# Patient Record
Sex: FEMALE | Race: WHITE | Employment: OTHER | URBAN - METROPOLITAN AREA
[De-identification: names, ages, dates, MRNs, and addresses within clinical notes are randomized per-mention and may not be internally consistent; named-entity substitution may affect disease eponyms.]

---

## 2017-01-05 ENCOUNTER — OFFICE VISIT (OUTPATIENT)
Dept: INTERNAL MEDICINE CLINIC | Age: 82
End: 2017-01-05

## 2017-01-05 VITALS
HEART RATE: 80 BPM | SYSTOLIC BLOOD PRESSURE: 168 MMHG | OXYGEN SATURATION: 98 % | WEIGHT: 178 LBS | RESPIRATION RATE: 16 BRPM | BODY MASS INDEX: 31.54 KG/M2 | HEIGHT: 63 IN | TEMPERATURE: 98.4 F | DIASTOLIC BLOOD PRESSURE: 84 MMHG

## 2017-01-05 DIAGNOSIS — J01.00 ACUTE MAXILLARY SINUSITIS, RECURRENCE NOT SPECIFIED: ICD-10-CM

## 2017-01-05 DIAGNOSIS — H26.9 CATARACT: Primary | ICD-10-CM

## 2017-01-05 DIAGNOSIS — R14.3 EXCESSIVE FLATUS: ICD-10-CM

## 2017-01-05 DIAGNOSIS — H61.21 EXCESSIVE CERUMEN IN EAR CANAL, RIGHT: ICD-10-CM

## 2017-01-05 DIAGNOSIS — Z01.818 PREOP GENERAL PHYSICAL EXAM: ICD-10-CM

## 2017-01-05 DIAGNOSIS — I10 ESSENTIAL HYPERTENSION: ICD-10-CM

## 2017-01-05 RX ORDER — AZITHROMYCIN 250 MG/1
250 TABLET, FILM COATED ORAL SEE ADMIN INSTRUCTIONS
Qty: 6 TAB | Refills: 0 | Status: SHIPPED | OUTPATIENT
Start: 2017-01-05 | End: 2017-01-10

## 2017-01-05 RX ORDER — SIMETHICONE 80 MG
80 TABLET,CHEWABLE ORAL
Qty: 30 TAB | Refills: 0
Start: 2017-01-05 | End: 2017-02-22 | Stop reason: ALTCHOICE

## 2017-01-05 NOTE — PROGRESS NOTES
Have you been to the ER or urgent care clinic since your last visit? No     Have you been hospitalized since your last visit? No     Have you been seen or consulted any other health care provider outside of [facility] since your last visit (including pap smears, colonoscopy screening)?    No

## 2017-01-05 NOTE — PATIENT INSTRUCTIONS
Cataract Surgery: Before Your Surgery  What is cataract surgery? Cataracts are cloudy areas in the lens of your eye. Your lens is behind the colored part of your eye (iris). Its job is to focus light onto the back of your eye. In some people, cataracts prevent light from reaching the back of the eye. This can cause vision problems. Cataract surgery helps you see better. It replaces your natural lens, which has become cloudy, with a clear artificial one. There are two types of cataract surgery. Phacoemulsification (say \"dbfs-yr-gd-wmu-anr-pnk-RUTH-shun\") is the most common type. The doctor makes a small cut in your eye. This cut is called an incision. The doctor uses a special ultrasound tool to break your cloudy lens apart. Sometimes a laser is used too. Then he or she removes the small pieces of the lens through the incision. In most cases, the doctor then inserts an artificial lens through the incision. Most people do not need stitches, because the incision is so small. If the doctor is not able to put in an artificial lens, you can wear a contact lens or thick glasses in place of your natural lens. Extracapsular extraction is a less common type of cataract surgery. The doctor makes a larger incision to remove the whole lens at once. After the doctor removes the lens, he or she stitches up the incision. Recovery from this type of surgery takes longer. Before either surgery, the doctor puts numbing drops in your eye. Some doctors use a shot instead. You may also get medicine to make you feel relaxed. You probably will not feel much pain. The surgery takes about 20 to 40 minutes. After surgery, you may have a bandage or shield on your eye. You will probably go home from surgery after 1 hour in the recovery room. Most people see better in 1 to 3 days. You may be able to go back to work or your normal routine in a few days.  It could take 3 to 10 weeks for your eye to completely heal. After your eye heals, you may still need to wear glasses, especially for reading. Follow-up care is a key part of your treatment and safety. Be sure to make and go to all appointments, and call your doctor if you are having problems. It's also a good idea to know your test results and keep a list of the medicines you take. What happens before surgery? Surgery can be stressful. This information will help you understand what you can expect. And it will help you safely prepare for surgery. Preparing for surgery  · Understand exactly what surgery is planned, along with the risks, benefits, and other options. · Tell your doctors ALL the medicines, vitamins, supplements, and herbal remedies you take. Some of these can increase the risk of bleeding or interact with anesthesia. · If you take blood thinners, such as warfarin (Coumadin), clopidogrel (Plavix), or aspirin, be sure to talk to your doctor. He or she will tell you if you should stop taking these medicines before your surgery. Make sure that you understand exactly what your doctor wants you to do. · Your doctor will tell you which medicines to take or stop before your surgery. You may need to stop taking certain medicines a week or more before surgery. So talk to your doctor as soon as you can. · If you have an advance directive, let your doctor know. It may include a living will and a durable power of  for health care. Bring a copy to the hospital. If you don't have one, you may want to prepare one. It lets your doctor and loved ones know your health care wishes. Doctors advise that everyone prepare these papers before any type of surgery or procedure. What happens on the day of surgery? · Follow the instructions exactly about when to stop eating and drinking. If you don't, your surgery may be canceled. If your doctor told you to take your medicines on the day of surgery, take them with only a sip of water. · Take a bath or shower before you come in for your surgery. Do not apply lotions, perfumes, deodorants, or nail polish. · Take off all jewelry and piercings. And take out contact lenses, if you wear them. At the hospital or surgery center  · Bring a picture ID. · The area for surgery is often marked to make sure there are no errors. · You will be kept comfortable and safe by your anesthesia provider. The anesthesia may make you sleep. Or it may just numb the area being worked on. · The surgery will take about 20 to 40 minutes. Going home  · Be sure you have someone to drive you home. Anesthesia and pain medicine make it unsafe for you to drive. · You will be given more specific instructions about recovering from your surgery. They will cover things like diet, wound care, follow-up care, driving, and getting back to your normal routine. · You may have a bandage or patch over your eye. You may also have a clear shield over your eye. This prevents you from rubbing it. When should you call your doctor? · You have questions or concerns. · You don't understand how to prepare for your surgery. · You become ill before the surgery (such as fever, flu, or a cold). · You need to reschedule or have changed your mind about having the surgery. Where can you learn more? Go to http://jonatan-elvis.info/. Enter K474 in the search box to learn more about \"Cataract Surgery: Before Your Surgery. \"  Current as of: May 23, 2016  Content Version: 11.1  © 8710-8401 [employer]. Care instructions adapted under license by [employer] (which disclaims liability or warranty for this information). If you have questions about a medical condition or this instruction, always ask your healthcare professional. [place] [address] any warranty or liability for your use of this information. Gas and Bloating: Care Instructions  Your Care Instructions  Gas and bloating can be uncomfortable and embarrassing problems.  All people pass gas, but some people produce more gas than others, sometimes enough to cause distress. It is normal to pass gas from 6 to 20 times per day. Excess gas usually is not caused by a serious health problem. Gas and bloating usually are caused by something you eat or drink, including some food supplements and medicines. Gas and bloating are usually harmless and go away without treatment. However, changing your diet can help end the problem. Some over-the-counter medicines can help prevent gas and relieve bloating. Follow-up care is a key part of your treatment and safety. Be sure to make and go to all appointments, and call your doctor if you are having problems. Its also a good idea to know your test results and keep a list of the medicines you take. How can you care for yourself at home? · Keep a food diary if you think a food gives you gas. Write down what you eat or drink. Also record when you get gas. If you notice that a food seems to cause your gas each time, avoid it and see if the gas goes away. Examples of foods that cause gas include:  ¨ Fried and fatty foods. ¨ Beans. ¨ Vegetables such as artichokes, asparagus, broccoli, brussels sprouts, cabbage, cauliflower, cucumbers, green peppers, onions, peas, radishes, and raw potatoes. ¨ Fruits such as apricots, bananas, melons, peaches, pears, prunes, and raw apples. ¨ Wheat and wheat bran. · Soak dry beans in water overnight, then dump the water and cook the soaked beans in new water. This can help prevent gas and bloating. · If you have problems with lactose, avoid dairy products such as milk and cheese. · Try not to swallow air. Do not drink through a straw, gulp your food, or chew gum. · Take an over-the-counter medicine. Read and follow all instructions on the label. ¨ Food enzymes, such as Beano, can be added to gas-producing foods to prevent gas.   ¨ Antacids, such as Maalox Anti-Gas and Mylanta Gas, can relieve bloating by making you burp.  ¨ Activated charcoal tablets, such as CharcoCaps, may decrease odor from gas you pass. ¨ If you have problems with lactose, you can take medicines such as Dairy Ease and Lactaid with dairy products to prevent gas and bloating. · Get some exercise regularly. When should you call for help? Call 911 anytime you think you may need emergency care. For example, call if:  · You have gas and signs of a heart attack, such as:  ¨ Chest pain or pressure. ¨ Sweating. ¨ Shortness of breath. ¨ Nausea or vomiting. ¨ Pain that spreads from the chest to the neck, jaw, or one or both shoulders or arms. ¨ Dizziness or lightheadedness. ¨ A fast or uneven pulse. After calling 911, chew 1 adult-strength aspirin. Wait for an ambulance. Do not try to drive yourself. Call your doctor now or seek immediate medical care if:  · You have severe belly pain. · You have blood in your stool. Watch closely for changes in your health, and be sure to contact your doctor if:  · You have blood or pus in your urine. · Your urine is cloudy or smells bad. · You are burping and have trouble swallowing. · You feel bloated and have swelling in your belly. · You do not get better as expected. Where can you learn more? Go to http://jonatan-elvis.info/. Enter O474 in the search box to learn more about \"Gas and Bloating: Care Instructions. \"  Current as of: May 27, 2016  Content Version: 11.1  © 6716-2970 [employer]. Care instructions adapted under license by [employer] (which disclaims liability or warranty for this information). If you have questions about a medical condition or this instruction, always ask your healthcare professional. [place] [address] any warranty or liability for your use of this information.

## 2017-01-05 NOTE — MR AVS SNAPSHOT
Visit Information Date & Time Provider Department Dept. Phone Encounter #  
 1/5/2017  2:00 PM Jayro Mccullough NP [facility] [id number] Your Appointments 6/13/2017 11:40 AM  
ESTABLISHED PATIENT with Rafael Segundo MD  
[facility] ([facility]) Appt Note: annual  
 [address] [facility] [place] [address] [place] [address] Road  
54 [facility] [address] [address] Upcoming Health Maintenance Date Due DTaP/Tdap/Td series (1 - Tdap) 12/30/1956 ZOSTER VACCINE AGE 60> 12/30/1996 Pneumococcal 65+ Low/Medium Risk (2 of 2 - PPSV23) 5/20/2014 COLONOSCOPY 1/1/2016 GLAUCOMA SCREENING Q2Y 4/1/2016 INFLUENZA AGE 9 TO ADULT 8/1/2016 MEDICARE YEARLY EXAM 11/12/2016 Allergies as of 1/5/2017  Review Complete On: 1/5/2017 By: Jayro Mccullough NP Severity Noted Reaction Type Reactions Ceclor [Cefaclor]  03/31/2009    Other (comments) Pain flank, abd  
 Cleocin [Clindamycin Hcl]  03/31/2009    Other (comments) Flank, abd pain Codeine  03/31/2009    Nausea and Vomiting Compazine [Prochlorperazine]  03/31/2009    Other (comments) Hyper and anxious Fosamax [Alendronate]  03/31/2009    Other (comments) Indigestion Macrobid [Nitrofurantoin Monohyd/m-cryst]  12/06/2012   Systemic Swelling, Other (comments)  
 wheezing Pcn [Penicillins]  03/31/2009    Rash Phenobarbital  03/31/2009    Other (comments)  
 anxious Sulfa (Sulfonamide Antibiotics)  03/31/2009    Rash Headache Current Immunizations  Reviewed on 4/11/2011 Name Date Influenza Vaccine (Whole Virus) 1/1/2013 Influenza Vaccine Split 11/17/2010  8:33 AM  
 Influenza Vaccine Whole 4/11/2010 PPD 3/23/2011 12:57 PM  
 Pneumococcal Vaccine (Unspecified Type) 5/20/2009 Not reviewed this visit You Were Diagnosed With   
  
 Codes Comments Cataract    -  Primary ICD-10-CM: H26.9 ICD-9-CM: 366.9 Preop general physical exam     ICD-10-CM: T67.681 ICD-9-CM: V72.83 Acute maxillary sinusitis, recurrence not specified     ICD-10-CM: J01.00 ICD-9-CM: 461.0 Essential hypertension     ICD-10-CM: I10 
ICD-9-CM: 401.9 Excessive flatus     ICD-10-CM: R14.3 ICD-9-CM: 787.3 Excessive cerumen in ear canal, right     ICD-10-CM: H61.21 ICD-9-CM: 380.4 Vitals BP Pulse Temp Resp Height(growth percentile) Weight(growth percentile) 168/84 (BP 1 Location: Left arm, BP Patient Position: Sitting) 80 98.4 °F (36.9 °C) (Oral) 16 5' 3\" (1.6 m) 178 lb (80.7 kg) SpO2 BMI OB Status Smoking Status 98% 31.53 kg/m2 Postmenopausal Former Smoker BMI and BSA Data Body Mass Index Body Surface Area  
 31.53 kg/m 2 1.89 m 2 Preferred Pharmacy Pharmacy Name Phone [id number] [address], [address] [phone] Your Updated Medication List  
  
   
This list is accurate as of: 1/5/17  3:04 PM.  Always use your most recent med list.  
  
  
  
  
 aspirin delayed-release 81 mg tablet  
take 162 mg by mouth daily. atenolol 25 mg tablet Commonly known as:  TENORMIN Take 1 Tab by mouth daily. azithromycin 250 mg tablet Commonly known as:  Ocasio Congress Take 1 Tab by mouth See Admin Instructions for 5 days. COPPER GLUCONATE PO Take  by mouth. diazePAM 5 mg tablet Commonly known as:  VALIUM Take 0.5 Tabs by mouth every six (6) hours as needed for Anxiety. hydroCHLOROthiazide 25 mg tablet Commonly known as:  HYDRODIURIL Take 1 Tab by mouth daily. OTHER  
  
 PRESERVISION LUTEIN 226 mg-200 unit -5 mg-0.8 mg Cap Generic drug:  Vit C-Vit E-Copper-ZnOx-Lutein Take  by mouth. simethicone 80 mg chewable tablet Commonly known as:  Marrifrancisco Smooth Take 1 Tab by mouth every six (6) hours as needed for Flatulence. TYLENOL EXTRA STRENGTH 500 mg tablet Generic drug:  acetaminophen Take  by mouth every six (6) hours as needed for Pain. VITAMIN D3 1,000 unit Cap Generic drug:  cholecalciferol Take 2,000 Int'l Units by mouth daily. Prescriptions Sent to Pharmacy Refills  
 azithromycin (ZITHROMAX) 250 mg tablet 0 Sig: Take 1 Tab by mouth See Admin Instructions for 5 days. Class: Normal  
 Pharmacy: [address] [place] [address] #: [phone] Route: Oral  
  
We Performed the Following [place] REMOVAL IMPACTED CERUMEN IRRIGATION/LVG UNILAT [19514 CPT(R)] Patient Instructions Cataract Surgery: Before Your Surgery What is cataract surgery? Cataracts are cloudy areas in the lens of your eye. Your lens is behind the colored part of your eye (iris). Its job is to focus light onto the back of your eye. In some people, cataracts prevent light from reaching the back of the eye. This can cause vision problems. Cataract surgery helps you see better. It replaces your natural lens, which has become cloudy, with a clear artificial one. There are two types of cataract surgery. Phacoemulsification (say \"iipv-yf-je-ong-ybc-cwp-RUTH-shun\") is the most common type. The doctor makes a small cut in your eye. This cut is called an incision. The doctor uses a special ultrasound tool to break your cloudy lens apart. Sometimes a laser is used too. Then he or she removes the small pieces of the lens through the incision. In most cases, the doctor then inserts an artificial lens through the incision. Most people do not need stitches, because the incision is so small. If the doctor is not able to put in an artificial lens, you can wear a contact lens or thick glasses in place of your natural lens. Extracapsular extraction is a less common type of cataract surgery. The doctor makes a larger incision to remove the whole lens at once.  After the doctor removes the lens, he or she stitches up the incision. Recovery from this type of surgery takes longer. Before either surgery, the doctor puts numbing drops in your eye. Some doctors use a shot instead. You may also get medicine to make you feel relaxed. You probably will not feel much pain. The surgery takes about 20 to 40 minutes. After surgery, you may have a bandage or shield on your eye. You will probably go home from surgery after 1 hour in the recovery room. Most people see better in 1 to 3 days. You may be able to go back to work or your normal routine in a few days. It could take 3 to 10 weeks for your eye to completely heal. After your eye heals, you may still need to wear glasses, especially for reading. Follow-up care is a key part of your treatment and safety. Be sure to make and go to all appointments, and call your doctor if you are having problems. It's also a good idea to know your test results and keep a list of the medicines you take. What happens before surgery? Surgery can be stressful. This information will help you understand what you can expect. And it will help you safely prepare for surgery. Preparing for surgery · Understand exactly what surgery is planned, along with the risks, benefits, and other options. · Tell your doctors ALL the medicines, vitamins, supplements, and herbal remedies you take. Some of these can increase the risk of bleeding or interact with anesthesia. · If you take blood thinners, such as warfarin (Coumadin), clopidogrel (Plavix), or aspirin, be sure to talk to your doctor. He or she will tell you if you should stop taking these medicines before your surgery. Make sure that you understand exactly what your doctor wants you to do. · Your doctor will tell you which medicines to take or stop before your surgery. You may need to stop taking certain medicines a week or more before surgery. So talk to your doctor as soon as you can. · If you have an advance directive, let your doctor know. It may include a living will and a durable power of  for health care. Bring a copy to the hospital. If you don't have one, you may want to prepare one. It lets your doctor and loved ones know your health care wishes. Doctors advise that everyone prepare these papers before any type of surgery or procedure. What happens on the day of surgery? · Follow the instructions exactly about when to stop eating and drinking. If you don't, your surgery may be canceled. If your doctor told you to take your medicines on the day of surgery, take them with only a sip of water. · Take a bath or shower before you come in for your surgery. Do not apply lotions, perfumes, deodorants, or nail polish. · Take off all jewelry and piercings. And take out contact lenses, if you wear them. At the hospital or surgery center · Bring a picture ID. · The area for surgery is often marked to make sure there are no errors. · You will be kept comfortable and safe by your anesthesia provider. The anesthesia may make you sleep. Or it may just numb the area being worked on. · The surgery will take about 20 to 40 minutes. Going home · Be sure you have someone to drive you home. Anesthesia and pain medicine make it unsafe for you to drive. · You will be given more specific instructions about recovering from your surgery. They will cover things like diet, wound care, follow-up care, driving, and getting back to your normal routine. · You may have a bandage or patch over your eye. You may also have a clear shield over your eye. This prevents you from rubbing it. When should you call your doctor? · You have questions or concerns. · You don't understand how to prepare for your surgery. · You become ill before the surgery (such as fever, flu, or a cold). · You need to reschedule or have changed your mind about having the surgery. Where can you learn more? Go to http://jonatan-elvis.info/. Enter K474 in the search box to learn more about \"Cataract Surgery: Before Your Surgery. \" Current as of: May 23, 2016 Content Version: 11.1 © 8297-0140 [employer]. Care instructions adapted under license by [employer] (which disclaims liability or warranty for this information). If you have questions about a medical condition or this instruction, always ask your healthcare professional. [address] any warranty or liability for your use of this information. Gas and Bloating: Care Instructions Your Care Instructions Gas and bloating can be uncomfortable and embarrassing problems. All people pass gas, but some people produce more gas than others, sometimes enough to cause distress. It is normal to pass gas from 6 to 20 times per day. Excess gas usually is not caused by a serious health problem. Gas and bloating usually are caused by something you eat or drink, including some food supplements and medicines. Gas and bloating are usually harmless and go away without treatment. However, changing your diet can help end the problem. Some over-the-counter medicines can help prevent gas and relieve bloating. Follow-up care is a key part of your treatment and safety. Be sure to make and go to all appointments, and call your doctor if you are having problems. Its also a good idea to know your test results and keep a list of the medicines you take. How can you care for yourself at home? · Keep a food diary if you think a food gives you gas. Write down what you eat or drink. Also record when you get gas. If you notice that a food seems to cause your gas each time, avoid it and see if the gas goes away. Examples of foods that cause gas include: ¨ Fried and fatty foods. ¨ Beans.  
¨ Vegetables such as artichokes, asparagus, broccoli, brussels sprouts, cabbage, cauliflower, cucumbers, green peppers, onions, peas, radishes, and raw potatoes. ¨ Fruits such as apricots, bananas, melons, peaches, pears, prunes, and raw apples. ¨ Wheat and wheat bran. · Soak dry beans in water overnight, then dump the water and cook the soaked beans in new water. This can help prevent gas and bloating. · If you have problems with lactose, avoid dairy products such as milk and cheese. · Try not to swallow air. Do not drink through a straw, gulp your food, or chew gum. · Take an over-the-counter medicine. Read and follow all instructions on the label. ¨ Food enzymes, such as Beano, can be added to gas-producing foods to prevent gas. ¨ Antacids, such as Maalox Anti-Gas and Mylanta Gas, can relieve bloating by making you burp. ¨ Activated charcoal tablets, such as CharcoCaps, may decrease odor from gas you pass. ¨ If you have problems with lactose, you can take medicines such as Dairy Ease and Lactaid with dairy products to prevent gas and bloating. · Get some exercise regularly. When should you call for help? Call 911 anytime you think you may need emergency care. For example, call if: 
· You have gas and signs of a heart attack, such as: ¨ Chest pain or pressure. ¨ Sweating. ¨ Shortness of breath. ¨ Nausea or vomiting. ¨ Pain that spreads from the chest to the neck, jaw, or one or both shoulders or arms. ¨ Dizziness or lightheadedness. ¨ A fast or uneven pulse. After calling 911, chew 1 adult-strength aspirin. Wait for an ambulance. Do not try to drive yourself. Call your doctor now or seek immediate medical care if: 
· You have severe belly pain. · You have blood in your stool. Watch closely for changes in your health, and be sure to contact your doctor if: 
· You have blood or pus in your urine. · Your urine is cloudy or smells bad. · You are burping and have trouble swallowing. · You feel bloated and have swelling in your belly. · You do not get better as expected. Where can you learn more? Go to http://jonatan-elvis.info/. Enter P513 in the search box to learn more about \"Gas and Bloating: Care Instructions. \" Current as of: May 27, 2016 Content Version: 11.1 © 3095-7368 [employer]. Care instructions adapted under license by [employer] (which disclaims liability or warranty for this information). If you have questions about a medical condition or this instruction, always ask your healthcare professional. [address] any warranty or liability for your use of this information. Introducing [facility] & HE[facility]! [facility] introduces [employer] patient portal. Now you can access parts of your medical record, email your doctor's office, and request medication refills online. 1. In your internet browser, go to [email or web address] 2. Click on the First Time User? Click Here link in the Sign In box. You will see the New Member Sign Up page. 3. Enter your [employer] Access Code exactly as it appears below. You will not need to use this code after youve completed the sign-up process. If you do not sign up before the expiration date, you must request a new code. · [employer] Access Code: [email or web address] Expires: 4/5/2017  3:04 PM 
 
4. Enter the last four digits of your Social Security Number (xxxx) and Date of Birth (mm/dd/yyyy) as indicated and click Submit. You will be taken to the next sign-up page. 5. Create a DCITSt ID. This will be your [employer] login ID and cannot be changed, so think of one that is secure and easy to remember. 6. Create a [employer] password. You can change your password at any time. 7. Enter your Password Reset Question and Answer. This can be used at a later time if you forget your password. 8. Enter your e-mail address. You will receive e-mail notification when new information is available in [address] 9. Click Sign Up. You can now view and download portions of your medical record. 10. Click the Download Summary menu link to download a portable copy of your medical information. If you have questions, please visit the Frequently Asked Questions section of the August website. Remember, August is NOT to be used for urgent needs. For medical emergencies, dial 911. Now available from your iPhone and Android! Please provide this summary of care documentation to your next provider. Your primary care clinician is listed as Hina Honeycutt. If you have any questions after today's visit, please call [phone].

## 2017-01-05 NOTE — PROGRESS NOTES
HISTORY OF PRESENT ILLNESS  Tamela Bergman is a [de-identified] y.o. female. HPI  Tamela Bergman was referred for evaluation by:Dr. Don Gonzalez for Pre- Op Evaluation. Please see encounter details and orders for consultative summary. Type of surgery : Cataract extraction with lens implant  Surgery site : Left eye  Anesthesia type: Regional block  Date of procedure:  1/18/2017    Complains of sinus pain and pressure for the past week; began with sore throat, right ear fullness and has developed thick post nasal drainage with foul taste in mouth. Has had mild dry cough; denies fever, chills. Has not taken any OTC medications for current symptoms. Complains of excessive flatus for the past month; denies eating unusual foods or foods that produce a lot of gas; has been under more stress with recent move into apartment and has not been eating as regularly. Denies constipation but feels her bowel habits have not been as regular as usual.  No abdominal pain, nausea or vomiting. Allergies: Allergies   Allergen Reactions    Ceclor [Cefaclor] Other (comments)     Pain flank, abd      Cleocin [Clindamycin Hcl] Other (comments)     Flank, abd pain    Codeine Nausea and Vomiting    Compazine [Prochlorperazine] Other (comments)     Hyper and anxious    Fosamax [Alendronate] Other (comments)     Indigestion      Macrobid [Nitrofurantoin Monohyd/M-Cryst] Swelling and Other (comments)     wheezing    Pcn [Penicillins] Rash    Phenobarbital Other (comments)     anxious    Sulfa (Sulfonamide Antibiotics) Rash     Headache       Latex allergy: no  Prior reactions to anesthesia:  None    Functional status:  she is able to ambulate up 2 flights of step without shortness of breath, chest pain  Prior cardiac evaluation:  Stress echo normal in 2015        Current Outpatient Prescriptions   Medication Sig    azithromycin (ZITHROMAX) 250 mg tablet Take 1 Tab by mouth See Admin Instructions for 5 days.     simethicone (MYLICON) 80 mg chewable tablet Take 1 Tab by mouth every six (6) hours as needed for Flatulence.  atenolol (TENORMIN) 25 mg tablet Take 1 Tab by mouth daily.  hydrochlorothiazide (HYDRODIURIL) 25 mg tablet Take 1 Tab by mouth daily.  Cholecalciferol, Vitamin D3, (VITAMIN D3) 1,000 unit cap Take 2,000 Int'l Units by mouth daily.  acetaminophen (TYLENOL EXTRA STRENGTH) 500 mg tablet Take  by mouth every six (6) hours as needed for Pain.  OTHER      Vit C-Vit E-Copper-ZnOx-Lutein (PRESERVISION) 226-200-5 mg-unit-mg Cap Take  by mouth.  COPPER GLUCONATE PO Take  by mouth.  aspirin delayed-release 81 mg tablet take 162 mg by mouth daily.  diazepam (VALIUM) 5 mg tablet Take 0.5 Tabs by mouth every six (6) hours as needed for Anxiety. No current facility-administered medications for this visit. Past Medical History   Diagnosis Date    Hypertension     Kidney stones 11/17/2010    Macular degeneration     Palpitation     Seizures ([facility] [address]      since 1992    Sleep apnea 11/16/2010     uses cpap    TIA (transient ischemic attack) 2003,2009,2011     tia's x 3  (she says it was related to estrogen use)     Past Surgical History   Procedure Laterality Date    Endoscopy, colon, diagnostic       2006 neg    Hx cholecystectomy       20 years ago with adhesions found    Hx abdominal wall defect repair       biliary stones and ercp for pancreatitis    Hx gyn       anastasiia endometriosis    Hx hysterectomy  1972    Hx tonsillectomy       x 2     Social History   Substance Use Topics    Smoking status: Former Smoker     Years: 2.00     Quit date: 1/1/1965    Smokeless tobacco: Never Used      Comment: stopped in her 25s    Alcohol use 3.5 oz/week     7 Glasses of wine per week       Blood pressure 168/84, pulse 80, temperature 98.4 °F (36.9 °C), temperature source Oral, resp. rate 16, height 5' 3\" (1.6 m), weight 178 lb (80.7 kg), SpO2 98 %.     Blood pressure rechecked in office 140/84    Review of Systems   Constitutional: Positive for malaise/fatigue. Negative for chills and fever. HENT: Positive for congestion, ear pain, hearing loss and sore throat. Eyes: Positive for blurred vision. Respiratory: Positive for cough. Negative for sputum production and shortness of breath. Cardiovascular: Negative for chest pain and palpitations. Gastrointestinal: Negative for abdominal pain, blood in stool, diarrhea, nausea and vomiting. Genitourinary: Negative for dysuria and frequency. Musculoskeletal: Negative for myalgias. Skin: Negative for rash. Neurological: Positive for headaches. Negative for dizziness and tingling. Physical Exam   Constitutional: She is oriented to person, place, and time. She appears well-developed and well-nourished. HENT:   Head: Normocephalic and atraumatic. Right Ear: External ear normal.   Left Ear: External ear normal.   Nose: Mucosal edema present. Right sinus exhibits maxillary sinus tenderness. Left sinus exhibits maxillary sinus tenderness. Mouth/Throat: Posterior oropharyngeal erythema present. No oropharyngeal exudate. Right ear canal occluded with cerumen   Neck: Normal range of motion. Neck supple. No thyromegaly present. Cardiovascular: Normal rate and regular rhythm. Pulmonary/Chest: Effort normal and breath sounds normal. She has no wheezes. Abdominal: Soft. Bowel sounds are normal. There is no tenderness. There is no rebound. Musculoskeletal: Normal range of motion. She exhibits no edema. Lymphadenopathy:     She has no cervical adenopathy. Neurological: She is alert and oriented to person, place, and time. Psychiatric: She has a normal mood and affect. Her behavior is normal.   Nursing note and vitals reviewed. ASSESSMENT and PLAN  Winnie Rodriguez was seen today for pre-op exam and pressure behind the eyes.     Diagnoses and all orders for this visit:    Cataract    Preop general physical exam -- considered medically stable to undergo Left cataract extraction surgery under regional anesthesia    Acute maxillary sinusitis, recurrence not specified  -     azithromycin (ZITHROMAX) 250 mg tablet; Take 1 Tab by mouth See Admin Instructions for 5 days. Essential hypertension --stable on recheck    Excessive flatus  -     simethicone (MYLICON) 80 mg chewable tablet; Take 1 Tab by mouth every six (6) hours as needed for Flatulence. Excessive cerumen in ear canal, right -- large amount of cerumen removed via irrigation; patient tolerated well.   -     RI REMOVAL IMPACTED CERUMEN IRRIGATION/LVG UNILAT      lab results and schedule of future lab studies reviewed with patient  reviewed diet, exercise and weight control  reviewed medications and side effects in detail

## 2017-01-12 ENCOUNTER — OFFICE VISIT (OUTPATIENT)
Dept: INTERNAL MEDICINE CLINIC | Age: 82
End: 2017-01-12

## 2017-01-12 ENCOUNTER — HOSPITAL ENCOUNTER (OUTPATIENT)
Dept: GENERAL RADIOLOGY | Age: 82
Discharge: HOME | End: 2017-01-12
Attending: NURSE PRACTITIONER
Payer: MEDICARE

## 2017-01-12 VITALS
BODY MASS INDEX: 31.01 KG/M2 | TEMPERATURE: 98.6 F | SYSTOLIC BLOOD PRESSURE: 146 MMHG | OXYGEN SATURATION: 98 % | WEIGHT: 175 LBS | RESPIRATION RATE: 16 BRPM | HEART RATE: 74 BPM | HEIGHT: 63 IN | DIASTOLIC BLOOD PRESSURE: 78 MMHG

## 2017-01-12 DIAGNOSIS — J98.01 BRONCHOSPASM: ICD-10-CM

## 2017-01-12 DIAGNOSIS — J40 BRONCHITIS: ICD-10-CM

## 2017-01-12 DIAGNOSIS — R10.9 FLANK PAIN: Primary | ICD-10-CM

## 2017-01-12 LAB
BILIRUB UR QL STRIP: NEGATIVE
GLUCOSE UR-MCNC: NEGATIVE MG/DL
KETONES P FAST UR STRIP-MCNC: NEGATIVE MG/DL
PH UR STRIP: 7 [PH] (ref 4.6–8)
PROT UR QL STRIP: NEGATIVE MG/DL
SP GR UR STRIP: 1.02 (ref 1–1.03)
UA UROBILINOGEN AMB POC: NORMAL (ref 0.2–1)
URINALYSIS CLARITY POC: CLEAR
URINALYSIS COLOR POC: YELLOW
URINE BLOOD POC: NORMAL
URINE LEUKOCYTES POC: NEGATIVE
URINE NITRITES POC: NEGATIVE

## 2017-01-12 PROCEDURE — 71020 XR CHEST PA LAT: CPT

## 2017-01-12 RX ORDER — LEVALBUTEROL INHALATION SOLUTION 1.25 MG/3ML
1.25 SOLUTION RESPIRATORY (INHALATION)
Qty: 3 ML | Refills: 0
Start: 2017-01-12 | End: 2017-01-12

## 2017-01-12 RX ORDER — DOXYCYCLINE 100 MG/1
100 TABLET ORAL 2 TIMES DAILY
Qty: 14 TAB | Refills: 0 | Status: SHIPPED | OUTPATIENT
Start: 2017-01-12 | End: 2017-02-22 | Stop reason: ALTCHOICE

## 2017-01-12 RX ORDER — ALBUTEROL SULFATE 90 UG/1
2 AEROSOL, METERED RESPIRATORY (INHALATION)
Qty: 1 INHALER | Refills: 0 | Status: SHIPPED | OUTPATIENT
Start: 2017-01-12 | End: 2017-02-22 | Stop reason: ALTCHOICE

## 2017-01-12 NOTE — MR AVS SNAPSHOT
Visit Information Date & Time Provider Department Dept. Phone Encounter #  
 1/12/2017  9:40 AM Genesis Rubio NP [facility] [address] [facility] [id number] Your Appointments 6/13/2017 11:40 AM  
ESTABLISHED PATIENT with Su Morton MD  
[facility] ([address]) Appt Note: annual  
 [address] [facility] [place] [address] [facility]  
[address] [facility] [address] [address] Upcoming Health Maintenance Date Due DTaP/Tdap/Td series (1 - Tdap) 12/30/1956 ZOSTER VACCINE AGE 60> 12/30/1996 Pneumococcal 65+ Low/Medium Risk (2 of 2 - PPSV23) 5/20/2014 COLONOSCOPY 1/1/2016 GLAUCOMA SCREENING Q2Y 4/1/2016 INFLUENZA AGE 9 TO ADULT 8/1/2016 MEDICARE YEARLY EXAM 11/12/2016 Allergies as of 1/12/2017  Review Complete On: 1/12/2017 By: Genesis Rubio NP Severity Noted Reaction Type Reactions Ceclor [Cefaclor]  03/31/2009    Other (comments) Pain flank, abd  
 Cleocin [Clindamycin Hcl]  03/31/2009    Other (comments) Flank, abd pain Codeine  03/31/2009    Nausea and Vomiting Compazine [Prochlorperazine]  03/31/2009    Other (comments) Hyper and anxious Fosamax [Alendronate]  03/31/2009    Other (comments) Indigestion Macrobid [Nitrofurantoin Monohyd/m-cryst]  12/06/2012   Systemic Swelling, Other (comments)  
 wheezing Pcn [Penicillins]  03/31/2009    Rash Phenobarbital  03/31/2009    Other (comments)  
 anxious Sulfa (Sulfonamide Antibiotics)  03/31/2009    Rash Headache Current Immunizations  Reviewed on 4/11/2011 Name Date Influenza Vaccine (Whole Virus) 1/1/2013 Influenza Vaccine Split 11/17/2010  8:33 AM  
 Influenza Vaccine Whole 4/11/2010 PPD 3/23/2011 12:57 PM  
 Pneumococcal Vaccine (Unspecified Type) 5/20/2009 Not reviewed this visit You Were Diagnosed With   
  
 Codes Comments Flank pain    -  Primary ICD-10-CM: R10.9 ICD-9-CM: 789.09 Bronchospasm     ICD-10-CM: J98.01 
ICD-9-CM: 519.11 Bronchitis     ICD-10-CM: J40 ICD-9-CM: 383 Vitals BP Pulse Temp Resp Height(growth percentile) Weight(growth percentile) 146/78 (BP 1 Location: Left arm, BP Patient Position: Sitting) 74 98.6 °F (37 °C) (Oral) 16 5' 3\" (1.6 m) 175 lb (79.4 kg) SpO2 BMI OB Status Smoking Status 98% 31 kg/m2 Postmenopausal Former Smoker BMI and BSA Data Body Mass Index Body Surface Area  
 31 kg/m 2 1.88 m 2 Preferred Pharmacy Pharmacy Name Phone [facility][id number]- [address], [address] [phone] Your Updated Medication List  
  
   
This list is accurate as of: 1/12/17 11:02 AM.  Always use your most recent med list.  
  
  
  
  
 albuterol 90 mcg/actuation inhaler Commonly known as:  PROVENTIL HFA, VENTOLIN HFA, PROAIR HFA Take 2 Puffs by inhalation every six (6) hours as needed for Wheezing. aspirin delayed-release 81 mg tablet  
take 162 mg by mouth daily. atenolol 25 mg tablet Commonly known as:  TENORMIN Take 1 Tab by mouth daily. COPPER GLUCONATE PO Take  by mouth. diazePAM 5 mg tablet Commonly known as:  VALIUM Take 0.5 Tabs by mouth every six (6) hours as needed for Anxiety. hydroCHLOROthiazide 25 mg tablet Commonly known as:  HYDRODIURIL Take 1 Tab by mouth daily. levalbuterol 1.25 mg/3 mL Nebu Commonly known as:  XOPENEX  
3 mL by Nebulization route now for 1 dose. OTHER  
  
 PRESERVISION LUTEIN 226 mg-200 unit -5 mg-0.8 mg Cap Generic drug:  Vit C-Vit E-Copper-ZnOx-Lutein Take  by mouth. simethicone 80 mg chewable tablet Commonly known as:  Sarajane Bumps Take 1 Tab by mouth every six (6) hours as needed for Flatulence. TYLENOL EXTRA STRENGTH 500 mg tablet Generic drug:  acetaminophen Take  by mouth every six (6) hours as needed for Pain. VITAMIN D3 1,000 unit Cap Generic drug:  cholecalciferol Take 2,000 Int'l Units by mouth daily. Prescriptions Sent to Pharmacy Refills  
 albuterol (PROVENTIL HFA, VENTOLIN HFA, PROAIR HFA) 90 mcg/actuation inhaler 0 Sig: Take 2 Puffs by inhalation every six (6) hours as needed for Wheezing. Class: Normal  
 Pharmacy: [address] [place] [address] Ph #: [phone] Route: Inhalation We Performed the Following AMB POC URINALYSIS DIP STICK AUTO W/O MICRO [48491 CPT(R)] LEVALBUTEROL, INHAL. SOL., FDA-APPROVED FINAL, NON-COMPOUND UNIT DOSE, 0.5 MG [ [facility]] [place] INHAL RX, AIRWAY OBST/DX SPUTUM INDUCT [id number] CPT(R)] To-Do List   
 01/12/2017 Imaging:  XR CHEST PA LAT Patient Instructions Bronchitis: Care Instructions Your Care Instructions Bronchitis is inflammation of the bronchial tubes, which carry air to the lungs. The tubes swell and produce mucus, or phlegm. The mucus and inflamed bronchial tubes make you cough. You may have trouble breathing. Most cases of bronchitis are caused by viruses like those that cause colds. Antibiotics usually do not help and they may be harmful. Bronchitis usually develops rapidly and lasts about 2 to 3 weeks in otherwise healthy people. Follow-up care is a key part of your treatment and safety. Be sure to make and go to all appointments, and call your doctor if you are having problems. It's also a good idea to know your test results and keep a list of the medicines you take. How can you care for yourself at home? · Take all medicines exactly as prescribed. Call your doctor if you think you are having a problem with your medicine. · Get some extra rest. 
· Take an over-the-counter pain medicine, such as acetaminophen (Tylenol), ibuprofen (Advil, Motrin), or naproxen (Aleve) to reduce fever and relieve body aches. Read and follow all instructions on the label. · Do not take two or more pain medicines at the same time unless the doctor told you to. Many pain medicines have acetaminophen, which is Tylenol. Too much acetaminophen (Tylenol) can be harmful. · Take an over-the-counter cough medicine that contains dextromethorphan to help quiet a dry, hacking cough so that you can sleep. Avoid cough medicines that have more than one active ingredient. Read and follow all instructions on the label. · Breathe moist air from a humidifier, hot shower, or sink filled with hot water. The heat and moisture will thin mucus so you can cough it out. · Do not smoke. Smoking can make bronchitis worse. If you need help quitting, talk to your doctor about stop-smoking programs and medicines. These can increase your chances of quitting for good. When should you call for help? Call 911 anytime you think you may need emergency care. For example, call if: 
· You have severe trouble breathing. Call your doctor now or seek immediate medical care if: 
· You have new or worse trouble breathing. · You cough up dark brown or bloody mucus (sputum). · You have a new or higher fever. · You have a new rash. Watch closely for changes in your health, and be sure to contact your doctor if: 
· You cough more deeply or more often, especially if you notice more mucus or a change in the color of your mucus. · You are not getting better as expected. Where can you learn more? Go to http://jonatan-elvis.info/. Enter H333 in the search box to learn more about \"Bronchitis: Care Instructions. \" Current as of: May 23, 2016 Content Version: 11.1 © 5107-4632 [employer]. Care instructions adapted under license by [employer] (which disclaims liability or warranty for this information).  If you have questions about a medical condition or this instruction, always ask your healthcare professional. Katy Mcfarlane [facility] disclaims any warranty or liability for your use of this information. Reactive Airway Disease: Care Instructions Your Care Instructions Reactive airway disease is a breathing problem that appears as wheezing, a whistling noise in your airways. It may be caused by a viral or bacterial infection, allergies, tobacco smoke, or something else in the environment. When you are around these triggers, your body releases chemicals that make the airways get tight. Reactive airway disease is a lot like asthma. Both can cause wheezing. But asthma is ongoing, while reactive airway disease may occur only now and then. Tests can be done to tell whether you have asthma. You may take the same medicines used to treat asthma. Good home care and follow-up care with your doctor can help you recover. Follow-up care is a key part of your treatment and safety. Be sure to make and go to all appointments, and call your doctor if you are having problems. It's also a good idea to know your test results and keep a list of the medicines you take. How can you care for yourself at home? · Take your medicines exactly as prescribed. Call your doctor if you think you are having a problem with your medicine. · Do not smoke or allow others to smoke around you. If you need help quitting, talk to your doctor about stop-smoking programs and medicines. These can increase your chances of quitting for good. · If you know what caused your wheezing (such as perfume or the odor of household chemicals), try to avoid it in the future. · Wash your hands several times a day, and consider using hand gels or wipes that contain alcohol. This can prevent colds and other infections. When should you call for help? Call 911 anytime you think you may need emergency care. For example, call if: 
· You have severe trouble breathing. Watch closely for changes in your health, and be sure to contact your doctor if: · You cough up yellow, dark brown, or bloody mucus. · You have a fever. · Your wheezing gets worse. Where can you learn more? Go to http://jonatan-elvis.info/. Enter H561 in the search box to learn more about \"Reactive Airway Disease: Care Instructions. \" Current as of: May 23, 2016 Content Version: 11.1 © 5832-3700 [employer]. Care instructions adapted under license by [employer] (which disclaims liability or warranty for this information). If you have questions about a medical condition or this instruction, always ask your healthcare professional. [place] [address] any warranty or liability for your use of this information. Introducing [facility]! [employer] introduces TrovaGene patient portal. Now you can access parts of your medical record, email your doctor's office, and request medication refills online. 1. In your internet browser, go to [email or web address] 2. Click on the First Time User? Click Here link in the Sign In box. You will see the New Member Sign Up page. 3. Enter your TrovaGene Access Code exactly as it appears below. You will not need to use this code after youve completed the sign-up process. If you do not sign up before the expiration date, you must request a new code. · TrovaGene Access Code: [email or web address] Expires: 4/5/2017  3:04 PM 
 
4. Enter the last four digits of your Social Security Number (xxxx) and Date of Birth (mm/dd/yyyy) as indicated and click Submit. You will be taken to the next sign-up page. 5. Create a TrovaGene ID. This will be your TrovaGene login ID and cannot be changed, so think of one that is secure and easy to remember. 6. Create a TrovaGene password. You can change your password at any time. 7. Enter your Password Reset Question and Answer. This can be used at a later time if you forget your password. 8. Enter your e-mail address. You will receive e-mail notification when new information is available in [address] 9. Click Sign Up. You can now view and download portions of your medical record. 10. Click the Download Summary menu link to download a portable copy of your medical information. If you have questions, please visit the Frequently Asked Questions section of the Share Your Brain website. Remember, Share Your Brain is NOT to be used for urgent needs. For medical emergencies, dial 911. Now available from your iPhone and Android! Please provide this summary of care documentation to your next provider. Your primary care clinician is listed as Willa Baker. If you have any questions after today's visit, please call [phone].

## 2017-01-12 NOTE — PROGRESS NOTES
Have you been to the ER or urgent care clinic since your last visit? No     Have you been hospitalized since your last visit? No     Have you been seen or consulted any other health care provider outside of [address] [place] [address] since your last visit (including pap smears, colonoscopy screening)?    No

## 2017-01-12 NOTE — PROGRESS NOTES
HISTORY OF PRESENT ILLNESS  Tamela Bergman is a [de-identified] y.o. female. HPI  Presents for followup from 1/5/2017 office visit where she was diagnosed with sinus infection; has completed course of Zithromax and sinus pain and pressure has improved but now she has deeper loose cough and has been feeling more weak and tired. Has also had some discomfort in bilateral flank areas but has been coughing more. Has been feeling more tight in upper chest and has felt slightly shortwinded at times. Has felt slightly feverish as well at night. Review of Systems   Constitutional: Positive for fever and malaise/fatigue. Negative for chills. HENT: Positive for congestion. Negative for sore throat. Respiratory: Positive for cough, sputum production, shortness of breath and wheezing. Cardiovascular: Negative for chest pain and palpitations. Gastrointestinal: Negative for nausea and vomiting. Genitourinary: Negative for dysuria and frequency. Musculoskeletal: Positive for back pain. Skin: Negative for rash. Neurological: Positive for weakness and headaches. Negative for dizziness and tingling. Visit Vitals    /78 (BP 1 Location: Left arm, BP Patient Position: Sitting)    Pulse 74    Temp 98.6 °F (37 °C) (Oral)    Resp 16    Ht 5' 3\" (1.6 m)    Wt 175 lb (79.4 kg)    SpO2 98%    BMI 31 kg/m2     Physical Exam   Constitutional: She is oriented to person, place, and time. She appears well-developed and well-nourished. HENT:   Head: Normocephalic and atraumatic. Right Ear: External ear normal.   Left Ear: External ear normal.   Nose: Mucosal edema present. Right sinus exhibits maxillary sinus tenderness. Mouth/Throat: Posterior oropharyngeal erythema present. No posterior oropharyngeal edema. Neck: Normal range of motion. Neck supple. No thyromegaly present. Cardiovascular: Normal rate and regular rhythm. Pulmonary/Chest: Effort normal. She has wheezes. She has no rales.    Loose cough Musculoskeletal:        Thoracic back: She exhibits tenderness. Back:    Lymphadenopathy:     She has no cervical adenopathy. Neurological: She is alert and oriented to person, place, and time. Psychiatric: She has a normal mood and affect. Her behavior is normal.   Nursing note and vitals reviewed. ASSESSMENT and PLAN  Ranjana Quintanilla was seen today for flank pain and croup. Diagnoses and all orders for this visit:    Flank pain - most likely MSK  -     AMB POC URINALYSIS DIP STICK AUTO W/O MICRO -- negative    Bronchospasm -- Xopenex nebulizer treatment given in office with improvement of air exchange and decreased wheezing.  -     LEVALBUTEROL, INHAL. SOL., FDA-APPROVED FINAL, NON-COMPOUND UNIT DOSE, 0.5 MG  -     levalbuterol (XOPENEX) 1.25 mg/3 mL nebu; 3 mL by Nebulization route now for 1 dose. -     [place] INHAL RX, AIRWAY OBST/DX SPUTUM INDUCT  -     albuterol (PROVENTIL HFA, VENTOLIN HFA, PROAIR HFA) 90 mcg/actuation inhaler; Take 2 Puffs by inhalation every six (6) hours as needed for Wheezing. Bronchitis -- concern about more chest congestion even with Zithromax. Will check CXR to rule out pneumonia  -     XR CHEST PA LAT; Future  -     doxycycline (ADOXA) 100 mg tablet; Take 1 Tab by mouth two (2) times a day.       lab results and schedule of future lab studies reviewed with patient  reviewed diet, exercise and weight control  reviewed medications and side effects in detail

## 2017-01-12 NOTE — PATIENT INSTRUCTIONS
Bronchitis: Care Instructions  Your Care Instructions    Bronchitis is inflammation of the bronchial tubes, which carry air to the lungs. The tubes swell and produce mucus, or phlegm. The mucus and inflamed bronchial tubes make you cough. You may have trouble breathing. Most cases of bronchitis are caused by viruses like those that cause colds. Antibiotics usually do not help and they may be harmful. Bronchitis usually develops rapidly and lasts about 2 to 3 weeks in otherwise healthy people. Follow-up care is a key part of your treatment and safety. Be sure to make and go to all appointments, and call your doctor if you are having problems. It's also a good idea to know your test results and keep a list of the medicines you take. How can you care for yourself at home? · Take all medicines exactly as prescribed. Call your doctor if you think you are having a problem with your medicine. · Get some extra rest.  · Take an over-the-counter pain medicine, such as acetaminophen (Tylenol), ibuprofen (Advil, Motrin), or naproxen (Aleve) to reduce fever and relieve body aches. Read and follow all instructions on the label. · Do not take two or more pain medicines at the same time unless the doctor told you to. Many pain medicines have acetaminophen, which is Tylenol. Too much acetaminophen (Tylenol) can be harmful. · Take an over-the-counter cough medicine that contains dextromethorphan to help quiet a dry, hacking cough so that you can sleep. Avoid cough medicines that have more than one active ingredient. Read and follow all instructions on the label. · Breathe moist air from a humidifier, hot shower, or sink filled with hot water. The heat and moisture will thin mucus so you can cough it out. · Do not smoke. Smoking can make bronchitis worse. If you need help quitting, talk to your doctor about stop-smoking programs and medicines. These can increase your chances of quitting for good.   When should you call for help? Call 911 anytime you think you may need emergency care. For example, call if:  · You have severe trouble breathing. Call your doctor now or seek immediate medical care if:  · You have new or worse trouble breathing. · You cough up dark brown or bloody mucus (sputum). · You have a new or higher fever. · You have a new rash. Watch closely for changes in your health, and be sure to contact your doctor if:  · You cough more deeply or more often, especially if you notice more mucus or a change in the color of your mucus. · You are not getting better as expected. Where can you learn more? Go to http://jonatan-elvis.info/. Enter H333 in the search box to learn more about \"Bronchitis: Care Instructions. \"  Current as of: May 23, 2016  Content Version: 11.1  © 1940-5684 [employer]. Care instructions adapted under license by [employer] (which disclaims liability or warranty for this information). If you have questions about a medical condition or this instruction, always ask your healthcare professional. [place] [address] any warranty or liability for your use of this information. Reactive Airway Disease: Care Instructions  Your Care Instructions  Reactive airway disease is a breathing problem that appears as wheezing, a whistling noise in your airways. It may be caused by a viral or bacterial infection, allergies, tobacco smoke, or something else in the environment. When you are around these triggers, your body releases chemicals that make the airways get tight. Reactive airway disease is a lot like asthma. Both can cause wheezing. But asthma is ongoing, while reactive airway disease may occur only now and then. Tests can be done to tell whether you have asthma. You may take the same medicines used to treat asthma. Good home care and follow-up care with your doctor can help you recover.   Follow-up care is a key part of your treatment and safety. Be sure to make and go to all appointments, and call your doctor if you are having problems. It's also a good idea to know your test results and keep a list of the medicines you take. How can you care for yourself at home? · Take your medicines exactly as prescribed. Call your doctor if you think you are having a problem with your medicine. · Do not smoke or allow others to smoke around you. If you need help quitting, talk to your doctor about stop-smoking programs and medicines. These can increase your chances of quitting for good. · If you know what caused your wheezing (such as perfume or the odor of household chemicals), try to avoid it in the future. · Wash your hands several times a day, and consider using hand gels or wipes that contain alcohol. This can prevent colds and other infections. When should you call for help? Call 911 anytime you think you may need emergency care. For example, call if:  · You have severe trouble breathing. Watch closely for changes in your health, and be sure to contact your doctor if:  · You cough up yellow, dark brown, or bloody mucus. · You have a fever. · Your wheezing gets worse. Where can you learn more? Go to http://jonatan-elvis.info/. Enter R376 in the search box to learn more about \"Reactive Airway Disease: Care Instructions. \"  Current as of: May 23, 2016  Content Version: 11.1  © 7900-0842 [employer]. Care instructions adapted under license by [employer] (which disclaims liability or warranty for this information). If you have questions about a medical condition or this instruction, always ask your healthcare professional. [place] [address] any warranty or liability for your use of this information.

## 2017-01-20 ENCOUNTER — TELEPHONE (OUTPATIENT)
Dept: INTERNAL MEDICINE CLINIC | Age: 82
End: 2017-01-20

## 2017-01-20 RX ORDER — LEVOFLOXACIN 500 MG/1
500 TABLET, FILM COATED ORAL DAILY
Qty: 7 TAB | Refills: 0 | Status: SHIPPED | OUTPATIENT
Start: 2017-01-20 | End: 2017-02-22 | Stop reason: ALTCHOICE

## 2017-01-20 NOTE — TELEPHONE ENCOUNTER
Pt states she was given an antibiotic by Melina Pinto that is not working.  Requesting a return call from the nurse [phone]

## 2017-01-20 NOTE — TELEPHONE ENCOUNTER
Patient stated she is still coughing up a lot of mucous and she stop the doxycycline because she couldn't tolerate it. Pt stated she discuss at her visit that [address] [place] [address] [facility] would help, pt requesting Levaquin to her local pharmacy.

## 2017-02-22 ENCOUNTER — HOSPITAL ENCOUNTER (OUTPATIENT)
Dept: LAB | Age: 82
Discharge: HOME | End: 2017-02-22
Payer: MEDICARE

## 2017-02-22 ENCOUNTER — OFFICE VISIT (OUTPATIENT)
Dept: INTERNAL MEDICINE CLINIC | Age: 82
End: 2017-02-22

## 2017-02-22 VITALS
RESPIRATION RATE: 16 BRPM | TEMPERATURE: 98.6 F | HEART RATE: 56 BPM | OXYGEN SATURATION: 98 % | BODY MASS INDEX: 30.83 KG/M2 | DIASTOLIC BLOOD PRESSURE: 81 MMHG | WEIGHT: 174 LBS | SYSTOLIC BLOOD PRESSURE: 130 MMHG | HEIGHT: 63 IN

## 2017-02-22 DIAGNOSIS — R25.1 TREMOR: ICD-10-CM

## 2017-02-22 DIAGNOSIS — R53.83 FATIGUE, UNSPECIFIED TYPE: Primary | ICD-10-CM

## 2017-02-22 DIAGNOSIS — H83.2X9 VESTIBULAR DISEQUILIBRIUM, UNSPECIFIED LATERALITY: ICD-10-CM

## 2017-02-22 PROCEDURE — 80053 COMPREHEN METABOLIC PANEL: CPT

## 2017-02-22 PROCEDURE — 84443 ASSAY THYROID STIM HORMONE: CPT

## 2017-02-22 PROCEDURE — 36415 COLL VENOUS BLD VENIPUNCTURE: CPT

## 2017-02-22 PROCEDURE — 85025 COMPLETE CBC W/AUTO DIFF WBC: CPT

## 2017-02-22 NOTE — MR AVS SNAPSHOT
Visit Information Date & Time Provider Department Dept. Phone Encounter #  
 2/22/2017 11:00 AM Akash Hogan NP [facility] [address]  [id number] Your Appointments 6/13/2017 11:40 AM  
ESTABLISHED PATIENT with Bartolo Wray MD  
[facility] ([address]) Appt Note: annual  
 [address] [facility] [place] [address] [facility]  
[address] [facility] [place] [address] [address] Upcoming Health Maintenance Date Due DTaP/Tdap/Td series (1 - Tdap) 12/30/1956 ZOSTER VACCINE AGE 60> 12/30/1996 Pneumococcal 65+ Low/Medium Risk (2 of 2 - PPSV23) 5/20/2014 COLONOSCOPY 1/1/2016 GLAUCOMA SCREENING Q2Y 4/1/2016 INFLUENZA AGE 9 TO ADULT 8/1/2016 MEDICARE YEARLY EXAM 11/12/2016 Allergies as of 2/22/2017  Review Complete On: 2/22/2017 By: Akash Hogan NP Severity Noted Reaction Type Reactions Ceclor [Cefaclor]  03/31/2009    Other (comments) Pain flank, abd  
 Cleocin [Clindamycin Hcl]  03/31/2009    Other (comments) Flank, abd pain Codeine  03/31/2009    Nausea and Vomiting Compazine [Prochlorperazine]  03/31/2009    Other (comments) Hyper and anxious Fosamax [Alendronate]  03/31/2009    Other (comments) Indigestion Macrobid [Nitrofurantoin Monohyd/m-cryst]  12/06/2012   Systemic Swelling, Other (comments)  
 wheezing Pcn [Penicillins]  03/31/2009    Rash Phenobarbital  03/31/2009    Other (comments)  
 anxious Sulfa (Sulfonamide Antibiotics)  03/31/2009    Rash Headache Current Immunizations  Reviewed on 4/11/2011 Name Date Influenza Vaccine (Whole Virus) 1/1/2013 Influenza Vaccine Split 11/17/2010  8:33 AM  
 Influenza Vaccine Whole 4/11/2010 PPD 3/23/2011 12:57 PM  
 Pneumococcal Vaccine (Unspecified Type) 5/20/2009 Not reviewed this visit You Were Diagnosed With   
  
 Codes Comments Fatigue, unspecified type    -  Primary ICD-10-CM: R53.83 ICD-9-CM: 780.79 Tremor     ICD-10-CM: R25.1 ICD-9-CM: 956. 0 Vestibular disequilibrium, unspecified laterality     ICD-10-CM: U11.5V0 ICD-9-CM: 386.50 Vitals BP  
  
  
  
  
  
 130/81 (BP 1 Location: Left arm, BP Patient Position: Sitting) BMI and BSA Data Body Mass Index Body Surface Area  
 30.82 kg/m 2 1.87 m 2 Preferred Pharmacy Pharmacy Name Phone [id number] [address], [address] [phone] Your Updated Medication List  
  
   
This list is accurate as of: 2/22/17 11:54 AM.  Always use your most recent med list.  
  
  
  
  
 aspirin delayed-release 81 mg tablet  
take 162 mg by mouth daily. atenolol 25 mg tablet Commonly known as:  TENORMIN Take 1 Tab by mouth daily. COPPER GLUCONATE PO Take  by mouth. diazePAM 5 mg tablet Commonly known as:  VALIUM Take 0.5 Tabs by mouth every six (6) hours as needed for Anxiety. hydroCHLOROthiazide 25 mg tablet Commonly known as:  HYDRODIURIL Take 1 Tab by mouth daily. OTHER  
  
 PRESERVISION LUTEIN 226 mg-200 unit -5 mg-0.8 mg Cap Generic drug:  Vit C-Vit E-Copper-ZnOx-Lutein Take  by mouth. TYLENOL EXTRA STRENGTH 500 mg tablet Generic drug:  acetaminophen Take  by mouth every six (6) hours as needed for Pain. VITAMIN D3 1,000 unit Cap Generic drug:  cholecalciferol Take 2,000 Int'l Units by mouth daily. We Performed the Following CBC WITH AUTOMATED DIFF [82008 CPT(R)] METABOLIC PANEL, COMPREHENSIVE [50811 CPT(R)] REFERRAL TO NEUROLOGY [NCM76 Custom] Comments:  
 Please evaluate patient for worsening of tremor TSH 3RD GENERATION [72909 CPT(R)] Referral Information Referral ID Referred By Referred To  
  
 [id number] MD [address] Lidia Browning Phone: [phone] Fax: [phone] Visits Status Start Date End Date 1 New Request 2/22/17 2/22/18 If your referral has a status of pending review or denied, additional information will be sent to support the outcome of this decision. Patient Instructions Fatigue: Care Instructions Your Care Instructions Fatigue is a feeling of tiredness, exhaustion, or lack of energy. You may feel fatigue because of too much or not enough activity. It can also come from stress, lack of sleep, boredom, and poor diet. Many medical problems, such as viral infections, can cause fatigue. Emotional problems, especially depression, are often the cause of fatigue. Fatigue is most often a symptom of another problem. Treatment for fatigue depends on the cause. For example, if you have fatigue because you have a certain health problem, treating this problem also treats your fatigue. If depression or anxiety is the cause, treatment may help. Follow-up care is a key part of your treatment and safety. Be sure to make and go to all appointments, and call your doctor if you are having problems. It's also a good idea to know your test results and keep a list of the medicines you take. How can you care for yourself at home? · Get regular exercise. But don't overdo it. Go back and forth between rest and exercise. · Get plenty of rest. 
· Eat a healthy diet. Do not skip meals, especially breakfast. 
· Reduce your use of caffeine, tobacco, and alcohol. Caffeine is most often found in coffee, tea, cola drinks, and chocolate. · Limit medicines that can cause fatigue. This includes tranquilizers and cold and allergy medicines. When should you call for help? Watch closely for changes in your health, and be sure to contact your doctor if: 
· You have new symptoms such as fever or a rash. · Your fatigue gets worse. · You have been feeling down, depressed, or hopeless. Or you may have lost interest in things that you usually enjoy. · You are not getting better as expected. Where can you learn more? Go to http://jonatan-elvis.info/. Enter U557 in the search box to learn more about \"Fatigue: Care Instructions. \" Current as of: May 27, 2016 Content Version: 11.1 © 7827-9531 MobileTag. Care instructions adapted under license by Street[employer] (which disclaims liability or warranty for this information). If you have questions about a medical condition or this instruction, always ask your healthcare professional. [place] [address] any warranty or liability for your use of this information. Vertigo: Care Instructions Your Care Instructions Vertigo is the feeling that you or your surroundings are moving when there is no actual movement. It is often described as a feeling of spinning, whirling, falling, or tilting. Vertigo may make you vomit or feel nauseated. You may have trouble standing or walking and may lose your balance. Vertigo is often related to an inner ear problem, but it can have other more serious causes. If vertigo continues, you may need more tests to find its cause. Follow-up care is a key part of your treatment and safety. Be sure to make and go to all appointments, and call your doctor if you are having problems. Its also a good idea to know your test results and keep a list of the medicines you take. How can you care for yourself at home? · Do not lie flat on your back. Prop yourself up slightly. This may reduce the spinning feeling. Keep your eyes open. · Move slowly so that you do not fall. · If your doctor recommends medicine, take it exactly as directed. · Do not drive while you are having vertigo. Certain exercises, called Villarreal-Daroff exercises, can help decrease vertigo. To do Villarreal-Daroff exercises: · Sit on the edge of a bed or sofa and quickly lie down on the side that causes the worst vertigo. Lie on your side with your ear down. · Stay in this position for at least 30 seconds or until the vertigo goes away. · Sit up. If this causes vertigo, wait for it to stop. · Repeat the procedure on the other side. · Repeat this 10 times. Do these exercises 2 times a day until the vertigo is gone. When should you call for help? Call 911 anytime you think you may need emergency care. For example, call if: 
· You passed out (lost consciousness). · You have symptoms of a stroke. These may include: 
¨ Sudden numbness, tingling, weakness, or loss of movement in your face, arm, or leg, especially on only one side of your body. ¨ Sudden vision changes. ¨ Sudden trouble speaking. ¨ Sudden confusion or trouble understanding simple statements. ¨ Sudden problems with walking or balance. ¨ A sudden, severe headache that is different from past headaches. Call your doctor now or seek immediate medical care if: · Vertigo occurs with a fever, a headache, or ringing in your ears. · You have new or increased nausea and vomiting. Watch closely for changes in your health, and be sure to contact your doctor if: · Vertigo gets worse or happens more often. · Vertigo has not gotten better after 2 weeks. Where can you learn more? Go to http://jonatan-elvis.info/. Enter P658 in the search box to learn more about \"Vertigo: Care Instructions. \" Current as of: July 29, 2016 Content Version: 11.1 © 6610-8925 7billionideas. Care instructions adapted under license by Vartopia (which disclaims liability or warranty for this information). If you have questions about a medical condition or this instruction, always ask your healthcare professional. [place] [address] any warranty or liability for your use of this information. Vertigo: Exercises Your Care Instructions Here are some examples of typical rehabilitation exercises for your condition. Start each exercise slowly. Ease off the exercise if you start to have pain. Your doctor or physical therapist will tell you when you can start these exercises and which ones will work best for you. How to do the exercises Note: Do these exercises twice a day. Try to progress to doing each head movement 15 to 20 times. Then try to do them with your eyes closed. Exercise 1 1. Stand with a chair in front of you and a wall behind you. If you begin to fall, you may use them for support. 2. Stand with your feet together and your arms at your sides. 3. Move your head up and down 10 times. Exercise 2 Move your head side to side 10 times. Exercise 3 Move your head diagonally up and down 10 times. Exercise 4 Move your head diagonally up and down 10 times on the other side. Follow-up care is a key part of your treatment and safety. Be sure to make and go to all appointments, and call your doctor if you are having problems. It's also a good idea to know your test results and keep a list of the medicines you take. Where can you learn more? Go to http://jonatan-elvis.info/. Enter F349 in the search box to learn more about \"Vertigo: Exercises. \" Current as of: July 29, 2016 Content Version: 11.1 © 3814-9900 [employer] Microsystems, Incorporated. Care instructions adapted under license by [employer] Biosc[employer] (which disclaims liability or warranty for this information). If you have questions about a medical condition or this instruction, always ask your healthcare professional. [place] [address] any warranty or liability for your use of this information. Introducing \[facility] [place] [facility]\"" & HEALTH SERVICES! [facility] introduces [employer] patient portal. Now you can access parts of your medical record, email your doctor's office, and request medication refills online.    
 
1. In your internet browser, go to [email or web address] 2. Click on the First Time User? Click Here link in the Sign In box. You will see the New Member Sign Up page. 3. Enter your Akumina Access Code exactly as it appears below. You will not need to use this code after youve completed the sign-up process. If you do not sign up before the expiration date, you must request a new code. · Akumina Access Code: [email or web address] Expires: 4/5/2017  3:04 PM 
 
4. Enter the last four digits of your Social Security Number (xxxx) and Date of Birth (mm/dd/yyyy) as indicated and click Submit. You will be taken to the next sign-up page. 5. Create a Datumatet ID. This will be your Akumina login ID and cannot be changed, so think of one that is secure and easy to remember. 6. Create a Akumina password. You can change your password at any time. 7. Enter your Password Reset Question and Answer. This can be used at a later time if you forget your password. 8. Enter your e-mail address. You will receive e-mail notification when new information is available in [address] 9. Click Sign Up. You can now view and download portions of your medical record. 10. Click the Download Summary menu link to download a portable copy of your medical information. If you have questions, please visit the Frequently Asked Questions section of the Akumina website. Remember, Akumina is NOT to be used for urgent needs. For medical emergencies, dial 911. Now available from your iPhone and Android! Please provide this summary of care documentation to your next provider. Your primary care clinician is listed as Airam Ortiz. If you have any questions after today's visit, please call [phone].

## 2017-02-22 NOTE — PROGRESS NOTES
Have you been to the ER or urgent care clinic since your last visit? No     Have you been hospitalized since your last visit? No     Have you been seen or consulted any other health care provider outside of [address] [place] [address] since your last visit (including pap smears, colonoscopy screening)?    No

## 2017-02-22 NOTE — PATIENT INSTRUCTIONS
Fatigue: Care Instructions  Your Care Instructions  Fatigue is a feeling of tiredness, exhaustion, or lack of energy. You may feel fatigue because of too much or not enough activity. It can also come from stress, lack of sleep, boredom, and poor diet. Many medical problems, such as viral infections, can cause fatigue. Emotional problems, especially depression, are often the cause of fatigue. Fatigue is most often a symptom of another problem. Treatment for fatigue depends on the cause. For example, if you have fatigue because you have a certain health problem, treating this problem also treats your fatigue. If depression or anxiety is the cause, treatment may help. Follow-up care is a key part of your treatment and safety. Be sure to make and go to all appointments, and call your doctor if you are having problems. It's also a good idea to know your test results and keep a list of the medicines you take. How can you care for yourself at home? · Get regular exercise. But don't overdo it. Go back and forth between rest and exercise. · Get plenty of rest.  · Eat a healthy diet. Do not skip meals, especially breakfast.  · Reduce your use of caffeine, tobacco, and alcohol. Caffeine is most often found in coffee, tea, cola drinks, and chocolate. · Limit medicines that can cause fatigue. This includes tranquilizers and cold and allergy medicines. When should you call for help? Watch closely for changes in your health, and be sure to contact your doctor if:  · You have new symptoms such as fever or a rash. · Your fatigue gets worse. · You have been feeling down, depressed, or hopeless. Or you may have lost interest in things that you usually enjoy. · You are not getting better as expected. Where can you learn more? Go to http://jonatan-elvis.info/. Enter R605 in the search box to learn more about \"Fatigue: Care Instructions. \"  Current as of: May 27, 2016  Content Version: 11.1  © 7787-3061 Healthwise, [employer]ive. Care instructions adapted under license by Ablative Solutions (which disclaims liability or warranty for this information). If you have questions about a medical condition or this instruction, always ask your healthcare professional. [address] any warranty or liability for your use of this information. Vertigo: Care Instructions  Your Care Instructions  Vertigo is the feeling that you or your surroundings are moving when there is no actual movement. It is often described as a feeling of spinning, whirling, falling, or tilting. Vertigo may make you vomit or feel nauseated. You may have trouble standing or walking and may lose your balance. Vertigo is often related to an inner ear problem, but it can have other more serious causes. If vertigo continues, you may need more tests to find its cause. Follow-up care is a key part of your treatment and safety. Be sure to make and go to all appointments, and call your doctor if you are having problems. Its also a good idea to know your test results and keep a list of the medicines you take. How can you care for yourself at home? · Do not lie flat on your back. Prop yourself up slightly. This may reduce the spinning feeling. Keep your eyes open. · Move slowly so that you do not fall. · If your doctor recommends medicine, take it exactly as directed. · Do not drive while you are having vertigo. Certain exercises, called Villarreal-Daroff exercises, can help decrease vertigo. To do Villarreal-Daroff exercises:  · Sit on the edge of a bed or sofa and quickly lie down on the side that causes the worst vertigo. Lie on your side with your ear down. · Stay in this position for at least 30 seconds or until the vertigo goes away. · Sit up. If this causes vertigo, wait for it to stop. · Repeat the procedure on the other side. · Repeat this 10 times.  Do these exercises 2 times a day until the vertigo is gone.  When should you call for help? Call 911 anytime you think you may need emergency care. For example, call if:  · You passed out (lost consciousness). · You have symptoms of a stroke. These may include:  ¨ Sudden numbness, tingling, weakness, or loss of movement in your face, arm, or leg, especially on only one side of your body. ¨ Sudden vision changes. ¨ Sudden trouble speaking. ¨ Sudden confusion or trouble understanding simple statements. ¨ Sudden problems with walking or balance. ¨ A sudden, severe headache that is different from past headaches. Call your doctor now or seek immediate medical care if:  · Vertigo occurs with a fever, a headache, or ringing in your ears. · You have new or increased nausea and vomiting. Watch closely for changes in your health, and be sure to contact your doctor if:  · Vertigo gets worse or happens more often. · Vertigo has not gotten better after 2 weeks. Where can you learn more? Go to http://jonatan-elvis.info/. Enter E792 in the search box to learn more about \"Vertigo: Care Instructions. \"  Current as of: July 29, 2016  Content Version: 11.1  © 1217-8687 Visicon Technologies. Care instructions adapted under license by [employer] (which disclaims liability or warranty for this information). If you have questions about a medical condition or this instruction, always ask your healthcare professional. [place] [address] any warranty or liability for your use of this information. Vertigo: Exercises  Your Care Instructions  Here are some examples of typical rehabilitation exercises for your condition. Start each exercise slowly. Ease off the exercise if you start to have pain. Your doctor or physical therapist will tell you when you can start these exercises and which ones will work best for you. How to do the exercises  Note: Do these exercises twice a day.  Try to progress to doing each head movement 15 to 20 times. Then try to do them with your eyes closed. Exercise 1    1. Stand with a chair in front of you and a wall behind you. If you begin to fall, you may use them for support. 2. Stand with your feet together and your arms at your sides. 3. Move your head up and down 10 times. Exercise 2    Move your head side to side 10 times. Exercise 3    Move your head diagonally up and down 10 times. Exercise 4    Move your head diagonally up and down 10 times on the other side. Follow-up care is a key part of your treatment and safety. Be sure to make and go to all appointments, and call your doctor if you are having problems. It's also a good idea to know your test results and keep a list of the medicines you take. Where can you learn more? Go to http://jonatan-elvis.info/. Enter F349 in the search box to learn more about \"Vertigo: Exercises. \"  Current as of: July 29, 2016  Content Version: 11.1  © 20068578-1955 Anonymous You, Incorporated. Care instructions adapted under license by Synthonics (which disclaims liability or warranty for this information). If you have questions about a medical condition or this instruction, always ask your healthcare professional. [address] any warranty or liability for your use of this information.

## 2017-02-22 NOTE — PROGRESS NOTES
HISTORY OF PRESENT ILLNESS  Yudi Newman is a [de-identified] y.o. female. HPI  Presents with various complaints of fatigue, intermittent head tremor, vision changes, sensation of feeling off-balance that has been occurring over the past 2 weeks. Recently completed 7 day course of Levaquin 500 mg for sinusitis and bronchitis on 1/27 and she is wondering whether her current symptoms are related to medication. Overall has been feeling \"terrible\" but is somewhat vague about specific symptoms. Had developed some pressure sensation in her eyes while she was taking Levaquin and she called her retinal specialist who saw her on 1/30 and she had a series of 2 injections to both eyes for increased pressure. Since then her vision has improved but then other symptoms began with extreme fatigue and feeling \"shaky\". Has been sleeping well without insomnia but continues to feel tired. Has been having to walk with her cane due to sensation of disequilibrium; denies spinning sensation. Denies fever, chills, nausea, vomiting. Has been voiding normally. Sees her neurologist on an annual basis due to past history of TIAs; she denies any slurring of speech or weakness of extremities. Review of Systems   Constitutional: Positive for malaise/fatigue. Negative for chills and fever. HENT: Negative for congestion and sore throat. Eyes: Positive for blurred vision. Respiratory: Negative for cough and shortness of breath. Cardiovascular: Negative for chest pain and palpitations. Gastrointestinal: Negative for abdominal pain, nausea and vomiting. Genitourinary: Negative for dysuria and frequency. Musculoskeletal: Negative for myalgias. Skin: Negative for rash. Neurological: Positive for dizziness, tremors, weakness and headaches. Negative for tingling. Psychiatric/Behavioral: The patient is nervous/anxious.         /81 (BP 1 Location: Left arm, BP Patient Position: Sitting)  Pulse (!) 56  Temp 98.6 °F (37 °C) (Oral)   Resp 16  Ht 5' 3\" (1.6 m)  Wt 174 lb (78.9 kg)  SpO2 98%  BMI 30.82 kg/m2  Physical Exam   Constitutional: She is oriented to person, place, and time. She appears well-developed and well-nourished. No distress. HENT:   Head: Normocephalic and atraumatic. Right Ear: External ear normal.   Left Ear: External ear normal.   Nose: Nose normal.   Mouth/Throat: Oropharynx is clear and moist.   Neck: Normal range of motion. Neck supple. No thyromegaly present. Cardiovascular: Normal rate, regular rhythm and normal heart sounds. Pulmonary/Chest: Effort normal and breath sounds normal. She has no wheezes. She has no rales. Abdominal: Soft. Bowel sounds are normal. There is no tenderness. There is no rebound. Musculoskeletal: Normal range of motion. Lymphadenopathy:     She has no cervical adenopathy. Neurological: She is alert and oriented to person, place, and time. Coordination (swaying with Romberg maneuver) abnormal.   Skin: Skin is warm and dry. Psychiatric: She has a normal mood and affect. Her behavior is normal.   Nursing note and vitals reviewed. ASSESSMENT and PLAN  Sukumar Ramos was seen today for fatigue.     Diagnoses and all orders for this visit:    Fatigue, unspecified type -- may be related to Levaquin; would not give this to her again  -     CBC WITH AUTOMATED DIFF  -     METABOLIC PANEL, COMPREHENSIVE  -     TSH 3RD GENERATION    Tremor  -     REFERRAL TO NEUROLOGY    Vestibular disequilibrium, unspecified laterality -- given head exercises to help with vertigo; did not give any antihistamines due to higher risk of falling; if symptoms persist, will have her see ENT or follow up with her Neurologist.  -     REFERRAL TO NEUROLOGY      lab results and schedule of future lab studies reviewed with patient  reviewed diet, exercise and weight control  reviewed medications and side effects in detail

## 2017-02-23 LAB
ALBUMIN SERPL-MCNC: 4.3 G/DL (ref 3.5–4.7)
ALBUMIN/GLOB SERPL: 1.5 {RATIO} (ref 1.1–2.5)
ALP SERPL-CCNC: 88 IU/L (ref 39–117)
ALT SERPL-CCNC: 49 IU/L (ref 0–32)
AST SERPL-CCNC: 40 IU/L (ref 0–40)
BASOPHILS # BLD AUTO: 0 X10E3/UL (ref 0–0.2)
BASOPHILS NFR BLD AUTO: 1 %
BILIRUB SERPL-MCNC: 0.4 MG/DL (ref 0–1.2)
BUN SERPL-MCNC: 23 MG/DL (ref 8–27)
BUN/CREAT SERPL: 32 (ref 11–26)
CALCIUM SERPL-MCNC: 10.5 MG/DL (ref 8.7–10.3)
CHLORIDE SERPL-SCNC: 102 MMOL/L (ref 96–106)
CO2 SERPL-SCNC: 25 MMOL/L (ref 18–29)
CREAT SERPL-MCNC: 0.73 MG/DL (ref 0.57–1)
EOSINOPHIL # BLD AUTO: 0.1 X10E3/UL (ref 0–0.4)
EOSINOPHIL NFR BLD AUTO: 1 %
ERYTHROCYTE [DISTWIDTH] IN BLOOD BY AUTOMATED COUNT: 12.7 % (ref 12.3–15.4)
GLOBULIN SER CALC-MCNC: 2.8 G/DL (ref 1.5–4.5)
GLUCOSE SERPL-MCNC: 76 MG/DL (ref 65–99)
HCT VFR BLD AUTO: 44.1 % (ref 34–46.6)
HGB BLD-MCNC: 14.8 G/DL (ref 11.1–15.9)
IMM GRANULOCYTES # BLD: 0 X10E3/UL (ref 0–0.1)
IMM GRANULOCYTES NFR BLD: 0 %
LYMPHOCYTES # BLD AUTO: 2.7 X10E3/UL (ref 0.7–3.1)
LYMPHOCYTES NFR BLD AUTO: 42 %
MCH RBC QN AUTO: 31.1 PG (ref 26.6–33)
MCHC RBC AUTO-ENTMCNC: 33.6 G/DL (ref 31.5–35.7)
MCV RBC AUTO: 93 FL (ref 79–97)
MONOCYTES # BLD AUTO: 0.9 X10E3/UL (ref 0.1–0.9)
MONOCYTES NFR BLD AUTO: 14 %
NEUTROPHILS # BLD AUTO: 2.7 X10E3/UL (ref 1.4–7)
NEUTROPHILS NFR BLD AUTO: 42 %
PLATELET # BLD AUTO: 389 X10E3/UL (ref 150–379)
POTASSIUM SERPL-SCNC: 5.2 MMOL/L (ref 3.5–5.2)
PROT SERPL-MCNC: 7.1 G/DL (ref 6–8.5)
RBC # BLD AUTO: 4.76 X10E6/UL (ref 3.77–5.28)
SODIUM SERPL-SCNC: 143 MMOL/L (ref 134–144)
TSH SERPL DL<=0.005 MIU/L-ACNC: 2.25 UIU/ML (ref 0.45–4.5)
WBC # BLD AUTO: 6.4 X10E3/UL (ref 3.4–10.8)

## 2017-03-01 ENCOUNTER — TELEPHONE (OUTPATIENT)
Dept: INTERNAL MEDICINE CLINIC | Age: 82
End: 2017-03-01

## 2017-03-16 ENCOUNTER — HOSPITAL ENCOUNTER (OUTPATIENT)
Dept: MRI IMAGING | Age: 82
Discharge: HOME | End: 2017-03-16
Attending: PSYCHIATRY & NEUROLOGY
Payer: MEDICARE

## 2017-03-16 DIAGNOSIS — R26.81 UNSTEADY GAIT: ICD-10-CM

## 2017-03-16 PROCEDURE — 70551 MRI BRAIN STEM W/O DYE: CPT

## 2017-04-07 ENCOUNTER — OFFICE VISIT (OUTPATIENT)
Dept: SLEEP MEDICINE | Age: 82
End: 2017-04-07

## 2017-04-07 VITALS
HEIGHT: 63 IN | BODY MASS INDEX: 30.65 KG/M2 | HEART RATE: 63 BPM | DIASTOLIC BLOOD PRESSURE: 82 MMHG | WEIGHT: 173 LBS | OXYGEN SATURATION: 94 % | TEMPERATURE: 98 F | SYSTOLIC BLOOD PRESSURE: 157 MMHG

## 2017-04-07 DIAGNOSIS — G47.33 OSA (OBSTRUCTIVE SLEEP APNEA): Primary | ICD-10-CM

## 2017-04-07 DIAGNOSIS — E66.9 OBESITY (BMI 30-39.9): ICD-10-CM

## 2017-04-07 NOTE — MR AVS SNAPSHOT
Visit Information Date & Time Provider Department Dept. Phone Encounter #  
 4/7/2017 11:00 AM Laura Kent, [address] [id number] Follow-up Instructions Return for call with results. Follow-up and Disposition History Your Appointments 5/16/2017 10:30 AM  
Medicare Physical with Norberto Menezes MD  
[facility] [address]) Appt Note: medicare annual  
 [address]  
  
   
 Lionel Elkins 94 [id number]  
  
    
 6/13/2017 11:40 AM  
ESTABLISHED PATIENT with Gilda Urias MD  
[facility] ([address]) Appt Note: annual  
 [address] [place] [address] 1007 [facility]  
[address] [facility] [place] [address] [facility] 91[facility] Upcoming Health Maintenance Date Due DTaP/Tdap/Td series (1 - Tdap) 12/30/1956 ZOSTER VACCINE AGE 60> 12/30/1996 Pneumococcal 65+ Low/Medium Risk (2 of 2 - PPSV23) 5/20/2014 COLONOSCOPY 1/1/2016 GLAUCOMA SCREENING Q2Y 4/1/2016 INFLUENZA AGE 9 TO ADULT 8/1/2016 MEDICARE YEARLY EXAM 11/12/2016 Allergies as of 4/7/2017  Review Complete On: 4/7/2017 By: Laura Kent MD  
  
 Severity Noted Reaction Type Reactions Ceclor [Cefaclor]  03/31/2009    Other (comments) Pain flank, abd  
 Cleocin [Clindamycin Hcl]  03/31/2009    Other (comments) Flank, abd pain Codeine  03/31/2009    Nausea and Vomiting Compazine [Prochlorperazine]  03/31/2009    Other (comments) Hyper and anxious Fosamax [Alendronate]  03/31/2009    Other (comments) Indigestion Macrobid [Nitrofurantoin Monohyd/m-cryst]  12/06/2012   Systemic Swelling, Other (comments)  
 wheezing Pcn [Penicillins]  03/31/2009    Rash Phenobarbital  03/31/2009    Other (comments)  
 anxious Sulfa (Sulfonamide Antibiotics)  03/31/2009    Rash Headache Current Immunizations  Reviewed on 4/11/2011 Name Date Influenza Vaccine (Whole Virus) 1/1/2013 Influenza Vaccine Split 11/17/2010  8:33 AM  
 Influenza Vaccine Whole 4/11/2010 PPD 3/23/2011 12:57 PM  
 Pneumococcal Vaccine (Unspecified Type) 5/20/2009 Not reviewed this visit You Were Diagnosed With   
  
 Codes Comments GALLO (obstructive sleep apnea)    -  Primary ICD-10-CM: G47.33 
ICD-9-CM: 327.23 Obesity (BMI 30-39. 9)     ICD-10-CM: E66.9 ICD-9-CM: 278.00 Vitals BP Pulse Temp Height(growth percentile) Weight(growth percentile) SpO2  
 157/82 63 98 °F (36.7 °C) 5' 3\" (1.6 m) 173 lb (78.5 kg) 94% BMI OB Status Smoking Status 30.65 kg/m2 Postmenopausal Former Smoker Vitals History BMI and BSA Data Body Mass Index Body Surface Area  
 30.65 kg/m 2 1.87 m 2 Preferred Pharmacy Pharmacy Name Phone [id number] [address], [address] [phone] Your Updated Medication List  
  
   
This list is accurate as of: 4/7/17 11:40 AM.  Always use your most recent med list.  
  
  
  
  
 aspirin delayed-release 81 mg tablet  
take 162 mg by mouth daily. atenolol 25 mg tablet Commonly known as:  TENORMIN Take 1 Tab by mouth daily. COPPER GLUCONATE PO Take  by mouth. diazePAM 5 mg tablet Commonly known as:  VALIUM Take 0.5 Tabs by mouth every six (6) hours as needed for Anxiety. hydroCHLOROthiazide 25 mg tablet Commonly known as:  HYDRODIURIL Take 1 Tab by mouth daily. OTHER  
  
 PRESERVISION LUTEIN 226 mg-200 unit -5 mg-0.8 mg Cap Generic drug:  Vit C-Vit E-Copper-ZnOx-Lutein Take  by mouth. TYLENOL EXTRA STRENGTH 500 mg tablet Generic drug:  acetaminophen Take  by mouth every six (6) hours as needed for Pain. VITAMIN D3 1,000 unit Cap Generic drug:  cholecalciferol Take 2,000 Int'l Units by mouth daily. We Performed the Following SLEEP STUDY UNATTENDED, RESPIRATORY EFFORT  [42085 CPT(R)] Follow-up Instructions Return for call with results. Patient Instructions [address] [place]. 1668 [facility], [address] Tel.  [phone] Fax. 100 [facility] 60 [place], [address] Tel.  [phone] Fax. [phone] [address] [facility]  Tel.  [phone] Fax. [phone] Learning About CPAP for Sleep Apnea What is CPAP? CPAP is a small machine that you use at home every night while you sleep. It increases air pressure in your throat to keep your airway open. When you have sleep apnea, this can help you sleep better so you feel much better. CPAP stands for \"continuous positive airway pressure. \" The CPAP machine will have one of the following: · A mask that covers your nose and mouth · Prongs that fit into your nose · A mask that covers your nose only, the most common type. This type is called NCPAP. The N stands for \"nasal.\" Why is it done? CPAP is usually the best treatment for obstructive sleep apnea. It is the first treatment choice and the most widely used. Your doctor may suggest CPAP if you have: · Moderate to severe sleep apnea. · Sleep apnea and coronary artery disease (CAD) or heart failure. How does it help? · CPAP can help you have more normal sleep, so you feel less sleepy and more alert during the daytime. · CPAP may help keep heart failure or other heart problems from getting worse. · NCPAP may help lower your blood pressure. · If you use CPAP, your bed partner may also sleep better because you are not snoring or restless. What are the side effects? Some people who use CPAP have: · A dry or stuffy nose and a sore throat. · Irritated skin on the face. · Sore eyes. · Bloating. If you have any of these problems, work with your doctor to fix them. Here are some things you can try: · Be sure the mask or nasal prongs fit well. · See if your doctor can adjust the pressure of your CPAP. · If your nose is dry, try a humidifier. · If your nose is runny or stuffy, try decongestant medicine or a steroid nasal spray. If these things do not help, you might try a different type of machine. Some machines have air pressure that adjusts on its own. Others have air pressures that are different when you breathe in than when you breathe out. This may reduce discomfort caused by too much pressure in your nose. Where can you learn more? Go to judge.me.be Enter A606 in the search box to learn more about \"Learning About CPAP for Sleep Apnea. \"  
© 7883-6613 Healthwise, Incorporated. Care instructions adapted under license by Alondra Gonzalez (which disclaims liability or warranty for this information). This care instruction is for use with your licensed healthcare professional. If you have questions about a medical condition or this instruction, always ask your healthcare professional. [place] [address] any warranty or liability for your use of this information. Content Version: 7.4.63381; Last Revised: January 11, 2010 PROPER SLEEP HYGIENE What to avoid · Do not have drinks with caffeine, such as coffee or black tea, for 8 hours before bed. · Do not smoke or use other types of tobacco near bedtime. Nicotine is a stimulant and can keep you awake. · Avoid drinking alcohol late in the evening, because it can cause you to wake in the middle of the night. · Do not eat a big meal close to bedtime. If you are hungry, eat a light snack. · Do not drink a lot of water close to bedtime, because the need to urinate may wake you up during the night. · Do not read or watch TV in bed. Use the bed only for sleeping and sexual activity. What to try · Go to bed at the same time every night, and wake up at the same time every morning. Do not take naps during the day. · Keep your bedroom quiet, dark, and cool. · Get regular exercise, but not within 3 to 4 hours of your bedtime. Arianna Bourgeois · Sleep on a comfortable pillow and mattress. · If watching the clock makes you anxious, turn it facing away from you so you cannot see the time. · If you worry when you lie down, start a worry book. Well before bedtime, write down your worries, and then set the book and your concerns aside. · Try meditation or other relaxation techniques before you go to bed. · If you cannot fall asleep, get up and go to another room until you feel sleepy. Do something relaxing. Repeat your bedtime routine before you go to bed again. · Make your house quiet and calm about an hour before bedtime. Turn down the lights, turn off the TV, log off the computer, and turn down the volume on music. This can help you relax after a busy day. Drowsy Driving: The [place] [address] cites drowsiness as a causing factor in more than 619,105 police reported crashes annually, resulting in 76,000 injuries and 1,500 deaths. Other surveys suggest 55% of people polled have driven while drowsy in the past year, 23% had fallen asleep but not crashed, 3% crashed, and 2% had and accident due to drowsy driving. Who is at risk? Young Drivers: One study of drowsy driving accidents states that 55% of the drivers were under 25 years. Of those, 75% were male. Shift Workers and Travelers: People who work overnight or travel across time zones frequently are at higher risk of experiencing Circadian Rhythm Disorders. They are trying to work and function when their body is programed to sleep. Sleep Deprived: Lack of sleep has a serious impact on your ability to pay attention or focus on a task.  Consistently getting less than the average of 8 hours your body needs creates partial or cumulative sleep deprivation. Untreated Sleep Disorders: Sleep Apnea, Narcolepsy, R.L.S., and other sleep disorders (untreated) prevent a person from getting enough restful sleep. This leads to excessive daytime sleepiness and increases the risk for drowsy driving accidents by up to 7 times. Medications / Alcohol: Even over the counter medications can cause drowsiness. Medications that impair a drivers attention should have a warning label. Alcohol naturally makes you sleepy and on its own can cause accidents. Combined with excessive drowsiness its effects are amplified. Signs of Drowsy Driving: * You don't remember driving the last few miles * You may drift out of your elsi * You are unable to focus and your thoughts wander * You may yawn more often than normal 
 * You have difficulty keeping your eyes open / nodding off * Missing traffic signs, speeding, or tailgating Prevention-  
Good sleep hygiene, lifestyle and behavioral choices have the most impact on drowsy driving. There is no substitute for sleep and the average person requires 8 hours nightly. If you find yourself driving drowsy, stop and sleep. Consider the sleep hygiene tips provided during your visit as well. Medication Refill Policy: Refills for all medications require 1 week advance notice. Please have your pharmacy fax a refill request. We are unable to fax, or call in \"controled substance\" medications and you will need to pick these prescriptions up from our office. [employer] Activation Thank you for requesting access to [employer]. Please follow the instructions below to securely access and download your online medical record. [employer] allows you to send messages to your doctor, view your test results, renew your prescriptions, schedule appointments, and more. How Do I Sign Up? 1. In your internet browser, go to [email or web address]. 2. Click on the First Time User? Click Here link in the Sign In box. You will see the New Member Sign Up page. 3. Enter your [employer] Access Code exactly as it appears below. You will not need to use this code after youve completed the sign-up process. If you do not sign up before the expiration date, you must request a new code. [employer] Access Code: [email or web address] Expires: 2017 11:33 AM (This is the date your [employer] access code will ) 4. Enter the last four digits of your Social Security Number (xxxx) and Date of Birth (mm/dd/yyyy) as indicated and click Submit. You will be taken to the next sign-up page. 5. Create a [employer] ID. This will be your [employer] login ID and cannot be changed, so think of one that is secure and easy to remember. 6. Create a [employer] password. You can change your password at any time. 7. Enter your Password Reset Question and Answer. This can be used at a later time if you forget your password. 8. Enter your e-mail address. You will receive e-mail notification when new information is available in [address] 9. Click Sign Up. You can now view and download portions of your medical record. 10. Click the Download Summary menu link to download a portable copy of your medical information. Additional Information If you have questions, please call [phone]. Remember, [employer] is NOT to be used for urgent needs. For medical emergencies, dial 911. Starting a Weight Loss Plan: After Your Visit Your Care Instructions If you are thinking about losing weight, it can be hard to know where to start. Your doctor can help you set up a weight loss plan that best meets your needs. You may want to take a class on nutrition or exercise, or join a weight loss support group.  If you have questions about how to make changes to your eating or exercise habits, ask your doctor about seeing a registered dietitian or an exercise specialist. 
 It can be a big challenge to lose weight. But you do not have to make huge changes at once. Make small changes, and stick with them. When those changes become habit, add a few more changes. If you do not think you are ready to make changes right now, try to pick a date in the future. Make an appointment to see your doctor to discuss whether the time is right for you to start a plan. Follow-up care is a key part of your treatment and safety. Be sure to make and go to all appointments, and call your doctor if you are having problems. It's also a good idea to know your test results and keep a list of the medicines you take. How can you care for yourself at home? · Set realistic goals. Many people expect to lose much more weight than is likely. A weight loss of 5% to 10% of your body weight may be enough to improve your health. · Get family and friends involved to provide support. Talk to them about why you are trying to lose weight, and ask them to help. They can help by participating in exercise and having meals with you, even if they may be eating something different. · Find what works best for you. If you do not have time or do not like to cook, a program that offers meal replacement bars or shakes may be better for you. Or if you like to prepare meals, finding a plan that includes daily menus and recipes may be best. 
· Ask your doctor about other health professionals who can help you achieve your weight loss goals. ¨ A dietitian can help you make healthy changes in your diet. ¨ An exercise specialist or  can help you develop a safe and effective exercise program. 
¨ A counselor or psychiatrist can help you cope with issues such as depression, anxiety, or family problems that can make it hard to focus on weight loss. · Consider joining a support group for people who are trying to lose weight. Your doctor can suggest groups in your area. Where can you learn more? Go to DealExplorer.be Enter H680 in the search box to learn more about \"Starting a Weight Loss Plan: After Your Visit. \"  
© 5563-6087 Healthwise, Incorporated. Care instructions adapted under license by [employer] (which disclaims liability or warranty for this information). This care instruction is for use with your licensed healthcare professional. If you have questions about a medical condition or this instruction, always ask your healthcare professional. [address] any warranty or liability for your use of this information. Content Version: 5.0.48887; Last Revised: September 1, 2009 Introducing \[facility]\"" & HEALTH SERVICES! [facility] introduces [employer] patient portal. Now you can access parts of your medical record, email your doctor's office, and request medication refills online. 1. In your internet browser, go to [email or web address] 2. Click on the First Time User? Click Here link in the Sign In box. You will see the New Member Sign Up page. 3. Enter your [employer] Access Code exactly as it appears below. You will not need to use this code after youve completed the sign-up process. If you do not sign up before the expiration date, you must request a new code. · [employer] Access Code: [email or web address] Expires: 7/6/2017 11:33 AM 
 
4. Enter the last four digits of your Social Security Number (xxxx) and Date of Birth (mm/dd/yyyy) as indicated and click Submit. You will be taken to the next sign-up page. 5. Create a Birdboxt ID. This will be your [employer] login ID and cannot be changed, so think of one that is secure and easy to remember. 6. Create a [employer] password. You can change your password at any time. 7. Enter your Password Reset Question and Answer. This can be used at a later time if you forget your password. 8. Enter your e-mail address.  You will receive e-mail notification when new information is available in [employer]. 9. Click Sign Up. You can now view and download portions of your medical record. 10. Click the Download Summary menu link to download a portable copy of your medical information. If you have questions, please visit the Frequently Asked Questions section of the [employer] website. Remember, [employer] is NOT to be used for urgent needs. For medical emergencies, dial 911. Now available from your iPhone and Android! Please provide this summary of care documentation to your next provider. Your primary care clinician is listed as Jeremy Brasher. If you have any questions after today's visit, please call [phone].

## 2017-04-07 NOTE — PROGRESS NOTES
217 [facility]. [place], [address]  Tel.  [phone]  Fax. 100 [facility] 60  [place], [address] [facility]  Tel.  [phone]  Fax. [phone] [address]  Tel.  [phone]  Fax. [phone]       S>Allie Mcdonald is a [de-identified] y.o. female seen today to receive a home sleep testing unit (HST). · Patient was educated on proper hookup and operation of the HST. · Instruction forms and documentation were reviewed and signed. · The patient demonstrated good understanding of the HST. O>    Visit Vitals    /82    Pulse 63    Temp 98 °F (36.7 °C)    Ht 5' 3\" (1.6 m)    Wt 173 lb (78.5 kg)    SpO2 94%    BMI 30.65 kg/m2    Neck circ. in \"inches\": 14.25    A>  1. GALLO (obstructive sleep apnea)    2. Obesity (BMI 30-39. 9)          P>  · General information regarding operations and maintenance of the device was provided. · She was provided information on sleep apnea including coresponding risk factors and the importance of proper treatment. · Follow-up appointment was made to return the HST. She will be contacted once the results have been reviewed. · She was asked to contact our office for any problems regarding her home sleep test study.

## 2017-04-07 NOTE — PROGRESS NOTES
217 [facility]., [facility]. [place], [address]  Tel.  [phone]  Fax. 100 [facility] 60  [place], [address] [facility]  Tel.  [phone]  Fax. [phone] 9250 [facility]  Tel.  [phone]  Fax. [phone]       S>Allie Mcdonald is a [de-identified] y.o. female seen today to receive a home sleep testing unit (HST). · Patient was educated on proper hookup and operation of the HST. · Instruction forms and documentation were reviewed and signed. · The patient demonstrated good understanding of the HST. O>    Visit Vitals    /82    Pulse 63    Temp 98 °F (36.7 °C)    Ht 5' 3\" (1.6 m)    Wt 173 lb (78.5 kg)    SpO2 94%    BMI 30.65 kg/m2    Neck circ. in \"inches\": 14.25    A>  1. GALLO (obstructive sleep apnea)    2. Obesity (BMI 30-39. 9)          P>  · General information regarding operations and maintenance of the device was provided. · She was provided information on sleep apnea including coresponding risk factors and the importance of proper treatment. · Follow-up appointment was made to return the HST. She will be contacted once the results have been reviewed. · She was asked to contact our office for any problems regarding her home sleep test study.

## 2017-04-07 NOTE — PATIENT INSTRUCTIONS
[address] [facility] [place]. [place], [address]  Tel.  [phone]  Fax. 100 [facility] 60  [place], [address]  Tel.  [phone]  Fax. [phone] [address] Roberto Nob[address]  Tel.  [phone]  Fax. [phone]     Learning About CPAP for Sleep Apnea  What is CPAP? CPAP is a small machine that you use at home every night while you sleep. It increases air pressure in your throat to keep your airway open. When you have sleep apnea, this can help you sleep better so you feel much better. CPAP stands for \"continuous positive airway pressure. \"  The CPAP machine will have one of the following:  · A mask that covers your nose and mouth  · Prongs that fit into your nose  · A mask that covers your nose only, the most common type. This type is called NCPAP. The N stands for \"nasal.\"  Why is it done? CPAP is usually the best treatment for obstructive sleep apnea. It is the first treatment choice and the most widely used. Your doctor may suggest CPAP if you have:  · Moderate to severe sleep apnea. · Sleep apnea and coronary artery disease (CAD) or heart failure. How does it help? · CPAP can help you have more normal sleep, so you feel less sleepy and more alert during the daytime. · CPAP may help keep heart failure or other heart problems from getting worse. · NCPAP may help lower your blood pressure. · If you use CPAP, your bed partner may also sleep better because you are not snoring or restless. What are the side effects? Some people who use CPAP have:  · A dry or stuffy nose and a sore throat. · Irritated skin on the face. · Sore eyes. · Bloating. If you have any of these problems, work with your doctor to fix them. Here are some things you can try:  · Be sure the mask or nasal prongs fit well. · See if your doctor can adjust the pressure of your CPAP. · If your nose is dry, try a humidifier.   · If your nose is runny or stuffy, try decongestant medicine or a steroid nasal spray. If these things do not help, you might try a different type of machine. Some machines have air pressure that adjusts on its own. Others have air pressures that are different when you breathe in than when you breathe out. This may reduce discomfort caused by too much pressure in your nose. Where can you learn more? Go to [employer].be  Enter Zohreh Ritu in the search box to learn more about \"Learning About CPAP for Sleep Apnea. \"   © 6948-0406 Healthwise, [employer]. Care instructions adapted under license by Kayden Winters (which disclaims liability or warranty for this information). This care instruction is for use with your licensed healthcare professional. If you have questions about a medical condition or this instruction, always ask your healthcare professional. [address] any warranty or liability for your use of this information. Content Version: 7.6.16218; Last Revised: January 11, 2010  PROPER SLEEP HYGIENE    What to avoid  · Do not have drinks with caffeine, such as coffee or black tea, for 8 hours before bed. · Do not smoke or use other types of tobacco near bedtime. Nicotine is a stimulant and can keep you awake. · Avoid drinking alcohol late in the evening, because it can cause you to wake in the middle of the night. · Do not eat a big meal close to bedtime. If you are hungry, eat a light snack. · Do not drink a lot of water close to bedtime, because the need to urinate may wake you up during the night. · Do not read or watch TV in bed. Use the bed only for sleeping and sexual activity. What to try  · Go to bed at the same time every night, and wake up at the same time every morning. Do not take naps during the day. · Keep your bedroom quiet, dark, and cool. · Get regular exercise, but not within 3 to 4 hours of your bedtime. .  · Sleep on a comfortable pillow and mattress.   · If watching the clock makes you anxious, turn it facing away from you so you cannot see the time. · If you worry when you lie down, start a worry book. Well before bedtime, write down your worries, and then set the book and your concerns aside. · Try meditation or other relaxation techniques before you go to bed. · If you cannot fall asleep, get up and go to another room until you feel sleepy. Do something relaxing. Repeat your bedtime routine before you go to bed again. · Make your house quiet and calm about an hour before bedtime. Turn down the lights, turn off the TV, log off the computer, and turn down the volume on music. This can help you relax after a busy day. Drowsy Driving: The [employer] cites drowsiness as a causing factor in more than 429,941 police reported crashes annually, resulting in 76,000 injuries and 1,500 deaths. Other surveys suggest 55% of people polled have driven while drowsy in the past year, 23% had fallen asleep but not crashed, 3% crashed, and 2% had and accident due to drowsy driving. Who is at risk? Young Drivers: One study of drowsy driving accidents states that 55% of the drivers were under 25 years. Of those, 75% were male. Shift Workers and Travelers: People who work overnight or travel across time zones frequently are at higher risk of experiencing Circadian Rhythm Disorders. They are trying to work and function when their body is programed to sleep. Sleep Deprived: Lack of sleep has a serious impact on your ability to pay attention or focus on a task. Consistently getting less than the average of 8 hours your body needs creates partial or cumulative sleep deprivation. Untreated Sleep Disorders: Sleep Apnea, Narcolepsy, R.L.S., and other sleep disorders (untreated) prevent a person from getting enough restful sleep. This leads to excessive daytime sleepiness and increases the risk for drowsy driving accidents by up to 7 times.   Medications / Alcohol: Even over the counter medications can cause drowsiness. Medications that impair a drivers attention should have a warning label. Alcohol naturally makes you sleepy and on its own can cause accidents. Combined with excessive drowsiness its effects are amplified. Signs of Drowsy Driving:   * You don't remember driving the last few miles   * You may drift out of your elsi   * You are unable to focus and your thoughts wander   * You may yawn more often than normal   * You have difficulty keeping your eyes open / nodding off   * Missing traffic signs, speeding, or tailgating  Prevention-   Good sleep hygiene, lifestyle and behavioral choices have the most impact on drowsy driving. There is no substitute for sleep and the average person requires 8 hours nightly. If you find yourself driving drowsy, stop and sleep. Consider the sleep hygiene tips provided during your visit as well. Medication Refill Policy: Refills for all medications require 1 week advance notice. Please have your pharmacy fax a refill request. We are unable to fax, or call in \"controled substance\" medications and you will need to pick these prescriptions up from our office. [employer] Activation    Thank you for requesting access to [employer]. Please follow the instructions below to securely access and download your online medical record. [employer] allows you to send messages to your doctor, view your test results, renew your prescriptions, schedule appointments, and more. How Do I Sign Up? 1. In your internet browser, go to [email or web address]. 2. Click on the First Time User? Click Here link in the Sign In box. You will see the New Member Sign Up page. 3. Enter your [employer] Access Code exactly as it appears below. You will not need to use this code after youve completed the sign-up process. If you do not sign up before the expiration date, you must request a new code.     [employer] Access Code: [email or web address]  Expires: 2017 11:33 AM (This is the date your [employer] access code will )    4. Enter the last four digits of your Social Security Number (xxxx) and Date of Birth (mm/dd/yyyy) as indicated and click Submit. You will be taken to the next sign-up page. 5. Create a [employer] ID. This will be your [employer] login ID and cannot be changed, so think of one that is secure and easy to remember. 6. Create a [employer] password. You can change your password at any time. 7. Enter your Password Reset Question and Answer. This can be used at a later time if you forget your password. 8. Enter your e-mail address. You will receive e-mail notification when new information is available in [address] 9. Click Sign Up. You can now view and download portions of your medical record. 10. Click the Download Summary menu link to download a portable copy of your medical information. Additional Information    If you have questions, please call [phone]. Remember, [employer] is NOT to be used for urgent needs. For medical emergencies, dial 911. Starting a Weight Loss Plan: After Your Visit  Your Care Instructions  If you are thinking about losing weight, it can be hard to know where to start. Your doctor can help you set up a weight loss plan that best meets your needs. You may want to take a class on nutrition or exercise, or join a weight loss support group. If you have questions about how to make changes to your eating or exercise habits, ask your doctor about seeing a registered dietitian or an exercise specialist.  It can be a big challenge to lose weight. But you do not have to make huge changes at once. Make small changes, and stick with them. When those changes become habit, add a few more changes. If you do not think you are ready to make changes right now, try to pick a date in the future.  Make an appointment to see your doctor to discuss whether the time is right for you to start a plan.  Follow-up care is a key part of your treatment and safety. Be sure to make and go to all appointments, and call your doctor if you are having problems. It's also a good idea to know your test results and keep a list of the medicines you take. How can you care for yourself at home? · Set realistic goals. Many people expect to lose much more weight than is likely. A weight loss of 5% to 10% of your body weight may be enough to improve your health. · Get family and friends involved to provide support. Talk to them about why you are trying to lose weight, and ask them to help. They can help by participating in exercise and having meals with you, even if they may be eating something different. · Find what works best for you. If you do not have time or do not like to cook, a program that offers meal replacement bars or shakes may be better for you. Or if you like to prepare meals, finding a plan that includes daily menus and recipes may be best.  · Ask your doctor about other health professionals who can help you achieve your weight loss goals. ¨ A dietitian can help you make healthy changes in your diet. ¨ An exercise specialist or  can help you develop a safe and effective exercise program.  ¨ A counselor or psychiatrist can help you cope with issues such as depression, anxiety, or family problems that can make it hard to focus on weight loss. · Consider joining a support group for people who are trying to lose weight. Your doctor can suggest groups in your area. Where can you learn more? Go to [employer].be  Enter U357 in the search box to learn more about \"Starting a Weight Loss Plan: After Your Visit. \"   © 4814-2494 Healthwise, Incorporated. Care instructions adapted under license by "[employer] (which disclaims liability or warranty for this information).  This care instruction is for use with your licensed healthcare professional. If you have questions about a medical condition or this instruction, always ask your healthcare professional. [place] [address] any warranty or liability for your use of this information.   Content Version: 3.0.21994; Last Revised: September 1, 2009

## 2017-04-09 ENCOUNTER — HOSPITAL ENCOUNTER (OUTPATIENT)
Dept: SLEEP MEDICINE | Age: 82
Discharge: HOME | End: 2017-04-09
Payer: MEDICARE

## 2017-04-09 PROCEDURE — 95806 SLEEP STUDY UNATT&RESP EFFT: CPT | Performed by: INTERNAL MEDICINE

## 2017-04-10 ENCOUNTER — TELEPHONE (OUTPATIENT)
Dept: SLEEP MEDICINE | Age: 82
End: 2017-04-10

## 2017-04-10 NOTE — TELEPHONE ENCOUNTER
HSAT Returned    Date of Study: 17    The following information was gathered from the patients study log:    · Lights off: 9:00p  · Estimated sleep onset: 10:30p    · Awakened a total of unknown times  · The patient felt they slept 8 hours  · Patient took 2.5mg Diazapam before starting the test  · Sleep quality was worse compared to a usual nights sleep. Further information provided: \"Went to a  yesterday. A lot on my mind. Body was tired and tense. \"

## 2017-04-11 ENCOUNTER — TELEPHONE (OUTPATIENT)
Dept: SLEEP MEDICINE | Age: 82
End: 2017-04-11

## 2017-04-11 NOTE — TELEPHONE ENCOUNTER
[address] [facility] [place]. [place], [address]  Tel.  [phone]  Fax. 100 [facility] 60  [place], [address] [facility]  Tel.  [phone]  Fax. [phone] [address]  Tel.  [phone]  Fax. [phone]   Polysomnogram was performed and the results of the study were explained to the patient. Please refer to interpretation report for further details. Apnea/Hypopnea index of 5 which indicates mild apnea. She continues to have snoring. * Treatment options were offered. She feels she will not be compliant with regular CPAP use. * We have recommended a dedicated weight loss and exercise regiment as significant weight reduction has been shown to reduce severity of obstructive sleep apnea. * Counseling was provided regarding safe driving and proper sleep hygiene, with particular attention to maintaining lateral positioning while sleeping with the use of bed pillows to reduce apnic events at night. Caution with hypnotics, alcohol, and sedating pain medications as these can worsen sleep apnea. F/U in 6 months  * She was asked to contact our office at any time for further questions regarding her sleep symptoms.       Guillermo Thurman M.D.  (electronically signed)  Diplomate in Sleep Medicine, [facility]

## 2017-04-12 ENCOUNTER — DOCUMENTATION ONLY (OUTPATIENT)
Dept: SLEEP MEDICINE | Age: 82
End: 2017-04-12

## 2017-05-16 ENCOUNTER — OFFICE VISIT (OUTPATIENT)
Dept: INTERNAL MEDICINE CLINIC | Age: 82
End: 2017-05-16

## 2017-05-16 ENCOUNTER — HOSPITAL ENCOUNTER (OUTPATIENT)
Dept: LAB | Age: 82
Discharge: HOME | End: 2017-05-16
Payer: MEDICARE

## 2017-05-16 VITALS
WEIGHT: 168.4 LBS | RESPIRATION RATE: 14 BRPM | OXYGEN SATURATION: 98 % | SYSTOLIC BLOOD PRESSURE: 126 MMHG | DIASTOLIC BLOOD PRESSURE: 59 MMHG | BODY MASS INDEX: 29.84 KG/M2 | HEART RATE: 58 BPM | HEIGHT: 63 IN | TEMPERATURE: 98 F

## 2017-05-16 DIAGNOSIS — Z78.0 POST-MENOPAUSAL: ICD-10-CM

## 2017-05-16 DIAGNOSIS — F33.2 SEVERE EPISODE OF RECURRENT MAJOR DEPRESSIVE DISORDER, WITHOUT PSYCHOTIC FEATURES (HCC): ICD-10-CM

## 2017-05-16 DIAGNOSIS — Z00.00 MEDICARE ANNUAL WELLNESS VISIT, SUBSEQUENT: Primary | ICD-10-CM

## 2017-05-16 DIAGNOSIS — I10 ESSENTIAL HYPERTENSION: ICD-10-CM

## 2017-05-16 DIAGNOSIS — E78.9 LIPID DISORDER: ICD-10-CM

## 2017-05-16 DIAGNOSIS — Z13.39 SCREENING FOR ALCOHOLISM: ICD-10-CM

## 2017-05-16 PROCEDURE — 80061 LIPID PANEL: CPT

## 2017-05-16 PROCEDURE — 36415 COLL VENOUS BLD VENIPUNCTURE: CPT

## 2017-05-16 PROCEDURE — 80053 COMPREHEN METABOLIC PANEL: CPT

## 2017-05-16 PROCEDURE — 85027 COMPLETE CBC AUTOMATED: CPT

## 2017-05-16 PROCEDURE — 84443 ASSAY THYROID STIM HORMONE: CPT

## 2017-05-16 NOTE — PROGRESS NOTES
HISTORY OF PRESENT ILLNESS  Kalyan Carmichael is a [de-identified] y.o. female. HPI     Hypertension ROS: taking medications as instructed, no medication side effects noted, no TIA's, no chest pain on exertion, no dyspnea on exertion, no swelling of ankles. New concerns: Stable- bp was 126/59 in the office today. Mood- Pt is not taking a medication for depression. She works on her mood with behavioral therapy. Reports she walks with her son. Pt does not take Valium often. She takes this medication if her tremors get bad. Reports she had bronchitis in 12/2016 and was on 3 antibiotics for this. The last antibiotic she took was Levaquin and she had bad side effects with this antibiotic. Pt had a rash and saw Dr. Ameya Allen who thought her rash was due to Levaquin. Reports her retina hemorrhaged and she had to have 6 injections- they thought this was due to Levaquin. She states her balance is not great. She was not driving for awhile because she was feeling unsteady and states now she is back to driving. Reports she saw her ENT who told her that her balance was not caused by her inner ear and suggested she see Dr. Anna Marr. Dr. Anna Marr thought she had a stroke and ordered and MRI. Pt states MRI showed a meningioma and Dr. Anna Marr is following this. Pt states that she went off her diuretic due to rash and her weight increased to 180 pounds when she stopped taking this. Pt states she started on a diet and taking diuretic again and her current weight is 168 pounds. Pt states she stopped drinking wine and now only occasionally drinks wine with a friend. Pt states that if she eats too fast she gets a spasm in her esophagus and she has to be careful. Pt states she had hearing checked by ENT who told her that her hearing was not any worse than the last check and it was 60%. Pt wears hearing aids. Pt states that she has been gardening for the past few weeks and has been having issues with her back.  Reports back is tight at night and if she moves her leg a certain way she has pain up her back. She took extra strength Tylenol which provided relief. Pt denies any recent falls. She continues to use her cane. She does all of her cooking and takes care of her  and they live in apartment. Review of Systems   All other systems reviewed and are negative. Physical Exam   Constitutional: She is oriented to person, place, and time. She appears well-developed and well-nourished. HENT:   Head: Normocephalic and atraumatic. Right Ear: External ear normal.   Left Ear: External ear normal.   Nose: Nose normal.   Mouth/Throat: Oropharynx is clear and moist.   Eyes: Conjunctivae and EOM are normal.   Neck: Normal range of motion. Neck supple. Carotid bruit is not present. No thyroid mass and no thyromegaly present. Cardiovascular: Normal rate, regular rhythm, S1 normal, S2 normal, normal heart sounds and intact distal pulses. Pulmonary/Chest: Effort normal and breath sounds normal.   Abdominal: Soft. Normal appearance and bowel sounds are normal. There is no hepatosplenomegaly. There is no tenderness. Musculoskeletal: Normal range of motion. Neurological: She is alert and oriented to person, place, and time. She has normal strength. No cranial nerve deficit or sensory deficit. Coordination normal.   Skin: Skin is warm, dry and intact. No abrasion and no rash noted. Psychiatric: She has a normal mood and affect. Her behavior is normal. Judgment and thought content normal.   Nursing note and vitals reviewed. ASSESSMENT and PLAN  Yu Romano was seen today for annual wellness visit. Diagnoses and all orders for this visit:    Medicare annual wellness visit, subsequent    Essential hypertension  BP is at goal. I do not recommend any change in medications.   -     CBC W/O DIFF  -     METABOLIC PANEL, COMPREHENSIVE    Severe episode of recurrent major depressive disorder, without psychotic features ([facility] [address]  She is not taking a medication for her mood. She walks and does behavioral therapy.  -     TSH 3RD GENERATION    Screening for alcoholism    Lipid disorder  -     LIPID PANEL    Post-menopausal  -     DEXA BONE DENSITY STUDY AXIAL; Future    Pt should take Tylenol prn and continue stretching for her back. Encouraged pt to get Prevnar-13 vaccine. Pt ate oatmeal- will check labs today anyway. lab results and schedule of future lab studies reviewed with patient  reviewed diet, exercise and weight control  reviewed medications and side effects in detail     Written by Brett Almeida, as dictated by Rossana Garcia MD.     Current diagnosis and concerns discussed with pt at length. Understands risks and benefits or current treatment plan and medications and accepts the treatment and medication with any possible risks. Pt asks appropriate questions which were answered. Pt instructed to call with any concerns or problems.

## 2017-05-16 NOTE — PROGRESS NOTES
This is a Subsequent Medicare Annual Wellness Visit providing Personalized Prevention Plan Services (PPPS) (Performed 12 months after initial AWV and PPPS )    I have reviewed the patient's medical history in detail and updated the computerized patient record. History     Past Medical History:   Diagnosis Date    Hypertension     Kidney stones 11/17/2010    Macular degeneration     Palpitation     Seizures ([address]     since 1992    Sleep apnea 11/16/2010    uses cpap    TIA (transient ischemic attack) 2003,2009,2011    tia's x 3  (she says it was related to estrogen use)      Past Surgical History:   Procedure Laterality Date    ENDOSCOPY, COLON, DIAGNOSTIC      2006 neg    HX ABDOMINAL WALL DEFECT REPAIR      biliary stones and ercp for pancreatitis    HX CHOLECYSTECTOMY      20 years ago with adhesions found    HX GYN      anastasiia endometriosis    HX HYSTERECTOMY  1972    HX TONSILLECTOMY      x 2     Current Outpatient Prescriptions   Medication Sig Dispense Refill    atenolol (TENORMIN) 25 mg tablet Take 1 Tab by mouth daily. 90 Tab 3    hydrochlorothiazide (HYDRODIURIL) 25 mg tablet Take 1 Tab by mouth daily. (Patient taking differently: Take 12.5 mg by mouth daily.) 30 Tab 4    diazepam (VALIUM) 5 mg tablet Take 0.5 Tabs by mouth every six (6) hours as needed for Anxiety. 20 Tab 0    Cholecalciferol, Vitamin D3, (VITAMIN D3) 1,000 unit cap Take 2,000 Int'l Units by mouth daily.  acetaminophen (TYLENOL EXTRA STRENGTH) 500 mg tablet Take  by mouth every six (6) hours as needed for Pain.  Vit C-Vit E-Copper-ZnOx-Lutein (PRESERVISION) 226-200-5 mg-unit-mg Cap Take  by mouth.  COPPER GLUCONATE PO Take  by mouth.  aspirin delayed-release 81 mg tablet take 162 mg by mouth daily.        Allergies   Allergen Reactions    Ceclor [Cefaclor] Other (comments)     Pain flank, abd      Cleocin [Clindamycin Hcl] Other (comments)     Flank, abd pain    Codeine Nausea and Vomiting    Compazine [Prochlorperazine] Other (comments)     Hyper and anxious    Fosamax [Alendronate] Other (comments)     Indigestion      Macrobid [Nitrofurantoin Monohyd/M-Cryst] Swelling and Other (comments)     wheezing    Pcn [Penicillins] Rash    Phenobarbital Other (comments)     anxious    Sulfa (Sulfonamide Antibiotics) Rash     Headache       Family History   Problem Relation Age of Onset    Hypertension Mother     Heart Disease Father      Social History   Substance Use Topics    Smoking status: Former Smoker     Years: 2.00     Quit date: 1/1/1965    Smokeless tobacco: Never Used      Comment: stopped in her 25s    Alcohol use 0.6 oz/week     1 Glasses of wine per week      Comment: weekends only     Patient Active Problem List   Diagnosis Code    TIA (transient ischemic attack) G45.9    Sleep apnea G47.30    Kidney stones N20.0    Macular degeneration H35.30    Major depressive disorder, recurrent episode, severe ([facility] [address] F33.2    Essential hypertension I10       Depression Risk Factor Screening:     PHQ over the last two weeks 7/22/2014   Little interest or pleasure in doing things Not at all   Feeling down, depressed or hopeless Not at all   Total Score PHQ 2 0     Alcohol Risk Factor Screening: On any occasion during the past 3 months, have you had more than 3 drinks containing alcohol? No    Do you average more than 7 drinks per week? No- maybe four times over the last two months      Functional Ability and Level of Safety:     Hearing Loss   moderate    Activities of Daily Living   Self-care. Requires assistance with: no ADLs    Fall Risk     Fall Risk Assessment, last 12 mths 2/22/2017   Able to walk? Yes   Fall in past 12 months? No     Abuse Screen   Patient is not abused    Review of Systems   A comprehensive review of systems was negative except for that written in the HPI.     Physical Examination     Evaluation of Cognitive Function:  Mood/affect:  neutral  Appearance: age appropriate  Family member/caregiver input: has been gardening and taking care of herself ; uses her can every time-         Patient Care Team:  Linda Bishop MD as PCP - General (Internal Medicine)  Christiano Gates MD (Cardiology)   [address] [place] [address] neurology     Advice/Referrals/Counseling   Education and counseling provided:  Are appropriate based on today's review and evaluation  End-of-Life planning (with patient's consent)  Pneumococcal Vaccine      Assessment/Plan   Kerry Grande was seen today for annual wellness visit. Diagnoses and all orders for this visit:    Medicare annual wellness visit, subsequent    Essential hypertension  -     CBC W/O DIFF  -     METABOLIC PANEL, COMPREHENSIVE    Severe episode of recurrent major depressive disorder, without psychotic features ([facility] [address]  -     TSH 3RD GENERATION    Screening for alcoholism    Lipid disorder  -     LIPID PANEL    Post-menopausal  -     DEXA BONE DENSITY STUDY AXIAL; Future      lab results and schedule of future lab studies reviewed with patient  reviewed diet, exercise and weight control  reviewed medications and side effects in detail.

## 2017-05-17 LAB
ALBUMIN SERPL-MCNC: 4.2 G/DL (ref 3.5–4.7)
ALBUMIN/GLOB SERPL: 1.6 {RATIO} (ref 1.2–2.2)
ALP SERPL-CCNC: 99 IU/L (ref 39–117)
ALT SERPL-CCNC: 27 IU/L (ref 0–32)
AST SERPL-CCNC: 28 IU/L (ref 0–40)
BILIRUB SERPL-MCNC: 0.4 MG/DL (ref 0–1.2)
BUN SERPL-MCNC: 17 MG/DL (ref 8–27)
BUN/CREAT SERPL: 24 (ref 12–28)
CALCIUM SERPL-MCNC: 10.3 MG/DL (ref 8.7–10.3)
CHLORIDE SERPL-SCNC: 100 MMOL/L (ref 96–106)
CHOLEST SERPL-MCNC: 170 MG/DL (ref 100–199)
CO2 SERPL-SCNC: 23 MMOL/L (ref 18–29)
CREAT SERPL-MCNC: 0.7 MG/DL (ref 0.57–1)
ERYTHROCYTE [DISTWIDTH] IN BLOOD BY AUTOMATED COUNT: 12.7 % (ref 12.3–15.4)
GLOBULIN SER CALC-MCNC: 2.6 G/DL (ref 1.5–4.5)
GLUCOSE SERPL-MCNC: 95 MG/DL (ref 65–99)
HCT VFR BLD AUTO: 42.2 % (ref 34–46.6)
HDLC SERPL-MCNC: 53 MG/DL
HGB BLD-MCNC: 13.8 G/DL (ref 11.1–15.9)
INTERPRETATION, 910389: NORMAL
LDLC SERPL CALC-MCNC: 97 MG/DL (ref 0–99)
MCH RBC QN AUTO: 30.8 PG (ref 26.6–33)
MCHC RBC AUTO-ENTMCNC: 32.7 G/DL (ref 31.5–35.7)
MCV RBC AUTO: 94 FL (ref 79–97)
PLATELET # BLD AUTO: 326 X10E3/UL (ref 150–379)
POTASSIUM SERPL-SCNC: 4.4 MMOL/L (ref 3.5–5.2)
PROT SERPL-MCNC: 6.8 G/DL (ref 6–8.5)
RBC # BLD AUTO: 4.48 X10E6/UL (ref 3.77–5.28)
SODIUM SERPL-SCNC: 137 MMOL/L (ref 134–144)
TRIGL SERPL-MCNC: 100 MG/DL (ref 0–149)
TSH SERPL DL<=0.005 MIU/L-ACNC: 2.05 UIU/ML (ref 0.45–4.5)
VLDLC SERPL CALC-MCNC: 20 MG/DL (ref 5–40)
WBC # BLD AUTO: 5.4 X10E3/UL (ref 3.4–10.8)

## 2017-05-25 ENCOUNTER — HOSPITAL ENCOUNTER (OUTPATIENT)
Dept: MAMMOGRAPHY | Age: 82
Discharge: HOME | End: 2017-05-25
Attending: INTERNAL MEDICINE
Payer: MEDICARE

## 2017-05-25 DIAGNOSIS — Z78.0 POST-MENOPAUSAL: ICD-10-CM

## 2017-05-25 PROCEDURE — 77080 DXA BONE DENSITY AXIAL: CPT

## 2017-08-10 ENCOUNTER — OFFICE VISIT (OUTPATIENT)
Dept: INTERNAL MEDICINE CLINIC | Age: 82
End: 2017-08-10

## 2017-08-10 ENCOUNTER — HOSPITAL ENCOUNTER (OUTPATIENT)
Dept: LAB | Age: 82
Discharge: HOME | End: 2017-08-10
Payer: MEDICARE

## 2017-08-10 VITALS
BODY MASS INDEX: 30.02 KG/M2 | WEIGHT: 169.4 LBS | DIASTOLIC BLOOD PRESSURE: 69 MMHG | OXYGEN SATURATION: 97 % | HEIGHT: 63 IN | HEART RATE: 69 BPM | RESPIRATION RATE: 14 BRPM | TEMPERATURE: 97.6 F | SYSTOLIC BLOOD PRESSURE: 144 MMHG

## 2017-08-10 DIAGNOSIS — R82.71 BACTERIURIA: ICD-10-CM

## 2017-08-10 DIAGNOSIS — R61 NIGHT SWEATS: ICD-10-CM

## 2017-08-10 DIAGNOSIS — M25.551 CHRONIC RIGHT HIP PAIN: ICD-10-CM

## 2017-08-10 DIAGNOSIS — M54.5 LEFT LOW BACK PAIN, UNSPECIFIED CHRONICITY, WITH SCIATICA PRESENCE UNSPECIFIED: Primary | ICD-10-CM

## 2017-08-10 DIAGNOSIS — G89.29 CHRONIC RIGHT HIP PAIN: ICD-10-CM

## 2017-08-10 LAB
BILIRUB UR QL STRIP: NEGATIVE
GLUCOSE UR-MCNC: NEGATIVE MG/DL
KETONES P FAST UR STRIP-MCNC: NEGATIVE MG/DL
PH UR STRIP: 6.5 [PH] (ref 4.6–8)
PROT UR QL STRIP: NEGATIVE MG/DL
SP GR UR STRIP: 1.01 (ref 1–1.03)
UA UROBILINOGEN AMB POC: ABNORMAL (ref 0.2–1)
URINALYSIS CLARITY POC: CLEAR
URINALYSIS COLOR POC: YELLOW
URINE BLOOD POC: ABNORMAL
URINE LEUKOCYTES POC: ABNORMAL
URINE NITRITES POC: NEGATIVE

## 2017-08-10 PROCEDURE — 36415 COLL VENOUS BLD VENIPUNCTURE: CPT

## 2017-08-10 PROCEDURE — 85025 COMPLETE CBC W/AUTO DIFF WBC: CPT

## 2017-08-10 PROCEDURE — 80053 COMPREHEN METABOLIC PANEL: CPT

## 2017-08-10 NOTE — PATIENT INSTRUCTIONS
Hip Pain: Care Instructions  Your Care Instructions  Hip pain may be caused by many things, including overuse, a fall, or a twisting movement. Another cause of hip pain is arthritis. Your pain may increase when you stand up, walk, or squat. The pain may come and go or may be constant. Home treatment can help relieve hip pain, swelling, and stiffness. If your pain is ongoing, you may need more tests and treatment. Follow-up care is a key part of your treatment and safety. Be sure to make and go to all appointments, and call your doctor if you are having problems. Its also a good idea to know your test results and keep a list of the medicines you take. How can you care for yourself at home? · Take pain medicines exactly as directed. ¨ If the doctor gave you a prescription medicine for pain, take it as prescribed. ¨ If you are not taking a prescription pain medicine, ask your doctor if you can take an over-the-counter medicine. · Rest and protect your hip. Take a break from any activity, including standing or walking, that may cause pain. · Put ice or a cold pack against your hip for 10 to 20 minutes at a time. Try to do this every 1 to 2 hours for the next 3 days (when you are awake) or until the swelling goes down. Put a thin cloth between the ice and your skin. · Sleep on your healthy side with a pillow between your knees, or sleep on your back with pillows under your knees. · If there is no swelling, you can put moist heat, a heating pad, or a warm cloth on your hip. Do gentle stretching exercises to help keep your hip flexible. · Learn how to prevent falls. Have your vision and hearing checked regularly. Wear slippers or shoes with a nonskid sole. · Stay at a healthy weight. · Wear comfortable shoes. When should you call for help? Call 911 anytime you think you may need emergency care. For example, call if:  · You have sudden chest pain and shortness of breath, or you cough up blood.   · You are not able to stand or walk or bear weight. · Your buttocks, legs, or feet feel numb or tingly. · Your leg or foot is cool or pale or changes color. · You have severe pain. Call your doctor now or seek immediate medical care if:  · You have signs of infection, such as:  ¨ Increased pain, swelling, warmth, or redness in the hip area. ¨ Red streaks leading from the hip area. ¨ Pus draining from the hip area. ¨ A fever. · You have signs of a blood clot, such as:  ¨ Pain in your calf, back of the knee, thigh, or groin. ¨ Redness and swelling in your leg or groin. · You are not able to bend, straighten, or move your leg normally. · You have trouble urinating or having bowel movements. Watch closely for changes in your health, and be sure to contact your doctor if:  · You do not get better as expected. Where can you learn more? Go to http://jonatan-elvis.info/. Enter B428 in the search box to learn more about \"Hip Pain: Care Instructions. \"  Current as of: March 20, 2017  Content Version: 11.3  © 5860-6876 [employer]. Care instructions adapted under license by [employer] (which disclaims liability or warranty for this information). If you have questions about a medical condition or this instruction, always ask your healthcare professional. [place] [address] any warranty or liability for your use of this information.

## 2017-08-10 NOTE — MR AVS SNAPSHOT
Visit Information Date & Time Provider Department Dept. Phone Encounter #  
 8/10/2017 11:20 AM Roberta John, NP [facility] [address] [id number] Your Appointments 10/19/2017 11:00 AM  
Any with Miko Figueroa MD  
[facility] ([address]) Appt Note: 6 month fu  
 [address] [place] [id number] [address] [facility] [phone] Upcoming Health Maintenance Date Due Pneumococcal 65+ Low/Medium Risk (2 of 2 - PPSV23) 5/20/2014 COLONOSCOPY 1/1/2016 GLAUCOMA SCREENING Q2Y 4/1/2016 INFLUENZA AGE 9 TO ADULT 8/1/2017 MEDICARE YEARLY EXAM 5/17/2018 DTaP/Tdap/Td series (2 - Td) 5/16/2027 Allergies as of 8/10/2017  Review Complete On: 8/10/2017 By: Roberta John NP Severity Noted Reaction Type Reactions Ceclor [Cefaclor]  03/31/2009    Other (comments) Pain flank, abd  
 Cleocin [Clindamycin Hcl]  03/31/2009    Other (comments) Flank, abd pain Codeine  03/31/2009    Nausea and Vomiting Compazine [Prochlorperazine]  03/31/2009    Other (comments) Hyper and anxious Fosamax [Alendronate]  03/31/2009    Other (comments) Indigestion Levaquin [Levofloxacin]  08/10/2017    Other (comments) Hemorrhage in the eyes Macrobid [Nitrofurantoin Monohyd/m-cryst]  12/06/2012   Systemic Swelling, Other (comments)  
 wheezing Pcn [Penicillins]  03/31/2009    Rash Phenobarbital  03/31/2009    Other (comments)  
 anxious Sulfa (Sulfonamide Antibiotics)  03/31/2009    Rash Headache Current Immunizations  Reviewed on 4/11/2011 Name Date Influenza Vaccine (Whole Virus) 1/1/2013 Influenza Vaccine Split 11/17/2010  8:33 AM  
 Influenza Vaccine Whole 4/11/2010 PPD 3/23/2011 12:57 PM  
 ZZZ-RETIRED (DO NOT USE) Pneumococcal Vaccine (Unspecified Type) 5/20/2009 Not reviewed this visit You Were Diagnosed With   
  
 Codes Comments Left low back pain, unspecified chronicity, with sciatica presence unspecified    -  Primary ICD-10-CM: M54.5 ICD-9-CM: 724.2 Chronic right hip pain     ICD-10-CM: M25.551, G89.29 ICD-9-CM: 719.45, 338.29 Bacteriuria     ICD-10-CM: R82.71 ICD-9-CM: 791.9 Night sweats     ICD-10-CM: R61 
ICD-9-CM: 780.8 Vitals BP Pulse Temp Resp Height(growth percentile) Weight(growth percentile) 144/69 (BP 1 Location: Left arm, BP Patient Position: Sitting) 69 97.6 °F (36.4 °C) (Oral) 14 5' 3\" (1.6 m) 169 lb 6.4 oz (76.8 kg) SpO2 BMI OB Status Smoking Status 97% 30.01 kg/m2 Postmenopausal Former Smoker BMI and BSA Data Body Mass Index Body Surface Area 30.01 kg/m 2 1.85 m 2 Preferred Pharmacy Pharmacy Name Phone [id number] [address] [facility] [address], [address] [phone] Your Updated Medication List  
  
   
This list is accurate as of: 8/10/17 12:09 PM.  Always use your most recent med list.  
  
  
  
  
 aspirin delayed-release 81 mg tablet  
take 162 mg by mouth daily. atenolol 25 mg tablet Commonly known as:  TENORMIN Take 1 Tab by mouth daily. COPPER GLUCONATE PO Take  by mouth. diazePAM 5 mg tablet Commonly known as:  VALIUM Take 0.5 Tabs by mouth every six (6) hours as needed for Anxiety. hydroCHLOROthiazide 25 mg tablet Commonly known as:  HYDRODIURIL Take 1 Tab by mouth daily. PRESERVISION LUTEIN 226 mg-200 unit -5 mg-0.8 mg Cap Generic drug:  Vit C-Vit E-Copper-ZnOx-Lutein Take  by mouth. TYLENOL EXTRA STRENGTH 500 mg tablet Generic drug:  acetaminophen Take  by mouth every six (6) hours as needed for Pain. VITAMIN D3 1,000 unit Cap Generic drug:  cholecalciferol Take 2,000 Int'l Units by mouth daily. We Performed the Following AMB POC URINALYSIS DIP STICK AUTO W/O MICRO [61312 CPT(R)] CBC WITH AUTOMATED DIFF [95519 CPT(R)] CULTURE, URINE [id number] CPT(R)] METABOLIC PANEL, COMPREHENSIVE [91864 CPT(R)] Patient Instructions Hip Pain: Care Instructions Your Care Instructions Hip pain may be caused by many things, including overuse, a fall, or a twisting movement. Another cause of hip pain is arthritis. Your pain may increase when you stand up, walk, or squat. The pain may come and go or may be constant. Home treatment can help relieve hip pain, swelling, and stiffness. If your pain is ongoing, you may need more tests and treatment. Follow-up care is a key part of your treatment and safety. Be sure to make and go to all appointments, and call your doctor if you are having problems. Its also a good idea to know your test results and keep a list of the medicines you take. How can you care for yourself at home? · Take pain medicines exactly as directed. ¨ If the doctor gave you a prescription medicine for pain, take it as prescribed. ¨ If you are not taking a prescription pain medicine, ask your doctor if you can take an over-the-counter medicine. · Rest and protect your hip. Take a break from any activity, including standing or walking, that may cause pain. · Put ice or a cold pack against your hip for 10 to 20 minutes at a time. Try to do this every 1 to 2 hours for the next 3 days (when you are awake) or until the swelling goes down. Put a thin cloth between the ice and your skin. · Sleep on your healthy side with a pillow between your knees, or sleep on your back with pillows under your knees. · If there is no swelling, you can put moist heat, a heating pad, or a warm cloth on your hip. Do gentle stretching exercises to help keep your hip flexible. · Learn how to prevent falls. Have your vision and hearing checked regularly. Wear slippers or shoes with a nonskid sole. · Stay at a healthy weight. · Wear comfortable shoes. When should you call for help? Call 911 anytime you think you may need emergency care. For example, call if: 
· You have sudden chest pain and shortness of breath, or you cough up blood. · You are not able to stand or walk or bear weight. · Your buttocks, legs, or feet feel numb or tingly. · Your leg or foot is cool or pale or changes color. · You have severe pain. Call your doctor now or seek immediate medical care if: 
· You have signs of infection, such as: 
¨ Increased pain, swelling, warmth, or redness in the hip area. ¨ Red streaks leading from the hip area. ¨ Pus draining from the hip area. ¨ A fever. · You have signs of a blood clot, such as: 
¨ Pain in your calf, back of the knee, thigh, or groin. ¨ Redness and swelling in your leg or groin. · You are not able to bend, straighten, or move your leg normally. · You have trouble urinating or having bowel movements. Watch closely for changes in your health, and be sure to contact your doctor if: 
· You do not get better as expected. Where can you learn more? Go to http://jonatan-elvis.info/. Enter C702 in the search box to learn more about \"Hip Pain: Care Instructions. \" Current as of: March 20, 2017 Content Version: 11.3 © 5567-8088 [employer]. Care instructions adapted under license by [employer] (which disclaims liability or warranty for this information). If you have questions about a medical condition or this instruction, always ask your healthcare professional. [place] [address] any warranty or liability for your use of this information. Introducing [facility]! Leticia Ugalde introduces GlobaTrek patient portal. Now you can access parts of your medical record, email your doctor's office, and request medication refills online. 1. In your internet browser, go to [email or web address] 2. Click on the First Time User? Click Here link in the Sign In box. You will see the New Member Sign Up page. 3. Enter your Ophis Vape Access Code exactly as it appears below. You will not need to use this code after youve completed the sign-up process. If you do not sign up before the expiration date, you must request a new code. · Ophis Vape Access Code: [email or web address] Expires: 11/8/2017 12:09 PM 
 
4. Enter the last four digits of your Social Security Number (xxxx) and Date of Birth (mm/dd/yyyy) as indicated and click Submit. You will be taken to the next sign-up page. 5. Create a Ophis Vape ID. This will be your Ophis Vape login ID and cannot be changed, so think of one that is secure and easy to remember. 6. Create a Ophis Vape password. You can change your password at any time. 7. Enter your Password Reset Question and Answer. This can be used at a later time if you forget your password. 8. Enter your e-mail address. You will receive e-mail notification when new information is available in [address] 9. Click Sign Up. You can now view and download portions of your medical record. 10. Click the Download Summary menu link to download a portable copy of your medical information. If you have questions, please visit the Frequently Asked Questions section of the Ophis Vape website. Remember, Ophis Vape is NOT to be used for urgent needs. For medical emergencies, dial 911. Now available from your iPhone and Android! Please provide this summary of care documentation to your next provider. Your primary care clinician is listed as Sean Aparicio. If you have any questions after today's visit, please call [phone].

## 2017-08-11 LAB
ALBUMIN SERPL-MCNC: 4.1 G/DL (ref 3.5–4.7)
ALBUMIN/GLOB SERPL: 1.6 {RATIO} (ref 1.2–2.2)
ALP SERPL-CCNC: 94 IU/L (ref 39–117)
ALT SERPL-CCNC: 25 IU/L (ref 0–32)
AST SERPL-CCNC: 23 IU/L (ref 0–40)
BASOPHILS # BLD AUTO: 0 X10E3/UL (ref 0–0.2)
BASOPHILS NFR BLD AUTO: 0 %
BILIRUB SERPL-MCNC: 0.3 MG/DL (ref 0–1.2)
BUN SERPL-MCNC: 18 MG/DL (ref 8–27)
BUN/CREAT SERPL: 28 (ref 12–28)
CALCIUM SERPL-MCNC: 10.2 MG/DL (ref 8.7–10.3)
CHLORIDE SERPL-SCNC: 101 MMOL/L (ref 96–106)
CO2 SERPL-SCNC: 26 MMOL/L (ref 18–29)
CREAT SERPL-MCNC: 0.64 MG/DL (ref 0.57–1)
EOSINOPHIL # BLD AUTO: 0.1 X10E3/UL (ref 0–0.4)
EOSINOPHIL NFR BLD AUTO: 2 %
ERYTHROCYTE [DISTWIDTH] IN BLOOD BY AUTOMATED COUNT: 13.2 % (ref 12.3–15.4)
GLOBULIN SER CALC-MCNC: 2.6 G/DL (ref 1.5–4.5)
GLUCOSE SERPL-MCNC: 94 MG/DL (ref 65–99)
HCT VFR BLD AUTO: 41.3 % (ref 34–46.6)
HGB BLD-MCNC: 13.5 G/DL (ref 11.1–15.9)
IMM GRANULOCYTES # BLD: 0 X10E3/UL (ref 0–0.1)
IMM GRANULOCYTES NFR BLD: 1 %
LYMPHOCYTES # BLD AUTO: 2.6 X10E3/UL (ref 0.7–3.1)
LYMPHOCYTES NFR BLD AUTO: 43 %
MCH RBC QN AUTO: 30.4 PG (ref 26.6–33)
MCHC RBC AUTO-ENTMCNC: 32.7 G/DL (ref 31.5–35.7)
MCV RBC AUTO: 93 FL (ref 79–97)
MONOCYTES # BLD AUTO: 0.6 X10E3/UL (ref 0.1–0.9)
MONOCYTES NFR BLD AUTO: 10 %
NEUTROPHILS # BLD AUTO: 2.7 X10E3/UL (ref 1.4–7)
NEUTROPHILS NFR BLD AUTO: 44 %
PLATELET # BLD AUTO: 356 X10E3/UL (ref 150–379)
POTASSIUM SERPL-SCNC: 4.7 MMOL/L (ref 3.5–5.2)
PROT SERPL-MCNC: 6.7 G/DL (ref 6–8.5)
RBC # BLD AUTO: 4.44 X10E6/UL (ref 3.77–5.28)
SODIUM SERPL-SCNC: 141 MMOL/L (ref 134–144)
WBC # BLD AUTO: 6.1 X10E3/UL (ref 3.4–10.8)

## 2017-08-11 NOTE — PROGRESS NOTES
Please call patient with normal labs and send letter with actual results.   She has urine culture pending

## 2017-08-12 LAB — BACTERIA UR CULT: NORMAL

## 2017-08-12 NOTE — PROGRESS NOTES
HISTORY OF PRESENT ILLNESS  Jarrell Morgan is a [de-identified] y.o. female. HPI  Presents with concerns about possible UTI; has been having some lower back pain and also urinary frequency over the past several weeks. Has been having some night sweats and chills but has not been running any fever when checked. Denies burning with voiding. She is scheduled to see Dr Sophia Benitez at the end of August for urinary incontinence. Reports she feels like her bladder has dropped. Denies vaginal discharge or pain. Reports that she saw Dr Jacoby Proctor for complaints of right hip pain for the past several months and she had cortisone injection in hip for bursitis and had good relief of pain but reports that her xray was not normal and she was advised to have repeat films in 3 months; she is concerned that this is a tumor and is wondering why orthopedic specialist wanted to wait so long to repeat the xrays. Review of Systems   Constitutional: Positive for chills, fever and malaise/fatigue. HENT: Negative for congestion and sore throat. Respiratory: Negative for cough and shortness of breath. Cardiovascular: Negative for chest pain and palpitations. Gastrointestinal: Negative for nausea and vomiting. Genitourinary: Positive for frequency and urgency. Negative for hematuria. Musculoskeletal: Positive for back pain and joint pain (right hip). Skin: Negative for rash. Neurological: Negative for dizziness, tingling and headaches. /69 (BP 1 Location: Left arm, BP Patient Position: Sitting)  Pulse 69  Temp 97.6 °F (36.4 °C) (Oral)   Resp 14  Ht 5' 3\" (1.6 m)  Wt 169 lb 6.4 oz (76.8 kg)  SpO2 97%  BMI 30.01 kg/m2  Physical Exam   Constitutional: She is oriented to person, place, and time. She appears well-developed and well-nourished. HENT:   Head: Normocephalic and atraumatic. Neck: Normal range of motion. Neck supple. No thyromegaly present. Cardiovascular: Normal rate and regular rhythm. Pulmonary/Chest: Effort normal and breath sounds normal. She has no wheezes. Abdominal: Soft. Bowel sounds are normal. There is tenderness in the suprapubic area. There is no rebound and no guarding. Musculoskeletal:        Right hip: She exhibits tenderness. Lumbar back: She exhibits tenderness. Back:    Lymphadenopathy:     She has no cervical adenopathy. Neurological: She is alert and oriented to person, place, and time. Psychiatric: She has a normal mood and affect. Her behavior is normal.   Nursing note and vitals reviewed. Urine dip in office unremarkable except for trace of blood and leukocytes. ASSESSMENT and PLAN  Diagnoses and all orders for this visit:    1. Left low back pain, unspecified chronicity, with sciatica presence unspecified  -     AMB POC URINALYSIS DIP STICK AUTO W/O MICRO    2. Chronic right hip pain -- advised to follow up with Dr Tauna Heimlich from Orthopedics. 3. Bacteriuria -- will send out urine culture and treat if positive; deferred on treatment at this time due to multiple antibiotic allergies. -     CULTURE, URINE    4.  Night sweats  -     CBC WITH AUTOMATED DIFF  -     METABOLIC PANEL, COMPREHENSIVE      lab results and schedule of future lab studies reviewed with patient  reviewed diet, exercise and weight control  reviewed medications and side effects in detail

## 2017-09-18 ENCOUNTER — OFFICE VISIT (OUTPATIENT)
Dept: OBGYN CLINIC | Age: 82
End: 2017-09-18

## 2017-09-18 VITALS
SYSTOLIC BLOOD PRESSURE: 136 MMHG | WEIGHT: 169 LBS | BODY MASS INDEX: 29.95 KG/M2 | HEIGHT: 63 IN | DIASTOLIC BLOOD PRESSURE: 80 MMHG

## 2017-09-18 DIAGNOSIS — Z01.411 ENCOUNTER FOR GYNECOLOGICAL EXAMINATION (GENERAL) (ROUTINE) WITH ABNORMAL FINDINGS: Primary | ICD-10-CM

## 2017-09-18 DIAGNOSIS — N90.5 ATROPHIC VULVA: ICD-10-CM

## 2017-09-18 DIAGNOSIS — R23.9 SKIN CHANGE: ICD-10-CM

## 2017-09-18 NOTE — PROGRESS NOTES
[address] [facility]  http://[employer]/  [phone]    Brendan Pineda MD, [address] [facility]       Annual Gynecologic Exam:  [facility] s/p hyst  Chief Complaint   Patient presents with    Well Woman    Vulvar Abnormaility     mole       Chloé Story is a [de-identified] y.o. No obstetric history on file. WHITE OR   female who is here for an annual exam.    She does report additional concerns today. Patient states that she went to the urologist a few months ago for prolapsed bladder, and that has now subsided, but they saw a mole on her right labia majora and advised her to get it looked at by her GYN. Patient states that she previously had a mole on her chest very similar to this, and that was examined by her dermatologist and she stated it was nothing to worry about. Pt wants AE. Has a dermatologist. But did not check under breast, and perineum. Saw Dr. Maury Dewitt, advised Gyn visit. H/o HRT until 78 yo.  D/C'd because of TIA. Has a pessary, in 2wk  Tried PT, until this year. Size 1, [place]. History of Present Illness: The patient is reporting having skin lesions for several years. 1 1/2, -2 years  She reports the symptoms are is slightly larger. Aggravating factors include none. And alleviating factors include none. Menstrual status:  Her periods are absent with a history of a hysterectomy. Contraception:  The current method of family planning is a hysterectomy. Sexual history:  She does not reports new sexual partner(s) in the last year. Preventive Medicine History:  Her last annual GYN exam was about three or more years ago. Her most recent Pap smear was normal many years ago per patient. Her most recent HR HPV screen was unknown. She reports she does not have a history of CHASE 2, 3 or cervical cancer. Breast health: The patient has not had a recent mammogram.  She does not have a first or second degree relative with a history of breast cancer.     Bone health: Eric Parra She does not have a history of osteopenia/osteoporosis. Osteoporosis family history updated: see FH. She is currently taking calcium and vitamin D.       Past Medical History:   Diagnosis Date    Endometriosis     YOU/BSO    Hypertension     Kidney stones 11/17/2010    Macular degeneration     Palpitation     Seizures ([address]     since 1992    Sleep apnea 11/16/2010    uses cpap    TIA (transient ischemic attack) 2003,2009,2011    tia's x 3  (she says it was related to estrogen use)     Past Surgical History:   Procedure Laterality Date    ENDOSCOPY, COLON, DIAGNOSTIC      2006 neg    HX ABDOMINAL WALL DEFECT REPAIR      biliary stones and ercp for pancreatitis    HX CHOLECYSTECTOMY      20 years ago with adhesions found    HX GYN      you endometriosis    HX HYSTERECTOMY  1972    endometriosis    HX TONSILLECTOMY      x 2     Family History   Problem Relation Age of Onset    Hypertension Mother    [facility] Other Mother      pulmonary hypotension    Heart Disease Father     Heart Disease Brother      Social History   Substance Use Topics    Smoking status: Former Smoker     Years: 2.00     Quit date: 1/1/1965    Smokeless tobacco: Never Used      Comment: stopped in her 25s    Alcohol use 0.6 oz/week     1 Glasses of wine per week      Comment: weekends only     Allergies   Allergen Reactions    Ceclor [Cefaclor] Other (comments)     Pain flank, abd      Cleocin [Clindamycin Hcl] Other (comments)     Flank, abd pain    Codeine Nausea and Vomiting    Compazine [Prochlorperazine] Other (comments)     Hyper and anxious    Fosamax [Alendronate] Other (comments)     Indigestion      Levaquin [Levofloxacin] Other (comments)     Hemorrhage in the eyes      Macrobid [Nitrofurantoin Monohyd/M-Cryst] Swelling and Other (comments)     wheezing    Pcn [Penicillins] Rash    Phenobarbital Other (comments)     anxious    Sulfa (Sulfonamide Antibiotics) Rash     Headache       Current Outpatient Prescriptions   Medication Sig    hydroCHLOROthiazide (HYDRODIURIL) 25 mg tablet TAKE 1 TABLET BY MOUTH DAILY.  atenolol (TENORMIN) 25 mg tablet Take 1 Tab by mouth daily.  diazepam (VALIUM) 5 mg tablet Take 0.5 Tabs by mouth every six (6) hours as needed for Anxiety.  Cholecalciferol, Vitamin D3, (VITAMIN D3) 1,000 unit cap Take 2,000 Int'l Units by mouth daily.  Vit C-Vit E-Copper-ZnOx-Lutein (PRESERVISION) 226-200-5 mg-unit-mg Cap Take  by mouth.  aspirin delayed-release 81 mg tablet take 162 mg by mouth daily.  acetaminophen (TYLENOL EXTRA STRENGTH) 500 mg tablet Take  by mouth every six (6) hours as needed for Pain.  COPPER GLUCONATE PO Take  by mouth. No current facility-administered medications for this visit. Tobacco History:  reports that she quit smoking about 52 years ago. She quit after 2.00 years of use. She has never used smokeless tobacco.  Alcohol Abuse:  reports that she drinks about 0.6 oz of alcohol per week   Drug Abuse:  reports that she does not use illicit drugs.     Patient Active Problem List   Diagnosis Code    TIA (transient ischemic attack) G45.9    Sleep apnea G47.30    Kidney stones N20.0    Macular degeneration H35.30    Major depressive disorder, recurrent episode, severe ([facility] [address] F33.2    Essential hypertension I10       Review of Systems - History obtained from the patient  Constitutional: negative for weight loss, fever, night sweats  HEENT: negative for hearing loss, earache, congestion, snoring, sorethroat  CV: negative for chest pain, palpitations, edema  Resp: negative for cough, shortness of breath, wheezing  GI: negative for change in bowel habits, abdominal pain, black or bloody stools  : negative for frequency, dysuria, hematuria, vaginal discharge  MSK: negative for back pain, joint pain, muscle pain  Breast: negative for breast lumps, nipple discharge, galactorrhea  Skin :negative for itching, rash, hives  Neuro: negative for dizziness, headache, confusion, weakness  Psych: negative for anxiety, depression, change in mood  Heme/lymph: negative for bleeding, bruising, pallor    Physical Exam  Visit Vitals    /80    Ht 5' 3\" (1.6 m)    Wt 169 lb (76.7 kg)    BMI 29.94 kg/m2       Constitutional  · Appearance: well-nourished, well developed, alert, in no acute distress    HENT  · Head and Face: appears normal    Neck  · Inspection/Palpation: normal appearance, no masses or tenderness  · Lymph Nodes: no lymphadenopathy present  · Thyroid: gland size normal, nontender, no nodules or masses present on palpation    Chest  · Respiratory Effort: breathing unlabored  · Auscultation: normal breath sounds    Cardiovascular  · Heart:  · Auscultation: regular rate and rhythm without murmur    Breasts  · Inspection of Breasts: breasts symmetrical, raised skin lesion see image under left breast c/w SK, no discharge present, nipple appearance normal, no skin retraction present  · Palpation of Breasts and Axillae: no masses present on palpation, no breast tenderness  · Axillary Lymph Nodes: no lymphadenopathy present      ·   ·     Gastrointestinal  · Abdominal Examination: abdomen non-tender to palpation, normal bowel sounds, no masses present  · Liver and spleen: no hepatomegaly present, spleen not palpable  · Hernias: no hernias identified    Genitourinary  Pessary removed, rinsed and replaced after exam  · External Genitalia: normal appearance for age, no discharge present, no tenderness present, no inflammatory lesions present, no masses present,  atrophy present  · See image above appears c/w SK: right labia majora  · Vagina: normal vaginal vault without central or paravaginal defects, no discharge present, no inflammatory lesions present, no masses present  · Bladder: non-tender to palpation  · Urethra: appears normal  · Cervix: not present   · Uterus: absent  · Adnexa: no adnexal tenderness present, no adnexal masses present  · Perineum: perineum within normal limits, no evidence of trauma, no rashes or skin lesions present  · Anus: anus within normal limits, no hemorrhoids present  · Inguinal Lymph Nodes: no lymphadenopathy present    Skin  · General Inspection: no rash, no lesions identified    Neurologic/Psychiatric  · Mental Status:  · Orientation: grossly oriented to person, place and time  · Mood and Affect: mood normal, affect appropriate    Assessment:  Encounter Diagnoses   Name Primary?  Encounter for gynecological examination (general) (routine) with abnormal findings Yes    Skin change     Atrophic vulva        Plan:  The patient was counseled about diet, exercise, healthy lifestyle  We recommended the patient return for yearly wellness visits  We discussed self breast exam  We discussed safer sex practices, condom use and risk factors for sexually transmitted diseases. We discussed current pap smear and HR HPV testing guidelines. Handouts were given to the patient. She is instructed to contact our office with any questions or concerns  We recommend repeating her annual exam in 1 year or following up sooner if needed  Handouts were given to the patient  We recommend follow up with a primary care physician for chronic medical problems and evaluation of non-gynecologic concerns. Rec dermatology skin check: full body: sees Dr. Odessa Craig. Disc breast cancer risks with advanced age, pt reports she wants to have one more and will have \"one more\"  Notify MD if any new skin changes, disc option of removal/biopsy for definitive diagnosis      No orders of the defined types were placed in this encounter. No results found for any visits on 09/18/17.

## 2017-09-18 NOTE — PATIENT INSTRUCTIONS

## 2017-09-18 NOTE — MR AVS SNAPSHOT
Visit Information Date & Time Provider Department Dept. Phone Encounter #  
 9/18/2017  1:30 PM Galina Campbell MD [facility] [phone] Your Appointments 10/31/2017 11:00 AM  
Any with Clauida Yepez MD  
[address] ([address]) Appt Note: 6 month fu; R/S  
 [address] [facility] [address] [place] [id number] [address] [facility] [id number] Upcoming Health Maintenance Date Due COLONOSCOPY 1/1/2016 INFLUENZA AGE 9 TO ADULT 8/1/2017 Allergies as of 9/18/2017  Review Complete On: 9/18/2017 By: Fabricio Gage LPN Severity Noted Reaction Type Reactions Ceclor [Cefaclor]  03/31/2009    Other (comments) Pain flank, abd  
 Cleocin [Clindamycin Hcl]  03/31/2009    Other (comments) Flank, abd pain Codeine  03/31/2009    Nausea and Vomiting Compazine [Prochlorperazine]  03/31/2009    Other (comments) Hyper and anxious Fosamax [Alendronate]  03/31/2009    Other (comments) Indigestion Levaquin [Levofloxacin]  08/10/2017    Other (comments) Hemorrhage in the eyes Macrobid [Nitrofurantoin Monohyd/m-cryst]  12/06/2012   Systemic Swelling, Other (comments)  
 wheezing Pcn [Penicillins]  03/31/2009    Rash Phenobarbital  03/31/2009    Other (comments)  
 anxious Sulfa (Sulfonamide Antibiotics)  03/31/2009    Rash Headache Current Immunizations  Reviewed on 4/11/2011 Name Date Influenza Vaccine (Whole Virus) 1/1/2013 Influenza Vaccine Split 11/17/2010  8:33 AM  
 Influenza Vaccine Whole 4/11/2010 PPD 3/23/2011 12:57 PM  
 ZZZ-RETIRED (DO NOT USE) Pneumococcal Vaccine (Unspecified Type) 5/20/2009 Not reviewed this visit Vitals BP Height(growth percentile) Weight(growth percentile) BMI OB Status Smoking Status  136/80 5' 3\" (1.6 m) 169 lb (76.7 kg) 29.94 kg/m2 Postmenopausal Former Smoker BMI and BSA Data Body Mass Index Body Surface Area  
 29.94 kg/m 2 1.85 m 2 Preferred Pharmacy Pharmacy Name Phone [id number] [address], [address] [phone] Your Updated Medication List  
  
   
This list is accurate as of: 9/18/17  1:47 PM.  Always use your most recent med list.  
  
  
  
  
 aspirin delayed-release 81 mg tablet  
take 162 mg by mouth daily. atenolol 25 mg tablet Commonly known as:  TENORMIN Take 1 Tab by mouth daily. COPPER GLUCONATE PO Take  by mouth. diazePAM 5 mg tablet Commonly known as:  VALIUM Take 0.5 Tabs by mouth every six (6) hours as needed for Anxiety. hydroCHLOROthiazide 25 mg tablet Commonly known as:  HYDRODIURIL  
TAKE 1 TABLET BY MOUTH DAILY. PRESERVISION LUTEIN 226 mg-200 unit -5 mg-0.8 mg Cap Generic drug:  Vit C-Vit E-Copper-ZnOx-Lutein Take  by mouth. TYLENOL EXTRA STRENGTH 500 mg tablet Generic drug:  acetaminophen Take  by mouth every six (6) hours as needed for Pain. VITAMIN D3 1,000 unit Cap Generic drug:  cholecalciferol Take 2,000 Int'l Units by mouth daily. Patient Instructions Well Visit, Over 72: Care Instructions Your Care Instructions Physical exams can help you stay healthy. Your doctor has checked your overall health and may have suggested ways to take good care of yourself. He or she also may have recommended tests. At home, you can help prevent illness with healthy eating, regular exercise, and other steps. Follow-up care is a key part of your treatment and safety. Be sure to make and go to all appointments, and call your doctor if you are having problems. It's also a good idea to know your test results and keep a list of the medicines you take. How can you care for yourself at home? · Reach and stay at a healthy weight.  This will lower your risk for many problems, such as obesity, diabetes, heart disease, and high blood pressure. · Get at least 30 minutes of exercise on most days of the week. Walking is a good choice. You also may want to do other activities, such as running, swimming, cycling, or playing tennis or team sports. · Do not smoke. Smoking can make health problems worse. If you need help quitting, talk to your doctor about stop-smoking programs and medicines. These can increase your chances of quitting for good. · Protect your skin from too much sun. When you're outdoors from 10 a.m. to 4 p.m., stay in the shade or cover up with clothing and a hat with a wide brim. Wear sunglasses that block UV rays. Even when it's cloudy, put broad-spectrum sunscreen (SPF 30 or higher) on any exposed skin. · See a dentist one or two times a year for checkups and to have your teeth cleaned. · Wear a seat belt in the car. · Limit alcohol to 2 drinks a day for men and 1 drink a day for women. Too much alcohol can cause health problems. Follow your doctor's advice about when to have certain tests. These tests can spot problems early. For men and women · Cholesterol. Your doctor will tell you how often to have this done based on your overall health and other things that can increase your risk for heart attack and stroke. · Blood pressure. Have your blood pressure checked during a routine doctor visit. Your doctor will tell you how often to check your blood pressure based on your age, your blood pressure results, and other factors. · Diabetes. Ask your doctor whether you should have tests for diabetes. · Vision. Experts recommend that you have yearly exams for glaucoma and other age-related eye problems. · Hearing. Tell your doctor if you notice any change in your hearing. You can have tests to find out how well you hear. · Colon cancer tests. Keep having colon cancer tests as your doctor recommends. You can have one of several types of tests. · Heart attack and stroke risk. At least every 4 to 6 years, you should have your risk for heart attack and stroke assessed. Your doctor uses factors such as your age, blood pressure, cholesterol, and whether you smoke or have diabetes to show what your risk for a heart attack or stroke is over the next 10 years. · Osteoporosis. Talk to your doctor about whether you should have a bone density test to find out whether you have thinning bones. Also ask your doctor about whether you should take calcium and vitamin D supplements. For women · Pap test and pelvic exam. You may no longer need a Pap test. Talk with your doctor about whether to stop or continue to have Pap tests. · Breast exam and mammogram. Ask how often you should have a mammogram, which is an X-ray of your breasts. A mammogram can spot breast cancer before it can be felt and when it is easiest to treat. · Thyroid disease. Talk to your doctor about whether to have your thyroid checked as part of a regular physical exam. Women have an increased chance of a thyroid problem. For men · Prostate exam. Talk to your doctor about whether you should have a blood test (called a PSA test) for prostate cancer. Experts disagree on whether men should have this test. Some experts recommend that you discuss the benefits and risks of the test with your doctor. · Abdominal aortic aneurysm. Ask your doctor whether you should have a test to check for an aneurysm. You may need a test if you ever smoked or if your parent, brother, sister, or child has had an aneurysm. When should you call for help? Watch closely for changes in your health, and be sure to contact your doctor if you have any problems or symptoms that concern you. Where can you learn more? Go to http://jonatan-elvis.info/. Enter J946 in the search box to learn more about \"Well Visit, Over 65: Care Instructions. \" Current as of: July 19, 2016 Content Version: 11.3 © 2432-6570 Healthwise, Incorporated. Care instructions adapted under license by [employer] (which disclaims liability or warranty for this information). If you have questions about a medical condition or this instruction, always ask your healthcare professional. [address] any warranty or liability for your use of this information. Introducing [facility]! [employer] introduces [employer]ring patient portal. Now you can access parts of your medical record, email your doctor's office, and request medication refills online. 1. In your internet browser, go to [email or web address] 2. Click on the First Time User? Click Here link in the Sign In box. You will see the New Member Sign Up page. 3. Enter your Zenring Access Code exactly as it appears below. You will not need to use this code after youve completed the sign-up process. If you do not sign up before the expiration date, you must request a new code. · Zenring Access Code: [email or web address] Expires: 11/8/2017 12:09 PM 
 
4. Enter the last four digits of your Social Security Number (xxxx) and Date of Birth (mm/dd/yyyy) as indicated and click Submit. You will be taken to the next sign-up page. 5. Create a Zenring ID. This will be your Zenring login ID and cannot be changed, so think of one that is secure and easy to remember. 6. Create a Zenring password. You can change your password at any time. 7. Enter your Password Reset Question and Answer. This can be used at a later time if you forget your password. 8. Enter your e-mail address. You will receive e-mail notification when new information is available in [address] 9. Click Sign Up. You can now view and download portions of your medical record. 10. Click the Download Summary menu link to download a portable copy of your medical information.  
 
If you have questions, please visit the Frequently Asked Questions section of the [employer]. Remember, [employer] is NOT to be used for urgent needs. For medical emergencies, dial 911. Now available from your iPhone and Android! Please provide this summary of care documentation to your next provider. Your primary care clinician is listed as Tanya Nolen. If you have any questions after today's visit, please call [phone].

## 2017-11-30 ENCOUNTER — OFFICE VISIT (OUTPATIENT)
Dept: CARDIOLOGY CLINIC | Age: 82
End: 2017-11-30

## 2017-11-30 VITALS
OXYGEN SATURATION: 98 % | DIASTOLIC BLOOD PRESSURE: 88 MMHG | WEIGHT: 164 LBS | HEART RATE: 70 BPM | BODY MASS INDEX: 29.06 KG/M2 | HEIGHT: 63 IN | SYSTOLIC BLOOD PRESSURE: 140 MMHG

## 2017-11-30 DIAGNOSIS — I10 ESSENTIAL HYPERTENSION: Primary | ICD-10-CM

## 2017-11-30 DIAGNOSIS — G45.9 TRANSIENT CEREBRAL ISCHEMIA, UNSPECIFIED TYPE: ICD-10-CM

## 2017-11-30 RX ORDER — HYDROCHLOROTHIAZIDE 25 MG/1
12.5 TABLET ORAL DAILY
Qty: 30 TAB | Refills: 5
Start: 2017-11-30 | End: 2019-02-27

## 2017-11-30 NOTE — PATIENT INSTRUCTIONS

## 2017-11-30 NOTE — PROGRESS NOTES
Domo Keller MD. [facility]              Patient: Alphonso Clancy  : 1935      Today's Date: 2017            HISTORY OF PRESENT ILLNESS:     History of Present Illness:  Ms. Shanonn Avila is here for follow-up. She is doing \"really well\" cardiac wise. She has some problems with her hip - difficult for her to walk. No cardiac complaints. No CP or SOB. She takes only half HCTZ daily. BP at 120-130/70-80's. PAST MEDICAL HISTORY:     Past Medical History:   Diagnosis Date    Endometriosis     ANASTASIIA/BSO    Hypertension     Kidney stones 2010    Macular degeneration     Palpitation     Seizures ([address]     since     Sleep apnea 2010    uses cpap    TIA (transient ischemic attack) ,,    tia's x 3  (she says it was related to estrogen use)         Past Surgical History:   Procedure Laterality Date    ENDOSCOPY, COLON, DIAGNOSTIC       neg    HX ABDOMINAL WALL DEFECT REPAIR      biliary stones and ercp for pancreatitis    HX CHOLECYSTECTOMY      20 years ago with adhesions found    HX GYN      anastasiia endometriosis    HX HYSTERECTOMY      endometriosis    HX TONSILLECTOMY      x 2           MEDICATIONS:     Current Outpatient Prescriptions   Medication Sig Dispense Refill    ASCORBIC ACID/MULTIVIT-MIN (EMERGEN-C PO) Take  by mouth.  atenolol (TENORMIN) 25 mg tablet TAKE 1 TAB BY MOUTH DAILY. 90 Tab 0    hydroCHLOROthiazide (HYDRODIURIL) 25 mg tablet TAKE 1 TABLET BY MOUTH DAILY. 30 Tab 5    diazepam (VALIUM) 5 mg tablet Take 0.5 Tabs by mouth every six (6) hours as needed for Anxiety. 20 Tab 0    Cholecalciferol, Vitamin D3, (VITAMIN D3) 1,000 unit cap Take 2,000 Int'l Units by mouth daily.  acetaminophen (TYLENOL EXTRA STRENGTH) 500 mg tablet Take  by mouth every six (6) hours as needed for Pain.  Vit C-Vit E-Copper-ZnOx-Lutein (PRESERVISION) 226-200-5 mg-unit-mg Cap Take  by mouth.  COPPER GLUCONATE PO Take  by mouth.  aspirin delayed-release 81 mg tablet Take 81 mg by mouth daily. Allergies   Allergen Reactions    Ceclor [Cefaclor] Other (comments)     Pain flank, abd      Cleocin [Clindamycin Hcl] Other (comments)     Flank, abd pain    Codeine Nausea and Vomiting    Compazine [Prochlorperazine] Other (comments)     Hyper and anxious    Fosamax [Alendronate] Other (comments)     Indigestion      Levaquin [Levofloxacin] Other (comments)     Hemorrhage in the eyes      Macrobid [Nitrofurantoin Monohyd/M-Cryst] Swelling and Other (comments)     wheezing    Pcn [Penicillins] Rash    Phenobarbital Other (comments)     anxious    Sulfa (Sulfonamide Antibiotics) Rash     Headache               SOCIAL HISTORY:     Social History   Substance Use Topics    Smoking status: Former Smoker     Years: 2.00     Quit date: 1/1/1965    Smokeless tobacco: Never Used      Comment: stopped in her 25s    Alcohol use 0.6 oz/week     1 Glasses of wine per week      Comment: weekends only         FAMILY HISTORY:     Family History   Problem Relation Age of Onset    Hypertension Mother     Other Mother      pulmonary hypotension    Heart Disease Father     Heart Disease Brother                REVIEW OF SYMPTOMS:      Review of Symptoms:  Constitutional: Occ chills. No fevers   HEENT: Negative for nosebleeds, tinnitus, and vision changes. Respiratory: Negative for cough, wheezing  Cardiovascular: Negative for orthopnea, claudication, syncope, and PND. Gastrointestinal: Negative for abdominal pain, diarrhea, melena. Genitourinary: Negative for dysuria  Musculoskeletal: Negative for myalgias. + back pain. Skin: Negative for rash  Heme: No problems bleeding.   Neurological: Negative for speech change and focal weakness.               PHYSICAL EXAM:      Physical Exam:  Visit Vitals    /88 (BP 1 Location: Right arm, BP Patient Position: Sitting)    Pulse 70    Ht 5' 3\" (1.6 m)    Wt 164 lb (74.4 kg)    SpO2 98%  BMI 29.05 kg/m2      Patient appears generally well, mood and affect are appropriate and pleasant. HEENT:  Hearing intact, non-icteric, normocephalic, atraumatic. Neck Exam: Supple, No JVD or carotid bruits. Lung Exam: Clear to auscultation, even breath sounds. Cardiac Exam: Regular rate and rhythm with no murmur  Abdomen: Soft, non-tender, normal bowel sounds. No bruits or masses. Extremities: Moves all ext well. No lower extremity edema. Vascular: 2+ dorsalis pedis pulses bilaterally. Psych: Appropriate affect  Neuro - Grossly intact              LABS / OTHER STUDIES:      Lab Results   Component Value Date/Time    Sodium 141 08/10/2017 12:27 PM    Potassium 4.7 08/10/2017 12:27 PM    Chloride 101 08/10/2017 12:27 PM    CO2 26 08/10/2017 12:27 PM    Anion gap 10 04/11/2011 05:20 PM    Glucose 94 08/10/2017 12:27 PM    BUN 18 08/10/2017 12:27 PM    Creatinine 0.64 08/10/2017 12:27 PM    BUN/Creatinine ratio 28 08/10/2017 12:27 PM    GFR est AA 97 08/10/2017 12:27 PM    GFR est non-AA 85 08/10/2017 12:27 PM    Calcium 10.2 08/10/2017 12:27 PM    Bilirubin, total 0.3 08/10/2017 12:27 PM    AST (SGOT) 23 08/10/2017 12:27 PM    Alk.  phosphatase 94 08/10/2017 12:27 PM    Protein, total 6.7 08/10/2017 12:27 PM    Albumin 4.1 08/10/2017 12:27 PM    Globulin 3.6 04/11/2011 05:20 PM    A-G Ratio 1.6 08/10/2017 12:27 PM    ALT (SGPT) 25 08/10/2017 12:27 PM       Lab Results   Component Value Date/Time    WBC 6.1 08/10/2017 12:27 PM    WBC 5.9 05/30/2012 03:04 AM    Hemoglobin (POC) 14.3 07/10/2009 01:33 PM    HGB 13.5 08/10/2017 12:27 PM    Hematocrit (POC) 42 07/10/2009 01:33 PM    HCT 41.3 08/10/2017 12:27 PM    PLATELET 922 55/69/5209 12:27 PM    MCV 93 08/10/2017 12:27 PM       Lab Results   Component Value Date/Time    Cholesterol, total 170 05/16/2017 12:07 PM    HDL Cholesterol 53 05/16/2017 12:07 PM    LDL, calculated 97 05/16/2017 12:07 PM    VLDL, calculated 20 05/16/2017 12:07 PM    Triglyceride 100 05/16/2017 12:07 PM    CHOL/HDL Ratio 3.5 07/11/2009 04:15 AM                   CARDIAC DIAGNOSTICS:      Cardiac Evaluation Includes:  Exercise Cardiolite 7/09 - walked 3:23 (5 METS). Normal study. LVEF > 75%  Echo 4/12/11 - LVEF 60%, grade 1 diastology   Carotid Doppler 4/11 - mild disease (0-49%) bilat   Stress Echo 11/12 - walked 4 min, normal   Stress Echo 7/27/15 - walked 4 min, normal study   CT Heart Scan 7/30/15 - CAC score 5. Also a 3 mm LLL nodule. Event monitor 6/10/16-7/9/16 - normal study, no afib      EKG 6/10/16 - NSR, PRWP  EKG 11/30/17 - sinus bradycardia, PRWP, cannot rule out old inferior infarct             ASSESSMENT AND PLAN:      Assessment and Plan:  1) History of TIA's (most recently in 2011)  - tia's x 3    - she says it was related to estrogen use  - Event monitor 6/10/16-7/9/16 - normal study, no afib      2) HTN  - she says she has white coat HTN  - BP at home runs < 130/90 she says  - continue atenolol and HCTZ (12.5 mg daily)  - follow BP at home      3) See me back in one year or earlier with any complaints. Patient expressed understanding of the plan - questions were answered. 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    Gwen Ya MD, [address]  [address] [facility], [address]                [address]  [address], [address]  Ph: [phone]                                                             Ph [phone]

## 2017-11-30 NOTE — PROGRESS NOTES
Visit Vitals    /88 (BP 1 Location: Right arm, BP Patient Position: Sitting)    Pulse 70    Ht 5' 3\" (1.6 m)    Wt 164 lb (74.4 kg)    SpO2 98%    BMI 29.05 kg/m2

## 2017-12-05 ENCOUNTER — OFFICE VISIT (OUTPATIENT)
Dept: INTERNAL MEDICINE CLINIC | Age: 82
End: 2017-12-05

## 2017-12-05 DIAGNOSIS — F33.1 MODERATE EPISODE OF RECURRENT MAJOR DEPRESSIVE DISORDER (HCC): Primary | ICD-10-CM

## 2017-12-07 ENCOUNTER — OFFICE VISIT (OUTPATIENT)
Dept: INTERNAL MEDICINE CLINIC | Age: 82
End: 2017-12-07

## 2017-12-07 VITALS
OXYGEN SATURATION: 97 % | WEIGHT: 164 LBS | RESPIRATION RATE: 14 BRPM | TEMPERATURE: 97.8 F | DIASTOLIC BLOOD PRESSURE: 75 MMHG | HEIGHT: 63 IN | BODY MASS INDEX: 29.06 KG/M2 | SYSTOLIC BLOOD PRESSURE: 135 MMHG | HEART RATE: 73 BPM

## 2017-12-07 DIAGNOSIS — F33.2 SEVERE EPISODE OF RECURRENT MAJOR DEPRESSIVE DISORDER, WITHOUT PSYCHOTIC FEATURES (HCC): Primary | ICD-10-CM

## 2017-12-07 RX ORDER — BUPROPION HYDROCHLORIDE 100 MG/1
100 TABLET, EXTENDED RELEASE ORAL DAILY
Qty: 30 TAB | Refills: 1 | Status: ON HOLD | OUTPATIENT
Start: 2017-12-07 | End: 2018-01-23

## 2017-12-07 NOTE — PATIENT INSTRUCTIONS
Depression Treatment: Care Instructions  Your Care Instructions    Depression is a condition that affects the way you feel, think, and act. It causes symptoms such as low energy, loss of interest in daily activities, and sadness or grouchiness that goes on for a long time. Depression is very common and affects men and women of all ages. Depression is a medical illness caused by changes in the natural chemicals in your brain. It is not a character flaw, and it does not mean that you are a bad or weak person. It does not mean that you are going crazy. It is important to know that depression can be treated. Medicines, counseling, and self-care can all help. Many people do not get help because they are embarrassed or think that they will get over the depression on their own. But some people do not get better without treatment. Follow-up care is a key part of your treatment and safety. Be sure to make and go to all appointments, and call your doctor if you are having problems. It's also a good idea to know your test results and keep a list of the medicines you take. How can you care for yourself at home? Learn about antidepressant medicines  Antidepressant medicines can improve or end the symptoms of depression. You may need to take the medicine for at least 6 months, and often longer. Keep taking your medicine even if you feel better. If you stop taking it too soon, your symptoms may come back or get worse. You may start to feel better within 1 to 3 weeks of taking antidepressant medicine. But it can take as many as 6 to 8 weeks to see more improvement. Talk to your doctor if you have problems with your medicine or if you do not notice any improvement after 3 weeks. Antidepressants can make you feel tired, dizzy, or nervous. Some people have dry mouth, constipation, headaches, sexual problems, an upset stomach, or diarrhea.  Many of these side effects are mild and go away on their own after you take the medicine for a few weeks. Some may last longer. Talk to your doctor if side effects bother you too much. You might be able to try a different medicine. If you are pregnant or breastfeeding, talk to your doctor about what medicines you can take. Learn about counseling  In many cases, counseling can work as well as medicines to treat mild to moderate depression. Counseling is done by licensed mental health providers, such as psychologists, social workers, and some types of nurses. It can be done in one-on-one sessions or in a group setting. Many people find group sessions helpful. Cognitive-behavioral therapy is a type of counseling. In this treatment therapy, you learn how to see and change unhelpful thinking styles that may be adding to your depression. Counseling and medicines often work well when used together. To manage depression  · Be physically active. Getting 30 minutes of exercise each day is good for your body and your mind. Begin slowly if it is hard for you to get started. If you already exercise, keep it up. · Plan something pleasant for yourself every day. Include activities that you have enjoyed in the past.  · Get enough sleep. Talk to your doctor if you have problems sleeping. · Eat a balanced diet. If you do not feel hungry, eat small snacks rather than large meals. · Do not drink alcohol, use illegal drugs, or take medicines that your doctor has not prescribed for you. They may interfere with your treatment. · Spend time with family and friends. It may help to speak openly about your depression with people you trust.  · Take your medicines exactly as prescribed. Call your doctor if you think you are having a problem with your medicine. · Do not make major life decisions while you are depressed. Depression may change the way you think. You will be able to make better decisions after you feel better. · Think positively.  Challenge negative thoughts with statements such as \"I am hopeful\"; \"Things will get better\"; and \"I can ask for the help I need. \" Write down these statements and read them often, even if you don't believe them yet. · Be patient with yourself. It took time for your depression to develop, and it will take time for your symptoms to improve. Do not take on too much or be too hard on yourself. · Learn all you can about depression from written and online materials. · Check out behavioral health classes to learn more about dealing with depression. · Keep the numbers for these national suicide hotlines: 3-338-701-TALK (4-572.172.5727) and 6-806-IQDXFFV (5-744.697.5476). If you or someone you know talks about suicide or feeling hopeless, get help right away. When should you call for help? Call 911 anytime you think you may need emergency care. For example, call if:  ? · You feel you cannot stop from hurting yourself or someone else. ?Call your doctor now or seek immediate medical care if:  ? · You hear voices. ? · You feel much more depressed. ? Watch closely for changes in your health, and be sure to contact your doctor if:  ? · You are having problems with your depression medicine. ? · You are not getting better as expected. Where can you learn more? Go to http://jonatan-elvis.info/. Enter W615 in the search box to learn more about \"Depression Treatment: Care Instructions. \"  Current as of: May 12, 2017  Content Version: 11.4  © 9004-7063 [employer]. Care instructions adapted under license by [employer] (which disclaims liability or warranty for this information). If you have questions about a medical condition or this instruction, always ask your healthcare professional. [address] any warranty or liability for your use of this information.

## 2017-12-07 NOTE — PROGRESS NOTES
HISTORY OF PRESENT ILLNESS  Rachel Chairez is a [de-identified] y.o. female. HPI  Presents to discuss possibly starting anti-depressant medication. Has been seeing Matilda Vogt for counseling and has had long term depression but has always been reluctant to start on any medication for depression due to her previous  committing suicide after he was started on antidepressant therapy. She reports that she has been feeling so sad and worthless and does not have any self-esteem. She is willing to try something for depression at this point. Reports that she has been isolating herself and has not been doing things that she has enjoyed in the past.  Reports she has a great deal of anger towards her  Cindi Jerome who has left them in financial ruin. Denies harmful thoughts towards self or others. Review of Systems   Constitutional: Negative for chills and fever. HENT: Negative for congestion. Respiratory: Negative for cough. Cardiovascular: Negative for chest pain and palpitations. Gastrointestinal: Negative for nausea and vomiting. Skin: Negative for rash. Neurological: Negative for dizziness and headaches. Psychiatric/Behavioral: Positive for depression. /75 (BP 1 Location: Left arm, BP Patient Position: Sitting)  Pulse 73  Temp 97.8 °F (36.6 °C) (Oral)   Resp 14  Ht 5' 3\" (1.6 m)  Wt 164 lb (74.4 kg)  SpO2 97%  BMI 29.05 kg/m2  Physical Exam   Constitutional: She appears well-developed and well-nourished. HENT:   Head: Normocephalic and atraumatic. Neck: Normal range of motion. Neck supple. Cardiovascular: Normal rate and regular rhythm. Pulmonary/Chest: Effort normal and breath sounds normal.   Psychiatric: Her speech is normal. She exhibits a depressed mood. Nursing note and vitals reviewed. ASSESSMENT and PLAN  Diagnoses and all orders for this visit:    1.  Severe episode of recurrent major depressive disorder, without psychotic features ([facility] [address]-- will start low dose Wellbutrin and bring her back in 2-3 weeks  -     buPROPion SR (WELLBUTRIN SR) 100 mg SR tablet; Take 1 Tab by mouth daily.       reviewed diet, exercise and weight control  reviewed medications and side effects in detail

## 2017-12-07 NOTE — MR AVS SNAPSHOT
Visit Information Date & Time Provider Department Dept. Phone Encounter #  
 12/7/2017  1:00 PM Ki Kelley NP [facility] [address] [facility] [id number] Your Appointments 2/20/2018 10:20 AM  
Any with Gagandeep Daily MD  
[address] ([facility]) Appt Note: 6 month fu; R/S; yearly cpap follow up  
 [address] [place] [phone] [address] [facility] [id number]  
  
    
 12/11/2018 11:40 AM  
ESTABLISHED PATIENT with Tamar Miranda MD  
[facility] ([facility]) Appt Note: annual visit [address] [facility] [address] [address]7 [facility]  
[address] [facility] [address] [address] Upcoming Health Maintenance Date Due Pneumococcal 65+ Low/Medium Risk (2 of 2 - PPSV23) 5/20/2014 COLONOSCOPY 1/1/2016 GLAUCOMA SCREENING Q2Y 4/1/2016 Influenza Age 5 to Adult 8/1/2017 MEDICARE YEARLY EXAM 5/17/2018 DTaP/Tdap/Td series (2 - Td) 5/16/2027 Allergies as of 12/7/2017  Review Complete On: 12/7/2017 By: Tony Roque LPN Severity Noted Reaction Type Reactions Ceclor [Cefaclor]  03/31/2009    Other (comments) Pain flank, abd  
 Cleocin [Clindamycin Hcl]  03/31/2009    Other (comments) Flank, abd pain Codeine  03/31/2009    Nausea and Vomiting Compazine [Prochlorperazine]  03/31/2009    Other (comments) Hyper and anxious Fosamax [Alendronate]  03/31/2009    Other (comments) Indigestion Levaquin [Levofloxacin]  08/10/2017    Other (comments) Hemorrhage in the eyes Macrobid [Nitrofurantoin Monohyd/m-cryst]  12/06/2012   Systemic Swelling, Other (comments)  
 wheezing Pcn [Penicillins]  03/31/2009    Rash Phenobarbital  03/31/2009    Other (comments)  
 anxious Sulfa (Sulfonamide Antibiotics)  03/31/2009    Rash Headache Current Immunizations  Reviewed on 4/11/2011 Name Date Influenza Vaccine (Whole Virus) 1/1/2013 Influenza Vaccine Split 11/17/2010  8:33 AM  
 Influenza Vaccine Whole 4/11/2010 PPD 3/23/2011 12:57 PM  
 ZZZ-RETIRED (DO NOT USE) Pneumococcal Vaccine (Unspecified Type) 5/20/2009 Not reviewed this visit You Were Diagnosed With   
  
 Codes Comments Severe episode of recurrent major depressive disorder, without psychotic features ([facility].)    -  Primary ICD-10-CM: F33.2 ICD-9-CM: 296.33 Vitals BP Pulse Temp Resp Height(growth percentile) Weight(growth percentile) 135/75 (BP 1 Location: Left arm, BP Patient Position: Sitting) 73 97.8 °F (36.6 °C) (Oral) 14 5' 3\" (1.6 m) 164 lb (74.4 kg) SpO2 BMI OB Status Smoking Status 97% 29.05 kg/m2 Postmenopausal Former Smoker BMI and BSA Data Body Mass Index Body Surface Area 29.05 kg/m 2 1.82 m 2 Preferred Pharmacy Pharmacy Name Phone [id number] [address], [address] [phone] Your Updated Medication List  
  
   
This list is accurate as of: 12/7/17  1:30 PM.  Always use your most recent med list.  
  
  
  
  
 aspirin delayed-release 81 mg tablet Take 81 mg by mouth daily. atenolol 25 mg tablet Commonly known as:  TENORMIN  
TAKE 1 TAB BY MOUTH DAILY. buPROPion  mg SR tablet Commonly known as:  [place] Take 1 Tab by mouth daily. COPPER GLUCONATE PO Take  by mouth. diazePAM 5 mg tablet Commonly known as:  VALIUM Take 0.5 Tabs by mouth every six (6) hours as needed for Anxiety. EMERGEN-C PO Take  by mouth. hydroCHLOROthiazide 25 mg tablet Commonly known as:  HYDRODIURIL Take 0.5 Tabs by mouth daily. PRESERVISION LUTEIN 226 mg-200 unit -5 mg-0.8 mg Cap Generic drug:  Vit C-Vit E-Copper-ZnOx-Lutein Take  by mouth. TYLENOL EXTRA STRENGTH 500 mg tablet Generic drug:  acetaminophen Take  by mouth every six (6) hours as needed for Pain. VITAMIN D3 1,000 unit Cap Generic drug:  cholecalciferol Take 2,000 Int'l Units by mouth daily. Prescriptions Sent to Pharmacy Refills buPROPion SR (WELLBUTRIN SR) 100 mg SR tablet 1 Sig: Take 1 Tab by mouth daily. Class: Normal  
 Pharmacy: [address] [place] [address] #: [phone] Route: Oral  
  
Patient Instructions Depression Treatment: Care Instructions Your Care Instructions Depression is a condition that affects the way you feel, think, and act. It causes symptoms such as low energy, loss of interest in daily activities, and sadness or grouchiness that goes on for a long time. Depression is very common and affects men and women of all ages. Depression is a medical illness caused by changes in the natural chemicals in your brain. It is not a character flaw, and it does not mean that you are a bad or weak person. It does not mean that you are going crazy. It is important to know that depression can be treated. Medicines, counseling, and self-care can all help. Many people do not get help because they are embarrassed or think that they will get over the depression on their own. But some people do not get better without treatment. Follow-up care is a key part of your treatment and safety. Be sure to make and go to all appointments, and call your doctor if you are having problems. It's also a good idea to know your test results and keep a list of the medicines you take. How can you care for yourself at home? Learn about antidepressant medicines Antidepressant medicines can improve or end the symptoms of depression. You may need to take the medicine for at least 6 months, and often longer. Keep taking your medicine even if you feel better. If you stop taking it too soon, your symptoms may come back or get worse. You may start to feel better within 1 to 3 weeks of taking antidepressant medicine. But it can take as many as 6 to 8 weeks to see more improvement. Talk to your doctor if you have problems with your medicine or if you do not notice any improvement after 3 weeks. Antidepressants can make you feel tired, dizzy, or nervous. Some people have dry mouth, constipation, headaches, sexual problems, an upset stomach, or diarrhea. Many of these side effects are mild and go away on their own after you take the medicine for a few weeks. Some may last longer. Talk to your doctor if side effects bother you too much. You might be able to try a different medicine. If you are pregnant or breastfeeding, talk to your doctor about what medicines you can take. Learn about counseling In many cases, counseling can work as well as medicines to treat mild to moderate depression. Counseling is done by licensed mental health providers, such as psychologists, social workers, and some types of nurses. It can be done in one-on-one sessions or in a group setting. Many people find group sessions helpful. Cognitive-behavioral therapy is a type of counseling. In this treatment therapy, you learn how to see and change unhelpful thinking styles that may be adding to your depression. Counseling and medicines often work well when used together. To manage depression · Be physically active. Getting 30 minutes of exercise each day is good for your body and your mind. Begin slowly if it is hard for you to get started. If you already exercise, keep it up. · Plan something pleasant for yourself every day. Include activities that you have enjoyed in the past. 
· Get enough sleep. Talk to your doctor if you have problems sleeping. · Eat a balanced diet. If you do not feel hungry, eat small snacks rather than large meals.  
· Do not drink alcohol, use illegal drugs, or take medicines that your doctor has not prescribed for you. They may interfere with your treatment. · Spend time with family and friends. It may help to speak openly about your depression with people you trust. 
· Take your medicines exactly as prescribed. Call your doctor if you think you are having a problem with your medicine. · Do not make major life decisions while you are depressed. Depression may change the way you think. You will be able to make better decisions after you feel better. · Think positively. Challenge negative thoughts with statements such as \"I am hopeful\"; \"Things will get better\"; and \"I can ask for the help I need. \" Write down these statements and read them often, even if you don't believe them yet. · Be patient with yourself. It took time for your depression to develop, and it will take time for your symptoms to improve. Do not take on too much or be too hard on yourself. · Learn all you can about depression from written and online materials. · Check out behavioral health classes to learn more about dealing with depression. · Keep the numbers for these national suicide hotlines: 5-759-407-TALK (5-259.302.8395) and 4-423-DCIBHUI (1-608.212.5832). If you or someone you know talks about suicide or feeling hopeless, get help right away. When should you call for help? Call 911 anytime you think you may need emergency care. For example, call if: 
? · You feel you cannot stop from hurting yourself or someone else. ?Call your doctor now or seek immediate medical care if: 
? · You hear voices. ? · You feel much more depressed. ? Watch closely for changes in your health, and be sure to contact your doctor if: 
? · You are having problems with your depression medicine. ? · You are not getting better as expected. Where can you learn more? Go to http://jonatan-elvis.info/. Enter C835 in the search box to learn more about \"Depression Treatment: Care Instructions. \" 
 Current as of: May 12, 2017 Content Version: 11.4 © 7648-6022 Healthwise, [employer]. Care instructions adapted under license by [employer] (which disclaims liability or warranty for this information). If you have questions about a medical condition or this instruction, always ask your healthcare professional. [address]  any warranty or liability for your use of this information. Introducing [facility]! [address] [facility] introduces [employer] patient portal. Now you can access parts of your medical record, email your doctor's office, and request medication refills online. 1. In your internet browser, go to [email or web address] 2. Click on the First Time User? Click Here link in the Sign In box. You will see the New Member Sign Up page. 3. Enter your [employer] Access Code exactly as it appears below. You will not need to use this code after youve completed the sign-up process. If you do not sign up before the expiration date, you must request a new code. · [employer] Access Code: [email or web address] Expires: 3/7/2018  1:30 PM 
 
4. Enter the last four digits of your Social Security Number (xxxx) and Date of Birth (mm/dd/yyyy) as indicated and click Submit. You will be taken to the next sign-up page. 5. Create a [employer] ID. This will be your [employer] login ID and cannot be changed, so think of one that is secure and easy to remember. 6. Create a [employer] password. You can change your password at any time. 7. Enter your Password Reset Question and Answer. This can be used at a later time if you forget your password. 8. Enter your e-mail address. You will receive e-mail notification when new information is available in [address] 9. Click Sign Up. You can now view and download portions of your medical record. 10. Click the Download Summary menu link to download a portable copy of your medical information. If you have questions, please visit the Frequently Asked Questions section of the [employer] website. Remember, [employer] is NOT to be used for urgent needs. For medical emergencies, dial 911. Now available from your iPhone and Android! Please provide this summary of care documentation to your next provider. Your primary care clinician is listed as Shakira Snowden. If you have any questions after today's visit, please call [phone].

## 2017-12-08 NOTE — PROGRESS NOTES
Behavioral Health Progress Note    This patient was seen by the [facility] from 11am to 11:45am for a total of 45 minutes. Psychotropic medication changes: None    Diagnosis: Major depressive disorder, recurrent, moderate    Description of session/progress/interventions: Nella Solo reports that she is practicing meditation with some regularly, which is helping her feel a bit calmer, but she is finding that she is becoming increasingly angry with her . She stated that she would like to start taking an antidepressant. She has been very reluctant to do this in the past, as her first  committed suicide after starting an antidepressant. She continues to have a lot of support from her son and several friends. Assessment: She is motivated to start medication to help alleviate depressive sx. Plan: To continue to practice stress management skills, return in 2 weeks and will follow closely while she is starting an antidepressant. She was encouraged to schedule an appt with her PCP to discuss medications. Message sent to PCP to update.

## 2017-12-19 ENCOUNTER — OFFICE VISIT (OUTPATIENT)
Dept: INTERNAL MEDICINE CLINIC | Age: 82
End: 2017-12-19

## 2017-12-19 DIAGNOSIS — F33.1 MODERATE EPISODE OF RECURRENT MAJOR DEPRESSIVE DISORDER (HCC): Primary | ICD-10-CM

## 2017-12-19 NOTE — PROGRESS NOTES
Behavioral Health Progress Note    This patient was seen by the [facility] from 11am to 11:45am for a total of 45 minutes. Psychotropic medication changes: Chon Sheth took Wellbutrin 100mg for 3 days. She reported feeling shaky and stopped taking it. Diagnosis: Major depressive disorder, recurrent, moderate    Description of session/progress/interventions: Chon Sheth reported that she is feeling better. She tried taking Wellbutrin, but felt she could not tolerate it. She also related that she has had seizures in the past and became overly anxious about the risk of Wellbutrin lowering her seizure threshold. She feels that she is doing well enough right now and will postpone starting an antidepressant. She reports feeling a bit more hopeful and recognizes that since her move, she has become more isolated which has contributed to her depressive sx. She stated that she plans on reaching out to her friends and will return to [employer] in the new year. She also related that her hearing loss contributes greatly to her decreased socialization as well. We discussed strategies to help her increase her self-esteem and to increase her social contact. Assessment/Plan: Mood a bit better. She will monitor negative automatic thoughts and work to challenge these, will work on increasing social contact and return in about a month. She will consider trying a different antidepressant at that time if depressive sx are still interfering with enjoyment/functioning.

## 2018-01-23 ENCOUNTER — ANESTHESIA EVENT (OUTPATIENT)
Dept: ENDOSCOPY | Age: 83
End: 2018-01-23
Payer: MEDICARE

## 2018-01-23 ENCOUNTER — HOSPITAL ENCOUNTER (OUTPATIENT)
Age: 83
Setting detail: OUTPATIENT SURGERY
Discharge: HOME | End: 2018-01-23
Attending: INTERNAL MEDICINE | Admitting: INTERNAL MEDICINE
Payer: MEDICARE

## 2018-01-23 ENCOUNTER — ANESTHESIA (OUTPATIENT)
Dept: ENDOSCOPY | Age: 83
End: 2018-01-23
Payer: MEDICARE

## 2018-01-23 VITALS
OXYGEN SATURATION: 95 % | RESPIRATION RATE: 18 BRPM | DIASTOLIC BLOOD PRESSURE: 65 MMHG | WEIGHT: 162 LBS | BODY MASS INDEX: 28.7 KG/M2 | HEIGHT: 63 IN | SYSTOLIC BLOOD PRESSURE: 144 MMHG | TEMPERATURE: 97.9 F | HEART RATE: 71 BPM

## 2018-01-23 PROCEDURE — 76060000031 HC ANESTHESIA FIRST 0.5 HR: Performed by: INTERNAL MEDICINE

## 2018-01-23 PROCEDURE — 74011250636 HC RX REV CODE- 250/636

## 2018-01-23 PROCEDURE — 74011250636 HC RX REV CODE- 250/636: Performed by: INTERNAL MEDICINE

## 2018-01-23 PROCEDURE — 76040000019: Performed by: INTERNAL MEDICINE

## 2018-01-23 PROCEDURE — C1726 CATH, BAL DIL, NON-VASCULAR: HCPCS | Performed by: INTERNAL MEDICINE

## 2018-01-23 RX ORDER — SODIUM CHLORIDE 9 MG/ML
INJECTION, SOLUTION INTRAVENOUS
Status: DISCONTINUED | OUTPATIENT
Start: 2018-01-23 | End: 2018-01-23 | Stop reason: HOSPADM

## 2018-01-23 RX ORDER — FLUMAZENIL 0.1 MG/ML
0.2 INJECTION INTRAVENOUS
Status: DISCONTINUED | OUTPATIENT
Start: 2018-01-23 | End: 2018-01-23 | Stop reason: HOSPADM

## 2018-01-23 RX ORDER — MIDAZOLAM HYDROCHLORIDE 1 MG/ML
.25-5 INJECTION, SOLUTION INTRAMUSCULAR; INTRAVENOUS
Status: DISCONTINUED | OUTPATIENT
Start: 2018-01-23 | End: 2018-01-23 | Stop reason: HOSPADM

## 2018-01-23 RX ORDER — PROPOFOL 10 MG/ML
INJECTION, EMULSION INTRAVENOUS AS NEEDED
Status: DISCONTINUED | OUTPATIENT
Start: 2018-01-23 | End: 2018-01-23 | Stop reason: HOSPADM

## 2018-01-23 RX ORDER — DEXTROMETHORPHAN/PSEUDOEPHED 2.5-7.5/.8
1.2 DROPS ORAL
Status: DISCONTINUED | OUTPATIENT
Start: 2018-01-23 | End: 2018-01-23 | Stop reason: HOSPADM

## 2018-01-23 RX ORDER — NALOXONE HYDROCHLORIDE 0.4 MG/ML
0.4 INJECTION, SOLUTION INTRAMUSCULAR; INTRAVENOUS; SUBCUTANEOUS
Status: DISCONTINUED | OUTPATIENT
Start: 2018-01-23 | End: 2018-01-23 | Stop reason: HOSPADM

## 2018-01-23 RX ORDER — ATROPINE SULFATE 0.1 MG/ML
0.4 INJECTION INTRAVENOUS
Status: DISCONTINUED | OUTPATIENT
Start: 2018-01-23 | End: 2018-01-23 | Stop reason: HOSPADM

## 2018-01-23 RX ORDER — EPINEPHRINE 0.1 MG/ML
1 INJECTION INTRACARDIAC; INTRAVENOUS
Status: DISCONTINUED | OUTPATIENT
Start: 2018-01-23 | End: 2018-01-23 | Stop reason: HOSPADM

## 2018-01-23 RX ORDER — SODIUM CHLORIDE 9 MG/ML
50 INJECTION, SOLUTION INTRAVENOUS CONTINUOUS
Status: DISCONTINUED | OUTPATIENT
Start: 2018-01-23 | End: 2018-01-23 | Stop reason: HOSPADM

## 2018-01-23 RX ADMIN — PROPOFOL 30 MG: 10 INJECTION, EMULSION INTRAVENOUS at 13:59

## 2018-01-23 RX ADMIN — PROPOFOL 40 MG: 10 INJECTION, EMULSION INTRAVENOUS at 14:10

## 2018-01-23 RX ADMIN — PROPOFOL 40 MG: 10 INJECTION, EMULSION INTRAVENOUS at 14:03

## 2018-01-23 RX ADMIN — PROPOFOL 50 MG: 10 INJECTION, EMULSION INTRAVENOUS at 14:01

## 2018-01-23 RX ADMIN — PROPOFOL 50 MG: 10 INJECTION, EMULSION INTRAVENOUS at 14:06

## 2018-01-23 RX ADMIN — SODIUM CHLORIDE: 9 INJECTION, SOLUTION INTRAVENOUS at 12:23

## 2018-01-23 RX ADMIN — SODIUM CHLORIDE 50 ML/HR: 900 INJECTION, SOLUTION INTRAVENOUS at 13:45

## 2018-01-23 RX ADMIN — PROPOFOL 50 MG: 10 INJECTION, EMULSION INTRAVENOUS at 13:57

## 2018-01-23 NOTE — ANESTHESIA POSTPROCEDURE EVALUATION
Post-Anesthesia Evaluation and Assessment    Patient: Bridger Murguia MRN: [id number]  SSN: [id number]    YOB: 1935  Age: 81 y.o. Sex: female       Cardiovascular Function/Vital Signs  Visit Vitals    /53    Pulse 91    Temp 36.6 °C (97.9 °F)    Resp 26    Ht 5' 3\" (1.6 m)    Wt 73.5 kg (162 lb)    SpO2 99%    Breastfeeding No    BMI 28.7 kg/m2       Patient is status post MAC anesthesia for Procedure(s):  COLONOSCOPY, EGD  ESOPHAGOGASTRODUODENOSCOPY (EGD)  ESOPHAGEAL DILATION. Nausea/Vomiting: None    Postoperative hydration reviewed and adequate. Pain:  Pain Scale 1: Numeric (0 - 10) (01/23/18 1424)  Pain Intensity 1: 0 (01/23/18 1424)   Managed    Neurological Status: At baseline    Mental Status and Level of Consciousness: Arousable    Pulmonary Status:   O2 Device: Room air (01/23/18 1424)   Adequate oxygenation and airway patent    Complications related to anesthesia: None    Post-anesthesia assessment completed.  No concerns    Signed By: Antolin Shore MD     January 23, 2018

## 2018-01-23 NOTE — PROCEDURES
Pollo Velásquez M.D.  [phone]           2018                EGD Operative Report  [facility] Setting  :  1935  Mario Mcallister Medical Record Number:  [id number]      Indication:  Dyphagia/odynophagia     : Erica Degroot MD    Referring Provider:  Anthony Dickinson MD      Anesthesia/Sedation:  MAC anesthesia    Airway assessment: No airway problems anticipated    Pre-Procedural Exam:      Airway: clear, no airway problems anticipated  Heart: RRR, without gallops or rubs  Lungs: clear bilaterally without wheezes, crackles, or rhonchi  Abdomen: soft, nontender, nondistended, bowel sounds present  Mental Status: awake, alert and oriented to person, place and time       Procedure Details     After infomed consent was obtained for the procedure, with all risks and benefits of procedure explained the patient was taken to the endoscopy suite and placed in the left lateral decubitus position. Following sequential administration of sedation as per above, the endoscope was inserted into the mouth and advanced under direct vision to second portion of the duodenum. A careful inspection was made as the gastroscope was withdrawn, including a retroflexed view of the proximal stomach; findings and interventions are described below. Findings:   Esophagus:Evidence of a schatzki's ring recurrence noted at the GE-junction. Residual lumen is about 17 mm. No other mucosal lesions seen. Stomach: Moderate size hiatal hernia seen, otherwise mucosa within normal  Duodenum/jejunum: normal    Therapies:  Effective esophageal dilation with 18 to 19 mm sized balloon, adequate tear seen with heme, no further dilation done. Specimens: none           Complications:   None; patient tolerated the procedure well. EBL:  None.            Impression:    Schatzki's ring dilated to 19 mm     Recommendations:    -Continue acid suppression.  -Continue with anti-reflux measures  -Continue with soft diet    Lamont Mccormick MD

## 2018-01-23 NOTE — H&P
The patient is a [de-identified]year old female who presents with a complaint of difficulty swallowing. The patient presents for consultation at the request of Dr. Nash Brooks). The onset of the difficulty swallowing has been gradual and has been occurring in an intermittent pattern for 3 months. The course has been recurrent and is described as  severe (in the past month was in the bathroom trying to get it up). The difficulty swallowing is described as being located in the substernal  area. The symptoms are aggravated by solids only (beef, rice, chicken, bread) and  are relieved by  taking liquids and regurgitation. The symptoms have been associated with chest pain (couple times when really stuck). The patient has been using no medications for this particular complaint/condition. The difficulty swallowing is characterized as worse (in the last month). The patient had an EGD 3 year(s) ago. The patient has normal/adequate nutrition. Note for \"Difficulty swallowing \": Uses antacids about 3 times a week. Problem List/Past Medical Enrico Fink JEFFERY Staleyakhil Cano; 12/20/2017 12:41 PM)  TIA    Congestive Heart Failure    Pancreatitis    Biliary Atresia    Hiatal Hernia    Colonic Polyps    Prolapsed Bladder    History of colonic polyps (V12.72  Z86.010)    Abdominal pain, epigastric (789.06  R10.13)    Dysphagia (787.20  R13.10)    Generalized abdominal pain (789.07  R10.84)    Abdominal pain, RUQ (789.01  R10.11)      Past Surgical History Enrico Fink JEFFERY Staleyakhil Cano; 12/20/2017 12:41 PM)  Cholecystectomy  [1992]:  Hysterectomy; Total   1960's    Allergies Enricogallito Fink JEFFERY Gordonakhil Cano; 12/20/2017 12:41 PM)  PHENobarbital *HYPNOTICS*    Sulfa Drugs    Codeine/Codeine Derivatives    Penicillins    Seaweed    Fosamax *ENDOCRINE AND METABOLIC AGENTS - MISC. *    Cleocin *ANTI-INFECTIVE AGENTS - MISC. *    Levaquin *FLUOROQUINOLONES*      Medication History Enrico Fink JEFFERY Staleyakhil Cano; 12/20/2017 12:41 PM)  Atenolol  (25MG Tablet, Oral one daily) Active.   Hydrochlorothiazide  (25MG Tablet, Oral 1/2 tab every other day) Active. Diazepam  (2.5MG Oral as needed) Specific strength unknown - Active. Tylenol  (500MG Capsule, Oral) Active. Bentyl  (10MG Capsule, Oral as needed) Active. Aspirin  (81MG Tablet, 1 tab Oral BID) Active. Medications Reconciled     Family History Vessie Sago D. Chauncey Boast; 12/20/2017 12:41 PM)  Coronary artery disease   Mother, Father. Ulcerative colitis   Son. Social History Vessie Sago D. Chauncey Boast; 12/20/2017 12:41 PM)  Employment status   Retired. Blood Transfusion   no  Marital status   . Alcohol Use   Drinks wine, Occasional alcohol use. Tobacco Use   Never smoker. Diagnostic Studies History Vessie Sago D. Chauncey Boast; 12/20/2017 12:41 PM)  Colonoscopy    ERCP      Health Maintenance History Vessie Sago D. Chauncey Boast; 12/20/2017 12:41 PM)  Flu Vaccine   none  Pneumovax  [2017]:        Review of Systems Vessie Sago D. Chauncey Boast; 12/20/2017 12:41 PM)  General Not Present- Chronic Fatigue, Poor Appetite, Weight Gain and Weight Loss. Skin Not Present- Itching, Rash and Skin Color Changes. HEENT Not Present- Hearing Loss and Vertigo. Respiratory Not Present- Difficulty Breathing and TB exposure. Cardiovascular Not Present- Chest Pain, Use of Antibiotics before Dental Procedures and Use of Blood Thinners. Gastrointestinal Present- See HPI. Musculoskeletal Not Present- Arthritis, Hip Replacement Surgery and Knee Replacement Surgery. Neurological Not Present- Weakness. Psychiatric Not Present- Depression. Endocrine Not Present- Diabetes and Thyroid Problems. Hematology Not Present- Anemia. Vitals Vessie Sago D. Chauncey Boast; 12/20/2017 12:40 PM)  12/20/2017 12:37 PM  Weight: 171 lb   Height: 63 in   Height was reported by patient. Body Surface Area: 1.81 m²   Body Mass Index: 30.29 kg/m²    Temp.: 98.9° F     BP: 160/80 (Sitting, Left Arm, Standard)              Physical Exam Lesley Boyer FNP; 12/20/2017 12:52 PM)  General  Mental Status - Alert.   General Appearance - Cooperative, Pleasant, Not in acute distress. Build & Nutrition - Well nourished and Well developed. Voice - Normal.    Eye  Eyeball - Left - No Exophthalmos. Eyeball - Right - No Exophthalmos. Sclera/Conjunctiva - Left - No Jaundice. Sclera/Conjunctiva - Right - No Jaundice. Chest and Lung Exam  Chest and lung exam reveals  - normal excursion with symmetric chest walls, quiet, even and easy respiratory effort with no use of accessory muscles and on auscultation, normal breath sounds, no adventitious sounds and normal vocal resonance. Cardiovascular  Cardiovascular examination reveals  - no digital clubbing, cyanosis, edema, increased warmth or tenderness. Auscultation  Heart Sounds - S1 WNL and S2 WNL. Murmurs & Other Heart Sounds - Auscultation of the heart reveals - No Murmurs. Assessment & Plan Celeste Ayon FNP; 12/20/2017 1:07 PM)  Dysphagia (787.20  R13.10)  Impression: She has had this in the past. EGD with dilatation was succesful when last done in 2014. Find difficulties with beef, rice, chicken and bread. No difficulties with liquids. I am concerned for Schatzki's ring or dysmotility. Current Plans  Endoscopy (61153) (Discussed risks and benefits with the patient to include: perforation, post polypectomy, or post biopsy bleeding, missed lesions, and sedation reactions.)  History of colonic polyps (V12.72  Z86.010)  Current Plans  COLONOSCOPY (90218)  Started PEG 3350/Electrolytes 240GM, 4000 Milliliter as directed, 1 Milliliter, 1 day starting 12/20/2017, No Refill. Local Order: Buy Bisacodyl OTC. Pt Education - How to access health information online: discussed with patient and provided information. Follow up office visit after procedure  This patient was reviewed and seen in collaboration with Dr. Loreto Singh.

## 2018-01-23 NOTE — PROCEDURES
Bright Philip M.D.  [phone]            2018          Colonoscopy Operative Report  Ender Gonzalez  :  1935  [place] Medical Record Number:  [id number]      Indications:    Personal history of colon polyps (screening only)     :  Donna Almodovar MD    Referring Provider: Flavia Land MD    Sedation:  MAC anesthesia    Pre-Procedural Exam:      Airway: clear,  No airway problems anticipated  Heart: RRR, without gallops or rubs  Lungs: clear bilaterally without wheezes, crackles, or rhonchi  Abdomen: soft, nontender, nondistended, bowel sounds present  Mental Status: awake, alert and oriented to person, place and time     Procedure Details:  After informed consent was obtained with all risks and benefits of procedure explained and preoperative exam completed, the patient was taken to the endoscopy suite and placed in the left lateral decubitus position. Upon sequential sedation as per above, a digital rectal exam was performed. The Olympus videocolonoscope  was inserted in the rectum and carefully advanced to the cecum, which was identified by the ileocecal valve and appendiceal orifice. The quality of preparation was good. The colonoscope was slowly withdrawn with careful inspection and evaluation between folds. Retroflexion in the rectum was performed. Findings:   Terminal Ileum: not intubated  Cecum: normal  Ascending Colon: normal  Transverse Colon: normal  Descending Colon: normal  Sigmoid: normal  Rectum: no mucosal lesion appreciated  Grade 1 internal hemorrhoid(s); Interventions:  none    Specimen Removed:  none    Complications: None. EBL:  None. Impression:  Small internal hemorrhoids, otherwise mucosa within normal    Recommendations:  -No need for repeat surveillance colonoscopy based on age   -High fiber diet.    -Resume normal medication(s).      Discharge Disposition:  Home in the company of a  when able to ambulate.     Fe Carson MD  1/23/2018  2:19 PM

## 2018-01-23 NOTE — ROUTINE PROCESS
[facility]  1935  [id number]    Situation:  Verbal report received from: Bruno Arce RN  Procedure: Procedure(s):  COLONOSCOPY, EGD  ESOPHAGOGASTRODUODENOSCOPY (EGD)  ESOPHAGEAL DILATION    Background:    Preoperative diagnosis: HX OF COLON POLYPS, DYSPHASIA  Postoperative diagnosis: hiatal hernia, hemorrhoids, schazki's ring     :  Dr. Rosa Callahan  Assistant(s): Endoscopy Technician-1: Arnel Harris  Endoscopy RN-1: Bruno Arce RN    Specimens: * No specimens in log *  H. Pylori  no    Assessment:  Intra-procedure medications     Anesthesia gave intra-procedure sedation and medications, see anesthesia flow sheet yes    Intravenous fluids: NS@ KVO     Vital signs stable     Abdominal assessment: round and soft   No crepitus felt around collarbones    Recommendation:  Discharge patient per MD order. Family or Friend   Permission to share finding with family or friend yes    Endoscopy discharge instructions have been reviewed and given to patient and spouse. The patient and spouse verbalized understanding and acceptance of instructions.

## 2018-01-23 NOTE — IP AVS SNAPSHOT
[address] [facility] [address] 
 
 
 [address] [facility] [address] [facility] 
[phone] Patient: Tammy Sagastume MRN: [id number] :1935 About your hospitalization You were admitted on:  2018 You last received care in the:  [facility] ENDOSCOPY You were discharged on:  2018 Why you were hospitalized Your primary diagnosis was:  Not on File Follow-up Information None Your Scheduled Appointments 2018 10:00 AM EST COUNSELING with Leroy Dent, CNS [facility] ([facility]) [address] [place] [address] [facility]  
[phone] 2018 10:20 AM EST Any with Ayesha Fletcher MD  
[address] [place] [address] ([facility])  
 [address] [place] [address] [phone] Discharge Orders None A check aurelia indicates which time of day the medication should be taken. My Medications CONTINUE taking these medications Instructions Each Dose to Equal  
 Morning Noon Evening Bedtime  
 aspirin delayed-release 81 mg tablet Your last dose was: Your next dose is: Take 81 mg by mouth daily. 81 mg  
    
   
   
   
  
 atenolol 25 mg tablet Commonly known as:  TENORMIN Your last dose was: Your next dose is: TAKE 1 TAB BY MOUTH DAILY. COPPER GLUCONATE PO Your last dose was: Your next dose is: Take  by mouth. diazePAM 5 mg tablet Commonly known as:  VALIUM Your last dose was: Your next dose is: Take 0.5 Tabs by mouth every six (6) hours as needed for Anxiety. 2.5 mg  
    
   
   
   
  
 EMERGEN-C PO Your last dose was: Your next dose is: Take  by mouth. hydroCHLOROthiazide 25 mg tablet Commonly known as:  HYDRODIURIL Your last dose was: Your next dose is: Take 0.5 Tabs by mouth daily. 12.5 mg  
    
   
   
   
  
 PRESERVISION LUTEIN 226 mg-200 unit -5 mg-0.8 mg Cap Generic drug:  Vit C-Vit E-Copper-ZnOx-Lutein Your last dose was: Your next dose is: Take  by mouth. TYLENOL EXTRA STRENGTH 500 mg tablet Generic drug:  acetaminophen Your last dose was: Your next dose is: Take  by mouth every six (6) hours as needed for Pain. VITAMIN D3 1,000 unit Cap Generic drug:  cholecalciferol Your last dose was: Your next dose is: Take 2,000 Int'l Units by mouth daily. 2000 Int'l Units Discharge Instructions [facility]. [facility] Doc PONCE Segundo 
[phone] COLON and EGD DISCHARGE INSTRUCTIONS 
 
2018 [address] :  1935 [facility]:  [id number] DISCOMFORT: 
Sore throat- throat lozenges or warm salt water gargle Redness at IV site- apply warm compress to area; if redness or soreness persist- contact your physician There may be a slight amount of blood passed from the rectum Gaseous discomfort- walking, belching will help relieve any discomfort You may not operate a vehicle for 12 hours You may not engage in an occupation involving machinery or appliances for rest of today You may not drink alcoholic beverages for at least 12 hours Avoid making any critical decisions for at least 24 hour DIET: 
 Soft diet  however -  remember your colon is empty and a heavy meal will produce gas. Avoid these foods:  vegetables, fried / greasy foods, carbonated drinks for today ACTIVITY: 
You may  resume your normal daily activities it is recommended that you spend the remainder of the day resting -  avoid any strenuous activity and driving. CALL M.D. ANY SIGN OF: Increasing pain, nausea, vomiting Abdominal distension (swelling) New increased bleeding (oral or rectal) Fever (chills) Pain in chest area Bloody discharge from nose or mouth Shortness of breath Follow-up Instructions: 
 Call Dr. Kalyan Bardales if any questions at [phone]. Results of procedure / biopsy in 7 to 10 days, we will call you with these results. Your endoscopy showed a hiatal hernia and recurrence of the benign stricture that was dilated. Your colonoscopy showed small internal hemorrhoids, otherwise looked within normal 
 
 
 
 
ACO Transitions of Care Introducing [address] offers a voluntary care coordination program to provide high quality service and care to [facility] fee-for-service beneficiaries. Ez Winters was designed to help you enhance your health and well-being through the following services: ? Transitions of Care  support for individuals who are transitioning from one care setting to another (example: Hospital to home). ? Chronic and Complex Care Coordination  support for individuals and caregivers of those with serious or chronic illnesses or with more than one chronic (ongoing) condition and those who take a number of different medications. If you meet specific medical criteria, a [facility] [address] may call you directly to coordinate your care with your primary care physician and your other care providers. For questions about the [facility] programs, please, contact your physicians office. For general questions or additional information about Accountable Care Organizations: 
Please visit www.medicare.gov/acos. html or call 1-800-MEDICARE (1-767.876.7508) TTY users should call 6-714.680.3407. Introducing [facility]! Christo Toure introduces Epiphany patient portal. Now you can access parts of your medical record, email your doctor's office, and request medication refills online. 1. In your internet browser, go to [email or web address] 2. Click on the First Time User? Click Here link in the Sign In box. You will see the New Member Sign Up page. 3. Enter your Epiphany Access Code exactly as it appears below. You will not need to use this code after youve completed the sign-up process. If you do not sign up before the expiration date, you must request a new code. · Epiphany Access Code: [email or web address] Expires: 3/7/2018  1:30 PM 
 
4. Enter the last four digits of your Social Security Number (xxxx) and Date of Birth (mm/dd/yyyy) as indicated and click Submit. You will be taken to the next sign-up page. 5. Create a Epiphany ID. This will be your Epiphany login ID and cannot be changed, so think of one that is secure and easy to remember. 6. Create a Epiphany password. You can change your password at any time. 7. Enter your Password Reset Question and Answer. This can be used at a later time if you forget your password. 8. Enter your e-mail address. You will receive e-mail notification when new information is available in [address] 9. Click Sign Up. You can now view and download portions of your medical record. 10. Click the Download Summary menu link to download a portable copy of your medical information. If you have questions, please visit the Frequently Asked Questions section of the Epiphany website. Remember, Epiphany is NOT to be used for urgent needs. For medical emergencies, dial 911. Now available from your iPhone and Android! Providers Seen During Your Hospitalization Provider Specialty Primary office phone Abhinav Miramontes MD Gastroenterology [phone] Your Primary Care Physician (PCP) Primary Care Physician Office Phone Office Fax [place], [phone] [facility] [phone] You are allergic to the following Allergen Reactions Ceclor (Cefaclor) Other (comments) Pain flank, abd 
  
    
 Cleocin (Clindamycin Hcl) Other (comments) Flank, abd pain Codeine Nausea and Vomiting Compazine (Prochlorperazine) Other (comments) Hyper and anxious Fosamax (Alendronate) Other (comments) Indigestion Levaquin (Levofloxacin) Other (comments) Hemorrhage in the eyes Macrobid (Nitrofurantoin Monohyd/M-Cryst) Swelling Other (comments)  
 wheezing Pcn (Penicillins) Rash Phenobarbital Other (comments)  
 anxious Sulfa (Sulfonamide Antibiotics) Rash Headache Recent Documentation Height Weight Breastfeeding? BMI OB Status Smoking Status 1.6 m 73.5 kg No 28.7 kg/m2 Postmenopausal Former Smoker Emergency Contacts Name Discharge Info Relation Home Work Mobile Brian Clancy DISCHARGE CAREGIVER [3] Spouse [3] [phone] Patient Belongings The following personal items are in your possession at time of discharge: 
  Dental Appliances: None  Visual Aid: Glasses Please provide this summary of care documentation to your next provider. Signatures-by signing, you are acknowledging that this After Visit Summary has been reviewed with you and you have received a copy. Patient Signature:  ____________________________________________________________ Date:  ____________________________________________________________  
  
Jaymie [place] Provider Signature:  ____________________________________________________________ Date:  ____________________________________________________________

## 2018-01-23 NOTE — ANESTHESIA PREPROCEDURE EVALUATION
Anesthetic History   No history of anesthetic complications            Review of Systems / Medical History  Patient summary reviewed, nursing notes reviewed and pertinent labs reviewed    Pulmonary        Sleep apnea           Neuro/Psych     seizures    TIA     Cardiovascular    Hypertension              Exercise tolerance: >4 METS     GI/Hepatic/Renal  Within defined limits              Endo/Other  Within defined limits           Other Findings            Physical Exam    Airway  Mallampati: II  TM Distance: 4 - 6 cm  Neck ROM: normal range of motion   Mouth opening: Normal     Cardiovascular    Rhythm: regular  Rate: normal         Dental    Dentition: Lower dentition intact and Upper dentition intact     Pulmonary  Breath sounds clear to auscultation               Abdominal         Other Findings            Anesthetic Plan    ASA: 3  Anesthesia type: MAC          Induction: Intravenous  Anesthetic plan and risks discussed with: Patient

## 2018-01-23 NOTE — DISCHARGE INSTRUCTIONS
[address] [facility]. Vida Chairez M.D.  [phone]                 COLON and EGD DISCHARGE INSTRUCTIONS    2018    Dmitriy Barone [place]  :  1935  [place] Medical Record Number:  [id number]      DISCOMFORT:  Sore throat- throat lozenges or warm salt water gargle  Redness at IV site- apply warm compress to area; if redness or soreness persist- contact your physician  There may be a slight amount of blood passed from the rectum  Gaseous discomfort- walking, belching will help relieve any discomfort  You may not operate a vehicle for 12 hours  You may not engage in an occupation involving machinery or appliances for rest of today  You may not drink alcoholic beverages for at least 12 hours  Avoid making any critical decisions for at least 24 hour  DIET:   Soft diet   - however -  remember your colon is empty and a heavy meal will produce gas. Avoid these foods:  vegetables, fried / greasy foods, carbonated drinks for today     ACTIVITY:  You may  resume your normal daily activities it is recommended that you spend the remainder of the day resting -  avoid any strenuous activity and driving. CALL M.D. ANY SIGN OF:   Increasing pain, nausea, vomiting  Abdominal distension (swelling)  New increased bleeding (oral or rectal)  Fever (chills)  Pain in chest area  Bloody discharge from nose or mouth  Shortness of breath      Follow-up Instructions:   Call Dr. Seamus Artis if any questions at [phone]. Results of procedure / biopsy in 7 to 10 days, we will call you with these results. Your endoscopy showed a hiatal hernia and recurrence of the benign stricture that was dilated.    Your colonoscopy showed small internal hemorrhoids, otherwise looked within normal

## 2018-01-29 DIAGNOSIS — R60.0 LEG EDEMA, LEFT: ICD-10-CM

## 2018-01-29 RX ORDER — ATENOLOL 25 MG/1
TABLET ORAL
Qty: 90 TAB | Refills: 2 | Status: SHIPPED | OUTPATIENT
Start: 2018-01-29 | End: 2018-10-20 | Stop reason: SDUPTHER

## 2018-01-29 NOTE — TELEPHONE ENCOUNTER
Requested Prescriptions     Pending Prescriptions Disp Refills    atenolol (TENORMIN) 25 mg tablet 90 Tab 0     Last OV 11/30/17  Next OV 12/11/18    Pharmacy verified  90 day supply    Thank you, AP

## 2018-02-02 ENCOUNTER — TELEPHONE (OUTPATIENT)
Dept: INTERNAL MEDICINE CLINIC | Age: 83
End: 2018-02-02

## 2018-02-02 RX ORDER — AZITHROMYCIN 250 MG/1
250 TABLET, FILM COATED ORAL SEE ADMIN INSTRUCTIONS
Qty: 2 TAB | Refills: 0 | Status: SHIPPED | OUTPATIENT
Start: 2018-02-02 | End: 2018-02-04

## 2018-02-02 NOTE — TELEPHONE ENCOUNTER
----- Message from Bianca Recinos sent at 2/2/2018 11:15 AM EST -----  Regarding: Sai Waters  Patient will have a dental appointment on 2/6/18. Needs a prescription for Azithromycin prior to visit. Patient needs 2 tablets. Pharmacy is [facility] on [address] [place] [address]. Pharmacy number is [phone]. Best contact number for patient is [phone].

## 2018-02-02 NOTE — TELEPHONE ENCOUNTER
I have never done it before but I sent in 250 mg tablets two of them- not sure if that is what she normally does though so we may need to check and correct it

## 2018-02-02 NOTE — TELEPHONE ENCOUNTER
V/m left notifying patient although PCP has never prescribed her prophylactic meds prior to dental procedures her meds were sent. Office number left if patient has additional questions or concerns.

## 2018-02-15 ENCOUNTER — APPOINTMENT (OUTPATIENT)
Dept: CT IMAGING | Age: 83
End: 2018-02-15
Attending: HOSPITALIST
Payer: MEDICARE

## 2018-02-15 ENCOUNTER — HOSPITAL ENCOUNTER (EMERGENCY)
Age: 83
Discharge: HOME | End: 2018-02-15
Attending: EMERGENCY MEDICINE
Payer: MEDICARE

## 2018-02-15 VITALS
BODY MASS INDEX: 29.96 KG/M2 | SYSTOLIC BLOOD PRESSURE: 187 MMHG | HEART RATE: 100 BPM | RESPIRATION RATE: 16 BRPM | DIASTOLIC BLOOD PRESSURE: 95 MMHG | WEIGHT: 169.09 LBS | OXYGEN SATURATION: 97 % | HEIGHT: 63 IN | TEMPERATURE: 97.8 F

## 2018-02-15 DIAGNOSIS — R10.11 ABDOMINAL PAIN, RIGHT UPPER QUADRANT: Primary | ICD-10-CM

## 2018-02-15 LAB
ALBUMIN SERPL-MCNC: 4.1 G/DL (ref 3.5–5)
ALBUMIN/GLOB SERPL: 1.2 {RATIO} (ref 1.1–2.2)
ALP SERPL-CCNC: 94 U/L (ref 45–117)
ALT SERPL-CCNC: 46 U/L (ref 12–78)
ANION GAP SERPL CALC-SCNC: 8 MMOL/L (ref 5–15)
APPEARANCE UR: CLEAR
AST SERPL-CCNC: 31 U/L (ref 15–37)
BACTERIA URNS QL MICRO: NEGATIVE /HPF
BASOPHILS # BLD: 0 K/UL (ref 0–0.1)
BASOPHILS NFR BLD: 0 % (ref 0–1)
BILIRUB SERPL-MCNC: 0.7 MG/DL (ref 0.2–1)
BILIRUB UR QL: NEGATIVE
BUN SERPL-MCNC: 12 MG/DL (ref 6–20)
BUN/CREAT SERPL: 16 (ref 12–20)
CALCIUM SERPL-MCNC: 10.3 MG/DL (ref 8.5–10.1)
CHLORIDE SERPL-SCNC: 105 MMOL/L (ref 97–108)
CO2 SERPL-SCNC: 25 MMOL/L (ref 21–32)
COLOR UR: ABNORMAL
CREAT SERPL-MCNC: 0.74 MG/DL (ref 0.55–1.02)
DIFFERENTIAL METHOD BLD: NORMAL
EOSINOPHIL # BLD: 0.1 K/UL (ref 0–0.4)
EOSINOPHIL NFR BLD: 1 % (ref 0–7)
EPITH CASTS URNS QL MICRO: NORMAL /LPF
ERYTHROCYTE [DISTWIDTH] IN BLOOD BY AUTOMATED COUNT: 12.5 % (ref 11.5–14.5)
GLOBULIN SER CALC-MCNC: 3.3 G/DL (ref 2–4)
GLUCOSE SERPL-MCNC: 106 MG/DL (ref 65–100)
GLUCOSE UR STRIP.AUTO-MCNC: NEGATIVE MG/DL
HCT VFR BLD AUTO: 42.9 % (ref 35–47)
HGB BLD-MCNC: 14.6 G/DL (ref 11.5–16)
HGB UR QL STRIP: ABNORMAL
IMM GRANULOCYTES # BLD: 0 K/UL (ref 0–0.04)
IMM GRANULOCYTES NFR BLD AUTO: 0 % (ref 0–0.5)
KETONES UR QL STRIP.AUTO: 40 MG/DL
LACTATE SERPL-SCNC: 1.2 MMOL/L (ref 0.4–2)
LEUKOCYTE ESTERASE UR QL STRIP.AUTO: NEGATIVE
LIPASE SERPL-CCNC: 173 U/L (ref 73–393)
LYMPHOCYTES # BLD: 2.4 K/UL (ref 0.8–3.5)
LYMPHOCYTES NFR BLD: 37 % (ref 12–49)
MCH RBC QN AUTO: 31.3 PG (ref 26–34)
MCHC RBC AUTO-ENTMCNC: 34 G/DL (ref 30–36.5)
MCV RBC AUTO: 91.9 FL (ref 80–99)
MONOCYTES # BLD: 0.7 K/UL (ref 0–1)
MONOCYTES NFR BLD: 11 % (ref 5–13)
NEUTS SEG # BLD: 3.3 K/UL (ref 1.8–8)
NEUTS SEG NFR BLD: 50 % (ref 32–75)
NITRITE UR QL STRIP.AUTO: NEGATIVE
NRBC # BLD: 0 K/UL (ref 0–0.01)
NRBC BLD-RTO: 0 PER 100 WBC
PH UR STRIP: 6.5 [PH] (ref 5–8)
PLATELET # BLD AUTO: 356 K/UL (ref 150–400)
PMV BLD AUTO: 9.9 FL (ref 8.9–12.9)
POTASSIUM SERPL-SCNC: 4.1 MMOL/L (ref 3.5–5.1)
PROT SERPL-MCNC: 7.4 G/DL (ref 6.4–8.2)
PROT UR STRIP-MCNC: NEGATIVE MG/DL
RBC # BLD AUTO: 4.67 M/UL (ref 3.8–5.2)
RBC #/AREA URNS HPF: NORMAL /HPF (ref 0–5)
SODIUM SERPL-SCNC: 138 MMOL/L (ref 136–145)
SP GR UR REFRACTOMETRY: 1.02 (ref 1–1.03)
UROBILINOGEN UR QL STRIP.AUTO: 0.2 EU/DL (ref 0.2–1)
WBC # BLD AUTO: 6.6 K/UL (ref 3.6–11)
WBC URNS QL MICRO: NORMAL /HPF (ref 0–4)

## 2018-02-15 PROCEDURE — 99283 EMERGENCY DEPT VISIT LOW MDM: CPT

## 2018-02-15 PROCEDURE — 83605 ASSAY OF LACTIC ACID: CPT | Performed by: HOSPITALIST

## 2018-02-15 PROCEDURE — 81001 URINALYSIS AUTO W/SCOPE: CPT | Performed by: HOSPITALIST

## 2018-02-15 PROCEDURE — 74011636320 HC RX REV CODE- 636/320: Performed by: RADIOLOGY

## 2018-02-15 PROCEDURE — 74177 CT ABD & PELVIS W/CONTRAST: CPT

## 2018-02-15 PROCEDURE — 85025 COMPLETE CBC W/AUTO DIFF WBC: CPT | Performed by: HOSPITALIST

## 2018-02-15 PROCEDURE — 83690 ASSAY OF LIPASE: CPT | Performed by: HOSPITALIST

## 2018-02-15 PROCEDURE — 80053 COMPREHEN METABOLIC PANEL: CPT | Performed by: HOSPITALIST

## 2018-02-15 PROCEDURE — 36415 COLL VENOUS BLD VENIPUNCTURE: CPT | Performed by: HOSPITALIST

## 2018-02-15 RX ORDER — ONDANSETRON 8 MG/1
8 TABLET, ORALLY DISINTEGRATING ORAL
Qty: 15 TAB | Refills: 0 | Status: SHIPPED | OUTPATIENT
Start: 2018-02-15 | End: 2018-05-22 | Stop reason: ALTCHOICE

## 2018-02-15 RX ADMIN — IOPAMIDOL 100 ML: 755 INJECTION, SOLUTION INTRAVENOUS at 16:26

## 2018-02-15 NOTE — ED TRIAGE NOTES
Pt. C/o intermittent right side abd pain since having her colonoscopy Jan. 23rd. Abd has felt distended, and not passing gas since yesterday. LBM  Today.   Denies n/v.

## 2018-02-15 NOTE — DISCHARGE INSTRUCTIONS
Abdominal Pain: Care Instructions  Your Care Instructions    Abdominal pain has many possible causes. Some aren't serious and get better on their own in a few days. Others need more testing and treatment. If your pain continues or gets worse, you need to be rechecked and may need more tests to find out what is wrong. You may need surgery to correct the problem. Don't ignore new symptoms, such as fever, nausea and vomiting, urination problems, pain that gets worse, and dizziness. These may be signs of a more serious problem. Your doctor may have recommended a follow-up visit in the next 8 to 12 hours. If you are not getting better, you may need more tests or treatment. The doctor has checked you carefully, but problems can develop later. If you notice any problems or new symptoms, get medical treatment right away. Follow-up care is a key part of your treatment and safety. Be sure to make and go to all appointments, and call your doctor if you are having problems. It's also a good idea to know your test results and keep a list of the medicines you take. How can you care for yourself at home? · Rest until you feel better. · To prevent dehydration, drink plenty of fluids, enough so that your urine is light yellow or clear like water. Choose water and other caffeine-free clear liquids until you feel better. If you have kidney, heart, or liver disease and have to limit fluids, talk with your doctor before you increase the amount of fluids you drink. · If your stomach is upset, eat mild foods, such as rice, dry toast or crackers, bananas, and applesauce. Try eating several small meals instead of two or three large ones. · Wait until 48 hours after all symptoms have gone away before you have spicy foods, alcohol, and drinks that contain caffeine. · Do not eat foods that are high in fat. · Avoid anti-inflammatory medicines such as aspirin, ibuprofen (Advil, Motrin), and naproxen (Aleve).  These can cause stomach upset. Talk to your doctor if you take daily aspirin for another health problem. When should you call for help? Call 911 anytime you think you may need emergency care. For example, call if:  ? · You passed out (lost consciousness). ? · You pass maroon or very bloody stools. ? · You vomit blood or what looks like coffee grounds. ? · You have new, severe belly pain. ?Call your doctor now or seek immediate medical care if:  ? · Your pain gets worse, especially if it becomes focused in one area of your belly. ? · You have a new or higher fever. ? · Your stools are black and look like tar, or they have streaks of blood. ? · You have unexpected vaginal bleeding. ? · You have symptoms of a urinary tract infection. These may include:  ¨ Pain when you urinate. ¨ Urinating more often than usual.  ¨ Blood in your urine. ? · You are dizzy or lightheaded, or you feel like you may faint. ? Watch closely for changes in your health, and be sure to contact your doctor if:  ? · You are not getting better after 1 day (24 hours). Where can you learn more? Go to http://jonatan-elvis.info/. Enter F414 in the search box to learn more about \"Abdominal Pain: Care Instructions. \"  Current as of: March 20, 2017  Content Version: 11.4  © 3545-8299 [employer]. Care instructions adapted under license by [employer] (which disclaims liability or warranty for this information). If you have questions about a medical condition or this instruction, always ask your healthcare professional. [place] [address] any warranty or liability for your use of this information.

## 2018-02-15 NOTE — ED NOTES
Patient discharged by MD. Patient and  given the opportunity to ask questions, verbalized understanding of teaching.  Patient ambulated using cane from home out of ER without issue
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
## 2018-03-29 ENCOUNTER — HOSPITAL ENCOUNTER (OUTPATIENT)
Dept: LAB | Age: 83
Discharge: HOME | End: 2018-03-29
Payer: MEDICARE

## 2018-03-29 ENCOUNTER — OFFICE VISIT (OUTPATIENT)
Dept: INTERNAL MEDICINE CLINIC | Age: 83
End: 2018-03-29

## 2018-03-29 VITALS
DIASTOLIC BLOOD PRESSURE: 69 MMHG | BODY MASS INDEX: 29.06 KG/M2 | RESPIRATION RATE: 12 BRPM | WEIGHT: 164 LBS | HEART RATE: 79 BPM | SYSTOLIC BLOOD PRESSURE: 139 MMHG | OXYGEN SATURATION: 98 % | HEIGHT: 63 IN | TEMPERATURE: 98.3 F

## 2018-03-29 DIAGNOSIS — J45.21 MILD INTERMITTENT REACTIVE AIRWAY DISEASE WITH ACUTE EXACERBATION: ICD-10-CM

## 2018-03-29 DIAGNOSIS — J40 BRONCHITIS: Primary | ICD-10-CM

## 2018-03-29 DIAGNOSIS — E83.52 HYPERCALCEMIA: ICD-10-CM

## 2018-03-29 DIAGNOSIS — K59.00 CONSTIPATION, UNSPECIFIED CONSTIPATION TYPE: ICD-10-CM

## 2018-03-29 PROCEDURE — 84443 ASSAY THYROID STIM HORMONE: CPT

## 2018-03-29 PROCEDURE — 80048 BASIC METABOLIC PNL TOTAL CA: CPT

## 2018-03-29 PROCEDURE — 84439 ASSAY OF FREE THYROXINE: CPT

## 2018-03-29 PROCEDURE — 36415 COLL VENOUS BLD VENIPUNCTURE: CPT

## 2018-03-29 RX ORDER — AZITHROMYCIN 250 MG/1
250 TABLET, FILM COATED ORAL SEE ADMIN INSTRUCTIONS
Qty: 6 TAB | Refills: 0 | Status: SHIPPED | OUTPATIENT
Start: 2018-03-29 | End: 2018-04-03

## 2018-03-29 RX ORDER — POLYETHYLENE GLYCOL 3350 17 G/17G
17 POWDER, FOR SOLUTION ORAL DAILY
COMMUNITY
End: 2022-10-24 | Stop reason: ALTCHOICE

## 2018-03-29 NOTE — PROGRESS NOTES
HISTORY OF PRESENT ILLNESS  Sebastian Balderas is a 81 y.o. female. HPI  Upper respiratory illness:  Sebastian Balderas presents with complaints of congestion, sore throat, post nasal drip, cough described as productive of thick whitish-yellow sputum, headache and some chest tightness for 2 weeks. no nausea and no vomiting . she has not had  Chills but felt feverish at onset of illness; has been feeling very fatigued. Symptoms are moderate. Over-the-counter remedies including Mucinex   has been used with poor relief of symptoms. She is having company this weekend and is concerned about passing this along to her guest.  Has Albuterol inhaler at home but has not used this for current illness. Drinking plenty of fluids: yes  Asthma?:  no  former smoker, quit many years ago  Contacts with similar infections: yes     Has been having significant constipation since she had colonoscopy in October and was advised to take large amounts of Miralax on daily basis by GI; reports he was concerned about possible thyroid disorder with her sudden constipation. Last TSH in 5/2017 was normal.      Her calcium level was slightly elevated at last check. Review of Systems   Constitutional: Positive for malaise/fatigue. Negative for chills and fever. HENT: Positive for congestion and sore throat. Negative for sinus pain. Respiratory: Positive for cough, sputum production and wheezing. Cardiovascular: Negative for chest pain and palpitations. Gastrointestinal: Negative for nausea and vomiting. Musculoskeletal: Negative for myalgias. Neurological: Positive for headaches. Negative for dizziness. /69 (BP 1 Location: Left arm, BP Patient Position: Sitting)  Pulse 79  Temp 98.3 °F (36.8 °C) (Oral)   Resp 12  Ht 5' 3\" (1.6 m)  Wt 164 lb (74.4 kg)  SpO2 98%  BMI 29.05 kg/m2  Physical Exam   Constitutional: She is oriented to person, place, and time. She appears well-developed and well-nourished. HENT:   Head: Normocephalic and atraumatic. Right Ear: External ear normal.   Left Ear: External ear normal.   Nose: Nose normal.   Mouth/Throat: Oropharynx is clear and moist.   Neck: Normal range of motion. Neck supple. No thyromegaly present. Cardiovascular: Normal rate and regular rhythm. Pulmonary/Chest: Effort normal. She has wheezes. She has no rales. Upper chest congestion   Lymphadenopathy:     She has no cervical adenopathy. Neurological: She is alert and oriented to person, place, and time. Skin: Skin is warm and dry. Psychiatric: She has a normal mood and affect. Her behavior is normal.   Nursing note and vitals reviewed. ASSESSMENT and PLAN  Diagnoses and all orders for this visit:    1. Bronchitis - continue with Mucinex, increase fluids, rest  -     azithromycin (ZITHROMAX) 250 mg tablet; Take 1 Tab by mouth See Admin Instructions for 5 days. 2. Mild intermittent reactive airway disease with acute exacerbation -- advised to use Albuterol inhaler as needed. 3. Constipation, unspecified constipation type  -     METABOLIC PANEL, BASIC  -     TSH 3RD GENERATION  -     T4, FREE    4.  Hypercalcemia  -     METABOLIC PANEL, BASIC      lab results and schedule of future lab studies reviewed with patient  reviewed diet, exercise and weight control  reviewed medications and side effects in detail

## 2018-03-29 NOTE — PATIENT INSTRUCTIONS
Reactive Airway Disease: Care Instructions  Your Care Instructions    Reactive airway disease is a breathing problem that appears as wheezing, a whistling noise in your airways. It may be caused by a viral or bacterial infection, allergies, tobacco smoke, or something else in the environment. When you are around these triggers, your body releases chemicals that make the airways get tight. Reactive airway disease is a lot like asthma. Both can cause wheezing. But asthma is ongoing, while reactive airway disease may occur only now and then. Tests can be done to tell whether you have asthma. You may take the same medicines used to treat asthma. Good home care and follow-up care with your doctor can help you recover. Follow-up care is a key part of your treatment and safety. Be sure to make and go to all appointments, and call your doctor if you are having problems. It's also a good idea to know your test results and keep a list of the medicines you take. How can you care for yourself at home? · Take your medicines exactly as prescribed. Call your doctor if you think you are having a problem with your medicine. · Do not smoke or allow others to smoke around you. If you need help quitting, talk to your doctor about stop-smoking programs and medicines. These can increase your chances of quitting for good. · If you know what caused your wheezing (such as perfume or the odor of household chemicals), try to avoid it in the future. · Wash your hands several times a day, and consider using hand gels or wipes that contain alcohol. This can prevent colds and other infections. When should you call for help? Call 911 anytime you think you may need emergency care. For example, call if:  ? · You have severe trouble breathing. ? Watch closely for changes in your health, and be sure to contact your doctor if:  ? · You cough up yellow, dark brown, or bloody mucus. ? · You have a fever. ? · Your wheezing gets worse. Where can you learn more? Go to http://jonatan-elvis.info/. Enter P720 in the search box to learn more about \"Reactive Airway Disease: Care Instructions. \"  Current as of: May 12, 2017  Content Version: 11.4  © 5372-5501 [employer]. Care instructions adapted under license by [employer] (which disclaims liability or warranty for this information). If you have questions about a medical condition or this instruction, always ask your healthcare professional. [address] any warranty or liability for your use of this information. Bronchitis: Care Instructions  Your Care Instructions    Bronchitis is inflammation of the bronchial tubes, which carry air to the lungs. The tubes swell and produce mucus, or phlegm. The mucus and inflamed bronchial tubes make you cough. You may have trouble breathing. Most cases of bronchitis are caused by viruses like those that cause colds. Antibiotics usually do not help and they may be harmful. Bronchitis usually develops rapidly and lasts about 2 to 3 weeks in otherwise healthy people. Follow-up care is a key part of your treatment and safety. Be sure to make and go to all appointments, and call your doctor if you are having problems. It's also a good idea to know your test results and keep a list of the medicines you take. How can you care for yourself at home? · Take all medicines exactly as prescribed. Call your doctor if you think you are having a problem with your medicine. · Get some extra rest.  · Take an over-the-counter pain medicine, such as acetaminophen (Tylenol), ibuprofen (Advil, Motrin), or naproxen (Aleve) to reduce fever and relieve body aches. Read and follow all instructions on the label. · Do not take two or more pain medicines at the same time unless the doctor told you to. Many pain medicines have acetaminophen, which is Tylenol. Too much acetaminophen (Tylenol) can be harmful.   · Take an over-the-counter cough medicine that contains dextromethorphan to help quiet a dry, hacking cough so that you can sleep. Avoid cough medicines that have more than one active ingredient. Read and follow all instructions on the label. · Breathe moist air from a humidifier, hot shower, or sink filled with hot water. The heat and moisture will thin mucus so you can cough it out. · Do not smoke. Smoking can make bronchitis worse. If you need help quitting, talk to your doctor about stop-smoking programs and medicines. These can increase your chances of quitting for good. When should you call for help? Call 911 anytime you think you may need emergency care. For example, call if:  ? · You have severe trouble breathing. ?Call your doctor now or seek immediate medical care if:  ? · You have new or worse trouble breathing. ? · You cough up dark brown or bloody mucus (sputum). ? · You have a new or higher fever. ? · You have a new rash. ? Watch closely for changes in your health, and be sure to contact your doctor if:  ? · You cough more deeply or more often, especially if you notice more mucus or a change in the color of your mucus. ? · You are not getting better as expected. Where can you learn more? Go to http://jonatan-elvis.info/. Enter H333 in the search box to learn more about \"Bronchitis: Care Instructions. \"  Current as of: May 12, 2017  Content Version: 11.4  © 5500-6027 [employer]. Care instructions adapted under license by [employer] (which disclaims liability or warranty for this information). If you have questions about a medical condition or this instruction, always ask your healthcare professional. [place] [address] any warranty or liability for your use of this information.

## 2018-03-29 NOTE — MR AVS SNAPSHOT
[address] [facility] [address] 
 
 
 [address] [place] 100[address] [facility] 
[phone] Patient: Lonny Hill MRN: [id number] :1935 Visit Information Date & Time Provider Department Dept. Phone Encounter #  
 3/29/2018 11:00 AM Mercedes Denton NP [facility] [address] [facility] [id number] Your Appointments 2018 11:40 AM  
ESTABLISHED PATIENT with Allison Hill MD  
[facility] ([address]) Appt Note: annual visit [address] [facility] 600 1007 [facility]  
[address] [facility] [address] [address] Upcoming Health Maintenance Date Due Pneumococcal 65+ Low/Medium Risk (2 of 2 - PPSV23) 2014 GLAUCOMA SCREENING Q2Y 2016 Influenza Age 5 to Adult 2017 MEDICARE YEARLY EXAM 2018 DTaP/Tdap/Td series (2 - Td) 2027 COLONOSCOPY 2028 Allergies as of 3/29/2018  Review Complete On: 3/29/2018 By: Mercedes Denton NP Severity Noted Reaction Type Reactions Ceclor [Cefaclor]  2009    Other (comments) Pain flank, abd  
 Cleocin [Clindamycin Hcl]  2009    Other (comments) Flank, abd pain Codeine  2009    Nausea and Vomiting Compazine [Prochlorperazine]  2009    Other (comments) Hyper and anxious Fosamax [Alendronate]  2009    Other (comments) Indigestion Levaquin [Levofloxacin]  08/10/2017    Other (comments) Hemorrhage in the eyes Macrobid [Nitrofurantoin Monohyd/m-cryst]  2012   Systemic Swelling, Other (comments)  
 wheezing Pcn [Penicillins]  2009    Rash Phenobarbital  2009    Other (comments)  
 anxious Sulfa (Sulfonamide Antibiotics)  2009    Rash Headache Current Immunizations  Reviewed on 2011 Name Date Influenza Vaccine (Whole Virus) 2013 Influenza Vaccine Split 11/17/2010  8:33 AM  
 Influenza Vaccine Whole 4/11/2010 PPD 3/23/2011 12:57 PM  
 ZZZ-RETIRED (DO NOT USE) Pneumococcal Vaccine (Unspecified Type) 5/20/2009 Not reviewed this visit You Were Diagnosed With   
  
 Codes Comments Bronchitis    -  Primary ICD-10-CM: D09 ICD-9-CM: 252 Mild intermittent reactive airway disease with acute exacerbation     ICD-10-CM: J45.21 ICD-9-CM: 378.25 Vitals BP Pulse Temp Resp Height(growth percentile) Weight(growth percentile) 139/69 (BP 1 Location: Left arm, BP Patient Position: Sitting) 79 98.3 °F (36.8 °C) (Oral) 12 5' 3\" (1.6 m) 164 lb (74.4 kg) SpO2 BMI OB Status Smoking Status 98% 29.05 kg/m2 Postmenopausal Former Smoker BMI and BSA Data Body Mass Index Body Surface Area 29.05 kg/m 2 1.82 m 2 Preferred Pharmacy Pharmacy Name Phone [facility][id number]- [address] [facility] [address], [address] [phone] Your Updated Medication List  
  
   
This list is accurate as of 3/29/18 11:41 AM.  Always use your most recent med list.  
  
  
  
  
 aspirin delayed-release 81 mg tablet Take 81 mg by mouth daily. atenolol 25 mg tablet Commonly known as:  TENORMIN  
TAKE 1 TAB BY MOUTH DAILY. azithromycin 250 mg tablet Commonly known as:  Fernanda Severance Take 1 Tab by mouth See Admin Instructions for 5 days. COPPER GLUCONATE PO Take  by mouth. diazePAM 5 mg tablet Commonly known as:  VALIUM Take 0.5 Tabs by mouth every six (6) hours as needed for Anxiety. EMERGEN-C PO Take  by mouth. hydroCHLOROthiazide 25 mg tablet Commonly known as:  HYDRODIURIL Take 0.5 Tabs by mouth daily. MIRALAX 17 gram/dose powder Generic drug:  polyethylene glycol Take 17 g by mouth daily. ondansetron 8 mg disintegrating tablet Commonly known as:  ZOFRAN ODT Take 1 Tab by mouth every eight (8) hours as needed for Nausea. PRESERVISION LUTEIN 226 mg-200 unit -5 mg-0.8 mg Cap Generic drug:  Vit C-Vit E-Copper-ZnOx-Lutein Take  by mouth. SENOKOT PO Take  by mouth. TYLENOL EXTRA STRENGTH 500 mg tablet Generic drug:  acetaminophen Take  by mouth every six (6) hours as needed for Pain. VITAMIN D3 1,000 unit Cap Generic drug:  cholecalciferol Take 2,000 Int'l Units by mouth daily. Prescriptions Sent to Pharmacy Refills  
 azithromycin (ZITHROMAX) 250 mg tablet 0 Sig: Take 1 Tab by mouth See Admin Instructions for 5 days. Class: Normal  
 Pharmacy: [address] [place] [address], [address] [place] [address] Road Ph #: [phone] Route: Oral  
  
Patient Instructions Reactive Airway Disease: Care Instructions Your Care Instructions Reactive airway disease is a breathing problem that appears as wheezing, a whistling noise in your airways. It may be caused by a viral or bacterial infection, allergies, tobacco smoke, or something else in the environment. When you are around these triggers, your body releases chemicals that make the airways get tight. Reactive airway disease is a lot like asthma. Both can cause wheezing. But asthma is ongoing, while reactive airway disease may occur only now and then. Tests can be done to tell whether you have asthma. You may take the same medicines used to treat asthma. Good home care and follow-up care with your doctor can help you recover. Follow-up care is a key part of your treatment and safety. Be sure to make and go to all appointments, and call your doctor if you are having problems. It's also a good idea to know your test results and keep a list of the medicines you take. How can you care for yourself at home? · Take your medicines exactly as prescribed. Call your doctor if you think you are having a problem with your medicine. · Do not smoke or allow others to smoke around you.  If you need help quitting, talk to your doctor about stop-smoking programs and medicines. These can increase your chances of quitting for good. · If you know what caused your wheezing (such as perfume or the odor of household chemicals), try to avoid it in the future. · Wash your hands several times a day, and consider using hand gels or wipes that contain alcohol. This can prevent colds and other infections. When should you call for help? Call 911 anytime you think you may need emergency care. For example, call if: 
? · You have severe trouble breathing. ? Watch closely for changes in your health, and be sure to contact your doctor if: 
? · You cough up yellow, dark brown, or bloody mucus. ? · You have a fever. ? · Your wheezing gets worse. Where can you learn more? Go to http://jonatan-elvis.info/. Enter M501 in the search box to learn more about \"Reactive Airway Disease: Care Instructions. \" Current as of: May 12, 2017 Content Version: 11.4 © 3994-0861 [employer]. Care instructions adapted under license by [employer] (which disclaims liability or warranty for this information). If you have questions about a medical condition or this instruction, always ask your healthcare professional. [place] [address] any warranty or liability for your use of this information. Bronchitis: Care Instructions Your Care Instructions Bronchitis is inflammation of the bronchial tubes, which carry air to the lungs. The tubes swell and produce mucus, or phlegm. The mucus and inflamed bronchial tubes make you cough. You may have trouble breathing. Most cases of bronchitis are caused by viruses like those that cause colds. Antibiotics usually do not help and they may be harmful. Bronchitis usually develops rapidly and lasts about 2 to 3 weeks in otherwise healthy people. Follow-up care is a key part of your treatment and safety.  Be sure to make and go to all appointments, and call your doctor if you are having problems. It's also a good idea to know your test results and keep a list of the medicines you take. How can you care for yourself at home? · Take all medicines exactly as prescribed. Call your doctor if you think you are having a problem with your medicine. · Get some extra rest. 
· Take an over-the-counter pain medicine, such as acetaminophen (Tylenol), ibuprofen (Advil, Motrin), or naproxen (Aleve) to reduce fever and relieve body aches. Read and follow all instructions on the label. · Do not take two or more pain medicines at the same time unless the doctor told you to. Many pain medicines have acetaminophen, which is Tylenol. Too much acetaminophen (Tylenol) can be harmful. · Take an over-the-counter cough medicine that contains dextromethorphan to help quiet a dry, hacking cough so that you can sleep. Avoid cough medicines that have more than one active ingredient. Read and follow all instructions on the label. · Breathe moist air from a humidifier, hot shower, or sink filled with hot water. The heat and moisture will thin mucus so you can cough it out. · Do not smoke. Smoking can make bronchitis worse. If you need help quitting, talk to your doctor about stop-smoking programs and medicines. These can increase your chances of quitting for good. When should you call for help? Call 911 anytime you think you may need emergency care. For example, call if: 
? · You have severe trouble breathing. ?Call your doctor now or seek immediate medical care if: 
? · You have new or worse trouble breathing. ? · You cough up dark brown or bloody mucus (sputum). ? · You have a new or higher fever. ? · You have a new rash. ? Watch closely for changes in your health, and be sure to contact your doctor if: 
? · You cough more deeply or more often, especially if you notice more mucus or a change in the color of your mucus. ? · You are not getting better as expected. Where can you learn more? Go to http://jonatan-elvis.info/. Enter H333 in the search box to learn more about \"Bronchitis: Care Instructions. \" Current as of: May 12, 2017 Content Version: 11.4 © 7494-7346 Healthwise, [employer]. Care instructions adapted under license by [employer] (which disclaims liability or warranty for this information). If you have questions about a medical condition or this instruction, always ask your healthcare professional. [place] [address] any warranty or liability for your use of this information. Introducing [facility] & HEALTH SERVICES! [address] introduces [employer] patient portal. Now you can access parts of your medical record, email your doctor's office, and request medication refills online. 1. In your internet browser, go to [email or web address] 2. Click on the First Time User? Click Here link in the Sign In box. You will see the New Member Sign Up page. 3. Enter your [employer] Access Code exactly as it appears below. You will not need to use this code after youve completed the sign-up process. If you do not sign up before the expiration date, you must request a new code. · [employer] Access Code: [facility] Expires: 6/27/2018 11:41 AM 
 
4. Enter the last four digits of your Social Security Number (xxxx) and Date of Birth (mm/dd/yyyy) as indicated and click Submit. You will be taken to the next sign-up page. 5. Create a [employer] ID. This will be your [employer] login ID and cannot be changed, so think of one that is secure and easy to remember. 6. Create a [employer] password. You can change your password at any time. 7. Enter your Password Reset Question and Answer. This can be used at a later time if you forget your password. 8. Enter your e-mail address. You will receive e-mail notification when new information is available in [address] 9. Click Sign Up. You can now view and download portions of your medical record. 10. Click the Download Summary menu link to download a portable copy of your medical information. If you have questions, please visit the Frequently Asked Questions section of the [employer] website. Remember, [employer] is NOT to be used for urgent needs. For medical emergencies, dial 911. Now available from your iPhone and Android! Please provide this summary of care documentation to your next provider. Your primary care clinician is listed as Monique Baptiste. If you have any questions after today's visit, please call [phone].

## 2018-03-30 LAB
BUN SERPL-MCNC: 18 MG/DL (ref 8–27)
BUN/CREAT SERPL: 24 (ref 12–28)
CALCIUM SERPL-MCNC: 10.6 MG/DL (ref 8.7–10.3)
CHLORIDE SERPL-SCNC: 101 MMOL/L (ref 96–106)
CO2 SERPL-SCNC: 22 MMOL/L (ref 18–29)
CREAT SERPL-MCNC: 0.75 MG/DL (ref 0.57–1)
GFR SERPLBLD CREATININE-BSD FMLA CKD-EPI: 75 ML/MIN/1.73
GFR SERPLBLD CREATININE-BSD FMLA CKD-EPI: 86 ML/MIN/1.73
GLUCOSE SERPL-MCNC: 95 MG/DL (ref 65–99)
POTASSIUM SERPL-SCNC: 5.1 MMOL/L (ref 3.5–5.2)
SODIUM SERPL-SCNC: 141 MMOL/L (ref 134–144)
T4 FREE SERPL-MCNC: 1.06 NG/DL (ref 0.82–1.77)
TSH SERPL DL<=0.005 MIU/L-ACNC: 2.12 UIU/ML (ref 0.45–4.5)

## 2018-04-04 ENCOUNTER — TELEPHONE (OUTPATIENT)
Dept: INTERNAL MEDICINE CLINIC | Age: 83
End: 2018-04-04

## 2018-04-04 NOTE — TELEPHONE ENCOUNTER
----- Message from ONEPLErietta Beero sent at 4/4/2018  2:33 PM EDT -----  Regarding: Bahman Crisostomo/Telephone  Pt is requesting a call with results from Thursday, March 29. Best contact number [phone].

## 2018-04-05 NOTE — TELEPHONE ENCOUNTER
I spoke with patient and informed her that NP mailed a letter with her lab results and recommendation on 04/03/18, I read pt the result letter. Pt verbalized understanding and was thankful for the call.

## 2018-05-22 ENCOUNTER — OFFICE VISIT (OUTPATIENT)
Dept: INTERNAL MEDICINE CLINIC | Age: 83
End: 2018-05-22

## 2018-05-22 VITALS
HEART RATE: 78 BPM | BODY MASS INDEX: 30.12 KG/M2 | RESPIRATION RATE: 14 BRPM | TEMPERATURE: 99 F | SYSTOLIC BLOOD PRESSURE: 135 MMHG | DIASTOLIC BLOOD PRESSURE: 75 MMHG | WEIGHT: 170 LBS | HEIGHT: 63 IN | OXYGEN SATURATION: 98 %

## 2018-05-22 DIAGNOSIS — H35.30 MACULAR DEGENERATION, UNSPECIFIED LATERALITY, UNSPECIFIED TYPE: ICD-10-CM

## 2018-05-22 DIAGNOSIS — E83.52 HYPERCALCEMIA: ICD-10-CM

## 2018-05-22 DIAGNOSIS — Z00.00 MEDICARE ANNUAL WELLNESS VISIT, SUBSEQUENT: Primary | ICD-10-CM

## 2018-05-22 DIAGNOSIS — I10 ESSENTIAL HYPERTENSION: ICD-10-CM

## 2018-05-22 DIAGNOSIS — F33.2 SEVERE EPISODE OF RECURRENT MAJOR DEPRESSIVE DISORDER, WITHOUT PSYCHOTIC FEATURES (HCC): ICD-10-CM
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
## 2018-05-22 NOTE — PATIENT INSTRUCTIONS
Medicare Wellness Visit, Female    The best way to live healthy is to have a lifestyle where you eat a well-balanced diet, exercise regularly, limit alcohol use, and quit all forms of tobacco/nicotine, if applicable. Regular preventive services are another way to keep healthy. Preventive services (vaccines, screening tests, monitoring & exams) can help personalize your care plan, which helps you manage your own care. Screening tests can find health problems at the earliest stages, when they are easiest to treat. [address] [facility] follows the current, evidence-based guidelines published by the [facility] ([facility]) when recommending preventive services for our patients. Because we follow these guidelines, sometimes recommendations change over time as research supports it. (For example, mammograms used to be recommended annually. Even though Medicare will still pay for an annual mammogram, the newer guidelines recommend a mammogram every two years for women of average risk.)    Of course, you and your provider may decide to screen more often for some diseases, based on your risk and co-morbidities (chronic disease you are already diagnosed with). Preventive services for you include:    - Medicare offers their members a free annual wellness visit, which is time for you and your primary care provider to discuss and plan for your preventive service needs. Take advantage of this benefit every year!    -All people over age 72 should receive the recommended pneumonia vaccines. Current USPSTF guidelines recommend a series of two vaccines for the best pneumonia protection.     -All adults should have a yearly flu vaccine and a tetanus vaccine every 10 years. All adults age 61 years should receive a shingles vaccine once in their lifetime.      -A bone mass density test is recommended when a woman turns 65 to screen for osteoporosis.  This test is only recommended once as a screening. Some providers will use this same test as a disease monitoring tool if you already have osteoporosis. -All adults age 38-68 years who are overweight should have a diabetes screening test once every three years.     -Other screening tests & preventive services for persons with diabetes include: an eye exam to screen for diabetic retinopathy, a kidney function test, a foot exam, and stricter control over your cholesterol.     -Cardiovascular screening for adults with routine risk involves an electrocardiogram (ECG) at intervals determined by the provider.     -Colorectal cancer screenings should be done for adults age 54-65 years with normal risk. There are a number of acceptable methods of screening for this type of cancer. Each test has its own benefits and drawbacks. Discuss with your provider what is most appropriate for you during your annual wellness visit. The different tests include: colonoscopy (considered the best screening method), a fecal occult blood test, a fecal DNA test, and sigmoidoscopy. -Breast cancer screenings are recommended every other year for women of normal risk age 54-69 years.     -Cervical cancer screenings for women over age 72 are only recommended with certain risk factors.     -All adults born between [facility] should be screened once for Hepatitis C.      Here is a list of your current Health Maintenance items (your personalized list of preventive services) with a due date:  Health Maintenance Due   Topic Date Due    Pneumococcal Vaccine (2 of 2 - PPSV23) 05/20/2014    Glaucoma Screening   04/01/2016    Annual Well Visit  05/17/2018

## 2018-05-22 NOTE — PROGRESS NOTES
HISTORY OF PRESENT ILLNESS  Charley Munoz is a 81 y.o. female. HPI  She is having issues with balance disorder and sees  once a year and he follows her for her meningioma; tremors and balance are getting worse- she is driving and getting injections in his eyes ( wet macular degeneration)  She is taking care of all the responsibilities in house - her mood is teary sometimes but she is doing ok   Subjective:   Charley Munoz is a 81 y.o. female with hypertension. Hypertension ROS: taking medications as instructed, no medication side effects noted, no TIA's, no chest pain on exertion, no dyspnea on exertion, no swelling of ankles. New concerns: has been seeing cardiologist .     She did have high calcium last time    They have a son who lives close to them    After her colonoscopy she had a hard time having bowel movements and got back to see NP and saw - had a lot of miralax and sennekot every day - she had to cut back as was making her too loose     Review of Systems   Constitutional: Negative for chills, diaphoresis, fever, malaise/fatigue and weight loss. HENT: Negative for congestion, ear pain, hearing loss, sore throat and tinnitus. Eyes: Negative for blurred vision and double vision. Respiratory: Negative for cough, hemoptysis, sputum production, shortness of breath and wheezing. Cardiovascular: Negative for chest pain, palpitations, orthopnea, claudication, leg swelling and PND. Gastrointestinal: Negative for abdominal pain, blood in stool, constipation, diarrhea, heartburn, nausea and vomiting. Genitourinary: Negative for dysuria, flank pain, frequency, hematuria and urgency. Musculoskeletal: Negative for back pain, joint pain, myalgias and neck pain. Skin: Negative. Neurological: Negative for dizziness, tingling, sensory change, speech change, focal weakness, seizures, loss of consciousness, weakness and headaches.    Endo/Heme/Allergies: Negative for environmental allergies and polydipsia. Does not bruise/bleed easily. Psychiatric/Behavioral: Negative for depression, memory loss, substance abuse and suicidal ideas. The patient is not nervous/anxious and does not have insomnia. Physical Exam   Constitutional: She is oriented to person, place, and time. She appears well-developed and well-nourished. HENT:   Head: Normocephalic and atraumatic. Right Ear: External ear normal.   Left Ear: External ear normal.   Nose: Nose normal.   Mouth/Throat: Oropharynx is clear and moist.   Eyes: Conjunctivae and EOM are normal. Pupils are equal, round, and reactive to light. Neck: Normal range of motion. Neck supple. Carotid bruit is not present. No thyromegaly present. Cardiovascular: Normal rate, regular rhythm, S1 normal, S2 normal, normal heart sounds and intact distal pulses. Pulmonary/Chest: Effort normal and breath sounds normal.   Abdominal: Soft. Normal appearance and bowel sounds are normal. There is no hepatosplenomegaly. There is no tenderness. Musculoskeletal: Normal range of motion. Neurological: She is alert and oriented to person, place, and time. Skin: Skin is warm, dry and intact. Psychiatric: She has a normal mood and affect. Her behavior is normal. Judgment and thought content normal.   Nursing note and vitals reviewed. ASSESSMENT and PLAN  Diagnoses and all orders for this visit:    1. Medicare annual wellness visit, subsequent    2. Essential hypertension- at goal stable  -     CBC W/O DIFF  -     LIPID PANEL    3. Severe episode of recurrent major depressive disorder, without psychotic features (HCC)-she is stable right now and doing well; had counseling for awhile   -     TSH 3RD GENERATION    4. Macular degeneration, unspecified laterality, unspecified type- per     5.  Hypercalcemia- we need to recheck this today  -     METABOLIC PANEL, COMPREHENSIVE  -     PTH INTACT      lab results and schedule of future lab studies reviewed with patient  reviewed diet, exercise and weight control  reviewed medications and side effects in detail

## 2018-05-22 NOTE — ACP (ADVANCE CARE PLANNING)
She brought in her AMD and her son will make medica decisions for her- paperwork reviewed and will be scanned in

## 2018-06-05 ENCOUNTER — TELEPHONE (OUTPATIENT)
Dept: INTERNAL MEDICINE CLINIC | Age: 83
End: 2018-06-05

## 2018-06-05 NOTE — TELEPHONE ENCOUNTER
Patient states she did get her labs done after her appt because she remembers the tech chu 4 vials of blood from her. I explained lab [facility] does not have any record of results from May. I apologized for the inconvenience & will place new orders at the front for  to complete fasting at her convenience. Patient voiced understanding.

## 2018-06-27 ENCOUNTER — HOSPITAL ENCOUNTER (OUTPATIENT)
Dept: LAB | Age: 83
Discharge: HOME | End: 2018-06-27
Payer: MEDICARE

## 2018-06-27 PROCEDURE — 80053 COMPREHEN METABOLIC PANEL: CPT

## 2018-06-27 PROCEDURE — 84443 ASSAY THYROID STIM HORMONE: CPT

## 2018-06-27 PROCEDURE — 36415 COLL VENOUS BLD VENIPUNCTURE: CPT

## 2018-06-27 PROCEDURE — 80061 LIPID PANEL: CPT

## 2018-06-27 PROCEDURE — 83970 ASSAY OF PARATHORMONE: CPT

## 2018-06-27 PROCEDURE — 85027 COMPLETE CBC AUTOMATED: CPT

## 2018-06-28 LAB
ALBUMIN SERPL-MCNC: 4.3 G/DL (ref 3.5–4.7)
ALBUMIN/GLOB SERPL: 1.7 {RATIO} (ref 1.2–2.2)
ALP SERPL-CCNC: 86 IU/L (ref 39–117)
ALT SERPL-CCNC: 35 IU/L (ref 0–32)
AST SERPL-CCNC: 27 IU/L (ref 0–40)
BILIRUB SERPL-MCNC: 0.5 MG/DL (ref 0–1.2)
BUN SERPL-MCNC: 17 MG/DL (ref 8–27)
BUN/CREAT SERPL: 23 (ref 12–28)
CALCIUM SERPL-MCNC: 10.3 MG/DL (ref 8.7–10.3)
CHLORIDE SERPL-SCNC: 101 MMOL/L (ref 96–106)
CHOLEST SERPL-MCNC: 207 MG/DL (ref 100–199)
CO2 SERPL-SCNC: 21 MMOL/L (ref 20–29)
CREAT SERPL-MCNC: 0.73 MG/DL (ref 0.57–1)
ERYTHROCYTE [DISTWIDTH] IN BLOOD BY AUTOMATED COUNT: 13.1 % (ref 12.3–15.4)
GLOBULIN SER CALC-MCNC: 2.6 G/DL (ref 1.5–4.5)
GLUCOSE SERPL-MCNC: 100 MG/DL (ref 65–99)
HCT VFR BLD AUTO: 43.6 % (ref 34–46.6)
HDLC SERPL-MCNC: 65 MG/DL
HGB BLD-MCNC: 14.3 G/DL (ref 11.1–15.9)
INTERPRETATION, 910389: NORMAL
LDLC SERPL CALC-MCNC: 112 MG/DL (ref 0–99)
MCH RBC QN AUTO: 30.5 PG (ref 26.6–33)
MCHC RBC AUTO-ENTMCNC: 32.8 G/DL (ref 31.5–35.7)
MCV RBC AUTO: 93 FL (ref 79–97)
PLATELET # BLD AUTO: 370 X10E3/UL (ref 150–379)
POTASSIUM SERPL-SCNC: 4.8 MMOL/L (ref 3.5–5.2)
PROT SERPL-MCNC: 6.9 G/DL (ref 6–8.5)
PTH-INTACT SERPL-MCNC: 69 PG/ML (ref 15–65)
RBC # BLD AUTO: 4.69 X10E6/UL (ref 3.77–5.28)
SODIUM SERPL-SCNC: 137 MMOL/L (ref 134–144)
TRIGL SERPL-MCNC: 149 MG/DL (ref 0–149)
TSH SERPL DL<=0.005 MIU/L-ACNC: 2.43 UIU/ML (ref 0.45–4.5)
VLDLC SERPL CALC-MCNC: 30 MG/DL (ref 5–40)
WBC # BLD AUTO: 6.7 X10E3/UL (ref 3.4–10.8)

## 2018-07-05 NOTE — TELEPHONE ENCOUNTER
Please call her labs were good but one of the hormones that is on the thyroid- called parathyroid hormone is working too hard. She needs to see an endocrinologist for this as when it starts over producing it can make the calcium higher.  She needs to at least get this evaluated by endocrine

## 2018-07-06 ENCOUNTER — TELEPHONE (OUTPATIENT)
Dept: INTERNAL MEDICINE CLINIC | Age: 83
End: 2018-07-06

## 2018-07-06 NOTE — TELEPHONE ENCOUNTER
----- Message from Shakira Snowden MD sent at 7/5/2018  6:39 AM EDT -----  Please call her labs were good but one of the hormones that is on the thyroid- called parathyroid hormone is working too hard. She needs to see an endocrinologist for this as when it starts over producing it can make the calcium higher.  She needs to at least get this evaluated by endocrine

## 2018-07-06 NOTE — TELEPHONE ENCOUNTER
Contacted pt and provided lab results. Obtained appt with Dr. Sophy Rushing, per pt request for August 12 at 1:40. Pt aware.

## 2018-07-12 ENCOUNTER — TELEPHONE (OUTPATIENT)
Dept: NEUROLOGY | Age: 83
End: 2018-07-12

## 2018-07-12 DIAGNOSIS — G25.0 ESSENTIAL TREMOR: Primary | ICD-10-CM

## 2018-07-12 RX ORDER — DIAZEPAM 2 MG/1
5 TABLET ORAL
Qty: 30 TAB | Refills: 1 | Status: SHIPPED | OUTPATIENT
Start: 2018-07-12 | End: 2019-02-05 | Stop reason: ALTCHOICE

## 2018-07-12 NOTE — TELEPHONE ENCOUNTER
Dr. Leo Si patient is requesting a refill on Diazepam. Patient has an appointment for August 6. Please advise.

## 2018-07-13 NOTE — TELEPHONE ENCOUNTER
Spoke with patient to let her known that her prescription for Diazepam has been sent to the pharmacy.

## 2018-07-19 ENCOUNTER — OFFICE VISIT (OUTPATIENT)
Dept: CARDIOLOGY CLINIC | Age: 83
End: 2018-07-19

## 2018-07-19 VITALS
BODY MASS INDEX: 30.37 KG/M2 | RESPIRATION RATE: 18 BRPM | WEIGHT: 171.4 LBS | DIASTOLIC BLOOD PRESSURE: 83 MMHG | HEIGHT: 63 IN | HEART RATE: 73 BPM | OXYGEN SATURATION: 99 % | SYSTOLIC BLOOD PRESSURE: 150 MMHG

## 2018-07-19 DIAGNOSIS — I10 ESSENTIAL HYPERTENSION: Primary | ICD-10-CM

## 2018-07-19 DIAGNOSIS — R00.2 PALPITATIONS: ICD-10-CM

## 2018-07-19 DIAGNOSIS — L74.9 SWEATING ABNORMALITY: ICD-10-CM

## 2018-07-19 NOTE — PROGRESS NOTES
All health maintenance and other pertinent information has been reviewed in preparation for today's office visit. Patient presents in the office today for:    Chief Complaint   Patient presents with    Follow-up     Essential hypertension     1. Have you been to the ER, urgent care clinic since your last visit? Hospitalized since your last visit? No    2. Have you seen or consulted any other health care providers outside of the [address] [place] [address] since your last visit? Include any pap smears or colon screening.  No    Visit Vitals    /83 (BP 1 Location: Right arm, BP Patient Position: Sitting)    Pulse 73    Resp 18    Ht 5' 3\" (1.6 m)    Wt 171 lb 6.4 oz (77.7 kg)    SpO2 99%    BMI 30.36 kg/m2

## 2018-07-19 NOTE — MR AVS SNAPSHOT
[address] [facility] [place] [address] 100[address] [facility] 
[phone] Patient: Hill Arellano MRN: [id number] :1935 Visit Information Date & Time Provider Department Dept. Phone Encounter #  
 2018  9:20 AM Jerson Valentine MD [facility] [phone] [id number] Your Appointments 2018 11:00 AM  
New Patient with Obdulio Richardson MD  
[facility] at [facility]) Appt Note: fu / annual check up/ balance issues/ mb; new pt, f/up annual check up/ balance issues,; r/s new pt, f/up annual check up/ balance issues [address], [address] [address]  
[address], [address] [place] [address] [address] [address]  
  
    
 8/10/2018 11:00 AM  
ECHO CARDIOGRAMS 2D with DESMOND BUNDY  
[facility] ([facility]) Appt Note: echo at 11am per dr Christine vanegas taylor 2 day s-card  ht 5'3 wt 170 24 hour holter at [address] [place] [address]  
[phone]  
  
   
 [address] [address]  
  
    
 8/10/2018 12:00 PM  
NUCLEAR MEDICINE with NUCLEARDESMOND  
[facility] ([facility]) Appt Note: echo at 11am per dr Christine Mack dx taylor 2 day s-card  ht 5'3 wt 170 24 hour holter at [place] [address] [place] [address] [facility] [address]  
[phone]  
  
   
 320 [facility] [place] [address] [address]  
  
    
 8/10/2018  2:30 PM  
PROCEDURE with HOLTER, STFRANCIS  
[facility] ([facility]) Appt Note: echo at 11am per dr Christine Mack dx taylor 2 day s-card  ht 5'3 wt 170 24 hour holter at [place] [address] [place] [address] [place] [address]  
[address] [place] [address] [address] 10/19/2018 10:20 AM  
ESTABLISHED PATIENT with Ulysses Chum, MD  
[facility] ([address]) Appt Note: 3 mo fu  
 [address] [facility] 600 [facility] [address]  
[phone]  
  
   
 700 [facility] 501 [facility] [address]  
  
    
 12/11/2018 11:40 AM  
ESTABLISHED PATIENT with Ulysses Chum, MD  
[facility] ([address]) Appt Note: annual visit [address] [facility] [address] [place] [address]  
[phone] Upcoming Health Maintenance Date Due  
 GLAUCOMA SCREENING Q2Y 4/1/2016 Influenza Age 5 to Adult 8/1/2018 MEDICARE YEARLY EXAM 5/23/2019 DTaP/Tdap/Td series (2 - Td) 5/16/2027 COLONOSCOPY 1/23/2028 Allergies as of 7/19/2018  Review Complete On: 7/19/2018 By: Ulysses Chum, MD  
  
 Severity Noted Reaction Type Reactions Ceclor [Cefaclor]  03/31/2009    Other (comments) Pain flank, abd  
 Cleocin [Clindamycin Hcl]  03/31/2009    Other (comments) Flank, abd pain Codeine  03/31/2009    Nausea and Vomiting Compazine [Prochlorperazine]  03/31/2009    Other (comments) Hyper and anxious Fosamax [Alendronate]  03/31/2009    Other (comments) Indigestion Levaquin [Levofloxacin]  08/10/2017    Other (comments) Hemorrhage in the eyes Macrobid [Nitrofurantoin Monohyd/m-cryst]  12/06/2012   Systemic Swelling, Other (comments)  
 wheezing Pcn [Penicillins]  03/31/2009    Rash Phenobarbital  03/31/2009    Other (comments)  
 anxious Sulfa (Sulfonamide Antibiotics)  03/31/2009    Rash Headache Current Immunizations  Reviewed on 4/11/2011 Name Date Influenza Vaccine (Whole Virus) 1/1/2013 Influenza Vaccine Split 11/17/2010  8:33 AM  
 Influenza Vaccine Whole 4/11/2010 PPD 3/23/2011 12:57 PM  
 Pneumococcal Conjugate (PCV-13) 5/22/2017 Pneumococcal Polysaccharide (PPSV-23) 5/20/2009 ZZZ-RETIRED (DO NOT USE) Pneumococcal Vaccine (Unspecified Type) 5/20/2009 Not reviewed this visit You Were Diagnosed With   
  
 Codes Comments Essential hypertension    -  Primary ICD-10-CM: I10 
ICD-9-CM: 401.9 Palpitations     ICD-10-CM: R00.2 ICD-9-CM: 785.1 Sweating abnormality     ICD-10-CM: L74.9 ICD-9-CM: 705.89 Vitals BP Pulse Resp Height(growth percentile) Weight(growth percentile) SpO2  
 150/83 (BP 1 Location: Right arm, BP Patient Position: Sitting) 73 18 5' 3\" (1.6 m) 171 lb 6.4 oz (77.7 kg) 99% BMI OB Status Smoking Status 30.36 kg/m2 Postmenopausal Former Smoker Vitals History BMI and BSA Data Body Mass Index Body Surface Area  
 30.36 kg/m 2 1.86 m 2 Preferred Pharmacy Pharmacy Name Phone [id number] [phone] [facility], [address] [phone] Your Updated Medication List  
  
   
This list is accurate as of 7/19/18 10:18 AM.  Always use your most recent med list.  
  
  
  
  
 aspirin delayed-release 81 mg tablet Take 81 mg by mouth daily. atenolol 25 mg tablet Commonly known as:  TENORMIN  
TAKE 1 TAB BY MOUTH DAILY. COPPER GLUCONATE PO Take  by mouth. diazePAM 2 mg tablet Commonly known as:  VALIUM Take 2.5 Tabs by mouth daily as needed for Anxiety. Indications: tremor EMERGEN-C PO Take  by mouth. hydroCHLOROthiazide 25 mg tablet Commonly known as:  HYDRODIURIL Take 0.5 Tabs by mouth daily. MIRALAX 17 gram/dose powder Generic drug:  polyethylene glycol Take 17 g by mouth daily. SENOKOT PO Take  by mouth. TYLENOL EXTRA STRENGTH 500 mg tablet Generic drug:  acetaminophen Take  by mouth every six (6) hours as needed for Pain. Introducing [facility]! Cece Olivera introduces [employer] patient portal. Now you can access parts of your medical record, email your doctor's office, and request medication refills online. 1. In your internet browser, go to [email or web address] 2. Click on the First Time User? Click Here link in the Sign In box. You will see the New Member Sign Up page. 3. Enter your Orbeus Access Code exactly as it appears below. You will not need to use this code after youve completed the sign-up process. If you do not sign up before the expiration date, you must request a new code. · Orbeus Access Code: [email or web address] Expires: 10/17/2018 10:18 AM 
 
4. Enter the last four digits of your Social Security Number (xxxx) and Date of Birth (mm/dd/yyyy) as indicated and click Submit. You will be taken to the next sign-up page. 5. Create a Comunitaet ID. This will be your Orbeus login ID and cannot be changed, so think of one that is secure and easy to remember. 6. Create a Orbeus password. You can change your password at any time. 7. Enter your Password Reset Question and Answer. This can be used at a later time if you forget your password. 8. Enter your e-mail address. You will receive e-mail notification when new information is available in [address] 9. Click Sign Up. You can now view and download portions of your medical record. 10. Click the Download Summary menu link to download a portable copy of your medical information. If you have questions, please visit the Frequently Asked Questions section of the Orbeus website. Remember, Orbeus is NOT to be used for urgent needs. For medical emergencies, dial 911. Now available from your iPhone and Android! Please provide this summary of care documentation to your next provider. Your primary care clinician is listed as Mayme Certain. If you have any questions after today's visit, please call [phone].
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
## 2018-07-20 ENCOUNTER — DOCUMENTATION ONLY (OUTPATIENT)
Dept: NEUROLOGY | Age: 83
End: 2018-07-20

## 2018-07-20 NOTE — PROGRESS NOTES
Faxed on 7/12/18 to [address] [employer] [phone] prescription for Valium 2 mg tablet.

## 2018-08-06 ENCOUNTER — OFFICE VISIT (OUTPATIENT)
Dept: NEUROLOGY | Age: 83
End: 2018-08-06

## 2018-08-06 VITALS
HEART RATE: 63 BPM | SYSTOLIC BLOOD PRESSURE: 132 MMHG | WEIGHT: 169 LBS | OXYGEN SATURATION: 96 % | BODY MASS INDEX: 29.95 KG/M2 | HEIGHT: 63 IN | DIASTOLIC BLOOD PRESSURE: 66 MMHG

## 2018-08-06 DIAGNOSIS — G25.0 BENIGN ESSENTIAL TREMOR: ICD-10-CM

## 2018-08-06 DIAGNOSIS — R26.81 GAIT INSTABILITY: Primary | ICD-10-CM

## 2018-08-06 DIAGNOSIS — F32.9 REACTIVE DEPRESSION: ICD-10-CM

## 2018-08-06 DIAGNOSIS — E61.0 COPPER DEFICIENCY: ICD-10-CM

## 2018-08-06 NOTE — MR AVS SNAPSHOT
[address] [place] [address], 
[id number], [address] [facility] 
[phone] Patient: Romayne Eng MRN: [id number] :1935 Visit Information Date & Time Provider Department Dept. Phone Encounter #  
 2018 11:00 AM Aniya Awan MD [facility] at [facility] [phone] [id number] Follow-up Instructions Return in about 1 year (around 2019). Your Appointments 2018 11:00 AM  
New Patient with Aniya Awan MD  
Neurology Clinic at [facility]) Appt Note: fu / annual check up/ balance issues/ mb; new pt, f/up annual check up/ balance issues,; r/s new pt, f/up annual check up/ balance issues 200 [facility], 
[address] [place] [address], [address] [facility]  
[phone]  
  
   
 [address] [place] [address], [address] [place] [address], [address] [place] [address].[place] [id number]  
  
    
 8/10/2018  8:30 AM  
NUCLEAR MEDICINE with NUCLEAR, STFRANCIS  
[facility] ([facility]) Appt Note: echo at 11am per dr Cinda Altman dx taylor 2 day s-card ht 5'3 wt 170 24 hour holter at 230 7.20.18 pt rs 8.10.18 7.26.18 spoke with pt to rs [address] [place] [address]  
[phone]  
  
   
 [address] [place] [address]  
  
    
 8/10/2018 11:00 AM  
ECHO CARDIOGRAMS 2D with ECHO, STFRANCIS  
[facility] Appt Note: echo at 11am per dr Cinda Altman dx taylor 2 day s-card ht 5'3 wt 170 24 hour holter at 230 7.20.18 pt rs 8.10.18; LVM informing pt of cx appt and requesting a return call to r/s Nuc, Echo & Holter appt. ..atb; echo at 11am per dr Cinda Altman dx taylor 2 day s-card ht 5'3 wt 170 24 hour holter at 230 7.20.18 pt rs 8.10.18 7..18 spoke with pt to rs 320 [facility] 235 1118 [facility]  
[phone] 619 [facility] [address] [address] [place]  
  
    
 8/10/2018 11:30 AM  
PROCEDURE with HOLTER, STFRANCIS  
[facility] ([address] [place] [address]) Appt Note: echo at 11am per dr Ruth Ambrocio dx taylor 2 day s-card ht 5'3 wt 170 24 hour holter at 230 7.20.18 pt rs 8.10.18 7.26.18 spoke with pt to rs [address] [address]  
[phone]  
  
   
 [address] [address]  
  
    
 10/19/2018 10:20 AM  
ESTABLISHED PATIENT with Milan Paz MD  
[facility] ([address] [place] [address]) Appt Note: 3 mo fu  
 [address] [address]  
[phone]  
  
   
 [address] [address]  
  
    
 12/11/2018 11:40 AM  
ESTABLISHED PATIENT with Milan Paz MD  
[facility] ([address] [place] [address]) Appt Note: annual visit 700 [facility] 600 1007 [facility]  
[phone]  
  
    
 8/5/2019 10:20 AM  
Follow Up with Leanne Olivo MD  
Neurology Clinic at [address]) Appt Note: f/u balance problems, jrb 8/6/18  
 [address] [place] [address], [address] [address]  
[address] [facility], [address] [place] [address], [address] [place] [address] [address] [address] Upcoming Health Maintenance Date Due  
 GLAUCOMA SCREENING Q2Y 4/1/2016 Influenza Age 5 to Adult 8/1/2018 MEDICARE YEARLY EXAM 5/23/2019 DTaP/Tdap/Td series (2 - Td) 5/16/2027 COLONOSCOPY 1/23/2028 Allergies as of 8/6/2018  Review Complete On: 8/6/2018 By: Tammy López Severity Noted Reaction Type Reactions Ceclor [Cefaclor]  03/31/2009    Other (comments) Pain flank, abd  
 Cleocin [Clindamycin Hcl]  03/31/2009    Other (comments) Flank, abd pain Codeine  03/31/2009    Nausea and Vomiting Compazine [Prochlorperazine]  03/31/2009    Other (comments) Hyper and anxious Fosamax [Alendronate]  03/31/2009    Other (comments) Indigestion Levaquin [Levofloxacin]  08/10/2017    Other (comments) Hemorrhage in the eyes Macrobid [Nitrofurantoin Monohyd/m-cryst]  12/06/2012   Systemic Swelling, Other (comments)  
 wheezing Pcn [Penicillins]  03/31/2009    Rash Phenobarbital  03/31/2009    Other (comments)  
 anxious Sulfa (Sulfonamide Antibiotics)  03/31/2009    Rash Headache Current Immunizations  Reviewed on 4/11/2011 Name Date Influenza Vaccine (Whole Virus) 1/1/2013 Influenza Vaccine Split 11/17/2010  8:33 AM  
 Influenza Vaccine Whole 4/11/2010 PPD 3/23/2011 12:57 PM  
 Pneumococcal Conjugate (PCV-13) 5/22/2017 Pneumococcal Polysaccharide (PPSV-23) 5/20/2009 ZZZ-RETIRED (DO NOT USE) Pneumococcal Vaccine (Unspecified Type) 5/20/2009 Not reviewed this visit You Were Diagnosed With   
  
 Codes Comments Gait instability    -  Primary ICD-10-CM: R26.81 
ICD-9-CM: 781. 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  
 132/66 63 5' 3\" (1.6 m) 169 lb (76.7 kg) 96% 29.94 kg/m2 OB Status Smoking Status Postmenopausal Former Smoker BMI and BSA Data Body Mass Index Body Surface Area  
 29.94 kg/m 2 1.85 m 2 Preferred Pharmacy Pharmacy Name Phone [id number] [address], [address] [phone] Your Updated Medication List  
  
   
This list is accurate as of 8/6/18 10:48 AM.  Always use your most recent med list.  
  
  
  
  
 aspirin delayed-release 81 mg tablet Take 81 mg by mouth daily. atenolol 25 mg tablet Commonly known as:  TENORMIN  
 TAKE 1 TAB BY MOUTH DAILY. COPPER GLUCONATE PO Take  by mouth. diazePAM 2 mg tablet Commonly known as:  VALIUM Take 2.5 Tabs by mouth daily as needed for Anxiety. Indications: tremor EMERGEN-C PO Take  by mouth. hydroCHLOROthiazide 25 mg tablet Commonly known as:  HYDRODIURIL Take 0.5 Tabs by mouth daily. MIRALAX 17 gram/dose powder Generic drug:  polyethylene glycol Take 17 g by mouth daily. SENOKOT PO Take  by mouth. 1-2 as needed TYLENOL EXTRA STRENGTH 500 mg tablet Generic drug:  acetaminophen Take  by mouth every six (6) hours as needed for Pain. Follow-up Instructions Return in about 1 year (around 8/6/2019). Patient Instructions Office Policies 
 
o Phone calls/patient messages: Please allow up to 24 hours for someone in the office to contact you about your call or message. Be mindful your provider may be out of the office or your message may require further review. We encourage you to use [employer] for your messages as this is a faster, more efficient way to communicate with our office 
 
o Medication Refills: 
Prescription medications require up to 48 business hours to process. We encourage you to use [employer] for your refills. For controlled medications: Please allow up to 72 business hours to process. Certain medications may require you to  a written prescription at our office. NO narcotic/controlled medications will be prescribed after 4pm Monday through Friday or on weekends 
 
o Form/Paperwork Completion: 
Please note there is a $25 fee for all paperwork completed by our providers. We ask that you allow 7-14 business days. Pre-payment is due prior to picking up/faxing the completed form. You may also download your forms to [employer] to have your doctor print off. A Healthy Lifestyle: Care Instructions Your Care Instructions A healthy lifestyle can help you feel good, stay at a healthy weight, and have plenty of energy for both work and play. A healthy lifestyle is something you can share with your whole family. A healthy lifestyle also can lower your risk for serious health problems, such as high blood pressure, heart disease, and diabetes. You can follow a few steps listed below to improve your health and the health of your family. Follow-up care is a key part of your treatment and safety. Be sure to make and go to all appointments, and call your doctor if you are having problems. It's also a good idea to know your test results and keep a list of the medicines you take. How can you care for yourself at home? · Do not eat too much sugar, fat, or fast foods. You can still have dessert and treats now and then. The goal is moderation. · Start small to improve your eating habits. Pay attention to portion sizes, drink less juice and soda pop, and eat more fruits and vegetables. ¨ Eat a healthy amount of food. A 3-ounce serving of meat, for example, is about the size of a deck of cards. Fill the rest of your plate with vegetables and whole grains. ¨ Limit the amount of soda and sports drinks you have every day. Drink more water when you are thirsty. ¨ Eat at least 5 servings of fruits and vegetables every day. It may seem like a lot, but it is not hard to reach this goal. A serving or helping is 1 piece of fruit, 1 cup of vegetables, or 2 cups of leafy, raw vegetables. Have an apple or some carrot sticks as an afternoon snack instead of a candy bar. Try to have fruits and/or vegetables at every meal. 
· Make exercise part of your daily routine. You may want to start with simple activities, such as walking, bicycling, or slow swimming. Try to be active 30 to 60 minutes every day. You do not need to do all 30 to 60 minutes all at once.  For example, you can exercise 3 times a day for 10 or 20 minutes. Moderate exercise is safe for most people, but it is always a good idea to talk to your doctor before starting an exercise program. 
· Keep moving. [place] the lawn, work in the garden, or [place]. Take the stairs instead of the elevator at work. · If you smoke, quit. People who smoke have an increased risk for heart attack, stroke, cancer, and other lung illnesses. Quitting is hard, but there are ways to boost your chance of quitting tobacco for good. ¨ Use nicotine gum, patches, or lozenges. ¨ Ask your doctor about stop-smoking programs and medicines. ¨ Keep trying. In addition to reducing your risk of diseases in the future, you will notice some benefits soon after you stop using tobacco. If you have shortness of breath or asthma symptoms, they will likely get better within a few weeks after you quit. · Limit how much alcohol you drink. Moderate amounts of alcohol (up to 2 drinks a day for men, 1 drink a day for women) are okay. But drinking too much can lead to liver problems, high blood pressure, and other health problems. Family health If you have a family, there are many things you can do together to improve your health. · Eat meals together as a family as often as possible. · Eat healthy foods. This includes fruits, vegetables, lean meats and dairy, and whole grains. · Include your family in your fitness plan. Most people think of activities such as jogging or tennis as the way to fitness, but there are many ways you and your family can be more active. Anything that makes you breathe hard and gets your heart pumping is exercise. Here are some tips: 
¨ Walk to do errands or to take your child to school or the bus. ¨ Go for a family bike ride after dinner instead of watching TV. Where can you learn more? Go to http://jonatan-elvis.info/. Enter N579 in the search box to learn more about \"A Healthy Lifestyle: Care Instructions. \" Current as of: December 7, 2017 Content Version: 11.7 © 7551-9801 [employer]. Care instructions adapted under license by [employer] (which disclaims liability or warranty for this information). If you have questions about a medical condition or this instruction, always ask your healthcare professional. [address] any warranty or liability for your use of this information. Recovering From Depression: Care Instructions Your Care Instructions Taking good care of yourself is important as you recover from depression. In time, your symptoms will fade as your treatment takes hold. Do not give up. Instead, focus your energy on getting better. Your mood will improve. It just takes some time. Focus on things that can help you feel better, such as being with friends and family, eating well, and getting enough rest. But take things slowly. Do not do too much too soon. You will begin to feel better gradually. Follow-up care is a key part of your treatment and safety. Be sure to make and go to all appointments, and call your doctor if you are having problems. It's also a good idea to know your test results and keep a list of the medicines you take. How can you care for yourself at home? Be realistic · If you have a large task to do, break it up into smaller steps you can handle, and just do what you can. · You may want to put off important decisions until your depression has lifted. If you have plans that will have a major impact on your life, such as marriage, divorce, or a job change, try to wait a bit. Talk it over with friends and loved ones who can help you look at the overall picture first. 
· Reaching out to people for help is important. Do not isolate yourself. Let your family and friends help you. Find someone you can trust and confide in, and talk to that person. · Be patient, and be kind to yourself.  Remember that depression is not your fault and is not something you can overcome with willpower alone. Treatment is necessary for depression, just like for any other illness. Feeling better takes time, and your mood will improve little by little. Stay active · Stay busy and get outside. Take a walk, or try some other light exercise. · Talk with your doctor about an exercise program. Exercise can help with mild depression. · Go to a movie or concert. Take part in a Gnosticist activity or other social gathering. Go to a ball game. · Ask a friend to have dinner with you. Take care of yourself · Eat a balanced diet with plenty of fresh fruits and vegetables, whole grains, and lean protein. If you have lost your appetite, eat small snacks rather than large meals. · Avoid drinking alcohol or using illegal drugs. Do not take medicines that have not been prescribed for you. They may interfere with medicines you may be taking for depression, or they may make your depression worse. · Take your medicines exactly as they are prescribed. You may start to feel better within 1 to 3 weeks of taking antidepressant medicine. But it can take as many as 6 to 8 weeks to see more improvement. If you have questions or concerns about your medicines, or if you do not notice any improvement by 3 weeks, talk to your doctor. · If you have any side effects from your medicine, tell your doctor. Antidepressants can make you feel tired, dizzy, or nervous. Some people have dry mouth, constipation, headaches, sexual problems, or diarrhea. Many of these side effects are mild and will go away on their own after you have been taking the medicine for a few weeks. Some may last longer. Talk to your doctor if side effects are bothering you too much. You might be able to try a different medicine. · Get enough sleep. If you have problems sleeping: ¨ Go to bed at the same time every night, and get up at the same time every morning. ¨ Keep your bedroom dark and quiet. ¨ Do not exercise after 5:00 p.m. ¨ Avoid drinks with caffeine after 5:00 p.m. · Avoid sleeping pills unless they are prescribed by the doctor treating your depression. Sleeping pills may make you groggy during the day, and they may interact with other medicine you are taking. · If you have any other illnesses, such as diabetes, heart disease, or high blood pressure, make sure to continue with your treatment. Tell your doctor about all of the medicines you take, including those with or without a prescription. · Keep the numbers for these national suicide hotlines: 4-114-269-TALK (4-720.561.9079) and 6-388-BZELXOV (5-311.689.8709). If you or someone you know talks about suicide or feeling hopeless, get help right away. When should you call for help? Call 911 anytime you think you may need emergency care. For example, call if: 
  · You feel like hurting yourself or someone else.  
  · Someone you know has depression and is about to attempt or is attempting suicide.  
[facility] your doctor now or seek immediate medical care if: 
  · You hear voices.  
  · Someone you know has depression and: 
¨ Starts to give away his or her possessions. ¨ Uses illegal drugs or drinks alcohol heavily. ¨ Talks or writes about death, including writing suicide notes or talking about guns, knives, or pills. ¨ Starts to spend a lot of time alone. ¨ Acts very aggressively or suddenly appears calm.  
 Watch closely for changes in your health, and be sure to contact your doctor if: 
  · You do not get better as expected. Where can you learn more? Go to http://jonatan-elvis.info/. Enter B686 in the search box to learn more about \"Recovering From Depression: Care Instructions. \" Current as of: December 7, 2017 Content Version: 11.7 © 1838-8134 [employer].  Care instructions adapted under license by [employer] (which disclaims liability or warranty for this information). If you have questions about a medical condition or this instruction, always ask your healthcare professional. [address] any warranty or liability for your use of this information. Benign Essential Tremor: Care Instructions Your Care Instructions Benign essential tremor is a medical term for shaking that you can't control. Your hand or fingers may shake when you lift a cup or point at something. Or your voice may shake when you speak. This type of tremor is not harmful. It is not caused by a stroke or Parkinson's disease. Some things can affect how much you shake. For example, drinking or eating something with caffeine may make tremors worse for a while. Some medicines also can increase tremors. These include antidepressants and too much thyroid replacement. Talk to your doctor if you think one of your medicines makes your tremors worse. If you are self-conscious about your tremors, there are some things you can do to reduce them or make them less noticeable. This includes taking medicine. Follow-up care is a key part of your treatment and safety. Be sure to make and go to all appointments, and call your doctor if you are having problems. It's also a good idea to know your test results and keep a list of the medicines you take. How can you care for yourself at home? · Take your medicines exactly as prescribed. Call your doctor if you think you are having a problem with your medicine. Some medicines that help control tremors have to be taken every day, even if you are not having tremors. You will get more details on the specific medicines your doctor prescribes. · Get plenty of rest. 
· Eat a balanced, healthy diet. · Try to reduce stress. Regular exercise and massages may help. · Limit alcohol. Heavy drinking can make your tremors worse. · Avoid drinks or foods with caffeine if they make your tremors worse. These include tea, cola, coffee, and chocolate. · Wear a heavy bracelet or watch. This adds a little weight to your hand. The extra weight may reduce tremors. · Drink from cups or glasses that are only half full. You may also want to try drinking with a straw. When should you call for help? Watch closely for changes in your health, and be sure to contact your doctor if: 
  · You notice your tremors are getting worse.  
  · You can't do your everyday activities because of your tremors.  
  · You are sad and embarrassed about your shaking. Where can you learn more? Go to http://jonatan-elvis.info/. Enter B746 in the search box to learn more about \"Benign Essential Tremor: Care Instructions. \" Current as of: October 9, 2017 Content Version: 11.7 © 0394-2777 [employer]. Care instructions adapted under license by [employer] (which disclaims liability or warranty for this information). If you have questions about a medical condition or this instruction, always ask your healthcare professional. [place] [address] any warranty or liability for your use of this information. Preventing Falls: Care Instructions Your Care Instructions Getting around your home safely can be a challenge if you have injuries or health problems that make it easy for you to fall. Loose rugs and furniture in walkways are among the dangers for many older people who have problems walking or who have poor eyesight. People who have conditions such as arthritis, osteoporosis, or dementia also have to be careful not to fall. You can make your home safer with a few simple measures. Follow-up care is a key part of your treatment and safety. Be sure to make and go to all appointments, and call your doctor if you are having problems. It's also a good idea to know your test results and keep a list of the medicines you take. How can you care for yourself at home? Taking care of yourself · You may get dizzy if you do not drink enough water. To prevent dehydration, drink plenty of fluids, enough so that your urine is light yellow or clear like water. Choose water and other caffeine-free clear liquids. If you have kidney, heart, or liver disease and have to limit fluids, talk with your doctor before you increase the amount of fluids you drink. · Exercise regularly to improve your strength, muscle tone, and balance. Walk if you can. Swimming may be a good choice if you cannot walk easily. · Have your vision and hearing checked each year or any time you notice a change. If you have trouble seeing and hearing, you might not be able to avoid objects and could lose your balance. · Know the side effects of the medicines you take. Ask your doctor or pharmacist whether the medicines you take can affect your balance. Sleeping pills or sedatives can affect your balance. · Limit the amount of alcohol you drink. Alcohol can impair your balance and other senses. · Ask your doctor whether calluses or corns on your feet need to be removed. If you wear loose-fitting shoes because of calluses or corns, you can lose your balance and fall. · Talk to your doctor if you have numbness in your feet. Preventing falls at home · Remove raised doorway thresholds, throw rugs, and clutter. Repair loose carpet or raised areas in the floor. · Move furniture and electrical cords to keep them out of walking paths. · Use nonskid floor wax, and wipe up spills right away, especially on ceramic tile floors. · If you use a walker or cane, put rubber tips on it. If you use crutches, clean the bottoms of them regularly with an abrasive pad, such as steel wool. · Keep your house well lit, especially [place], and outside walkways. Use night-lights in areas such as hallways and bathrooms.  Add extra light switches or use remote switches (such as switches that go on or off when you clap your hands) to make it easier to turn lights on if you have to get up during the night. · Install sturdy handrails on stairways. · Move items in your cabinets so that the things you use a lot are on the lower shelves (about waist level). · Keep a cordless phone and a flashlight with new batteries by your bed. If possible, put a phone in each of the main rooms of your house, or carry a cell phone in case you fall and cannot reach a phone. Or, you can wear a device around your neck or wrist. You push a button that sends a signal for help. · Wear low-heeled shoes that fit well and give your feet good support. Use footwear with nonskid soles. Check the heels and soles of your shoes for wear. Repair or replace worn heels or soles. · Do not wear socks without shoes on wood floors. · Walk on the grass when the sidewalks are slippery. If you live in an area that gets snow and ice in the winter, sprinkle salt on slippery steps and sidewalks. Preventing falls in the bath · Install grab bars and nonskid mats inside and outside your shower or tub and near the toilet and sinks. · Use shower chairs and bath benches. · Use a hand-held shower head that will allow you to sit while showering. · Get into a tub or shower by putting the weaker leg in first. Get out of a tub or shower with your strong side first. 
· Repair loose toilet seats and consider installing a raised toilet seat to make getting on and off the toilet easier. · Keep your bathroom door unlocked while you are in the shower. Where can you learn more? Go to [email or web address] Enter [phone] [id number] [phone] in the search box to learn more about \"Preventing Falls: Care Instructions. \" Current as of: May 12, 2017 Content Version: 11.7 © 8833-8055 [employer].  Care instructions adapted under license by [employer] (which disclaims liability or warranty for this information). If you have questions about a medical condition or this instruction, always ask your healthcare professional. [address] any warranty or liability for your use of this information. Introducing [facility] SERVICES! [employer] introduces [employer]ital[employer] patient portal. Now you can access parts of your medical record, email your doctor's office, and request medication refills online. 1. In your internet browser, go to [email or web address] 2. Click on the First Time User? Click Here link in the Sign In box. You will see the New Member Sign Up page. 3. Enter your VitalFields Access Code exactly as it appears below. You will not need to use this code after youve completed the sign-up process. If you do not sign up before the expiration date, you must request a new code. · Vital[employer] Access Code: [email or web address] Expires: 10/17/2018 10:18 AM 
 
4. Enter the last four digits of your Social Security Number (xxxx) and Date of Birth (mm/dd/yyyy) as indicated and click Submit. You will be taken to the next sign-up page. 5. Create a VitalFields ID. This will be your VitalFields login ID and cannot be changed, so think of one that is secure and easy to remember. 6. Create a VitalFields password. You can change your password at any time. 7. Enter your Password Reset Question and Answer. This can be used at a later time if you forget your password. 8. Enter your e-mail address. You will receive e-mail notification when new information is available in [address] 9. Click Sign Up. You can now view and download portions of your medical record. 10. Click the Download Summary menu link to download a portable copy of your medical information. If you have questions, please visit the Frequently Asked Questions section of the VitalFields website. Remember, [employer] is NOT to be used for urgent needs. For medical emergencies, dial 911. Now available from your iPhone and Android! Please provide this summary of care documentation to your next provider. Your primary care clinician is listed as Sean De Santiago. If you have any questions after today's visit, please call [phone].
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
## 2018-08-06 NOTE — PATIENT INSTRUCTIONS
Office Policies    o Phone calls/patient messages:  Please allow up to 24 hours for someone in the office to contact you about your call or message. Be mindful your provider may be out of the office or your message may require further review. We encourage you to use [employer] for your messages as this is a faster, more efficient way to communicate with our office    o Medication Refills:  Prescription medications require up to 48 business hours to process. We encourage you to use [employer] for your refills. For controlled medications: Please allow up to 72 business hours to process. Certain medications may require you to  a written prescription at our office. NO narcotic/controlled medications will be prescribed after 4pm Monday through Friday or on weekends    o Form/Paperwork Completion:  Please note there is a $25 fee for all paperwork completed by our providers. We ask that you allow 7-14 business days. Pre-payment is due prior to picking up/faxing the completed form. You may also download your forms to [employer] to have your doctor print off. A Healthy Lifestyle: Care Instructions  Your Care Instructions    A healthy lifestyle can help you feel good, stay at a healthy weight, and have plenty of energy for both work and play. A healthy lifestyle is something you can share with your whole family. A healthy lifestyle also can lower your risk for serious health problems, such as high blood pressure, heart disease, and diabetes. You can follow a few steps listed below to improve your health and the health of your family. Follow-up care is a key part of your treatment and safety. Be sure to make and go to all appointments, and call your doctor if you are having problems. It's also a good idea to know your test results and keep a list of the medicines you take. How can you care for yourself at home? · Do not eat too much sugar, fat, or fast foods. You can still have dessert and treats now and then. The goal is moderation. · Start small to improve your eating habits. Pay attention to portion sizes, drink less juice and soda pop, and eat more fruits and vegetables. ¨ Eat a healthy amount of food. A 3-ounce serving of meat, for example, is about the size of a deck of cards. Fill the rest of your plate with vegetables and whole grains. ¨ Limit the amount of soda and sports drinks you have every day. Drink more water when you are thirsty. ¨ Eat at least 5 servings of fruits and vegetables every day. It may seem like a lot, but it is not hard to reach this goal. A serving or helping is 1 piece of fruit, 1 cup of vegetables, or 2 cups of leafy, raw vegetables. Have an apple or some carrot sticks as an afternoon snack instead of a candy bar. Try to have fruits and/or vegetables at every meal.  · Make exercise part of your daily routine. You may want to start with simple activities, such as walking, bicycling, or slow swimming. Try to be active 30 to 60 minutes every day. You do not need to do all 30 to 60 minutes all at once. For example, you can exercise 3 times a day for 10 or 20 minutes. Moderate exercise is safe for most people, but it is always a good idea to talk to your doctor before starting an exercise program.  · Keep moving. [place] the lawn, work in the garden, or [employer]. Take the stairs instead of the elevator at work. · If you smoke, quit. People who smoke have an increased risk for heart attack, stroke, cancer, and other lung illnesses. Quitting is hard, but there are ways to boost your chance of quitting tobacco for good. ¨ Use nicotine gum, patches, or lozenges. ¨ Ask your doctor about stop-smoking programs and medicines. ¨ Keep trying. In addition to reducing your risk of diseases in the future, you will notice some benefits soon after you stop using tobacco. If you have shortness of breath or asthma symptoms, they will likely get better within a few weeks after you quit.   · Limit how much alcohol you drink. Moderate amounts of alcohol (up to 2 drinks a day for men, 1 drink a day for women) are okay. But drinking too much can lead to liver problems, high blood pressure, and other health problems. Family health  If you have a family, there are many things you can do together to improve your health. · Eat meals together as a family as often as possible. · Eat healthy foods. This includes fruits, vegetables, lean meats and dairy, and whole grains. · Include your family in your fitness plan. Most people think of activities such as jogging or tennis as the way to fitness, but there are many ways you and your family can be more active. Anything that makes you breathe hard and gets your heart pumping is exercise. Here are some tips:  ¨ Walk to do errands or to take your child to school or the bus. ¨ Go for a family bike ride after dinner instead of watching TV. Where can you learn more? Go to http://jonatanSmartStartelvis.info/. Enter S376 in the search box to learn more about \"A Healthy Lifestyle: Care Instructions. \"  Current as of: December 7, 2017  Content Version: 11.7  © 7937-9408 [employer]. Care instructions adapted under license by [employer] (which disclaims liability or warranty for this information). If you have questions about a medical condition or this instruction, always ask your healthcare professional. [address] any warranty or liability for your use of this information. Recovering From Depression: Care Instructions  Your Care Instructions    Taking good care of yourself is important as you recover from depression. In time, your symptoms will fade as your treatment takes hold. Do not give up. Instead, focus your energy on getting better. Your mood will improve. It just takes some time.  Focus on things that can help you feel better, such as being with friends and family, eating well, and getting enough rest. But take things slowly. Do not do too much too soon. You will begin to feel better gradually. Follow-up care is a key part of your treatment and safety. Be sure to make and go to all appointments, and call your doctor if you are having problems. It's also a good idea to know your test results and keep a list of the medicines you take. How can you care for yourself at home? Be realistic  · If you have a large task to do, break it up into smaller steps you can handle, and just do what you can. · You may want to put off important decisions until your depression has lifted. If you have plans that will have a major impact on your life, such as marriage, divorce, or a job change, try to wait a bit. Talk it over with friends and loved ones who can help you look at the overall picture first.  · Reaching out to people for help is important. Do not isolate yourself. Let your family and friends help you. Find someone you can trust and confide in, and talk to that person. · Be patient, and be kind to yourself. Remember that depression is not your fault and is not something you can overcome with willpower alone. Treatment is necessary for depression, just like for any other illness. Feeling better takes time, and your mood will improve little by little. Stay active  · Stay busy and get outside. Take a walk, or try some other light exercise. · Talk with your doctor about an exercise program. Exercise can help with mild depression. · Go to a movie or concert. Take part in a Jewish activity or other social gathering. Go to a ball game. · Ask a friend to have dinner with you. Take care of yourself  · Eat a balanced diet with plenty of fresh fruits and vegetables, whole grains, and lean protein. If you have lost your appetite, eat small snacks rather than large meals. · Avoid drinking alcohol or using illegal drugs. Do not take medicines that have not been prescribed for you.  They may interfere with medicines you may be taking for depression, or they may make your depression worse. · Take your medicines exactly as they are prescribed. You may start to feel better within 1 to 3 weeks of taking antidepressant medicine. But it can take as many as 6 to 8 weeks to see more improvement. If you have questions or concerns about your medicines, or if you do not notice any improvement by 3 weeks, talk to your doctor. · If you have any side effects from your medicine, tell your doctor. Antidepressants can make you feel tired, dizzy, or nervous. Some people have dry mouth, constipation, headaches, sexual problems, or diarrhea. Many of these side effects are mild and will go away on their own after you have been taking the medicine for a few weeks. Some may last longer. Talk to your doctor if side effects are bothering you too much. You might be able to try a different medicine. · Get enough sleep. If you have problems sleeping:  ¨ Go to bed at the same time every night, and get up at the same time every morning. ¨ Keep your bedroom dark and quiet. ¨ Do not exercise after 5:00 p.m. ¨ Avoid drinks with caffeine after 5:00 p.m. · Avoid sleeping pills unless they are prescribed by the doctor treating your depression. Sleeping pills may make you groggy during the day, and they may interact with other medicine you are taking. · If you have any other illnesses, such as diabetes, heart disease, or high blood pressure, make sure to continue with your treatment. Tell your doctor about all of the medicines you take, including those with or without a prescription. · Keep the numbers for these national suicide hotlines: 9-453-306-TALK (2-952.609.4777) and 2-120-LSPDJFX (6-126.817.2386). If you or someone you know talks about suicide or feeling hopeless, get help right away. When should you call for help? Call 911 anytime you think you may need emergency care.  For example, call if:    · You feel like hurting yourself or someone else.     · Someone you know has depression and is about to attempt or is attempting suicide.   [facility] your doctor now or seek immediate medical care if:    · You hear voices.     · Someone you know has depression and:  ¨ Starts to give away his or her possessions. ¨ Uses illegal drugs or drinks alcohol heavily. ¨ Talks or writes about death, including writing suicide notes or talking about guns, knives, or pills. ¨ Starts to spend a lot of time alone. ¨ Acts very aggressively or suddenly appears calm.    Watch closely for changes in your health, and be sure to contact your doctor if:    · You do not get better as expected. Where can you learn more? Go to http://jonatan-elvis.info/. Enter W245 in the search box to learn more about \"Recovering From Depression: Care Instructions. \"  Current as of: December 7, 2017  Content Version: 11.7  © 0716-8834 [employer]. Care instructions adapted under license by [employer] (which disclaims liability or warranty for this information). If you have questions about a medical condition or this instruction, always ask your healthcare professional. [place] [address] any warranty or liability for your use of this information. Benign Essential Tremor: Care Instructions  Your Care Instructions    Benign essential tremor is a medical term for shaking that you can't control. Your hand or fingers may shake when you lift a cup or point at something. Or your voice may shake when you speak. This type of tremor is not harmful. It is not caused by a stroke or Parkinson's disease. Some things can affect how much you shake. For example, drinking or eating something with caffeine may make tremors worse for a while. Some medicines also can increase tremors. These include antidepressants and too much thyroid replacement. Talk to your doctor if you think one of your medicines makes your tremors worse.   If you are self-conscious about your tremors, there are some things you can do to reduce them or make them less noticeable. This includes taking medicine. Follow-up care is a key part of your treatment and safety. Be sure to make and go to all appointments, and call your doctor if you are having problems. It's also a good idea to know your test results and keep a list of the medicines you take. How can you care for yourself at home? · Take your medicines exactly as prescribed. Call your doctor if you think you are having a problem with your medicine. Some medicines that help control tremors have to be taken every day, even if you are not having tremors. You will get more details on the specific medicines your doctor prescribes. · Get plenty of rest.  · Eat a balanced, healthy diet. · Try to reduce stress. Regular exercise and massages may help. · Limit alcohol. Heavy drinking can make your tremors worse. · Avoid drinks or foods with caffeine if they make your tremors worse. These include tea, cola, coffee, and chocolate. · Wear a heavy bracelet or watch. This adds a little weight to your hand. The extra weight may reduce tremors. · Drink from cups or glasses that are only half full. You may also want to try drinking with a straw. When should you call for help? Watch closely for changes in your health, and be sure to contact your doctor if:    · You notice your tremors are getting worse.     · You can't do your everyday activities because of your tremors.     · You are sad and embarrassed about your shaking. Where can you learn more? Go to http://jonatan-elvis.info/. Enter B746 in the search box to learn more about \"Benign Essential Tremor: Care Instructions. \"  Current as of: October 9, 2017  Content Version: 11.7  © 2247-3196 [employer]. Care instructions adapted under license by [employer] (which disclaims liability or warranty for this information).  If you have questions about a medical condition or this instruction, always ask your healthcare professional. [address] any warranty or liability for your use of this information. Preventing Falls: Care Instructions  Your Care Instructions    Getting around your home safely can be a challenge if you have injuries or health problems that make it easy for you to fall. Loose rugs and furniture in walkways are among the dangers for many older people who have problems walking or who have poor eyesight. People who have conditions such as arthritis, osteoporosis, or dementia also have to be careful not to fall. You can make your home safer with a few simple measures. Follow-up care is a key part of your treatment and safety. Be sure to make and go to all appointments, and call your doctor if you are having problems. It's also a good idea to know your test results and keep a list of the medicines you take. How can you care for yourself at home? Taking care of yourself  · You may get dizzy if you do not drink enough water. To prevent dehydration, drink plenty of fluids, enough so that your urine is light yellow or clear like water. Choose water and other caffeine-free clear liquids. If you have kidney, heart, or liver disease and have to limit fluids, talk with your doctor before you increase the amount of fluids you drink. · Exercise regularly to improve your strength, muscle tone, and balance. Walk if you can. Swimming may be a good choice if you cannot walk easily. · Have your vision and hearing checked each year or any time you notice a change. If you have trouble seeing and hearing, you might not be able to avoid objects and could lose your balance. · Know the side effects of the medicines you take. Ask your doctor or pharmacist whether the medicines you take can affect your balance. Sleeping pills or sedatives can affect your balance. · Limit the amount of alcohol you drink. Alcohol can impair your balance and other senses.   · Ask your doctor whether calluses or corns on your feet need to be removed. If you wear loose-fitting shoes because of calluses or corns, you can lose your balance and fall. · Talk to your doctor if you have numbness in your feet. Preventing falls at home  · Remove raised doorway thresholds, throw rugs, and clutter. Repair loose carpet or raised areas in the floor. · Move furniture and electrical cords to keep them out of walking paths. · Use nonskid floor wax, and wipe up spills right away, especially on ceramic tile floors. · If you use a walker or cane, put rubber tips on it. If you use crutches, clean the bottoms of them regularly with an abrasive pad, such as steel wool. · Keep your house well lit, especially [place], and outside walkways. Use night-lights in areas such as hallways and bathrooms. Add extra light switches or use remote switches (such as switches that go on or off when you clap your hands) to make it easier to turn lights on if you have to get up during the night. · Install sturdy handrails on stairways. · Move items in your cabinets so that the things you use a lot are on the lower shelves (about waist level). · Keep a cordless phone and a flashlight with new batteries by your bed. If possible, put a phone in each of the main rooms of your house, or carry a cell phone in case you fall and cannot reach a phone. Or, you can wear a device around your neck or wrist. You push a button that sends a signal for help. · Wear low-heeled shoes that fit well and give your feet good support. Use footwear with nonskid soles. Check the heels and soles of your shoes for wear. Repair or replace worn heels or soles. · Do not wear socks without shoes on wood floors. · Walk on the grass when the sidewalks are slippery. If you live in an area that gets snow and ice in the winter, sprinkle salt on slippery steps and sidewalks.   Preventing falls in the bath  · Install grab bars and nonskid mats inside and outside your shower or tub and near the toilet and sinks. · Use shower chairs and bath benches. · Use a hand-held shower head that will allow you to sit while showering. · Get into a tub or shower by putting the weaker leg in first. Get out of a tub or shower with your strong side first.  · Repair loose toilet seats and consider installing a raised toilet seat to make getting on and off the toilet easier. · Keep your bathroom door unlocked while you are in the shower. Where can you learn more? Go to http://jonatan-elvis.info/. Enter 0476 [id number] 69 [id number] in the search box to learn more about \"Preventing Falls: Care Instructions. \"  Current as of: May 12, 2017  Content Version: 11.7  © 1676-5671 [employer]. Care instructions adapted under license by [employer] (which disclaims liability or warranty for this information). If you have questions about a medical condition or this instruction, always ask your healthcare professional. [address] any warranty or liability for your use of this information.

## 2018-08-08 DIAGNOSIS — I10 ESSENTIAL HYPERTENSION: ICD-10-CM

## 2018-08-08 RX ORDER — HYDROCHLOROTHIAZIDE 25 MG/1
TABLET ORAL
Qty: 30 TAB | Refills: 5 | Status: SHIPPED | OUTPATIENT
Start: 2018-08-08 | End: 2018-09-18 | Stop reason: SDUPTHER

## 2018-08-10 ENCOUNTER — CLINICAL SUPPORT (OUTPATIENT)
Dept: CARDIOLOGY CLINIC | Age: 83
End: 2018-08-10

## 2018-08-10 DIAGNOSIS — I10 ESSENTIAL HYPERTENSION: ICD-10-CM

## 2018-08-10 DIAGNOSIS — R00.2 PALPITATIONS: Primary | ICD-10-CM

## 2018-08-10 DIAGNOSIS — R06.09 DOE (DYSPNEA ON EXERTION): Primary | ICD-10-CM

## 2018-08-10 DIAGNOSIS — G45.9 TRANSIENT CEREBRAL ISCHEMIA, UNSPECIFIED TYPE: ICD-10-CM

## 2018-08-15 ENCOUNTER — TELEPHONE (OUTPATIENT)
Dept: CARDIOLOGY CLINIC | Age: 83
End: 2018-08-15

## 2018-08-28 ENCOUNTER — TELEPHONE (OUTPATIENT)
Dept: CARDIOLOGY CLINIC | Age: 83
End: 2018-08-28

## 2018-08-28 ENCOUNTER — TELEPHONE (OUTPATIENT)
Dept: INTERNAL MEDICINE CLINIC | Age: 83
End: 2018-08-28

## 2018-08-28 NOTE — TELEPHONE ENCOUNTER
Can we just stop it and see if she even needs anything?  If she really wants a diuretic we can do lasix 20 mg once a day instead

## 2018-08-28 NOTE — TELEPHONE ENCOUNTER
Spoke with patient, she is going to stop the HCTZ and monitor her BP at home.  Advised to contact the office if BP stays elevated

## 2018-08-28 NOTE — TELEPHONE ENCOUNTER
Per patient she needs to change her hydroCHLOROthiazide (HYDRODIURIL) 25 mg tablet  Needs to be changed with out Calcium.  Her no [phone]

## 2018-08-28 NOTE — TELEPHONE ENCOUNTER
Spoke with patient who states she saw the endocrinologist and they would like her on a different diuretic due to her calcium levels running low

## 2018-09-14 ENCOUNTER — TELEPHONE (OUTPATIENT)
Dept: CARDIOLOGY CLINIC | Age: 83
End: 2018-09-14

## 2018-09-14 NOTE — TELEPHONE ENCOUNTER
----- Message from Franko Moreau MD sent at 9/13/2018 11:57 PM EDT -----  Regarding: please call patient  Please call patient. Holter 8/10/18 - NSR, HR , Avg 73 bpm. Rare PAC and PVC's. Symptoms of heart fluttering correlate to NSR. Holter showed essentially normal findings. Will have nurse call with results.      Thanks,  SK

## 2018-09-18 ENCOUNTER — OFFICE VISIT (OUTPATIENT)
Dept: INTERNAL MEDICINE CLINIC | Age: 83
End: 2018-09-18

## 2018-09-18 VITALS
DIASTOLIC BLOOD PRESSURE: 88 MMHG | HEIGHT: 63 IN | HEART RATE: 71 BPM | SYSTOLIC BLOOD PRESSURE: 133 MMHG | WEIGHT: 171 LBS | RESPIRATION RATE: 16 BRPM | BODY MASS INDEX: 30.3 KG/M2 | OXYGEN SATURATION: 98 % | TEMPERATURE: 97.9 F

## 2018-09-18 DIAGNOSIS — E83.52 HYPERCALCEMIA: ICD-10-CM

## 2018-09-18 DIAGNOSIS — Z01.810 PREOP CARDIOVASCULAR EXAM: Primary | ICD-10-CM

## 2018-09-18 DIAGNOSIS — G45.9 TRANSIENT CEREBRAL ISCHEMIA, UNSPECIFIED TYPE: ICD-10-CM

## 2018-09-18 DIAGNOSIS — M81.0 OSTEOPOROSIS, UNSPECIFIED OSTEOPOROSIS TYPE, UNSPECIFIED PATHOLOGICAL FRACTURE PRESENCE: ICD-10-CM

## 2018-09-18 DIAGNOSIS — I10 ESSENTIAL HYPERTENSION: ICD-10-CM

## 2018-09-18 DIAGNOSIS — K59.09 OTHER CONSTIPATION: ICD-10-CM

## 2018-09-18 DIAGNOSIS — R09.81 SINUS CONGESTION: ICD-10-CM

## 2018-09-18 DIAGNOSIS — R07.81 RIB PAIN ON LEFT SIDE: ICD-10-CM

## 2018-09-18 RX ORDER — AMLODIPINE BESYLATE 2.5 MG/1
2.5 TABLET ORAL DAILY
Qty: 30 TAB | Refills: 3 | Status: SHIPPED | OUTPATIENT
Start: 2018-09-18 | End: 2018-11-15 | Stop reason: SDUPTHER

## 2018-09-18 RX ORDER — AZITHROMYCIN 250 MG/1
250 TABLET, FILM COATED ORAL SEE ADMIN INSTRUCTIONS
Qty: 6 TAB | Refills: 0 | Status: SHIPPED | OUTPATIENT
Start: 2018-09-18 | End: 2018-09-23

## 2018-09-18 RX ORDER — MELATONIN
DAILY
COMMUNITY
End: 2019-05-07

## 2018-09-18 NOTE — PROGRESS NOTES
HISTORY OF PRESENT ILLNESS  Janet Meyer is a 81 y.o. female. HPI  Pre-Op: Pt is scheduled for cataracts surgery on 9/27/18. Sinus Congestion: Pt reports pain behind right ear, sinus pressure, nasal drainage, cough, and clear mucus. She experienced low-grade fever last night. Hypertension ROS: taking medications as instructed, no medication side effects noted, no TIA's, no chest pain on exertion, no dyspnea on exertion, no swelling of ankles. New concerns:  Patient's BP in office today is 133/88. She continues on Atenolol and HCTZ. She notes elevated BP at home. Osteoporosis: Pt followed up with orthopedics who recommended Reclast.     Hypercalcemia: Pt followed up with endocrinologist who detected high calcium levels. Rib Pain: Pt reports left-sided rib pain for past 9 months. She notes that pain is positional (e.g. when extending arm to reach for item) and unrelated to cough. Constipation: Pt has been experiencing constipation since colonoscopy in October 2017. She takes Miralax (per GI). TIA: Stable, and well-managed. Pt continues on baby Aspirin. Review of Systems   All other systems reviewed and are negative. Physical Exam   Constitutional: She is oriented to person, place, and time. She appears well-developed and well-nourished. HENT:   Head: Normocephalic and atraumatic. Right Ear: External ear normal.   Left Ear: External ear normal.   Nose: Nose normal.   Mouth/Throat: Oropharynx is clear and moist.   Eyes: Conjunctivae and EOM are normal.   Neck: Normal range of motion. Neck supple. Carotid bruit is not present. No thyroid mass and no thyromegaly present. Cardiovascular: Normal rate, regular rhythm, S1 normal, S2 normal, normal heart sounds and intact distal pulses. Pulmonary/Chest: Effort normal and breath sounds normal.   Abdominal: Soft. Normal appearance and bowel sounds are normal. There is no hepatosplenomegaly. There is no tenderness. Musculoskeletal: Normal range of motion. Neurological: She is alert and oriented to person, place, and time. She has normal strength. No cranial nerve deficit or sensory deficit. Coordination normal.   Skin: Skin is warm, dry and intact. No abrasion and no rash noted. Psychiatric: She has a normal mood and affect. Her behavior is normal. Judgment and thought content normal.   Nursing note and vitals reviewed. ASSESSMENT and PLAN  Diagnoses and all orders for this visit:    1. Preop cardiovascular exam  Pt is cleared for surgery, based on today's examination. Low risk for cardiac complications. Prescribed AB to address sinus congestion. 2. Essential hypertension  BP is at goal. Prescribed Amlodipine. Pt will also continue on HCTZ and Atenolol. Will monitor for any changes or improvements. -     amLODIPine (NORVASC) 2.5 mg tablet; Take 1 Tab by mouth daily. 3. Sinus congestion  Prescribed Azithromycin. Will monitor for any changes or improvements. -     azithromycin (ZITHROMAX) 250 mg tablet; Take 1 Tab by mouth See Admin Instructions for 5 days. 4. Hypercalcemia  Stable condition. Will monitor for any changes or improvements. 5. Osteoporosis, unspecified osteoporosis type, unspecified pathological fracture presence  Stable, and well-managed. Pt will continue to f/u with orthopedics. 6. Rib pain on left side  Stable condition. Pt wishes to consider PT after cataracts surgery. 7. Other constipation  Stable condition. Advised pt to f/u with GI. Will monitor for updates. Cont miralax as doing as that does help but swallowing may be due to esophageal stricture and she may need this addressed again    8. Transient cerebral ischemia, unspecified type  Stable, and well-managed. No change in medications. Lab results and schedule of future lab studies reviewed with patient. Reviewed diet, exercise and weight control.     Written by Teresa Allen, as dictated by Ja Cole MD Alvarez.     Current diagnosis and concerns discussed with pt at length. Understands risks and benefits or current treatment plan and medications and accepts the treatment and medication with any possible risks. Pt asks appropriate questions which were answered. Pt instructed to call with any concerns or problems.

## 2018-10-22 ENCOUNTER — HOSPITAL ENCOUNTER (OUTPATIENT)
Dept: GENERAL RADIOLOGY | Age: 83
Discharge: HOME | End: 2018-10-22
Payer: MEDICARE

## 2018-10-22 DIAGNOSIS — R13.10 DYSPHAGIA: ICD-10-CM

## 2018-10-22 DIAGNOSIS — R10.9 ABDOMINAL PAIN: ICD-10-CM

## 2018-10-22 PROCEDURE — 74018 RADEX ABDOMEN 1 VIEW: CPT

## 2018-11-15 DIAGNOSIS — I10 ESSENTIAL HYPERTENSION: ICD-10-CM

## 2018-11-15 RX ORDER — AMLODIPINE BESYLATE 2.5 MG/1
2.5 TABLET ORAL DAILY
Qty: 90 TAB | Refills: 0 | Status: ON HOLD | OUTPATIENT
Start: 2018-11-15 | End: 2018-11-26

## 2018-11-26 ENCOUNTER — ANESTHESIA EVENT (OUTPATIENT)
Dept: ENDOSCOPY | Age: 83
End: 2018-11-26
Payer: MEDICARE

## 2018-11-26 ENCOUNTER — HOSPITAL ENCOUNTER (OUTPATIENT)
Age: 83
Setting detail: OUTPATIENT SURGERY
Discharge: HOME | End: 2018-11-26
Attending: INTERNAL MEDICINE | Admitting: INTERNAL MEDICINE
Payer: MEDICARE

## 2018-11-26 ENCOUNTER — ANESTHESIA (OUTPATIENT)
Dept: ENDOSCOPY | Age: 83
End: 2018-11-26
Payer: MEDICARE

## 2018-11-26 VITALS
RESPIRATION RATE: 14 BRPM | OXYGEN SATURATION: 98 % | SYSTOLIC BLOOD PRESSURE: 151 MMHG | WEIGHT: 164 LBS | HEIGHT: 63 IN | HEART RATE: 59 BPM | TEMPERATURE: 98 F | DIASTOLIC BLOOD PRESSURE: 53 MMHG | BODY MASS INDEX: 29.06 KG/M2

## 2018-11-26 PROCEDURE — 76060000031 HC ANESTHESIA FIRST 0.5 HR: Performed by: INTERNAL MEDICINE

## 2018-11-26 PROCEDURE — 74011250636 HC RX REV CODE- 250/636

## 2018-11-26 PROCEDURE — C1726 CATH, BAL DIL, NON-VASCULAR: HCPCS | Performed by: INTERNAL MEDICINE

## 2018-11-26 PROCEDURE — 76040000019: Performed by: INTERNAL MEDICINE

## 2018-11-26 PROCEDURE — 74011250636 HC RX REV CODE- 250/636: Performed by: INTERNAL MEDICINE

## 2018-11-26 RX ORDER — EPINEPHRINE 0.1 MG/ML
1 INJECTION INTRACARDIAC; INTRAVENOUS
Status: DISCONTINUED | OUTPATIENT
Start: 2018-11-26 | End: 2018-11-26 | Stop reason: HOSPADM

## 2018-11-26 RX ORDER — FLUMAZENIL 0.1 MG/ML
0.2 INJECTION INTRAVENOUS
Status: DISCONTINUED | OUTPATIENT
Start: 2018-11-26 | End: 2018-11-26 | Stop reason: HOSPADM

## 2018-11-26 RX ORDER — NALOXONE HYDROCHLORIDE 0.4 MG/ML
0.4 INJECTION, SOLUTION INTRAMUSCULAR; INTRAVENOUS; SUBCUTANEOUS
Status: DISCONTINUED | OUTPATIENT
Start: 2018-11-26 | End: 2018-11-26 | Stop reason: HOSPADM

## 2018-11-26 RX ORDER — SODIUM CHLORIDE 9 MG/ML
INJECTION, SOLUTION INTRAVENOUS
Status: DISCONTINUED | OUTPATIENT
Start: 2018-11-26 | End: 2018-11-26 | Stop reason: HOSPADM

## 2018-11-26 RX ORDER — SODIUM CHLORIDE 9 MG/ML
50 INJECTION, SOLUTION INTRAVENOUS CONTINUOUS
Status: DISCONTINUED | OUTPATIENT
Start: 2018-11-26 | End: 2018-11-26 | Stop reason: HOSPADM

## 2018-11-26 RX ORDER — ATROPINE SULFATE 0.1 MG/ML
0.4 INJECTION INTRAVENOUS
Status: DISCONTINUED | OUTPATIENT
Start: 2018-11-26 | End: 2018-11-26 | Stop reason: HOSPADM

## 2018-11-26 RX ORDER — PROPOFOL 10 MG/ML
INJECTION, EMULSION INTRAVENOUS AS NEEDED
Status: DISCONTINUED | OUTPATIENT
Start: 2018-11-26 | End: 2018-11-26 | Stop reason: HOSPADM

## 2018-11-26 RX ORDER — MIDAZOLAM HYDROCHLORIDE 1 MG/ML
.25-5 INJECTION, SOLUTION INTRAMUSCULAR; INTRAVENOUS
Status: DISCONTINUED | OUTPATIENT
Start: 2018-11-26 | End: 2018-11-26 | Stop reason: HOSPADM

## 2018-11-26 RX ORDER — PANTOPRAZOLE SODIUM 40 MG/1
40 TABLET, DELAYED RELEASE ORAL DAILY
Qty: 30 TAB | Refills: 3 | Status: SHIPPED | OUTPATIENT
Start: 2018-11-26 | End: 2019-02-05 | Stop reason: ALTCHOICE

## 2018-11-26 RX ORDER — DEXTROMETHORPHAN/PSEUDOEPHED 2.5-7.5/.8
1.2 DROPS ORAL
Status: DISCONTINUED | OUTPATIENT
Start: 2018-11-26 | End: 2018-11-26 | Stop reason: HOSPADM

## 2018-11-26 RX ADMIN — PROPOFOL 50 MG: 10 INJECTION, EMULSION INTRAVENOUS at 12:48

## 2018-11-26 RX ADMIN — SODIUM CHLORIDE: 9 INJECTION, SOLUTION INTRAVENOUS at 12:40

## 2018-11-26 RX ADMIN — SODIUM CHLORIDE 50 ML/HR: 900 INJECTION, SOLUTION INTRAVENOUS at 11:50

## 2018-11-26 RX ADMIN — PROPOFOL 100 MG: 10 INJECTION, EMULSION INTRAVENOUS at 12:41

## 2018-11-26 RX ADMIN — PROPOFOL 50 MG: 10 INJECTION, EMULSION INTRAVENOUS at 12:44

## 2018-11-26 NOTE — PROCEDURES
Cristal Samaniego M.D.  [phone]           2018                EGD Operative Report  Esvin Elizabeth  :  1935  [employer] Medical Record Number:  [id number]      Indication:  Abdominal pain, epigastric, Dysphagia/odynophagia     : Lydia Diop MD    Referring Provider:  Kelly Bhardwaj MD      Anesthesia/Sedation:  MAC anesthesia    Airway assessment: No airway problems anticipated    Pre-Procedural Exam:      Airway: clear, no airway problems anticipated  Heart: RRR, without gallops or rubs  Lungs: clear bilaterally without wheezes, crackles, or rhonchi  Abdomen: soft, nontender, nondistended, bowel sounds present  Mental Status: awake, alert and oriented to person, place and time       Procedure Details     After infomed consent was obtained for the procedure, with all risks and benefits of procedure explained the patient was taken to the endoscopy suite and placed in the left lateral decubitus position. Following sequential administration of sedation as per above, the endoscope was inserted into the mouth and advanced under direct vision to second portion of the duodenum. A careful inspection was made as the gastroscope was withdrawn, including a retroflexed view of the proximal stomach; findings and interventions are described below. Findings:   Esophagus:Evidence of recurrence of the previously dilated schatzki's ring. The scope was able to pass easily into the stomach, residual lumen felt to be about 16 mm. Stomach: Small size hiatal hernia, otherwise mucosa within normal  Duodenum/jejunum: normal    Therapies:  Adequate and effective esophageal dilation with 18 to 19 mm sized balloon    Specimens: none           Complications:   None; patient tolerated the procedure well. EBL:  None.            Impression:    Schatzki's Ring dilated                            Hiatal Hernia      Recommendations:    -Acid suppression with a proton pump inhibitor.  -Continue with soft diet and anti-reflux measures  -Follow-up in the office    Peng Au MD

## 2018-11-26 NOTE — ANESTHESIA POSTPROCEDURE EVALUATION
Procedure(s): ESOPHAGOGASTRODUODENOSCOPY (EGD) ESOPHAGEAL DILATION. Anesthesia Post Evaluation Multimodal analgesia: multimodal analgesia not used between 6 hours prior to anesthesia start to PACU discharge Patient location during evaluation: PACU Patient participation: complete - patient participated Level of consciousness: awake Pain management: adequate Airway patency: patent Anesthetic complications: no 
Cardiovascular status: acceptable, blood pressure returned to baseline and hemodynamically stable Respiratory status: acceptable Hydration status: acceptable Post anesthesia nausea and vomiting:  none Visit Vitals /59 Pulse 64 Temp 37.1 °C (98.8 °F) Resp 19 Ht 5' 3\" (1.6 m) Wt 74.4 kg (164 lb) SpO2 96% Breastfeeding? No  
BMI 29.05 kg/m²

## 2018-11-26 NOTE — PERIOP NOTES
Report from [facility], CRNA, see anesthesia record. ABD remains soft and non-tender post procedure. Pt has no complaints at this time and tolerated the procedure well. CRE balloon dilatation of the esophagus 18 mm Balloon inflated to 3 ATMs and held for 10 seconds. 19 mm Balloon inflated to 4.5 ATMs and held for 10  seconds. 20 mm Balloon inflated to 6 ATMs and held for 2 seconds. No subcutaneous crepitus of the chest or cervical region was noted post dilatation. Endoscope was pre-cleaned at bedside immediately following procedure by Jolanta Carlson.

## 2018-11-26 NOTE — H&P
The patient is a 81year old female who presents with a complaint of Abdominal Pain. The patient presents for consultation at the request of Dr. Jama Rasmussen. Pt  feels sharp pains on left and right side of upper abdomen.  Pain has been persisting for a couple of months.  The pain does not immediately occur with eating sometimes will happen after 3 hours of heating.  She has been eating less and smaller portions.  It is made worse with greasy foods.  Nothing makes the pain better \"it just has to wear off.  She she admits to bloating, fevers and chills, nausea, and vomiting.  Her weight has been stable.  Says that she has noticed gray stools and yellowish white stools.  She is unable to assess for blood in her stools because of poor vision. ). The symptoms have been associated with constipation (takes miralax daily and senna prn.  \"i feel like i dont completely empty. \"). Previous diagnostic tests have included colonoscopy (January 2018 Small internal hemorrhoids otherwise normal.) and EGD (Woodrows ring dilated. ). Additional reasons for visit: 
 
Dysphagia is described as the following: The patient presents for due to reoccurrence of symptoms (Has been having episodes of vomiting after eating. She feels food \"getting seized\" in her neck and throat area. She has noted this ocurring after eating rice, bread and chicken. She has been eating smaller portions and being careful of what she is eating.). The symptoms have been associated with heartburn (has occasional heartburn for which she takes tums. Does not take NSAIDs.). Problem List/Past Medical Alina Filler; 10/22/2018 9:19 AM) TIA   
Congestive Heart Failure   
Pancreatitis   
Biliary Atresia   
Hiatal Hernia   
Colonic Polyps   
Prolapsed Bladder   
Abdominal pain, epigastric (789.06  R10.13)   Abdominal pain, RUQ (789.01  R10.11)   Dysphagia (787.20  R13.10)   History of colonic polyps (V12.72  Z86.010)   
 Generalized abdominal pain (789.07  R10.84)   Pt presents today complaining of abdominal pain and bloating that has been occurring since her colonoscopy in January 2018. She states that when she eats she feels bloated and when she doesnt, she feels less bloated. She has been having small diarrhea stools and reports she feels constipated Past Surgical History Kaity Callaway; 10/22/2018 9:19 AM) Cholecystectomy  [1992]: Hysterectomy; Total   1960's Allergies Kaity Callaway; 10/22/2018 9:19 AM) PHENobarbital *HYPNOTICS*   
Sulfa Drugs   
Codeine/Codeine Derivatives   
Penicillins   
Seaweed   
Fosamax *ENDOCRINE AND METABOLIC AGENTS - MISC. *   
Cleocin *ANTI-INFECTIVE AGENTS - MISC. *   
Levaquin *FLUOROQUINOLONES*   
 
Medication History Kaity Callaway; 10/22/2018 9:19 AM) MiraLax  (Oral daily) Active. Atenolol  (25MG Tablet, Oral one daily) Active. Hydrochlorothiazide  (25MG Tablet, Oral 1/2 tab every other day) Active. Tylenol  (500MG Capsule, Oral) Active. Diazepam  (2.5MG Oral as needed) Specific strength unknown - Active. Aspirin  (81MG Tablet, 1 tab Oral BID) Active. Medications Reconciled  
 
Family History Kaity Callaway; 10/22/2018 9:19 AM) Coronary artery disease   Mother, Father. Ulcerative colitis   Son. Social History Kaity Callaway; 10/22/2018 9:19 AM) Employment status   Retired. Blood Transfusion   no 
Marital status   . Alcohol Use   Drinks wine, Occasional alcohol use. Tobacco Use   Former smoker. Diagnostic Studies History Kaity Callaway; 10/22/2018 9:19 AM) Colonoscopy  [2018]: ERCP   
 
Health Maintenance History Kaity Callaway; 10/22/2018 9:19 AM) Flu Vaccine   none Pneumovax  [2017]: 
 
 
 
Review of Systems Kaitybrendan Callaway; 10/22/2018 9:19 AM) General Not Present- Chronic Fatigue, Poor Appetite, Weight Gain and Weight Loss. Skin Not Present- Itching, Rash and Skin Color Changes. HEENT Present- Hearing Loss. Not Present- Vertigo. Respiratory Not Present- Difficulty Breathing and TB exposure. Cardiovascular Not Present- Chest Pain, Use of Antibiotics before Dental Procedures and Use of Blood Thinners. Gastrointestinal Present- See HPI. Musculoskeletal Present- Arthritis. Not Present- Hip Replacement Surgery and Knee Replacement Surgery. Neurological Not Present- Weakness. Psychiatric Not Present- Depression. Endocrine Not Present- Diabetes and Thyroid Problems. Hematology Not Present- Anemia. Vitals Kaity Callaway; 10/22/2018 9:21 AM) 10/22/2018 9:18 AM 
Weight: 168 lb   Height: 62 in  
Weight was reported by patient. Body Surface Area: 1.77 m²   Body Mass Index: 30.73 kg/m²   
Pulse: 64 (Regular)    
BP: 144/80 (Sitting, Left Arm, Standard) Physical Exam (Villa MAYO; 10/22/2018 10:10 AM) General 
Mental Status - Alert. General Appearance - Cooperative, Pleasant, Not in acute distress. Orientation - Oriented X3. Integumentary General Characteristics Color - normal coloration of skin. Head and Neck Neck Global Assessment - supple. Eye 
Sclera/Conjunctiva - Bilateral - No Jaundice. Chest and Lung Exam 
Chest and lung exam reveals  - quiet, even and easy respiratory effort with no use of accessory muscles. Auscultation Breath sounds - Normal. Adventitious sounds - No Adventitious sounds. Cardiovascular Auscultation Rhythm - Regular, No Tachycardia, No Bradycardia . Heart Sounds - Normal heart sounds , S1 WNL and S2 WNL, No S3, No Summation Gallop. Murmurs & Other Heart Sounds - Auscultation of the heart reveals - No Murmurs. Abdomen Palpation/Percussion Tenderness - Right Upper Quadrant. Rebound tenderness - No rebound. Rigidity (guarding) - No Rigidity. Dullness to percussion - No abnormal dullness to percussion. Liver - No hepatosplenomegaly. Abdominal Mass Palpable - No masses. Other Characteristics - No Ascites. Auscultation Auscultation of the abdomen reveals - Bowel sounds normal, No Abdominal bruits and No Succussion splash. Rectal - Did not examine. Peripheral Vascular Lower Extremity Palpation - Edema - Left - No edema. Right - No edema. Neurologic Motor Involuntary Movements - Asterixis - not present. Assessment & Plan (Villa MCALLISTER; 10/22/2018 10:18 AM) Abdominal pain (789.00  R10.9) Story: Pt presents with upper abdominal pain and bloating which has persisting for 2 months. Feels like she does not empty when she has bowel movements. Impression: Likely related to constipation. Will also rule out gallbladder pathology with US. Gave her samples of 72mcg of linzess to try. Asked her to call me in a few days after taking linzess to let me know how she is feeling. Current Plans Abdominal Ultrasound (38881) KUB (36478) Dysphagia (787.20  R13.10) Story: Pt presents with worsening dysphagia. She states that she feels like food gets stuck in her neck and \"gets seized up. \" Last EGD in January 2018 - Schatzkis ring was dilated. No NSAIDs, Rare heartburn for which she takes tums. Impression: Advised her to continue to eat soft diet and continue eating small portions. Current Plans METABOLIC PANEL, COMPREHENSIVE (88405) CBC, PLATELETS & AUT DIFF (38559) TSH (20185) EGD (70889) Case discussed personally with a physician in the office regarding the presentation, pertinent physical findings and development of a diagnostic and therapeutic plan. Pt Education - How to access health information online: discussed with patient and provided information. Medical Decision Making (Villa MCALLISTER; 10/22/2018 10:24 AM) Amount/complexity of data to be reviewed: - Order and/or review of lab test(s) 
- Order and/or review of radiology test(s) - Review and summarization of old records Signed electronically by ESVIN Steen (10/22/2018 10:24 AM)

## 2018-11-26 NOTE — ROUTINE PROCESS
[address] 1935 
[id number] Situation: 
Verbal report received from: Naresh Juárez RN Procedure: Procedure(s): ESOPHAGOGASTRODUODENOSCOPY (EGD) ESOPHAGEAL DILATION Background: 
 
Preoperative diagnosis: EPIGASTRIC PAIN Postoperative diagnosis: Schatzki's Ring Hiatal Hernia :  Dr. Lissy Pike Assistant(s): Endoscopy Technician-1: Félix Dixon Endoscopy RN-1: Hank Leos RN Specimens: * No specimens in log * H. Pylori  no Assessment: 
Intra-procedure medications V Anesthesia gave intra-procedure sedation and medications, see anesthesia flow sheet yes Intravenous fluids: NS@ [place]y Vital signs stable Abdominal assessment: round and soft Recommendation: 
Discharge patient per MD order. Family or Friend Permission to share finding with family or friend yes

## 2018-11-26 NOTE — DISCHARGE INSTRUCTIONS
Sania Toscano M.D.  [phone]           2018  Gene Clancy  :  1935  [facility] Medical Record Number:  [id number]        ENDOSCOPY FINDINGS:   Your endoscopy showed a recurrence of the stricture that was dilated, small hiatal hernia, otherwise no changes seen. EGD DISCHARGE INSTRUCTIONS    DISCOMFORT:  Sore throat- throat lozenges or warm salt water gargle  redness at IV site- apply warm compress to area; if redness or soreness persist- contact your physician  Gaseous discomfort- walking, belching will help relieve any discomfort  You may not operate a vehicle for 12 hours  You may not engage in an occupation involving machinery or appliances for rest of today  You may not drink alcoholic beverages for at least 12 hours  Avoid making any critical decisions for at least 24 hour    DIET:   You may resume your soft diet. ACTIVITY  Spend the remainder of the day resting -  avoid any strenuous activity. Avoid driving or operating machinery. CALL M.D. ANY SIGN OF   Increasing pain, nausea, vomiting  Abdominal distension (swelling)  New increased bleeding (oral or rectal)  Fever (chills)  Pain in chest area  Bloody discharge from nose or mouth  Shortness of breath    Follow-up Instructions:   Call Dr. Shawna Bush for any questions or problems. Telephone # [phone]  Start taking pantoprazole 40 mg daily every morning 30 minutes before breakfast.    Follow up in the office.

## 2018-11-26 NOTE — ANESTHESIA PREPROCEDURE EVALUATION
Anesthetic History No history of anesthetic complications Review of Systems / Medical History Patient summary reviewed, nursing notes reviewed and pertinent labs reviewed Pulmonary Within defined limits Sleep apnea Neuro/Psych Within defined limits 
seizures: well controlled TIA Cardiovascular Within defined limits Hypertension Exercise tolerance: >4 METS 
  
GI/Hepatic/Renal 
Within defined limits Renal disease: stones Endo/Other Within defined limits Other Findings Comments: Multiple TIAs, no residual 
Last seizure 2005 Hx meningioma Physical Exam 
 
Airway Mallampati: II 
 
Neck ROM: normal range of motion Mouth opening: Normal 
 
 Cardiovascular Regular rate and rhythm,  S1 and S2 normal,  no murmur, click, rub, or gallop Rhythm: regular Rate: normal 
 
 
 
 Dental 
No notable dental hx Pulmonary Breath sounds clear to auscultation Abdominal 
GI exam deferred Other Findings Anesthetic Plan ASA: 2 Anesthesia type: MAC Induction: Intravenous Anesthetic plan and risks discussed with: Patient

## 2018-12-21 DIAGNOSIS — R60.0 LEG EDEMA, LEFT: ICD-10-CM

## 2018-12-21 RX ORDER — ATENOLOL 25 MG/1
TABLET ORAL
Qty: 90 TAB | Refills: 1 | Status: SHIPPED | OUTPATIENT
Start: 2018-12-21 | End: 2019-03-01

## 2018-12-21 NOTE — TELEPHONE ENCOUNTER
Requested Prescriptions     Signed Prescriptions Disp Refills    atenolol (TENORMIN) 25 mg tablet 90 Tab 1     Sig: TAKE 1 TABLET BY MOUTH EVERY DAY     Authorizing Provider: Terri Bell     Ordering User: Bouchra Henry     Refill per verbal order Dr. Larisa Zacarias.

## 2019-02-05 ENCOUNTER — OFFICE VISIT (OUTPATIENT)
Dept: CARDIOLOGY CLINIC | Age: 84
End: 2019-02-05

## 2019-02-05 VITALS
HEIGHT: 63 IN | HEART RATE: 59 BPM | SYSTOLIC BLOOD PRESSURE: 162 MMHG | DIASTOLIC BLOOD PRESSURE: 80 MMHG | WEIGHT: 175 LBS | BODY MASS INDEX: 31.01 KG/M2

## 2019-02-05 DIAGNOSIS — G45.9 TIA (TRANSIENT ISCHEMIC ATTACK): ICD-10-CM

## 2019-02-05 DIAGNOSIS — I10 ESSENTIAL HYPERTENSION: Primary | ICD-10-CM

## 2019-02-05 RX ORDER — RANITIDINE 150 MG/1
150 TABLET, FILM COATED ORAL 2 TIMES DAILY
COMMUNITY
End: 2019-07-28 | Stop reason: SDUPTHER
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
## 2019-02-15 ENCOUNTER — OFFICE VISIT (OUTPATIENT)
Dept: NEUROLOGY | Age: 84
End: 2019-02-15

## 2019-02-15 VITALS
HEIGHT: 63 IN | SYSTOLIC BLOOD PRESSURE: 162 MMHG | BODY MASS INDEX: 31.01 KG/M2 | DIASTOLIC BLOOD PRESSURE: 84 MMHG | WEIGHT: 175 LBS

## 2019-02-15 DIAGNOSIS — R26.9 GAIT ABNORMALITY: ICD-10-CM

## 2019-02-15 DIAGNOSIS — G25.0 ESSENTIAL TREMOR: Primary | ICD-10-CM

## 2019-02-15 RX ORDER — DIAZEPAM 5 MG/1
TABLET ORAL
Qty: 60 TAB | Refills: 5 | Status: SHIPPED | OUTPATIENT
Start: 2019-02-15 | End: 2019-04-09 | Stop reason: SDUPTHER

## 2019-02-15 NOTE — PROGRESS NOTES
Tremors have gotten worse. Normally when she is getting very tired. But seem to be fine when she is laying down. Tremors have been better with the diazepam patient states was prescribed by Dr. Martha Soriano is not good.
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
## 2019-02-26 ENCOUNTER — HOSPITAL ENCOUNTER (OUTPATIENT)
Dept: MRI IMAGING | Age: 84
Discharge: HOME | End: 2019-02-26
Attending: NURSE PRACTITIONER
Payer: MEDICARE

## 2019-02-26 ENCOUNTER — TELEPHONE (OUTPATIENT)
Dept: CARDIOLOGY CLINIC | Age: 84
End: 2019-02-26

## 2019-02-26 VITALS — WEIGHT: 168 LBS | BODY MASS INDEX: 29.76 KG/M2

## 2019-02-26 DIAGNOSIS — R26.9 GAIT ABNORMALITY: ICD-10-CM

## 2019-02-26 DIAGNOSIS — G25.0 ESSENTIAL TREMOR: ICD-10-CM

## 2019-02-26 DIAGNOSIS — I10 ESSENTIAL HYPERTENSION: ICD-10-CM

## 2019-02-26 PROCEDURE — 70553 MRI BRAIN STEM W/O & W/DYE: CPT

## 2019-02-26 PROCEDURE — 74011250636 HC RX REV CODE- 250/636: Performed by: RADIOLOGY

## 2019-02-26 PROCEDURE — A9575 INJ GADOTERATE MEGLUMI 0.1ML: HCPCS | Performed by: RADIOLOGY

## 2019-02-26 RX ORDER — GADOTERATE MEGLUMINE 376.9 MG/ML
15 INJECTION INTRAVENOUS
Status: COMPLETED | OUTPATIENT
Start: 2019-02-26 | End: 2019-02-26

## 2019-02-26 RX ADMIN — GADOTERATE MEGLUMINE 15 ML: 376.9 INJECTION INTRAVENOUS at 12:00

## 2019-02-26 NOTE — TELEPHONE ENCOUNTER
Pt stopped into office with BP diary. Readings are mostly 130s-140s/60s-80s. A few outliers, highest being 170/54 which happened to be right after she had acupuncture. HR all over the place . Most between 60-90. She notes Fatigue on the dates her HR was over 100. (3 days in total). Recommendations?

## 2019-02-27 ENCOUNTER — TELEPHONE (OUTPATIENT)
Dept: CARDIOLOGY CLINIC | Age: 84
End: 2019-02-27

## 2019-02-27 DIAGNOSIS — R60.0 LEG EDEMA, LEFT: ICD-10-CM

## 2019-02-27 RX ORDER — AMLODIPINE BESYLATE 2.5 MG/1
2.5 TABLET ORAL DAILY
Qty: 90 TAB | Refills: 0 | Status: SHIPPED | OUTPATIENT
Start: 2019-02-27 | End: 2019-05-07

## 2019-02-27 RX ORDER — HYDROCHLOROTHIAZIDE 25 MG/1
25 TABLET ORAL DAILY
Qty: 30 TAB | Refills: 5
Start: 2019-02-27 | End: 2019-05-07

## 2019-02-27 NOTE — TELEPHONE ENCOUNTER
Return call placed to pt (IDx2). Called discuss med changes and recommendations per Dr. Denise Yee. Pt states that the current readings are while she is on HCTZ 25mg daily. She had increased the dose herself d/t BP elevation. Will forward to Dr. Denise Yee for further recommendation.

## 2019-02-27 NOTE — TELEPHONE ENCOUNTER
LM with pt  for her to call back. Dr. Padmini Bejarano would like to start pt on amlodipine 2.5mg daily. Rx sent to pt preferred pharmacy.

## 2019-02-27 NOTE — TELEPHONE ENCOUNTER
Return call placed to pt (IDx2). Discussed Dr. Jt Watson recommendation of Amlodipine 2.5mg daily. Medication and SEs discussed.

## 2019-02-28 NOTE — TELEPHONE ENCOUNTER
Patient would like to speak to you in regards to her BP medication. Her Neurologist suggested to take another dose of Atenolol to help with her with the tremor in her head.    Phone: [phone]

## 2019-03-01 ENCOUNTER — TELEPHONE (OUTPATIENT)
Dept: NEUROLOGY | Age: 84
End: 2019-03-01

## 2019-03-01 RX ORDER — ATENOLOL 50 MG/1
TABLET ORAL
COMMUNITY
Start: 2019-03-01 | End: 2019-05-07

## 2019-03-01 NOTE — TELEPHONE ENCOUNTER
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

## 2019-03-01 NOTE — TELEPHONE ENCOUNTER
Verified patient with two types of identifiers. Patient reports her neurologist wishes to increase her atenolol to 50mg daily related to her tremor and elevated BP. She wanted to check with Dr. Denise Yee before this change.  Will check with MD.

## 2019-03-01 NOTE — TELEPHONE ENCOUNTER
Verified patient with two types of identifiers. Let patient know Dr. Shikha Neri approves of increasing her atenolol to 50mg. Patient will call neurologist for prescription and to clarify when he wants her to take it. Medication list updated but no prescription sent. Patient verbalized understanding and will call with any other questions.

## 2019-03-18 ENCOUNTER — OFFICE VISIT (OUTPATIENT)
Dept: NEUROLOGY | Age: 84
End: 2019-03-18

## 2019-03-18 VITALS
HEIGHT: 63 IN | BODY MASS INDEX: 29.77 KG/M2 | SYSTOLIC BLOOD PRESSURE: 136 MMHG | WEIGHT: 168 LBS | DIASTOLIC BLOOD PRESSURE: 74 MMHG

## 2019-03-18 DIAGNOSIS — G93.89 BRAIN MASS: Primary | ICD-10-CM

## 2019-03-18 RX ORDER — ATENOLOL 50 MG/1
50 TABLET ORAL
Qty: 90 TAB | Refills: 1 | Status: ON HOLD | OUTPATIENT
Start: 2019-03-18 | End: 2019-06-03 | Stop reason: DRUGHIGH

## 2019-03-18 NOTE — PROGRESS NOTES
Patient is here for MRI results. Weakness on right side. Tremors are worse. Not started amlodipine yet because of possible side effect of dizziness. Vision seems to be foggy. Valium has helped tremors. Went to ortho and got an injection in her hip. And was told that she has to have a walker from now on.
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
## 2019-03-22 ENCOUNTER — TELEPHONE (OUTPATIENT)
Dept: NEUROLOGY | Age: 84
End: 2019-03-22

## 2019-03-22 NOTE — TELEPHONE ENCOUNTER
R/t call to patient's son. refaxed office note to both fax number attn : Ryan Molina. He states understanding.

## 2019-04-02 DIAGNOSIS — I10 ESSENTIAL HYPERTENSION: ICD-10-CM

## 2019-04-02 RX ORDER — HYDROCHLOROTHIAZIDE 25 MG/1
TABLET ORAL
Qty: 30 TAB | Refills: 4 | Status: SHIPPED | OUTPATIENT
Start: 2019-04-02 | End: 2019-10-10 | Stop reason: SDUPTHER

## 2019-04-03 ENCOUNTER — HOSPITAL ENCOUNTER (OUTPATIENT)
Dept: MRI IMAGING | Age: 84
Discharge: HOME | End: 2019-04-03
Attending: NEUROLOGICAL SURGERY
Payer: MEDICARE

## 2019-04-03 DIAGNOSIS — R26.9 GAIT DISTURBANCE: ICD-10-CM

## 2019-04-03 DIAGNOSIS — M48.02 CERVICAL STENOSIS OF SPINE: ICD-10-CM

## 2019-04-03 PROCEDURE — 72146 MRI CHEST SPINE W/O DYE: CPT

## 2019-04-03 PROCEDURE — 72141 MRI NECK SPINE W/O DYE: CPT

## 2019-04-09 ENCOUNTER — TELEPHONE (OUTPATIENT)
Dept: NEUROLOGY | Age: 84
End: 2019-04-09

## 2019-04-09 DIAGNOSIS — G25.0 ESSENTIAL TREMOR: ICD-10-CM

## 2019-04-09 RX ORDER — DIAZEPAM 5 MG/1
TABLET ORAL
Qty: 60 TAB | Refills: 5 | Status: ON HOLD | OUTPATIENT
Start: 2019-04-09 | End: 2019-06-03 | Stop reason: DRUGHIGH

## 2019-04-09 NOTE — TELEPHONE ENCOUNTER
----- Message from Jay Jay Portillo sent at 4/9/2019  3:16 PM EDT -----  Regarding: NP Olaughlin/rx  Pt [phone], he said the Diazapam that was ordered for her tremors and was told by [facility] on [place]   [phone], that she would have to wait until Saturday, she said she can not wait that long, she would like to know if Dr Carmelina Vasquez can call in to have the rx filled be fore its due date. Please let her know if that is possible.

## 2019-04-18 ENCOUNTER — TELEPHONE (OUTPATIENT)
Dept: NEUROLOGY | Age: 84
End: 2019-04-18

## 2019-04-18 NOTE — TELEPHONE ENCOUNTER
Contacted patient after PSR stated the patient was confused about getting a spinal tap and physical therapy before and after. I stated to the patient that if the spinal tap and new order for PT was ordered by Theresa Mendoza in neurosurgery, then she needed to call their office and get everything set up through their office. I provided her with the number to [address] to get her spinal tap scheduled. I stated to her if she runs into any other issues she is welcomed to call back if needed, but we cannot do anything with the orders that  put in. She would have to go through their office. She verbalized understanding.

## 2019-04-29 ENCOUNTER — HOSPITAL ENCOUNTER (OUTPATIENT)
Dept: GENERAL RADIOLOGY | Age: 84
Discharge: HOME | End: 2019-04-29
Attending: NEUROLOGICAL SURGERY
Payer: MEDICARE

## 2019-04-29 VITALS
TEMPERATURE: 98.2 F | OXYGEN SATURATION: 96 % | DIASTOLIC BLOOD PRESSURE: 56 MMHG | SYSTOLIC BLOOD PRESSURE: 142 MMHG | RESPIRATION RATE: 16 BRPM | HEART RATE: 67 BPM

## 2019-04-29 DIAGNOSIS — R26.9 GAIT DISTURBANCE: ICD-10-CM

## 2019-04-29 LAB
APPEARANCE CSF: CLEAR
COLOR CSF: COLORLESS
COMMENT, HOLDF: NORMAL
GLUCOSE CSF-MCNC: 55 MG/DL (ref 40–70)
PROT CSF-MCNC: 41 MG/DL (ref 15–45)
RBC # CSF: 1 /CU MM
SAMPLES BEING HELD,HOLD: NORMAL
TUBE # CSF: 1
TUBE # CSF: 1
TUBE # CSF: 3
WBC # CSF: 0 /CU MM (ref 0–5)

## 2019-04-29 PROCEDURE — 97116 GAIT TRAINING THERAPY: CPT

## 2019-04-29 PROCEDURE — 97110 THERAPEUTIC EXERCISES: CPT

## 2019-04-29 PROCEDURE — 89050 BODY FLUID CELL COUNT: CPT

## 2019-04-29 PROCEDURE — 62270 DX LMBR SPI PNXR: CPT

## 2019-04-29 PROCEDURE — 97161 PT EVAL LOW COMPLEX 20 MIN: CPT

## 2019-04-29 PROCEDURE — 84157 ASSAY OF PROTEIN OTHER: CPT

## 2019-04-29 PROCEDURE — 82945 GLUCOSE OTHER FLUID: CPT

## 2019-04-29 RX ORDER — ACETAMINOPHEN 325 MG/1
650 TABLET ORAL
Status: DISPENSED | OUTPATIENT
Start: 2019-04-29 | End: 2019-04-29

## 2019-04-29 RX ORDER — SODIUM BICARBONATE 42 MG/ML
1 INJECTION, SOLUTION INTRAVENOUS
Status: DISPENSED | OUTPATIENT
Start: 2019-04-29 | End: 2019-04-29

## 2019-04-29 RX ORDER — LIDOCAINE HYDROCHLORIDE 10 MG/ML
INJECTION, SOLUTION EPIDURAL; INFILTRATION; INTRACAUDAL; PERINEURAL
Status: DISCONTINUED
Start: 2019-04-29 | End: 2019-04-29 | Stop reason: WASHOUT
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
## 2019-04-29 NOTE — DISCHARGE INSTRUCTIONS
POST LUMBAR PUNCTURE DISCHARGE INSTRUCTIONS    General Information:    Lumbar Puncture: A LP is done to help diagnose several disorders, like pseudo tumor, migraines, meningitis, and multiple sclerosis. It involves a puncture (usually in the lower spine) into the sac that protects the spinal column. A sample of the fluid in that space is removed and tested in the lab. Call If:     You should call your Physician and/or the Radiology Nurse if you develop a headache that is not relieved by Tylenol, and worsens when you stand and eases when you lie down, you need to call. You may have developed what is referred to as a spinal headache. Our physician's will probably advise you to be on strict bed rest for 24 hours, to drink lots of fluids and caffeine. If this does not help the head pain, call again the next day. You should call if you have bleeding other than a small spot on your bandage. You should call if you have any numbness, tingling, weakness, fever, chills, urinary retention, severe itching, rash, welts, swelling, or confusion. Follow-Up Instructions: See the doctor who ordered your procedure as he/she has instructed. If you had a Lumbar Puncture, your results should be available to your ordering doctor in 3-5 business days. You can remove your dressing in 24 hours and shower regularly. Do not bathe or swim for 72 hours. To Reach Us:    Should you experience any significant changes, please call [phone] between the hours of 7:30 am and 10 pm or [phone] after hours.  After hours, ask the  to page the [address] [place] [address] Technologist, and describe the problem to the technologist.

## 2019-05-03 ENCOUNTER — TELEPHONE (OUTPATIENT)
Dept: INTERNAL MEDICINE CLINIC | Age: 84
End: 2019-05-03

## 2019-05-03 NOTE — TELEPHONE ENCOUNTER
Patient called to report that she has a brain tumor. Patient stated that she will be having surgery to have a shunt put in. Stated that this will happen in 2 weeks. Patient stated that they can not remove the tumor. Patient wanted to make NP and DR. Carmen Esqueda aware of this. Stated that she wants doctor and NP to know she will be followed by nerosurgeon for this. But she has not forgotten us, just had a lot on her. Patient stated that she can be reached at home.    [phone]

## 2019-05-07 ENCOUNTER — OFFICE VISIT (OUTPATIENT)
Dept: NEUROLOGY | Age: 84
End: 2019-05-07

## 2019-05-07 VITALS
HEIGHT: 63 IN | WEIGHT: 173 LBS | BODY MASS INDEX: 30.65 KG/M2 | SYSTOLIC BLOOD PRESSURE: 150 MMHG | DIASTOLIC BLOOD PRESSURE: 88 MMHG

## 2019-05-07 DIAGNOSIS — G25.0 ESSENTIAL TREMOR: ICD-10-CM
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
## 2019-05-07 NOTE — PROGRESS NOTES
Saw spinal surgeon. They dx her with hydrocephalus and will be putting a shunt in within the next couple of weeks. Accompanied by her daughter, Susan Andre. Constant headache because of the pressure.

## 2019-05-20 ENCOUNTER — TELEPHONE (OUTPATIENT)
Dept: CARDIOLOGY CLINIC | Age: 84
End: 2019-05-20

## 2019-05-23 NOTE — TELEPHONE ENCOUNTER
Called pt to get more information. 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 Dr. Garrett Mcnulty is going to put a shunt in her brain on Roxy 3, 2019. She is requesting that we send records to his office. Dr. Garrett Mcnulty  [address]  Phone [phone]  Renetta Giles NP  Nurse: Hussain Rincon called their office and LVM for Blinda Lank to let us know more specifically what records they require.

## 2019-05-24 ENCOUNTER — HOSPITAL ENCOUNTER (OUTPATIENT)
Dept: PREADMISSION TESTING | Age: 84
Discharge: HOME | End: 2019-05-24
Payer: MEDICARE

## 2019-05-24 VITALS
WEIGHT: 170 LBS | DIASTOLIC BLOOD PRESSURE: 85 MMHG | HEIGHT: 63 IN | HEART RATE: 69 BPM | SYSTOLIC BLOOD PRESSURE: 164 MMHG | BODY MASS INDEX: 30.12 KG/M2 | TEMPERATURE: 97.7 F

## 2019-05-24 LAB
ANION GAP SERPL CALC-SCNC: 6 MMOL/L (ref 5–15)
BASOPHILS # BLD: 0 K/UL (ref 0–0.1)
BASOPHILS NFR BLD: 1 % (ref 0–1)
BUN SERPL-MCNC: 16 MG/DL (ref 6–20)
BUN/CREAT SERPL: 21 (ref 12–20)
CALCIUM SERPL-MCNC: 10.7 MG/DL (ref 8.5–10.1)
CHLORIDE SERPL-SCNC: 106 MMOL/L (ref 97–108)
CO2 SERPL-SCNC: 27 MMOL/L (ref 21–32)
CREAT SERPL-MCNC: 0.77 MG/DL (ref 0.55–1.02)
DIFFERENTIAL METHOD BLD: ABNORMAL
EOSINOPHIL # BLD: 0.1 K/UL (ref 0–0.4)
EOSINOPHIL NFR BLD: 2 % (ref 0–7)
ERYTHROCYTE [DISTWIDTH] IN BLOOD BY AUTOMATED COUNT: 12.9 % (ref 11.5–14.5)
GLUCOSE SERPL-MCNC: 85 MG/DL (ref 65–100)
HCT VFR BLD AUTO: 42 % (ref 35–47)
HGB BLD-MCNC: 13.7 G/DL (ref 11.5–16)
IMM GRANULOCYTES # BLD AUTO: 0 K/UL (ref 0–0.04)
IMM GRANULOCYTES NFR BLD AUTO: 1 % (ref 0–0.5)
LYMPHOCYTES # BLD: 3 K/UL (ref 0.8–3.5)
LYMPHOCYTES NFR BLD: 46 % (ref 12–49)
MCH RBC QN AUTO: 30.9 PG (ref 26–34)
MCHC RBC AUTO-ENTMCNC: 32.6 G/DL (ref 30–36.5)
MCV RBC AUTO: 94.6 FL (ref 80–99)
MONOCYTES # BLD: 0.8 K/UL (ref 0–1)
MONOCYTES NFR BLD: 13 % (ref 5–13)
NEUTS SEG # BLD: 2.4 K/UL (ref 1.8–8)
NEUTS SEG NFR BLD: 37 % (ref 32–75)
NRBC # BLD: 0 K/UL (ref 0–0.01)
NRBC BLD-RTO: 0 PER 100 WBC
PLATELET # BLD AUTO: 327 K/UL (ref 150–400)
PMV BLD AUTO: 10.3 FL (ref 8.9–12.9)
POTASSIUM SERPL-SCNC: 4.4 MMOL/L (ref 3.5–5.1)
RBC # BLD AUTO: 4.44 M/UL (ref 3.8–5.2)
SODIUM SERPL-SCNC: 139 MMOL/L (ref 136–145)
WBC # BLD AUTO: 6.4 K/UL (ref 3.6–11)

## 2019-05-24 PROCEDURE — 80048 BASIC METABOLIC PNL TOTAL CA: CPT

## 2019-05-24 PROCEDURE — 36415 COLL VENOUS BLD VENIPUNCTURE: CPT

## 2019-05-24 PROCEDURE — 85025 COMPLETE CBC W/AUTO DIFF WBC: CPT

## 2019-05-29 RX ORDER — AMLODIPINE BESYLATE 2.5 MG/1
TABLET ORAL
Qty: 90 TAB | Refills: 1 | Status: ON HOLD | OUTPATIENT
Start: 2019-05-29 | End: 2019-06-03

## 2019-05-31 ENCOUNTER — ANESTHESIA EVENT (OUTPATIENT)
Dept: SURGERY | Age: 84
DRG: 033 | End: 2019-05-31
Payer: MEDICARE

## 2019-06-03 ENCOUNTER — ANESTHESIA (OUTPATIENT)
Dept: SURGERY | Age: 84
DRG: 033 | End: 2019-06-03
Payer: MEDICARE

## 2019-06-03 ENCOUNTER — HOSPITAL ENCOUNTER (INPATIENT)
Age: 84
LOS: 5 days | Discharge: NURSING, REHAB OR LONG-TERM CARE | DRG: 033 | End: 2019-06-08
Attending: NEUROLOGICAL SURGERY | Admitting: NEUROLOGICAL SURGERY
Payer: MEDICARE

## 2019-06-03 DIAGNOSIS — Z98.2 S/P VP SHUNT: ICD-10-CM

## 2019-06-03 DIAGNOSIS — R56.9 SEIZURE (HCC): Primary | ICD-10-CM

## 2019-06-03 PROBLEM — G91.2 NPH (NORMAL PRESSURE HYDROCEPHALUS) (HCC): Status: ACTIVE | Noted: 2019-06-03

## 2019-06-03 PROCEDURE — 77030012890

## 2019-06-03 PROCEDURE — 77030011640 HC PAD GRND REM COVD -A: Performed by: NEUROLOGICAL SURGERY

## 2019-06-03 PROCEDURE — 74011250636 HC RX REV CODE- 250/636

## 2019-06-03 PROCEDURE — 00160J6 BYPASS CEREBRAL VENTRICLE TO PERITONEAL CAVITY WITH SYNTHETIC SUBSTITUTE, OPEN APPROACH: ICD-10-PCS | Performed by: NEUROLOGICAL SURGERY

## 2019-06-03 PROCEDURE — 74011250636 HC RX REV CODE- 250/636: Performed by: ANESTHESIOLOGY

## 2019-06-03 PROCEDURE — 74011250637 HC RX REV CODE- 250/637: Performed by: NEUROLOGICAL SURGERY

## 2019-06-03 PROCEDURE — 74011250637 HC RX REV CODE- 250/637: Performed by: NURSE PRACTITIONER

## 2019-06-03 PROCEDURE — 77030018673: Performed by: NEUROLOGICAL SURGERY

## 2019-06-03 PROCEDURE — 77030004391 HC BUR FLUT MEDT -C: Performed by: NEUROLOGICAL SURGERY

## 2019-06-03 PROCEDURE — 77030008467 HC STPLR SKN COVD -B: Performed by: NEUROLOGICAL SURGERY

## 2019-06-03 PROCEDURE — 77030020782 HC GWN BAIR PAWS FLX 3M -B

## 2019-06-03 PROCEDURE — 77030031139 HC SUT VCRL2 J&J -A: Performed by: NEUROLOGICAL SURGERY

## 2019-06-03 PROCEDURE — 77010033678 HC OXYGEN DAILY

## 2019-06-03 PROCEDURE — 77030012270 HC CATH PASS SUBQ MEDT -C: Performed by: NEUROLOGICAL SURGERY

## 2019-06-03 PROCEDURE — 74011000250 HC RX REV CODE- 250

## 2019-06-03 PROCEDURE — 77030029099 HC BN WAX SSPC -A: Performed by: NEUROLOGICAL SURGERY

## 2019-06-03 PROCEDURE — 76060000035 HC ANESTHESIA 2 TO 2.5 HR: Performed by: NEUROLOGICAL SURGERY

## 2019-06-03 PROCEDURE — 74011000250 HC RX REV CODE- 250: Performed by: NEUROLOGICAL SURGERY

## 2019-06-03 PROCEDURE — 77030038600 HC TU BPLR IRR DISP STRY -B: Performed by: NEUROLOGICAL SURGERY

## 2019-06-03 PROCEDURE — 74011250636 HC RX REV CODE- 250/636: Performed by: NEUROLOGICAL SURGERY

## 2019-06-03 PROCEDURE — 77030004833 HC CATH CSF VENT MEDT -C: Performed by: NEUROLOGICAL SURGERY

## 2019-06-03 PROCEDURE — 77030004831 HC CATH CSF PERI MEDT -C: Performed by: NEUROLOGICAL SURGERY

## 2019-06-03 PROCEDURE — 77030037843 HC SHNT CSF CERTAS PRGM J&J -K1: Performed by: NEUROLOGICAL SURGERY

## 2019-06-03 PROCEDURE — 76010000131 HC OR TIME 2 TO 2.5 HR: Performed by: NEUROLOGICAL SURGERY

## 2019-06-03 PROCEDURE — 76210000017 HC OR PH I REC 1.5 TO 2 HR: Performed by: NEUROLOGICAL SURGERY

## 2019-06-03 PROCEDURE — 94760 N-INVAS EAR/PLS OXIMETRY 1: CPT

## 2019-06-03 PROCEDURE — 77030002946 HC SUT NRLN J&J -B: Performed by: NEUROLOGICAL SURGERY

## 2019-06-03 PROCEDURE — 74011000272 HC RX REV CODE- 272: Performed by: NEUROLOGICAL SURGERY

## 2019-06-03 PROCEDURE — 77030002996 HC SUT SLK J&J -A: Performed by: NEUROLOGICAL SURGERY

## 2019-06-03 PROCEDURE — 65610000006 HC RM INTENSIVE CARE

## 2019-06-03 PROCEDURE — 77030010855 HC CATH PASS PERI MEDT -C: Performed by: NEUROLOGICAL SURGERY

## 2019-06-03 PROCEDURE — 77030003029 HC SUT VCRL J&J -B: Performed by: NEUROLOGICAL SURGERY

## 2019-06-03 DEVICE — VALVE VENT DRNGE IN LN W/ ANTI SIPHON DEV CERTAS +: Type: IMPLANTABLE DEVICE | Site: CRANIAL | Status: FUNCTIONAL

## 2019-06-03 DEVICE — CATHETER VENT L23CM STD BA IMPREG R ANG CLP S STL STYL: Type: IMPLANTABLE DEVICE | Site: CRANIAL | Status: FUNCTIONAL

## 2019-06-03 DEVICE — CATH 23047 PERIT STD OE W/SLITS 120: Type: IMPLANTABLE DEVICE | Site: ABDOMEN | Status: FUNCTIONAL

## 2019-06-03 RX ORDER — HYDROMORPHONE HYDROCHLORIDE 1 MG/ML
INJECTION, SOLUTION INTRAMUSCULAR; INTRAVENOUS; SUBCUTANEOUS AS NEEDED
Status: DISCONTINUED | OUTPATIENT
Start: 2019-06-03 | End: 2019-06-03 | Stop reason: HOSPADM

## 2019-06-03 RX ORDER — DEXAMETHASONE SODIUM PHOSPHATE 4 MG/ML
INJECTION, SOLUTION INTRA-ARTICULAR; INTRALESIONAL; INTRAMUSCULAR; INTRAVENOUS; SOFT TISSUE AS NEEDED
Status: DISCONTINUED | OUTPATIENT
Start: 2019-06-03 | End: 2019-06-03 | Stop reason: HOSPADM

## 2019-06-03 RX ORDER — LIDOCAINE HYDROCHLORIDE 20 MG/ML
INJECTION, SOLUTION EPIDURAL; INFILTRATION; INTRACAUDAL; PERINEURAL AS NEEDED
Status: DISCONTINUED | OUTPATIENT
Start: 2019-06-03 | End: 2019-06-03 | Stop reason: HOSPADM

## 2019-06-03 RX ORDER — SODIUM CHLORIDE, SODIUM LACTATE, POTASSIUM CHLORIDE, CALCIUM CHLORIDE 600; 310; 30; 20 MG/100ML; MG/100ML; MG/100ML; MG/100ML
100 INJECTION, SOLUTION INTRAVENOUS CONTINUOUS
Status: DISCONTINUED | OUTPATIENT
Start: 2019-06-03 | End: 2019-06-03 | Stop reason: HOSPADM

## 2019-06-03 RX ORDER — LIDOCAINE HYDROCHLORIDE AND EPINEPHRINE 10; 10 MG/ML; UG/ML
INJECTION, SOLUTION INFILTRATION; PERINEURAL AS NEEDED
Status: DISCONTINUED | OUTPATIENT
Start: 2019-06-03 | End: 2019-06-03 | Stop reason: HOSPADM

## 2019-06-03 RX ORDER — MIDAZOLAM HYDROCHLORIDE 1 MG/ML
0.5 INJECTION, SOLUTION INTRAMUSCULAR; INTRAVENOUS
Status: DISCONTINUED | OUTPATIENT
Start: 2019-06-03 | End: 2019-06-03 | Stop reason: HOSPADM

## 2019-06-03 RX ORDER — SODIUM CHLORIDE 0.9 % (FLUSH) 0.9 %
5-40 SYRINGE (ML) INJECTION EVERY 8 HOURS
Status: DISCONTINUED | OUTPATIENT
Start: 2019-06-03 | End: 2019-06-08 | Stop reason: HOSPADM

## 2019-06-03 RX ORDER — FENTANYL CITRATE 50 UG/ML
INJECTION, SOLUTION INTRAMUSCULAR; INTRAVENOUS AS NEEDED
Status: DISCONTINUED | OUTPATIENT
Start: 2019-06-03 | End: 2019-06-03 | Stop reason: HOSPADM

## 2019-06-03 RX ORDER — LABETALOL HCL 20 MG/4 ML
10 SYRINGE (ML) INTRAVENOUS
Status: DISCONTINUED | OUTPATIENT
Start: 2019-06-03 | End: 2019-06-08 | Stop reason: HOSPADM

## 2019-06-03 RX ORDER — HYDROMORPHONE HYDROCHLORIDE 1 MG/ML
0.2 INJECTION, SOLUTION INTRAMUSCULAR; INTRAVENOUS; SUBCUTANEOUS
Status: DISCONTINUED | OUTPATIENT
Start: 2019-06-03 | End: 2019-06-03 | Stop reason: HOSPADM

## 2019-06-03 RX ORDER — SODIUM CHLORIDE 0.9 % (FLUSH) 0.9 %
5-40 SYRINGE (ML) INJECTION AS NEEDED
Status: DISCONTINUED | OUTPATIENT
Start: 2019-06-03 | End: 2019-06-03 | Stop reason: HOSPADM

## 2019-06-03 RX ORDER — ROCURONIUM BROMIDE 10 MG/ML
INJECTION, SOLUTION INTRAVENOUS AS NEEDED
Status: DISCONTINUED | OUTPATIENT
Start: 2019-06-03 | End: 2019-06-03 | Stop reason: HOSPADM

## 2019-06-03 RX ORDER — FENTANYL CITRATE 50 UG/ML
50 INJECTION, SOLUTION INTRAMUSCULAR; INTRAVENOUS AS NEEDED
Status: DISCONTINUED | OUTPATIENT
Start: 2019-06-03 | End: 2019-06-03 | Stop reason: HOSPADM

## 2019-06-03 RX ORDER — DIAZEPAM 5 MG/1
2.5 TABLET ORAL
Status: DISCONTINUED | OUTPATIENT
Start: 2019-06-03 | End: 2019-06-03

## 2019-06-03 RX ORDER — DIAZEPAM 5 MG/1
2.5 TABLET ORAL DAILY
Status: DISCONTINUED | OUTPATIENT
Start: 2019-06-04 | End: 2019-06-08 | Stop reason: HOSPADM

## 2019-06-03 RX ORDER — DIAZEPAM 5 MG/1
5 TABLET ORAL
COMMUNITY
End: 2019-06-08

## 2019-06-03 RX ORDER — MIDAZOLAM HYDROCHLORIDE 1 MG/ML
1 INJECTION, SOLUTION INTRAMUSCULAR; INTRAVENOUS AS NEEDED
Status: DISCONTINUED | OUTPATIENT
Start: 2019-06-03 | End: 2019-06-03 | Stop reason: HOSPADM

## 2019-06-03 RX ORDER — ROPIVACAINE HYDROCHLORIDE 5 MG/ML
150 INJECTION, SOLUTION EPIDURAL; INFILTRATION; PERINEURAL AS NEEDED
Status: DISCONTINUED | OUTPATIENT
Start: 2019-06-03 | End: 2019-06-03 | Stop reason: HOSPADM

## 2019-06-03 RX ORDER — FENTANYL CITRATE 50 UG/ML
25 INJECTION, SOLUTION INTRAMUSCULAR; INTRAVENOUS
Status: DISCONTINUED | OUTPATIENT
Start: 2019-06-03 | End: 2019-06-03 | Stop reason: HOSPADM

## 2019-06-03 RX ORDER — HYDROMORPHONE HYDROCHLORIDE 1 MG/ML
0.5 INJECTION, SOLUTION INTRAMUSCULAR; INTRAVENOUS; SUBCUTANEOUS
Status: DISCONTINUED | OUTPATIENT
Start: 2019-06-03 | End: 2019-06-04

## 2019-06-03 RX ORDER — ONDANSETRON 2 MG/ML
INJECTION INTRAMUSCULAR; INTRAVENOUS AS NEEDED
Status: DISCONTINUED | OUTPATIENT
Start: 2019-06-03 | End: 2019-06-03 | Stop reason: HOSPADM

## 2019-06-03 RX ORDER — SODIUM CHLORIDE, SODIUM LACTATE, POTASSIUM CHLORIDE, CALCIUM CHLORIDE 600; 310; 30; 20 MG/100ML; MG/100ML; MG/100ML; MG/100ML
INJECTION, SOLUTION INTRAVENOUS
Status: DISCONTINUED | OUTPATIENT
Start: 2019-06-03 | End: 2019-06-03 | Stop reason: HOSPADM

## 2019-06-03 RX ORDER — ACETAMINOPHEN 325 MG/1
650 TABLET ORAL
Status: DISCONTINUED | OUTPATIENT
Start: 2019-06-03 | End: 2019-06-08 | Stop reason: HOSPADM

## 2019-06-03 RX ORDER — ONDANSETRON 2 MG/ML
4 INJECTION INTRAMUSCULAR; INTRAVENOUS
Status: DISCONTINUED | OUTPATIENT
Start: 2019-06-03 | End: 2019-06-08 | Stop reason: HOSPADM

## 2019-06-03 RX ORDER — HYDROCODONE BITARTRATE AND ACETAMINOPHEN 5; 325 MG/1; MG/1
1 TABLET ORAL
Status: DISCONTINUED | OUTPATIENT
Start: 2019-06-03 | End: 2019-06-06

## 2019-06-03 RX ORDER — LIDOCAINE HYDROCHLORIDE 10 MG/ML
0.1 INJECTION, SOLUTION EPIDURAL; INFILTRATION; INTRACAUDAL; PERINEURAL AS NEEDED
Status: DISCONTINUED | OUTPATIENT
Start: 2019-06-03 | End: 2019-06-03 | Stop reason: HOSPADM

## 2019-06-03 RX ORDER — DIAZEPAM 2 MG/1
2.5 TABLET ORAL DAILY
COMMUNITY
End: 2019-06-08

## 2019-06-03 RX ORDER — AMOXICILLIN 250 MG
1 CAPSULE ORAL DAILY
Status: DISCONTINUED | OUTPATIENT
Start: 2019-06-04 | End: 2019-06-08 | Stop reason: HOSPADM

## 2019-06-03 RX ORDER — SODIUM CHLORIDE AND POTASSIUM CHLORIDE .9; .15 G/100ML; G/100ML
SOLUTION INTRAVENOUS CONTINUOUS
Status: DISCONTINUED | OUTPATIENT
Start: 2019-06-03 | End: 2019-06-05

## 2019-06-03 RX ORDER — SODIUM CHLORIDE 0.9 % (FLUSH) 0.9 %
5-40 SYRINGE (ML) INJECTION EVERY 8 HOURS
Status: DISCONTINUED | OUTPATIENT
Start: 2019-06-03 | End: 2019-06-03 | Stop reason: HOSPADM

## 2019-06-03 RX ORDER — POLYETHYLENE GLYCOL 3350 17 G/17G
17 POWDER, FOR SOLUTION ORAL DAILY
Status: DISCONTINUED | OUTPATIENT
Start: 2019-06-04 | End: 2019-06-06

## 2019-06-03 RX ORDER — HYDROCHLOROTHIAZIDE 25 MG/1
25 TABLET ORAL DAILY
Status: DISCONTINUED | OUTPATIENT
Start: 2019-06-04 | End: 2019-06-08 | Stop reason: HOSPADM

## 2019-06-03 RX ORDER — GLYCOPYRROLATE 0.2 MG/ML
INJECTION INTRAMUSCULAR; INTRAVENOUS AS NEEDED
Status: DISCONTINUED | OUTPATIENT
Start: 2019-06-03 | End: 2019-06-03 | Stop reason: HOSPADM

## 2019-06-03 RX ORDER — SUCCINYLCHOLINE CHLORIDE 20 MG/ML
INJECTION INTRAMUSCULAR; INTRAVENOUS AS NEEDED
Status: DISCONTINUED | OUTPATIENT
Start: 2019-06-03 | End: 2019-06-03 | Stop reason: HOSPADM

## 2019-06-03 RX ORDER — FAMOTIDINE 20 MG/1
20 TABLET, FILM COATED ORAL 2 TIMES DAILY
Status: DISCONTINUED | OUTPATIENT
Start: 2019-06-03 | End: 2019-06-04

## 2019-06-03 RX ORDER — ATENOLOL 50 MG/1
25 TABLET ORAL 2 TIMES DAILY
COMMUNITY
End: 2019-06-08

## 2019-06-03 RX ORDER — CEFAZOLIN SODIUM IN 0.9 % NACL 2 G/100 ML
PLASTIC BAG, INJECTION (ML) INTRAVENOUS AS NEEDED
Status: DISCONTINUED | OUTPATIENT
Start: 2019-06-03 | End: 2019-06-03 | Stop reason: HOSPADM

## 2019-06-03 RX ORDER — CEFAZOLIN SODIUM/WATER 2 G/20 ML
2 SYRINGE (ML) INTRAVENOUS EVERY 8 HOURS
Status: COMPLETED | OUTPATIENT
Start: 2019-06-03 | End: 2019-06-04

## 2019-06-03 RX ORDER — HYDRALAZINE HYDROCHLORIDE 20 MG/ML
INJECTION INTRAMUSCULAR; INTRAVENOUS AS NEEDED
Status: DISCONTINUED | OUTPATIENT
Start: 2019-06-03 | End: 2019-06-03 | Stop reason: HOSPADM

## 2019-06-03 RX ORDER — SODIUM CHLORIDE 0.9 % (FLUSH) 0.9 %
5-40 SYRINGE (ML) INJECTION AS NEEDED
Status: DISCONTINUED | OUTPATIENT
Start: 2019-06-03 | End: 2019-06-08 | Stop reason: HOSPADM

## 2019-06-03 RX ORDER — NITROGLYCERIN 20 MG/100ML
0-200 INJECTION INTRAVENOUS
Status: DISCONTINUED | OUTPATIENT
Start: 2019-06-03 | End: 2019-06-03

## 2019-06-03 RX ORDER — HYDRALAZINE HYDROCHLORIDE 20 MG/ML
10 INJECTION INTRAMUSCULAR; INTRAVENOUS
Status: DISCONTINUED | OUTPATIENT
Start: 2019-06-03 | End: 2019-06-08 | Stop reason: HOSPADM

## 2019-06-03 RX ORDER — DIAZEPAM 5 MG/1
5 TABLET ORAL
Status: DISCONTINUED | OUTPATIENT
Start: 2019-06-03 | End: 2019-06-08 | Stop reason: HOSPADM

## 2019-06-03 RX ORDER — NEOSTIGMINE METHYLSULFATE 1 MG/ML
INJECTION INTRAVENOUS AS NEEDED
Status: DISCONTINUED | OUTPATIENT
Start: 2019-06-03 | End: 2019-06-03 | Stop reason: HOSPADM

## 2019-06-03 RX ORDER — ATENOLOL 25 MG/1
25 TABLET ORAL EVERY 12 HOURS
Status: DISCONTINUED | OUTPATIENT
Start: 2019-06-03 | End: 2019-06-08 | Stop reason: HOSPADM

## 2019-06-03 RX ORDER — ATENOLOL 25 MG/1
50 TABLET ORAL
Status: DISCONTINUED | OUTPATIENT
Start: 2019-06-03 | End: 2019-06-03

## 2019-06-03 RX ORDER — MIDAZOLAM HYDROCHLORIDE 1 MG/ML
INJECTION, SOLUTION INTRAMUSCULAR; INTRAVENOUS AS NEEDED
Status: DISCONTINUED | OUTPATIENT
Start: 2019-06-03 | End: 2019-06-03 | Stop reason: HOSPADM

## 2019-06-03 RX ORDER — DIPHENHYDRAMINE HYDROCHLORIDE 50 MG/ML
12.5 INJECTION, SOLUTION INTRAMUSCULAR; INTRAVENOUS AS NEEDED
Status: DISCONTINUED | OUTPATIENT
Start: 2019-06-03 | End: 2019-06-03 | Stop reason: HOSPADM

## 2019-06-03 RX ORDER — PROPOFOL 10 MG/ML
INJECTION, EMULSION INTRAVENOUS AS NEEDED
Status: DISCONTINUED | OUTPATIENT
Start: 2019-06-03 | End: 2019-06-03 | Stop reason: HOSPADM

## 2019-06-03 RX ADMIN — HYDRALAZINE HYDROCHLORIDE 4 MG: 20 INJECTION INTRAMUSCULAR; INTRAVENOUS at 12:07

## 2019-06-03 RX ADMIN — NEOSTIGMINE METHYLSULFATE 2 MG: 1 INJECTION INTRAVENOUS at 12:12

## 2019-06-03 RX ADMIN — MIDAZOLAM HYDROCHLORIDE 1 MG: 1 INJECTION, SOLUTION INTRAMUSCULAR; INTRAVENOUS at 10:11

## 2019-06-03 RX ADMIN — SODIUM CHLORIDE AND POTASSIUM CHLORIDE 100 ML/HR: 9; 1.49 INJECTION, SOLUTION INTRAVENOUS at 13:49

## 2019-06-03 RX ADMIN — HYDROMORPHONE HYDROCHLORIDE 0.5 MG: 1 INJECTION, SOLUTION INTRAMUSCULAR; INTRAVENOUS; SUBCUTANEOUS at 23:14

## 2019-06-03 RX ADMIN — DEXAMETHASONE SODIUM PHOSPHATE 8 MG: 4 INJECTION, SOLUTION INTRA-ARTICULAR; INTRALESIONAL; INTRAMUSCULAR; INTRAVENOUS; SOFT TISSUE at 10:38

## 2019-06-03 RX ADMIN — GLYCOPYRROLATE 0.4 MG: 0.2 INJECTION INTRAMUSCULAR; INTRAVENOUS at 12:12

## 2019-06-03 RX ADMIN — ONDANSETRON 4 MG: 2 INJECTION INTRAMUSCULAR; INTRAVENOUS at 10:47

## 2019-06-03 RX ADMIN — ROCURONIUM BROMIDE 10 MG: 10 INJECTION, SOLUTION INTRAVENOUS at 10:11

## 2019-06-03 RX ADMIN — ROCURONIUM BROMIDE 20 MG: 10 INJECTION, SOLUTION INTRAVENOUS at 10:13

## 2019-06-03 RX ADMIN — MIDAZOLAM HYDROCHLORIDE 1 MG: 1 INJECTION, SOLUTION INTRAMUSCULAR; INTRAVENOUS at 11:37

## 2019-06-03 RX ADMIN — FAMOTIDINE 20 MG: 20 TABLET, FILM COATED ORAL at 18:00

## 2019-06-03 RX ADMIN — FENTANYL CITRATE 25 MCG: 50 INJECTION INTRAMUSCULAR; INTRAVENOUS at 13:01

## 2019-06-03 RX ADMIN — PROPOFOL 70 MG: 10 INJECTION, EMULSION INTRAVENOUS at 10:28

## 2019-06-03 RX ADMIN — HYDRALAZINE HYDROCHLORIDE 4 MG: 20 INJECTION INTRAMUSCULAR; INTRAVENOUS at 11:10

## 2019-06-03 RX ADMIN — DIAZEPAM 2.5 MG: 5 TABLET ORAL at 21:03

## 2019-06-03 RX ADMIN — FENTANYL CITRATE 50 MCG: 50 INJECTION, SOLUTION INTRAMUSCULAR; INTRAVENOUS at 10:11

## 2019-06-03 RX ADMIN — FENTANYL CITRATE 50 MCG: 50 INJECTION, SOLUTION INTRAMUSCULAR; INTRAVENOUS at 10:16

## 2019-06-03 RX ADMIN — SODIUM CHLORIDE, SODIUM LACTATE, POTASSIUM CHLORIDE, CALCIUM CHLORIDE: 600; 310; 30; 20 INJECTION, SOLUTION INTRAVENOUS at 10:09

## 2019-06-03 RX ADMIN — ACETAMINOPHEN 650 MG: 325 TABLET ORAL at 19:36

## 2019-06-03 RX ADMIN — ROCURONIUM BROMIDE 20 MG: 10 INJECTION, SOLUTION INTRAVENOUS at 10:49

## 2019-06-03 RX ADMIN — FENTANYL CITRATE 25 MCG: 50 INJECTION INTRAMUSCULAR; INTRAVENOUS at 13:31

## 2019-06-03 RX ADMIN — HYDROMORPHONE HYDROCHLORIDE 0.5 MG: 1 INJECTION, SOLUTION INTRAMUSCULAR; INTRAVENOUS; SUBCUTANEOUS at 15:42

## 2019-06-03 RX ADMIN — HYDRALAZINE HYDROCHLORIDE 2 MG: 20 INJECTION INTRAMUSCULAR; INTRAVENOUS at 12:25

## 2019-06-03 RX ADMIN — Medication 10 ML: at 15:42

## 2019-06-03 RX ADMIN — Medication 2 G: at 10:23

## 2019-06-03 RX ADMIN — PROPOFOL 130 MG: 10 INJECTION, EMULSION INTRAVENOUS at 10:11

## 2019-06-03 RX ADMIN — FENTANYL CITRATE 50 MCG: 50 INJECTION INTRAMUSCULAR; INTRAVENOUS at 13:14

## 2019-06-03 RX ADMIN — SUCCINYLCHOLINE CHLORIDE 120 MG: 20 INJECTION INTRAMUSCULAR; INTRAVENOUS at 10:11

## 2019-06-03 RX ADMIN — HYDROMORPHONE HYDROCHLORIDE 0.3 MG: 1 INJECTION, SOLUTION INTRAMUSCULAR; INTRAVENOUS; SUBCUTANEOUS at 14:24

## 2019-06-03 RX ADMIN — Medication 10 ML: at 21:04

## 2019-06-03 RX ADMIN — Medication 2 G: at 19:27

## 2019-06-03 RX ADMIN — Medication 10 ML: at 14:00

## 2019-06-03 RX ADMIN — SODIUM CHLORIDE AND POTASSIUM CHLORIDE: 9; 1.49 INJECTION, SOLUTION INTRAVENOUS at 23:17

## 2019-06-03 RX ADMIN — LIDOCAINE HYDROCHLORIDE 100 MG: 20 INJECTION, SOLUTION EPIDURAL; INFILTRATION; INTRACAUDAL; PERINEURAL at 10:11

## 2019-06-03 RX ADMIN — HYDROMORPHONE HYDROCHLORIDE 0.5 MG: 1 INJECTION, SOLUTION INTRAMUSCULAR; INTRAVENOUS; SUBCUTANEOUS at 12:32

## 2019-06-03 RX ADMIN — SODIUM CHLORIDE, SODIUM LACTATE, POTASSIUM CHLORIDE, CALCIUM CHLORIDE: 600; 310; 30; 20 INJECTION, SOLUTION INTRAVENOUS at 12:10

## 2019-06-03 RX ADMIN — ATENOLOL 25 MG: 25 TABLET ORAL at 21:03

## 2019-06-03 NOTE — BRIEF OP NOTE
BRIEF OPERATIVE NOTE    Date of Procedure: 6/3/2019   Preoperative Diagnosis: NORMAL PRESSURE HYDROCEPHALUS  Postoperative Diagnosis: NORMAL PRESSURE HYDROCEPHALUS    Procedure(s):  INSERTIOIN OF CODMAN PROGRAMABLE VENTRICULAR-PERITONEAL SHUNT  Surgeon(s) and Role:     * Jv Diaz MD - Primary         Surgical Assistant: jesus    Surgical Staff:  Circ-1: Blanca Jones RN  Circ-Intern: Checo Andrade RN-1: Cari Kevin  Surg Asst-1: [address], [address] Staff: Maricruz Walters RN  Event Time In Time Out   Incision Start 1153    Incision Close       Anesthesia: General   Estimated Blood Loss: 25  Specimens: * No specimens in log *   Findings: na   Complications: no  Implants:   Implant Name Type Inv.  Item Serial No.  Lot No. LRB No. Used Action   CATH PERITNL STD OPN END 120CM --  - SN/A  CATH PERITNL STD OPN END 120CM --  N/A [employer] [id number] N/A 1 Implanted   CATH CSF VENTRIC BA STRP 23CM -- STRL - SN/A  CATH CSF VENTRIC BA STRP 23CM -- STRL N/A [employer] [facility] [id number] N/A 1 Implanted   VALVE SHNT CSF PRGM -- CODMAN CERTAS - SN/A  VALVE SHNT CSF PRGM -- CODMAN CERTAS N/A LIZZIE CODMAN [id number] Right 1 Implanted

## 2019-06-03 NOTE — ROUTINE PROCESS
Patient: Mike Goldstein MRN: [id number]  SSN: [id number]   YOB: 1935  Age: 81 y.o. Sex: female     Patient is status post Procedure(s):  INSERTIOIN OF CODMAN PROGRAMABLE VENTRICULAR-PERITONEAL SHUNT.     Surgeon(s) and Role:     * Maria Del Rosario Liz MD - Primary    Local/Dose/Irrigation: 1% Xylocaine with EPI, 8ml given local scalp                          Airway - Endotracheal Tube 06/03/19 Oral (Active)                   Dressing/Packing:  Wound Head Right-Dressing Type: 4 x 4;Transparent film(4x4's and tegaderm) (06/03/19 1100)  Wound Abdomen Right-Dressing Type: (exofin) (06/03/19 1100)    Splint/Cast:  ]    Other:

## 2019-06-03 NOTE — PERIOP NOTES
TRANSFER - OUT REPORT:    Verbal report given to Fiordaliza Sterling (name) on Sherry Little  being transferred to ICU 10 (unit) for routine post - op       Report consisted of patients Situation, Background, Assessment and   Recommendations(SBAR). Time Pre op antibiotic given:1023  Anesthesia Stop time: 6101  Rahman Present on Transfer to floor:y  Order for Rahman on Chart:y  Discharge Prescriptions with Chart:n    Information from the following report(s) SBAR, OR Summary, Procedure Summary, Intake/Output and MAR was reviewed with the receiving nurse. Opportunity for questions and clarification was provided. Is the patient on 02? YES       L/Min 2       Other     Is the patient on a monitor? YES    Is the nurse transporting with the patient? YES    Surgical Waiting Area notified of patient's transfer from PACU? YES      The following personal items collected during your admission accompanied patient upon transfer:   Dental Appliance:    Vision: Visual Aid: Glasses  Hearing Aid:    Jewelry:    Clothing: Clothing: (clothing to [employer])  Other Valuables:  Other Valuables: Eyeglasses, Walker(SON TO TAKE WALKER)  Valuables sent to safe:        Glasses and clothes to floor with pt

## 2019-06-03 NOTE — ANESTHESIA PREPROCEDURE EVALUATION
Relevant Problems   No relevant active problems       Anesthetic History               Review of Systems / Medical History      Pulmonary        Sleep apnea           Neuro/Psych     seizures  CVA  TIA and psychiatric history     Cardiovascular    Hypertension                   GI/Hepatic/Renal     GERD           Endo/Other        Arthritis     Other Findings              Physical Exam    Airway  Mallampati: II  TM Distance: > 6 cm  Neck ROM: normal range of motion   Mouth opening: Normal     Cardiovascular  Regular rate and rhythm,  S1 and S2 normal,  no murmur, click, rub, or gallop             Dental  No notable dental hx       Pulmonary  Breath sounds clear to auscultation               Abdominal  GI exam deferred       Other Findings            Anesthetic Plan    ASA: 3  Anesthesia type: general          Induction: Intravenous  Anesthetic plan and risks discussed with: Patient

## 2019-06-03 NOTE — ANESTHESIA POSTPROCEDURE EVALUATION
Post-Anesthesia Evaluation and Assessment    Patient: Ally Hung MRN: [id number]  SSN: [id number]    YOB: 1935  Age: 81 y.o. Sex: female      I have evaluated the patient and they are stable and ready for discharge from the PACU. Cardiovascular Function/Vital Signs  Visit Vitals  /64   Pulse 61   Temp 36.4 °C (97.5 °F)   Resp 11   Ht 5' 3\" (1.6 m)   Wt 77.1 kg (170 lb)   SpO2 95%   BMI 30.11 kg/m²       Patient is status post General anesthesia for Procedure(s):  INSERTIOIN OF CODMAN PROGRAMABLE VENTRICULAR-PERITONEAL SHUNT. Nausea/Vomiting: None    Postoperative hydration reviewed and adequate. Pain:  Pain Scale 1: Numeric (0 - 10) (06/03/19 1331)  Pain Intensity 1: 8 (06/03/19 1331)   Managed    Neurological Status:   Neuro (WDL): (unable balance) (06/03/19 1220)  Neuro  Neurologic State: Drowsy (06/03/19 1220)  Orientation Level: Oriented X4 (06/03/19 1220)  Cognition: Appropriate for age attention/concentration (06/03/19 1220)  Speech: Clear (06/03/19 1220)  Assessment L Pupil: Brisk (06/03/19 1220)  Size L Pupil (mm): 3 (06/03/19 1220)  Assessment R Pupil: Brisk (06/03/19 1220)  Size R Pupil (mm): 3 (06/03/19 1220)  LUE Motor Response: Purposeful (06/03/19 1220)  LLE Motor Response: Purposeful (06/03/19 1220)  RUE Motor Response: Purposeful (06/03/19 1220)  RLE Motor Response: Purposeful (06/03/19 1220)   At baseline    Mental Status, Level of Consciousness: Alert and  oriented to person, place, and time    Pulmonary Status:   O2 Device: Nasal cannula (06/03/19 1243)   Adequate oxygenation and airway patent    Complications related to anesthesia: None    Post-anesthesia assessment completed.  No concerns    Signed By: Sara Vasquez MD     Roxy 3, 2019              Procedure(s):  INSERTIOIN OF CODMAN PROGRAMABLE VENTRICULAR-PERITONEAL SHUNT.    general    <BSHSIANPOST>    Vitals Value Taken Time   /64 6/3/2019  1:48 PM   Temp 36.4 °C (97.5 °F) 6/3/2019  1:45 PM Pulse 66 6/3/2019  2:00 PM   Resp 17 6/3/2019  2:00 PM   SpO2 94 % 6/3/2019  2:00 PM   Vitals shown include unvalidated device data.

## 2019-06-04 ENCOUNTER — APPOINTMENT (OUTPATIENT)
Dept: CT IMAGING | Age: 84
DRG: 033 | End: 2019-06-04
Attending: NEUROLOGICAL SURGERY
Payer: MEDICARE

## 2019-06-04 ENCOUNTER — APPOINTMENT (OUTPATIENT)
Dept: GENERAL RADIOLOGY | Age: 84
DRG: 033 | End: 2019-06-04
Attending: NEUROLOGICAL SURGERY
Payer: MEDICARE

## 2019-06-04 ENCOUNTER — ANESTHESIA EVENT (OUTPATIENT)
Dept: SURGERY | Age: 84
DRG: 033 | End: 2019-06-04
Payer: MEDICARE

## 2019-06-04 ENCOUNTER — ANESTHESIA (OUTPATIENT)
Dept: SURGERY | Age: 84
DRG: 033 | End: 2019-06-04
Payer: MEDICARE

## 2019-06-04 PROCEDURE — 76010000149 HC OR TIME 1 TO 1.5 HR: Performed by: NEUROLOGICAL SURGERY

## 2019-06-04 PROCEDURE — 74011000272 HC RX REV CODE- 272: Performed by: NEUROLOGICAL SURGERY

## 2019-06-04 PROCEDURE — 00W60JZ REVISION OF SYNTHETIC SUBSTITUTE IN CEREBRAL VENTRICLE, OPEN APPROACH: ICD-10-PCS | Performed by: NEUROLOGICAL SURGERY

## 2019-06-04 PROCEDURE — 77010033678 HC OXYGEN DAILY

## 2019-06-04 PROCEDURE — 76210000006 HC OR PH I REC 0.5 TO 1 HR: Performed by: NEUROLOGICAL SURGERY

## 2019-06-04 PROCEDURE — 77030026438 HC STYL ET INTUB CARD -A: Performed by: ANESTHESIOLOGY

## 2019-06-04 PROCEDURE — 77030003029 HC SUT VCRL J&J -B: Performed by: NEUROLOGICAL SURGERY

## 2019-06-04 PROCEDURE — 77030031139 HC SUT VCRL2 J&J -A: Performed by: NEUROLOGICAL SURGERY

## 2019-06-04 PROCEDURE — 74011250637 HC RX REV CODE- 250/637: Performed by: NEUROLOGICAL SURGERY

## 2019-06-04 PROCEDURE — 77030029099 HC BN WAX SSPC -A: Performed by: NEUROLOGICAL SURGERY

## 2019-06-04 PROCEDURE — 74011250636 HC RX REV CODE- 250/636: Performed by: NEUROLOGICAL SURGERY

## 2019-06-04 PROCEDURE — 70450 CT HEAD/BRAIN W/O DYE: CPT

## 2019-06-04 PROCEDURE — 65610000006 HC RM INTENSIVE CARE

## 2019-06-04 PROCEDURE — 77030038600 HC TU BPLR IRR DISP STRY -B: Performed by: NEUROLOGICAL SURGERY

## 2019-06-04 PROCEDURE — 77030014006 HC SPNG HEMSTAT J&J -A: Performed by: NEUROLOGICAL SURGERY

## 2019-06-04 PROCEDURE — 77030008684 HC TU ET CUF COVD -B: Performed by: ANESTHESIOLOGY

## 2019-06-04 PROCEDURE — 74011250636 HC RX REV CODE- 250/636: Performed by: NURSE PRACTITIONER

## 2019-06-04 PROCEDURE — 74011000250 HC RX REV CODE- 250

## 2019-06-04 PROCEDURE — 74011000250 HC RX REV CODE- 250: Performed by: NEUROLOGICAL SURGERY

## 2019-06-04 PROCEDURE — 74011250636 HC RX REV CODE- 250/636

## 2019-06-04 PROCEDURE — 74019 RADEX ABDOMEN 2 VIEWS: CPT

## 2019-06-04 PROCEDURE — 77030004833 HC CATH CSF VENT MEDT -C: Performed by: NEUROLOGICAL SURGERY

## 2019-06-04 PROCEDURE — 77030011640 HC PAD GRND REM COVD -A: Performed by: NEUROLOGICAL SURGERY

## 2019-06-04 PROCEDURE — 77030008467 HC STPLR SKN COVD -B: Performed by: NEUROLOGICAL SURGERY

## 2019-06-04 PROCEDURE — 77030002946 HC SUT NRLN J&J -B: Performed by: NEUROLOGICAL SURGERY

## 2019-06-04 PROCEDURE — 74011000250 HC RX REV CODE- 250: Performed by: NURSE PRACTITIONER

## 2019-06-04 PROCEDURE — 74011250637 HC RX REV CODE- 250/637: Performed by: NURSE PRACTITIONER

## 2019-06-04 PROCEDURE — 77030008027: Performed by: NEUROLOGICAL SURGERY

## 2019-06-04 PROCEDURE — 77030018673: Performed by: NEUROLOGICAL SURGERY

## 2019-06-04 PROCEDURE — 76060000033 HC ANESTHESIA 1 TO 1.5 HR: Performed by: NEUROLOGICAL SURGERY

## 2019-06-04 RX ORDER — ROCURONIUM BROMIDE 10 MG/ML
INJECTION, SOLUTION INTRAVENOUS AS NEEDED
Status: DISCONTINUED | OUTPATIENT
Start: 2019-06-04 | End: 2019-06-04 | Stop reason: HOSPADM

## 2019-06-04 RX ORDER — FACIAL-BODY WIPES
10 EACH TOPICAL
Status: COMPLETED | OUTPATIENT
Start: 2019-06-04 | End: 2019-06-04

## 2019-06-04 RX ORDER — FENTANYL CITRATE 50 UG/ML
INJECTION, SOLUTION INTRAMUSCULAR; INTRAVENOUS AS NEEDED
Status: DISCONTINUED | OUTPATIENT
Start: 2019-06-04 | End: 2019-06-04 | Stop reason: HOSPADM

## 2019-06-04 RX ORDER — GLYCOPYRROLATE 0.2 MG/ML
INJECTION INTRAMUSCULAR; INTRAVENOUS AS NEEDED
Status: DISCONTINUED | OUTPATIENT
Start: 2019-06-04 | End: 2019-06-04 | Stop reason: HOSPADM

## 2019-06-04 RX ORDER — ONDANSETRON 2 MG/ML
INJECTION INTRAMUSCULAR; INTRAVENOUS AS NEEDED
Status: DISCONTINUED | OUTPATIENT
Start: 2019-06-04 | End: 2019-06-04 | Stop reason: HOSPADM

## 2019-06-04 RX ORDER — LIDOCAINE HYDROCHLORIDE 20 MG/ML
INJECTION, SOLUTION EPIDURAL; INFILTRATION; INTRACAUDAL; PERINEURAL AS NEEDED
Status: DISCONTINUED | OUTPATIENT
Start: 2019-06-04 | End: 2019-06-04 | Stop reason: HOSPADM

## 2019-06-04 RX ORDER — ACETAMINOPHEN 10 MG/ML
INJECTION, SOLUTION INTRAVENOUS AS NEEDED
Status: DISCONTINUED | OUTPATIENT
Start: 2019-06-04 | End: 2019-06-04 | Stop reason: HOSPADM

## 2019-06-04 RX ORDER — SODIUM CHLORIDE, SODIUM LACTATE, POTASSIUM CHLORIDE, CALCIUM CHLORIDE 600; 310; 30; 20 MG/100ML; MG/100ML; MG/100ML; MG/100ML
INJECTION, SOLUTION INTRAVENOUS
Status: DISCONTINUED | OUTPATIENT
Start: 2019-06-04 | End: 2019-06-04 | Stop reason: HOSPADM

## 2019-06-04 RX ORDER — NEOSTIGMINE METHYLSULFATE 1 MG/ML
INJECTION INTRAVENOUS AS NEEDED
Status: DISCONTINUED | OUTPATIENT
Start: 2019-06-04 | End: 2019-06-04 | Stop reason: HOSPADM

## 2019-06-04 RX ORDER — DEXMEDETOMIDINE HYDROCHLORIDE 4 UG/ML
INJECTION, SOLUTION INTRAVENOUS AS NEEDED
Status: DISCONTINUED | OUTPATIENT
Start: 2019-06-04 | End: 2019-06-04 | Stop reason: HOSPADM

## 2019-06-04 RX ORDER — HYDROMORPHONE HYDROCHLORIDE 1 MG/ML
0.5 INJECTION, SOLUTION INTRAMUSCULAR; INTRAVENOUS; SUBCUTANEOUS ONCE
Status: DISPENSED | OUTPATIENT
Start: 2019-06-04 | End: 2019-06-05

## 2019-06-04 RX ORDER — PROPOFOL 10 MG/ML
INJECTION, EMULSION INTRAVENOUS AS NEEDED
Status: DISCONTINUED | OUTPATIENT
Start: 2019-06-04 | End: 2019-06-04 | Stop reason: HOSPADM

## 2019-06-04 RX ORDER — PHENYLEPHRINE HCL IN 0.9% NACL 0.4MG/10ML
SYRINGE (ML) INTRAVENOUS AS NEEDED
Status: DISCONTINUED | OUTPATIENT
Start: 2019-06-04 | End: 2019-06-04 | Stop reason: HOSPADM

## 2019-06-04 RX ORDER — SUCCINYLCHOLINE CHLORIDE 20 MG/ML
INJECTION INTRAMUSCULAR; INTRAVENOUS AS NEEDED
Status: DISCONTINUED | OUTPATIENT
Start: 2019-06-04 | End: 2019-06-04 | Stop reason: HOSPADM

## 2019-06-04 RX ORDER — HYDROMORPHONE HYDROCHLORIDE 1 MG/ML
1 INJECTION, SOLUTION INTRAMUSCULAR; INTRAVENOUS; SUBCUTANEOUS
Status: DISCONTINUED | OUTPATIENT
Start: 2019-06-04 | End: 2019-06-06

## 2019-06-04 RX ORDER — EPHEDRINE SULFATE/0.9% NACL/PF 50 MG/5 ML
SYRINGE (ML) INTRAVENOUS AS NEEDED
Status: DISCONTINUED | OUTPATIENT
Start: 2019-06-04 | End: 2019-06-04 | Stop reason: HOSPADM

## 2019-06-04 RX ADMIN — LIDOCAINE HYDROCHLORIDE 80 MG: 20 INJECTION, SOLUTION EPIDURAL; INFILTRATION; INTRACAUDAL; PERINEURAL at 14:57

## 2019-06-04 RX ADMIN — PROPOFOL 80 MG: 10 INJECTION, EMULSION INTRAVENOUS at 14:57

## 2019-06-04 RX ADMIN — ROCURONIUM BROMIDE 10 MG: 10 INJECTION, SOLUTION INTRAVENOUS at 14:57

## 2019-06-04 RX ADMIN — Medication 10 ML: at 14:09

## 2019-06-04 RX ADMIN — Medication 80 MCG: at 15:27

## 2019-06-04 RX ADMIN — DIAZEPAM 5 MG: 5 TABLET ORAL at 22:27

## 2019-06-04 RX ADMIN — SUCCINYLCHOLINE CHLORIDE 120 MG: 20 INJECTION INTRAMUSCULAR; INTRAVENOUS at 14:57

## 2019-06-04 RX ADMIN — HYDROMORPHONE HYDROCHLORIDE 0.5 MG: 1 INJECTION, SOLUTION INTRAMUSCULAR; INTRAVENOUS; SUBCUTANEOUS at 13:22

## 2019-06-04 RX ADMIN — ONDANSETRON 4 MG: 2 INJECTION INTRAMUSCULAR; INTRAVENOUS at 02:39

## 2019-06-04 RX ADMIN — Medication 2 G: at 01:43

## 2019-06-04 RX ADMIN — ROCURONIUM BROMIDE 10 MG: 10 INJECTION, SOLUTION INTRAVENOUS at 15:00

## 2019-06-04 RX ADMIN — Medication 10 ML: at 06:02

## 2019-06-04 RX ADMIN — HYDROMORPHONE HYDROCHLORIDE 0.5 MG: 1 INJECTION, SOLUTION INTRAMUSCULAR; INTRAVENOUS; SUBCUTANEOUS at 08:47

## 2019-06-04 RX ADMIN — Medication 10 ML: at 22:28

## 2019-06-04 RX ADMIN — ONDANSETRON 4 MG: 2 INJECTION INTRAMUSCULAR; INTRAVENOUS at 15:35

## 2019-06-04 RX ADMIN — ACETAMINOPHEN 650 MG: 325 TABLET ORAL at 20:27

## 2019-06-04 RX ADMIN — SODIUM CHLORIDE, SODIUM LACTATE, POTASSIUM CHLORIDE, CALCIUM CHLORIDE: 600; 310; 30; 20 INJECTION, SOLUTION INTRAVENOUS at 14:49

## 2019-06-04 RX ADMIN — BISACODYL 10 MG: 10 SUPPOSITORY RECTAL at 11:28

## 2019-06-04 RX ADMIN — ACETAMINOPHEN 650 MG: 10 INJECTION, SOLUTION INTRAVENOUS at 15:21

## 2019-06-04 RX ADMIN — DEXMEDETOMIDINE HYDROCHLORIDE 10 MCG: 4 INJECTION, SOLUTION INTRAVENOUS at 14:58

## 2019-06-04 RX ADMIN — FENTANYL CITRATE 50 MCG: 50 INJECTION, SOLUTION INTRAMUSCULAR; INTRAVENOUS at 14:57

## 2019-06-04 RX ADMIN — Medication 10 MG: at 15:02

## 2019-06-04 RX ADMIN — FAMOTIDINE 20 MG: 10 INJECTION, SOLUTION INTRAVENOUS at 14:09

## 2019-06-04 RX ADMIN — HYDROMORPHONE HYDROCHLORIDE 0.5 MG: 1 INJECTION, SOLUTION INTRAMUSCULAR; INTRAVENOUS; SUBCUTANEOUS at 02:39

## 2019-06-04 RX ADMIN — Medication 2 G: at 09:21

## 2019-06-04 RX ADMIN — NEOSTIGMINE METHYLSULFATE 3 MG: 1 INJECTION INTRAVENOUS at 15:38

## 2019-06-04 RX ADMIN — GLYCOPYRROLATE 0.4 MG: 0.2 INJECTION INTRAMUSCULAR; INTRAVENOUS at 15:38

## 2019-06-04 RX ADMIN — ROCURONIUM BROMIDE 10 MG: 10 INJECTION, SOLUTION INTRAVENOUS at 15:09

## 2019-06-04 NOTE — BRIEF OP NOTE
BRIEF OPERATIVE NOTE    Date of Procedure: 6/4/2019   Preoperative Diagnosis: hydrocephalus  Postoperative Diagnosis: hydrocephalus    Procedure(s):  REVISION RIGHT FRONT PROXIMAL SHUNT  Surgeon(s) and Role:     * Armen Rebollar MD - Primary         Surgical Assistant: staff    Surgical Staff:  Circ-1: Kristine Suresh RN  Circ-2: Cristin Louie RN  Scrub RN-1: Johnna Chris RN  Surg Asst-1: Ino Proctor  Event Time In Time Out   Incision Start 06/04/2019 1515    Incision Close 06/04/2019 1536      Anesthesia: General   Estimated Blood Loss: 5cc  Specimens: * No specimens in log *   Findings: na   Complications: no  Implants:   Implant Name Type Inv.  Item Serial No.  Lot No. LRB No. Used Action   CATH CSF VENTRIC BA STRP 23CM -- STRL - SNA  CATH CSF VENTRIC BA STRP 23CM -- STRL NA [email or web address] NA Right 1 Implanted

## 2019-06-04 NOTE — PROGRESS NOTES
Reason for Admission: For insertion of a  shunt                   RRAT Score:    10 / green                 Plan for utilizing home health:  TBD                        Current Advanced Directive/Advance Care Plan: On File  Likelihood of Readmission:  Low                         Transition of Care Plan:      TBD    Care manager met with patient and her son Tesha Thomas [phone] to introduce self and explain role. Patient is independent, but has not driven since December 2018 due to seizures. Her son lives a mile away and takes her to her appointments. She confirmed her PCP to be Dr Dorcas Jay and sees her yearly and uses the [facility] on [address] as her pharmacy. Patient uses a walker, no previous home health needs. Confirmed her insurance to be Medicare A,B and an [address] [place] [address] Dr. Patient would like to go to rehab prior to going home, will need a PT/OT eval. Robles Sandoval RN,CRM        Care Management Interventions  PCP Verified by CM: Yes(Dr Dorcas Jay)  Palliative Care Criteria Met (RRAT>21 & CHF Dx)?: No  MyChart Signup: No  Discharge Durable Medical Equipment: No  Physical Therapy Consult: No  Occupational Therapy Consult: No  Speech Therapy Consult: No  Current Support Network: (Fransisco Thomas [phone])  Plan discussed with Pt/Family/Caregiver:  Yes

## 2019-06-04 NOTE — PROGRESS NOTES
Pt stable. C/o some abd distention. Ct head shows prox shunt not in ventricle. D/w pt and family will take to or to re-position proximal shunt.   Will get ap and lateral abd xray to insure distal position first

## 2019-06-04 NOTE — ANESTHESIA PREPROCEDURE EVALUATION
Relevant Problems   No relevant active problems       Anesthetic History   No history of anesthetic complications            Review of Systems / Medical History  Patient summary reviewed, nursing notes reviewed and pertinent labs reviewed    Pulmonary  Within defined limits      Sleep apnea           Neuro/Psych   Within defined limits  seizures  CVA  TIA and psychiatric history     Cardiovascular  Within defined limits  Hypertension                   GI/Hepatic/Renal  Within defined limits   GERD           Endo/Other  Within defined limits      Arthritis     Other Findings              Physical Exam    Airway  Mallampati: II  TM Distance: > 6 cm  Neck ROM: normal range of motion   Mouth opening: Normal     Cardiovascular  Regular rate and rhythm,  S1 and S2 normal,  no murmur, click, rub, or gallop             Dental  No notable dental hx       Pulmonary  Breath sounds clear to auscultation               Abdominal  GI exam deferred       Other Findings            Anesthetic Plan    ASA: 3  Anesthesia type: general          Induction: Intravenous  Anesthetic plan and risks discussed with: Patient

## 2019-06-04 NOTE — PROGRESS NOTES
1930- Bedside and Verbal shift change report given to David SMITH RN (oncoming nurse) by Ama Dewitt RN (offgoing nurse). Report included the following information SBAR, Kardex, OR Summary, Intake/Output, MAR, Recent Results, Med Rec Status and Cardiac Rhythm NSR.    2045- Paged Dr. Bruce Maynard, Neurosurgery on-call, to be made aware of patient complaining of double vision that has gone away and tingling in her L groin that has also gone away after pausing SCD. No orders received. Will monitor. 2330- Bedside and Verbal shift change report given to [address] [place] [id number] (oncoming nurse) by Clay Oliva RN (offgoing nurse). Report included the following information SBAR, Kardex, OR Summary, Procedure Summary, Intake/Output, MAR, Recent Results, Med Rec Status and Cardiac Rhythm NSR.

## 2019-06-04 NOTE — OP NOTES
[address]   OPERATIVE REPORT    Name:  Laqueta Olszewski  MR#:  [id number]  :  1935  ACCOUNT #:  [de-identified]  DATE OF SERVICE:  2019    PREOPERATIVE DIAGNOSIS:  Normal pressure hydrocephalus. POSTOPERATIVE DIAGNOSIS:  Normal pressure hydrocephalus. PROCEDURE PERFORMED:  Insertion of right frontal ventricular peritoneal shunt using Codman CERTAS valve set a 5 setting. SURGEON:  MD Óscra Goodwin.    ANESTHESIA:  General endotracheal anesthesia. COMPLICATIONS:  None. SPECIMENS REMOVED:  None. IMPLANTS:  As noted above. ESTIMATED BLOOD LOSS:  25 mL. OPERATIVE INDICATIONS:  An 81-year-old female with progressive decline in mobility, neurologic workup did not show any clear radiographic findings. She underwent a large-volume lumbar puncture which remarkably helped symptoms. After discussing treatment options and risks of surgery, informed consent was obtained. OPERATION IN DETAIL:  The patient was taken to the operating room and placed under general endotracheal anesthesia. All necessary lines and monitors were placed. She was given appropriate dose of IV antibiotics. SCDs and Rahman were placed. She was placed supine on the operating table with head turned to the left. The right side of the head, neck, chest, and abdomen were prepped and draped in standard sterile fashion. A curvilinear incision was made in the right frontal region with a skin knife and carried down with Bovie electrocautery through the scalp. Pericranium was left in place. A high-speed drill was used to make a bur hole in this area. I then created a subgaleal pocket underneath the scalp. A right paraumbilical linear incision was then made in the abdomen and carried down with Bovie electrocautery down through the fascial layers. Self-retaining retractor was placed. I then split the abdominal musculature and identified the peritoneum and opened this up. I easily was able to pass the instruments into the peritoneum. There was a lot of omental fat but that clearly appeared to be in the correct place. I then passed the tunneler from the proximal incision from the abdominal incision up to the cranial incision using multiple passers and #1 silk ties. I then pulled the 120 mm peritoneal catheter through from the abdominal incision to the right frontal incision. This was flushed with good flow. I then attached the [address] valve and sutured this to the peritoneal catheter. This was set I believe at 5 prior to the case. This was protected with antibiotic-soaked sponge. I then opened the dura and cauterized the mela arachnoid and dura. I then passed a shunt catheter into the right lateral ventricle with good flow of clear cerebrospinal fluid. This was cut adjusted and then attached to the valve. This was also tied into place with 2-0 silk tie. I then pulled this through and pulled the shunt valve underneath the scalp, tried not to pull the shunt catheter out or pushing it in too far. It was then hooked up to the L-connector. I pumped in the shunt valve and it pumped and refilled well with good flow of CSF distally. The distal end was then placed into the peritoneum and it was closed tightly around the peritoneal layer. The L-connector was then sutured to the pericranium. The cranial wound was then closed using inverted 2-0 Vicryl and staples on the skin. The abdominal wound was closed with 2-0 Vicryl on the fascial deep dermal layer and 4-0 Monocryl on the skin. The remaining stab incisions were closed with 2-0 Vicryl in the dermal layer and Dermabond. Wounds were cleaned, dried, and dressed with sterile dressing. The patient was then extubated and taken to recovery room in stable condition.         MD MP [id number]  D:  06/03/2019 16:35  T:  06/03/2019 16:39  JOB #:  [id number]  CC:  MD Kael Starr Xi Grey MD

## 2019-06-04 NOTE — PROGRESS NOTES
PCCM    In ICU for observation after  shunt  No other pulmonary or critical care issues  Neurosurgery following  We are available to see her as needed    Charmaine Quintanilla MD

## 2019-06-04 NOTE — PERIOP NOTES
TRANSFER - OUT REPORT:    Verbal report given to Luz(name) on Ana Laura Craig  being transferred to ICU10(unit) for routine post - op       Report consisted of patients Situation, Background, Assessment and   Recommendations(SBAR). Time Pre op antibiotic given:1507  Anesthesia Stop time:   Rahman Present on Transfer to floor:yes  Order for Rahman on Chart:yes  Discharge Prescriptions with Chart:no    Information from the following report(s) SBAR, Kardex, OR Summary, Procedure Summary, Intake/Output, MAR, Recent Results and Med Rec Status was reviewed with the receiving nurse. Opportunity for questions and clarification was provided. Is the patient on 02? YES       L/Min 2       Other     Is the patient on a monitor? YES    Is the nurse transporting with the patient? YES    Surgical Waiting Area notified of patient's transfer from PACU? YES      The following personal items collected during your admission accompanied patient upon transfer:   Dental Appliance: Dental Appliances: None  Vision: Visual Aid: Glasses, With patient  Hearing Aid: Hearing Aid: At home  Jewelry: Jewelry: None  Clothing: Clothing: None  Other Valuables:  Other Valuables: None  Valuables sent to safe:        None in PACU

## 2019-06-04 NOTE — ANESTHESIA POSTPROCEDURE EVALUATION
Procedure(s):  REVISION RIGHT FRONT PROXIMAL SHUNT.    general    Anesthesia Post Evaluation      Multimodal analgesia: multimodal analgesia used between 6 hours prior to anesthesia start to PACU discharge  Patient location during evaluation: bedside  Patient participation: waiting for patient participation  Level of consciousness: awake  Pain management: adequate  Airway patency: patent  Anesthetic complications: no  Cardiovascular status: acceptable  Respiratory status: unassisted  Hydration status: acceptable  Comments: Post-Anesthesia Evaluation and Assessment    I have evaluated the patient and they are ready for PACU discharge. Patient: Janet Meyer MRN: [id number]  SSN: [id number]   YOB: 1935  Age: 81 y.o. Sex: female      Cardiovascular Function/Vital Signs  /53   Pulse 85   Temp 36.4 °C (97.6 °F)   Resp 12   Ht 5' 3\" (1.6 m)   Wt 86.2 kg (190 lb 0.6 oz)   SpO2 95%   BMI 33.66 kg/m²     Patient is status post General anesthesia for Procedure(s):  REVISION RIGHT FRONT PROXIMAL SHUNT. Nausea/Vomiting: None    Postoperative hydration reviewed and adequate. Pain:  Pain Scale 1: Numeric (0 - 10) (06/04/19 1603)  Pain Intensity 1: 4 (06/04/19 1603)   Managed    Neurological Status:   Neuro (WDL): Within Defined Limits (06/04/19 1603)  Neuro  Neurologic State: Alert (06/04/19 1603)  Orientation Level: Oriented X4 (06/04/19 1603)  Cognition: Appropriate decision making; Follows commands (06/04/19 1603)  Speech: Clear (06/04/19 1603)  Assessment L Pupil: Brisk;Round (06/04/19 1603)  Size L Pupil (mm): 2 (06/04/19 1603)  Assessment R Pupil: Brisk;Round (06/04/19 1603)  Size R Pupil (mm): 2 (06/04/19 1603)  LUE Motor Response: Purposeful (06/04/19 1603)  LLE Motor Response: Purposeful (06/04/19 1603)  RUE Motor Response: Purposeful (06/04/19 1603)  RLE Motor Response: Purposeful (06/04/19 1603)   At baseline    Mental Status, Level of Consciousness: Alert and  oriented to person, place, and time    Pulmonary Status:   O2 Device: CO2 nasal cannula (06/04/19 0209)   Adequate oxygenation and airway patent    Complications related to anesthesia: None    Post-anesthesia assessment completed. No concerns    Signed By: Claudette Pill, MD    June 4, 2019                   Vitals Value Taken Time   /91 6/4/2019  4:10 PM   Temp 36.4 °C (97.6 °F) 6/4/2019  4:09 PM   Pulse 77 6/4/2019  4:15 PM   Resp 15 6/4/2019  4:15 PM   SpO2 94 % 6/4/2019  4:15 PM   Vitals shown include unvalidated device data.

## 2019-06-05 ENCOUNTER — APPOINTMENT (OUTPATIENT)
Dept: CT IMAGING | Age: 84
DRG: 033 | End: 2019-06-05
Attending: NEUROLOGICAL SURGERY
Payer: MEDICARE

## 2019-06-05 LAB
ANION GAP SERPL CALC-SCNC: 8 MMOL/L (ref 5–15)
BUN SERPL-MCNC: 11 MG/DL (ref 6–20)
BUN/CREAT SERPL: 13 (ref 12–20)
CALCIUM SERPL-MCNC: 9.3 MG/DL (ref 8.5–10.1)
CHLORIDE SERPL-SCNC: 112 MMOL/L (ref 97–108)
CO2 SERPL-SCNC: 20 MMOL/L (ref 21–32)
CREAT SERPL-MCNC: 0.83 MG/DL (ref 0.55–1.02)
ERYTHROCYTE [DISTWIDTH] IN BLOOD BY AUTOMATED COUNT: 13.4 % (ref 11.5–14.5)
GLUCOSE SERPL-MCNC: 132 MG/DL (ref 65–100)
HCT VFR BLD AUTO: 37.8 % (ref 35–47)
HGB BLD-MCNC: 12 G/DL (ref 11.5–16)
MCH RBC QN AUTO: 31.4 PG (ref 26–34)
MCHC RBC AUTO-ENTMCNC: 31.7 G/DL (ref 30–36.5)
MCV RBC AUTO: 99 FL (ref 80–99)
NRBC # BLD: 0 K/UL (ref 0–0.01)
NRBC BLD-RTO: 0 PER 100 WBC
PLATELET # BLD AUTO: 272 K/UL (ref 150–400)
PMV BLD AUTO: 10.7 FL (ref 8.9–12.9)
POTASSIUM SERPL-SCNC: 4.2 MMOL/L (ref 3.5–5.1)
RBC # BLD AUTO: 3.82 M/UL (ref 3.8–5.2)
SODIUM SERPL-SCNC: 140 MMOL/L (ref 136–145)
WBC # BLD AUTO: 9.8 K/UL (ref 3.6–11)

## 2019-06-05 PROCEDURE — 74011250637 HC RX REV CODE- 250/637: Performed by: NURSE PRACTITIONER

## 2019-06-05 PROCEDURE — 65660000000 HC RM CCU STEPDOWN

## 2019-06-05 PROCEDURE — 85027 COMPLETE CBC AUTOMATED: CPT

## 2019-06-05 PROCEDURE — 74011250637 HC RX REV CODE- 250/637: Performed by: NEUROLOGICAL SURGERY

## 2019-06-05 PROCEDURE — 36415 COLL VENOUS BLD VENIPUNCTURE: CPT

## 2019-06-05 PROCEDURE — 74011250636 HC RX REV CODE- 250/636: Performed by: NEUROLOGICAL SURGERY

## 2019-06-05 PROCEDURE — 70450 CT HEAD/BRAIN W/O DYE: CPT

## 2019-06-05 PROCEDURE — 80048 BASIC METABOLIC PNL TOTAL CA: CPT

## 2019-06-05 PROCEDURE — 74011250636 HC RX REV CODE- 250/636: Performed by: NURSE PRACTITIONER

## 2019-06-05 RX ORDER — FAMOTIDINE 20 MG/1
20 TABLET, FILM COATED ORAL 2 TIMES DAILY
Status: DISCONTINUED | OUTPATIENT
Start: 2019-06-05 | End: 2019-06-08 | Stop reason: HOSPADM

## 2019-06-05 RX ORDER — FACIAL-BODY WIPES
10 EACH TOPICAL DAILY PRN
Status: DISCONTINUED | OUTPATIENT
Start: 2019-06-05 | End: 2019-06-08 | Stop reason: HOSPADM

## 2019-06-05 RX ORDER — ADHESIVE BANDAGE
30 BANDAGE TOPICAL DAILY PRN
Status: DISCONTINUED | OUTPATIENT
Start: 2019-06-05 | End: 2019-06-08 | Stop reason: HOSPADM

## 2019-06-05 RX ADMIN — HYDROCODONE BITARTRATE AND ACETAMINOPHEN 1 TABLET: 5; 325 TABLET ORAL at 10:32

## 2019-06-05 RX ADMIN — VANCOMYCIN HYDROCHLORIDE 1000 MG: 1 INJECTION, POWDER, LYOPHILIZED, FOR SOLUTION INTRAVENOUS at 15:39

## 2019-06-05 RX ADMIN — SODIUM CHLORIDE AND POTASSIUM CHLORIDE: 9; 1.49 INJECTION, SOLUTION INTRAVENOUS at 00:59

## 2019-06-05 RX ADMIN — HYDROCHLOROTHIAZIDE 25 MG: 25 TABLET ORAL at 08:20

## 2019-06-05 RX ADMIN — SENNOSIDES,DOCUSATE SODIUM 1 TABLET: 8.6; 5 TABLET, FILM COATED ORAL at 08:20

## 2019-06-05 RX ADMIN — POLYETHYLENE GLYCOL 3350 17 G: 17 POWDER, FOR SOLUTION ORAL at 09:00

## 2019-06-05 RX ADMIN — ACETAMINOPHEN 650 MG: 325 TABLET ORAL at 15:24

## 2019-06-05 RX ADMIN — DIAZEPAM 2.5 MG: 5 TABLET ORAL at 21:14

## 2019-06-05 RX ADMIN — Medication 10 ML: at 21:15

## 2019-06-05 RX ADMIN — DIAZEPAM 2.5 MG: 5 TABLET ORAL at 08:20

## 2019-06-05 RX ADMIN — FAMOTIDINE 20 MG: 20 TABLET ORAL at 18:00

## 2019-06-05 RX ADMIN — FAMOTIDINE 20 MG: 10 INJECTION, SOLUTION INTRAVENOUS at 01:59

## 2019-06-05 RX ADMIN — VANCOMYCIN HYDROCHLORIDE 1000 MG: 1 INJECTION, POWDER, LYOPHILIZED, FOR SOLUTION INTRAVENOUS at 02:55

## 2019-06-05 RX ADMIN — MAGNESIUM HYDROXIDE 30 ML: 400 SUSPENSION ORAL at 12:05

## 2019-06-05 RX ADMIN — HYDROMORPHONE HYDROCHLORIDE 0.5 MG: 1 INJECTION, SOLUTION INTRAMUSCULAR; INTRAVENOUS; SUBCUTANEOUS at 02:00

## 2019-06-05 RX ADMIN — ATENOLOL 25 MG: 25 TABLET ORAL at 21:14

## 2019-06-05 RX ADMIN — HYDROCODONE BITARTRATE AND ACETAMINOPHEN 1 TABLET: 5; 325 TABLET ORAL at 16:38

## 2019-06-05 RX ADMIN — HYDROCODONE BITARTRATE AND ACETAMINOPHEN 1 TABLET: 5; 325 TABLET ORAL at 02:50

## 2019-06-05 RX ADMIN — Medication 10 ML: at 07:16

## 2019-06-05 RX ADMIN — Medication 10 ML: at 13:38

## 2019-06-05 RX ADMIN — ACETAMINOPHEN 650 MG: 325 TABLET ORAL at 08:19

## 2019-06-05 RX ADMIN — ATENOLOL 25 MG: 25 TABLET ORAL at 08:20

## 2019-06-05 RX ADMIN — HYDROMORPHONE HYDROCHLORIDE 1 MG: 1 INJECTION, SOLUTION INTRAMUSCULAR; INTRAVENOUS; SUBCUTANEOUS at 21:14

## 2019-06-05 NOTE — PROGRESS NOTES
Problem: Falls - Risk of  Goal: *Absence of Falls  Description  Document Cris Latin Fall Risk and appropriate interventions in the flowsheet. Outcome: Progressing Towards Goal     Problem: Patient Education: Go to Patient Education Activity  Goal: Patient/Family Education  Outcome: Progressing Towards Goal     Problem: Pressure Injury - Risk of  Goal: *Prevention of pressure injury  Description  Document Jagdish Scale and appropriate interventions in the flowsheet.   Outcome: Progressing Towards Goal     Problem: Patient Education: Go to Patient Education Activity  Goal: Patient/Family Education  Outcome: Progressing Towards Goal

## 2019-06-05 NOTE — PROGRESS NOTES
Neurosurgery Progress Note  Katiuska Miller [place]  [phone]        Admit Date: 6/3/2019   LOS: 2 days        Daily Progress Note: 2019    POD:1 Day Post-Op    S/P: Procedure(s):  REVISION RIGHT FRONT PROXIMAL SHUNT    Subjective: The patient has had difficulty walking and was found to have an increased ventricle size on imaging. She underwent a high volume LP to determine if her function improved at all before placing a  shunt. The patient states she was able to walk without a walker, which she has not been able to do in awhile. She states she felt like a new person and the physical therapist also documented quite a bit of improvement in the patient's gait and functional status after the LP. This lasted for about 48 hours before the patient returned to baseline. Therefore, the patient was thought to benefit from a  shunt placement. She underwent this procedure on 19. On her post-op scan, the proximal end of the shunt catheter tip was not in a good position, so she was taken back to the OR yesterday by Dr. Oliva Trejo to reposition the shunt. This morning she is doing well. She states she is sore in her neck, chest, abdomen. She has not had a bowel movement in the last few days and her xrays of her abdomen showed she was distended due to stool. Denies numbness, tingling, chest pain, leg pain, nausea, vomiting, difficulty swallowing, and dyspnea.        Objective:     Vital signs  Temp (24hrs), Av °F (36.7 °C), Min:97.6 °F (36.4 °C), Max:98.6 °F (37 °C)   701 - 1900  In: -   Out: 115 [Urine:115]  1901 -  0700  In: 9732 [I.V.:4090]  Out: 2216 [Urine:2215]    Visit Vitals  /55   Pulse 73   Temp 97.9 °F (36.6 °C)   Resp 17   Ht 5' 3\" (1.6 m)   Wt 82.1 kg (181 lb)   SpO2 98%   BMI 32.06 kg/m²    O2 Flow Rate (L/min): 2 l/min O2 Device: Nasal cannula     Pain control  Pain Assessment  Pain Scale 1: Numeric (0 - 10)  Pain Intensity 1: 5  Pain Onset 1: post op  Pain Location 1: Head  Pain Orientation 1: Anterior  Pain Description 1: Aching  Pain Intervention(s) 1: Medication (see MAR)    PT/OT  Gait                 Physical Exam:  Gen:NAD. Resting head tremor  Neuro: A&Ox3. Follows commands. Speech clear. Affect normal.  PERRL. EOMI. Face symmetric. Tongue midline. JOSE spontaneously. Strength 5-/5 in UE and LE BL. Negative drift. Gait deferred. Skin: Right scalp dressing C/D/I. Dermabond in place over neck incision. Bruising over track of the shunt.  shunt valve pumps and refills. 24 hour results:    Recent Results (from the past 24 hour(s))   METABOLIC PANEL, BASIC    Collection Time: 06/05/19 10:15 AM   Result Value Ref Range    Sodium 140 136 - 145 mmol/L    Potassium 4.2 3.5 - 5.1 mmol/L    Chloride 112 (H) 97 - 108 mmol/L    CO2 20 (L) 21 - 32 mmol/L    Anion gap 8 5 - 15 mmol/L    Glucose 132 (H) 65 - 100 mg/dL    BUN 11 6 - 20 MG/DL    Creatinine 0.83 0.55 - 1.02 MG/DL    BUN/Creatinine ratio 13 12 - 20      GFR est AA >60 >60 ml/min/1.73m2    GFR est non-AA >60 >60 ml/min/1.73m2    Calcium 9.3 8.5 - 10.1 MG/DL          Assessment:     Active Problems:    NPH (normal pressure hydrocephalus) (6/3/2019)        Plan:   1. Normal pressure hydrocephalus   - s/p  shunt placement 06/03   - s/p shunt revision 06/04   - ADAT   - Normalize and mobilize   - Pain control   - PT/OT evals   - ok to transfer to [facility]  2. HTN    - Cont atenolol, HCTZ from home   - Hydralazine/labetalol PRN   - SBP<140  3. Constipation   - Cont miralax, pericolace scheduled   - Bisacodyl, milk of magnesia PRN  4. GERD   - Cont pepcid  5. Obesity   - Body mass index is 32.06 kg/m².    - Counseling as able    Activity: up with assist  DVT ppx: SCDs  Dispo: tbd    Plan d/w Dr. Jenaro Lane, family at bedside      Elizabeth Finnegan NP

## 2019-06-05 NOTE — PROGRESS NOTES
Bedside and Verbal shift change report given to DANIE Zepeda (oncoming nurse) by Teena Ross RN (offgoing nurse). Report included the following information SBAR, MAR, Recent Results and Cardiac Rhythm NSR.     0830 Patient up to chair, bathed, and real taken out. 1930 Bedside and Verbal shift change report given to Tyson Shelton RN (oncoming nurse) by DANIE Zepeda (offgoing nurse). Report included the following information SBAR, MAR, Recent Results and Cardiac Rhythm NSR.

## 2019-06-05 NOTE — PROGRESS NOTES
Spiritual Care Assessment/Progress Note  [facility]      NAME: Ishan Sinclair      MRN: [id number]  AGE: 81 y.o. SEX: female  Episcopal Affiliation: No preference   Language: English     6/5/2019     Total Time (in minutes): 15     Spiritual Assessment begun in [address] [address] through conversation with:         [x]Patient        [] Family    [x] Friend(s)        Reason for Consult: Initial/Spiritual assessment, critical care     Spiritual beliefs: (Please include comment if needed)     [x] Identifies with a alex tradition:         [x] Supported by a alex community:            [] Claims no spiritual orientation:           [] Seeking spiritual identity:                [] Adheres to an individual form of spirituality:           [] Not able to assess:                           Identified resources for coping:      [x] Prayer                               [] Music                  [] Guided Imagery     [x] Family/friends                 [] Pet visits     [] Devotional reading                         [] Unknown     [] Other:                                               Interventions offered during this visit: (See comments for more details)    Patient Interventions: Affirmation of emotions/emotional suffering, Affirmation of alex, Iconic (affirming the presence of God/Higher Power), Prayer (actual), Prayer (assurance of), Normalization of emotional/spiritual concerns     Family/Friend(s):  Affirmation of emotions/emotional suffering, Affirmation of alex, Iconic (affirming the presence of God/Higher Power), Prayer (actual), Prayer (assurance of), Normalization of emotional/spiritual concerns     Plan of Care:     [x] Support spiritual and/or cultural needs    [] Support AMD and/or advance care planning process      [] Support grieving process   [] Coordinate Rites and/or Rituals    [] Coordination with community clergy   [] No spiritual needs identified at this time   [] Detailed Plan of Care below (See Comments)  [] Make referral to Music Therapy  [] Make referral to Pet Therapy     [] Make referral to Addiction services  [] Make referral to [facility]  [] Make referral to Spiritual Care Partner  [] No future visits requested        [x] Follow up visits as needed     Comments:  for initial visit. Pt was sitting up in chair having breakfast and friend at bedside. Pt shared that she had a rough night last night but is feeling better and happy that she was able to walk to the chair and sit up and eat after 4 days. Pt and friend shared that pt does daily devotion and prayer.  provided pastoral listening, support and prayer. Let pt and friend know of  support and availability. Please contact [phone] [address] [facility] for further support.  will follow as needed.      Arnoldo Mcghee, [facility]   [phone])

## 2019-06-05 NOTE — OP NOTES
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 PROCEDURE PERFORMED:  Revision of right frontal proximal ventriculoperitoneal shunt with removal of right frontal catheter and placement of new right frontal catheter attached to [address] valve. SURGEON:  Macro Guaman MD    ASSISTANT:  Staff. ANESTHESIA:  General endotracheal anesthesia. COMPLICATIONS:  None. SPECIMENS REMOVED:  None. IMPLANTS:  .    ESTIMATED BLOOD LOSS:  10 mL. OPERATIVE INDICATIONS:  This is an 71-year-old female, who the day before underwent a placement of right frontal ventriculoperitoneal shunt for hydrocephalus. Postoperative CT showed that the ventricular shunt was not in the ventricle but in the interhemispheric cistern. After discussing this situation with the patient and her family, we recommended proceeding back to surgery to reposition the proximal shunt for more effective therapeutics. Risks and goals were explained. OPERATION IN DETAIL:  The patient was taken to the operating room and placed under general endotracheal anesthesia. All necessary lines and monitors were placed. She was given appropriate dose of IV antibiotics. SCDs were placed. She already had a Rahman. The head was kept upright in the neutral position. I then prepped out the right frontal region and an area in the neck just in case we had to address this. The previous curvilinear right frontal incision was opened with scissors and opened up exposing the proximal shunt. I was able to visualize the tip of the valve which was pulled up just slightly into the field. I then cut the proximal shunt and moved it.   I then used a separate proximal shunt catheter. It did take 2 passes, but at about 7 cm, I clearly hit the ventricle with good flow of clear cerebrospinal fluid. This was then placed in the L-connector and sterilely hooked up to the valve and sewn into place with 2-0 silk tie. The L-connector was sewn to what was left of pericranium. The wound was copiously irrigated with antibiotic solution. Hemostasis was achieved. The scalp was closed with inverted 2-0 Vicryl and staples on the skin. Wounds were cleaned, dried, and dressed with sterile dressing. The patient was then extubated and taken to recovery room in stable condition.         MD MP [email or web address]  D:  06/05/2019 7:37  T:  06/05/2019 13:17  JOB #:  [id number]  CC:  Keith Dorsey MD

## 2019-06-06 PROCEDURE — 97535 SELF CARE MNGMENT TRAINING: CPT

## 2019-06-06 PROCEDURE — 97161 PT EVAL LOW COMPLEX 20 MIN: CPT

## 2019-06-06 PROCEDURE — 74011250637 HC RX REV CODE- 250/637: Performed by: NEUROLOGICAL SURGERY

## 2019-06-06 PROCEDURE — 97116 GAIT TRAINING THERAPY: CPT

## 2019-06-06 PROCEDURE — 74011250637 HC RX REV CODE- 250/637: Performed by: NURSE PRACTITIONER

## 2019-06-06 PROCEDURE — 97530 THERAPEUTIC ACTIVITIES: CPT

## 2019-06-06 PROCEDURE — 97165 OT EVAL LOW COMPLEX 30 MIN: CPT

## 2019-06-06 PROCEDURE — 65660000000 HC RM CCU STEPDOWN

## 2019-06-06 RX ORDER — HYDROCODONE BITARTRATE AND ACETAMINOPHEN 5; 325 MG/1; MG/1
1 TABLET ORAL
Status: DISCONTINUED | OUTPATIENT
Start: 2019-06-06 | End: 2019-06-08 | Stop reason: HOSPADM

## 2019-06-06 RX ORDER — POLYETHYLENE GLYCOL 3350 17 G/17G
17 POWDER, FOR SOLUTION ORAL DAILY PRN
Status: DISCONTINUED | OUTPATIENT
Start: 2019-06-06 | End: 2019-06-08 | Stop reason: HOSPADM

## 2019-06-06 RX ORDER — HYDROMORPHONE HYDROCHLORIDE 1 MG/ML
0.5 INJECTION, SOLUTION INTRAMUSCULAR; INTRAVENOUS; SUBCUTANEOUS
Status: DISCONTINUED | OUTPATIENT
Start: 2019-06-06 | End: 2019-06-08 | Stop reason: HOSPADM

## 2019-06-06 RX ADMIN — ATENOLOL 25 MG: 25 TABLET ORAL at 21:45

## 2019-06-06 RX ADMIN — FAMOTIDINE 20 MG: 20 TABLET ORAL at 17:54

## 2019-06-06 RX ADMIN — ACETAMINOPHEN 650 MG: 325 TABLET ORAL at 03:53

## 2019-06-06 RX ADMIN — DIAZEPAM 5 MG: 5 TABLET ORAL at 21:45

## 2019-06-06 RX ADMIN — Medication 10 ML: at 13:11

## 2019-06-06 RX ADMIN — Medication 10 ML: at 21:46

## 2019-06-06 RX ADMIN — HYDROCODONE BITARTRATE AND ACETAMINOPHEN 1 TABLET: 5; 325 TABLET ORAL at 08:37

## 2019-06-06 RX ADMIN — BENZOCAINE AND MENTHOL 1 LOZENGE: 15; 3.6 LOZENGE ORAL at 21:46

## 2019-06-06 RX ADMIN — DIAZEPAM 2.5 MG: 5 TABLET ORAL at 08:39

## 2019-06-06 RX ADMIN — HYDROCHLOROTHIAZIDE 25 MG: 25 TABLET ORAL at 08:41

## 2019-06-06 RX ADMIN — ATENOLOL 25 MG: 25 TABLET ORAL at 08:40

## 2019-06-06 RX ADMIN — ACETAMINOPHEN 650 MG: 325 TABLET ORAL at 17:54

## 2019-06-06 RX ADMIN — HYDROCODONE BITARTRATE AND ACETAMINOPHEN 1 TABLET: 5; 325 TABLET ORAL at 21:45

## 2019-06-06 RX ADMIN — ACETAMINOPHEN 650 MG: 325 TABLET ORAL at 10:25

## 2019-06-06 RX ADMIN — FAMOTIDINE 20 MG: 20 TABLET ORAL at 08:39

## 2019-06-06 NOTE — PROGRESS NOTES
TRANSFER - OUT REPORT:    Verbal report given to [facility] RN(name) on Nigel Lax  being transferred to [facility] 664(unit) for routine progression of care       Report consisted of patients Situation, Background, Assessment and   Recommendations(SBAR). Information from the following report(s) SBAR, Kardex, Procedure Summary, Intake/Output, MAR, Recent Results, Med Rec Status and Cardiac Rhythm NSR/S Myah Schneider was reviewed with the receiving nurse. Lines:   Peripheral IV 06/05/19 Right Forearm (Active)   Site Assessment Clean, dry, & intact 6/5/2019  3:45 PM   Phlebitis Assessment 0 6/5/2019  3:45 PM   Infiltration Assessment 0 6/5/2019  3:45 PM   Dressing Status New 6/5/2019  3:45 PM   Dressing Type Tape;Transparent 6/5/2019  3:45 PM   Hub Color/Line Status Pink;Flushed; Infusing 6/5/2019  3:45 PM   Action Taken Open ports on tubing capped 6/5/2019  3:45 PM        Opportunity for questions and clarification was provided.       Patient transported with:   Monitor  O2 @ 2 liters  Registered Nurse  [facility]

## 2019-06-06 NOTE — PROGRESS NOTES
Physical Therapy:    Orders received, chart reviewed and patient evaluated by physical therapy. Recommend short stay at inpatient rehab as patient is motivated, lives alone, and is below her baseline function. Full evaluation to follow.      Philip Sousa, PT, DPT

## 2019-06-06 NOTE — PROGRESS NOTES
Neurosurgery Progress Note  Yan Laws, [facility]  [phone]        Admit Date: 6/3/2019   LOS: 3 days        Daily Progress Note: 2019    POD:2 Day Post-Op    S/P: Procedure(s):  REVISION RIGHT FRONT PROXIMAL SHUNT    HPI:      The patient has had difficulty walking and was found to have an increased ventricle size on imaging. She underwent a high volume LP to determine if her function improved at all before placing a  shunt. The patient states she was able to walk without a walker, which she has not been able to do in awhile. She states she felt like a new person and the physical therapist also documented quite a bit of improvement in the patient's gait and functional status after the LP. This lasted for about 48 hours before the patient returned to baseline. Therefore, the patient was thought to benefit from a  shunt placement. She underwent this procedure on 19. On her post-op scan, the proximal end of the shunt catheter tip was not in a good position, so she was taken back to the OR yesterday by Dr. Brooklyn Stratton to reposition the shunt. Subjective:   No acute events overnight. Pt is unsteady on her feet. She felt like she got a little sleepy after pain medication admnistration. Denies numbness, tingling, chest pain, leg pain, nausea, vomiting, difficulty swallowing, and dyspnea.        Objective:     Vital signs  Temp (24hrs), Av.5 °F (36.9 °C), Min:97.7 °F (36.5 °C), Max:99 °F (37.2 °C)   701 -  190  In: 200 [P.O.:200]  Out: -    190 -  0700  In: 6791 [I.V.:1850]  Out: 1090 [Urine:1090]    Visit Vitals  /55 (BP 1 Location: Right arm, BP Patient Position: At rest;Sitting)   Pulse 68   Temp 98.5 °F (36.9 °C)   Resp 20   Ht 5' 3\" (1.6 m)   Wt 86.4 kg (190 lb 7.6 oz)   SpO2 92%   BMI 33.74 kg/m²    O2 Flow Rate (L/min): 2 l/min O2 Device: Room air     Pain control  Pain Assessment  Pain Scale 1: Visual  Pain Intensity 1: 0  Pain Onset 1: post op  Pain Location 1: Head, Eye  Pain Orientation 1: Anterior  Pain Description 1: Aching, Sharp  Pain Intervention(s) 1: Distraction, Rest, Emotional support, Therapeutic touch, Therapeutic presence, Environmental changes(lights dimmed)    PT/OT  Gait     Gait  Speed/Lluvia: Shuffled, Slow  Step Length: Left shortened, Right shortened  Gait Abnormalities: Decreased step clearance, Shuffling gait  Ambulation - Level of Assistance: Minimal assistance  Distance (ft): 5 Feet (ft)  Assistive Device: Gait belt, Walker, rollator           Physical Exam:  Gen:NAD. Resting head tremor  Neuro: A&Ox3. Follows commands. Speech clear. Affect normal.  PERRL. EOMI. Face symmetric. Tongue midline. JOSE spontaneously. Strength 5-/5 in UE and LE BL. Negative drift. Gait deferred. Skin: Right scalp incision C/D/I with staples in place. Dermabond in place over neck incision. Bruising over track of the shunt.  shunt valve pumps and refills. 24 hour results:    No results found for this or any previous visit (from the past 24 hour(s)). Assessment:     Active Problems:    NPH (normal pressure hydrocephalus) (6/3/2019)        Plan:   1. Normal pressure hydrocephalus   - s/p  shunt placement 06/03   - s/p shunt revision 06/04   - ADAT   - Normalize and mobilize   - Pain control   - PT/OT evals   - ok to go to rehab when accepted  2. HTN    - Cont atenolol, HCTZ from home   - Hydralazine/labetalol PRN   - SBP<140  3. Constipation - Resolved   - Cont pericolace scheduled   - Bisacodyl, miralax, milk of magnesia PRN  4. GERD   - Cont pepcid  5. Obesity   - Body mass index is 33.74 kg/m². - Counseling as able    Activity: up with assist  DVT ppx: SCDs  Dispo: tbd    Plan d/w Dr. Luzmaria Hamm, family at bedside. Referral to case management for inpatient rehab.       Susana Aguilar NP

## 2019-06-06 NOTE — PROGRESS NOTES
Bedside and Verbal shift change report given to Bandar Chavez (oncoming nurse) by Sli Cordero (offgoing nurse). Report included the following information SBAR, Kardex, Intake/Output, MAR, Recent Results and Cardiac Rhythm NSR.

## 2019-06-06 NOTE — PROGRESS NOTES
Orders received, chart reviewed and patient evaluated by occupational therapy. Recommend patient to discharge to [facility] pending progression with skilled acute occupational therapy. Recommend with nursing patient to complete as able in order to maintain strength, endurance and independence: OOB to chair 3x/day, ADLs with supervision/setup and mobilizing to the bathroom for toileting with min  assist. Thank you for your assistance. Full evaluation to follow.
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
## 2019-06-06 NOTE — PROGRESS NOTES
Problem: Falls - Risk of  Goal: *Absence of Falls  Description  Document Adam Ayon Fall Risk and appropriate interventions in the flowsheet. Outcome: Progressing Towards Goal     Problem: Patient Education: Go to Patient Education Activity  Goal: Patient/Family Education  Outcome: Progressing Towards Goal     Problem: Pressure Injury - Risk of  Goal: *Prevention of pressure injury  Description  Document Jagdish Scale and appropriate interventions in the flowsheet.   Outcome: Progressing Towards Goal     Problem: Patient Education: Go to Patient Education Activity  Goal: Patient/Family Education  Outcome: Progressing Towards Goal

## 2019-06-06 NOTE — PROGRESS NOTES
Chart reviewed; Therapy is recommending IP Rehab. Met with patient at bedside to introduce self/role. Patient was asleep. Call placed to patient's son, Ke OLMOS# [phone] to discuss transitions of care. No answer. Awaiting call back. CM will continue to follow.    Sybil Gore, MINGO,CRM
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
## 2019-06-07 ENCOUNTER — APPOINTMENT (OUTPATIENT)
Dept: CT IMAGING | Age: 84
DRG: 033 | End: 2019-06-07
Attending: NURSE PRACTITIONER
Payer: MEDICARE

## 2019-06-07 PROCEDURE — 74011250637 HC RX REV CODE- 250/637: Performed by: NEUROLOGICAL SURGERY

## 2019-06-07 PROCEDURE — 70450 CT HEAD/BRAIN W/O DYE: CPT

## 2019-06-07 PROCEDURE — 74011250637 HC RX REV CODE- 250/637: Performed by: NURSE PRACTITIONER

## 2019-06-07 PROCEDURE — 65660000000 HC RM CCU STEPDOWN

## 2019-06-07 PROCEDURE — 97116 GAIT TRAINING THERAPY: CPT

## 2019-06-07 RX ORDER — NALOXONE HYDROCHLORIDE 4 MG/.1ML
SPRAY NASAL
Qty: 1 EACH | Refills: 0 | Status: SHIPPED
Start: 2019-06-07 | End: 2019-06-17

## 2019-06-07 RX ORDER — HYDROCODONE BITARTRATE AND ACETAMINOPHEN 5; 325 MG/1; MG/1
1 TABLET ORAL
Qty: 12 TAB | Refills: 0 | Status: SHIPPED | OUTPATIENT
Start: 2019-06-07 | End: 2019-06-10

## 2019-06-07 RX ORDER — ATENOLOL 25 MG/1
25 TABLET ORAL EVERY 12 HOURS
Qty: 60 TAB | Refills: 0 | Status: SHIPPED
Start: 2019-06-07 | End: 2019-09-04

## 2019-06-07 RX ORDER — DIAZEPAM 5 MG/1
TABLET ORAL
Qty: 15 TAB | Refills: 0 | Status: SHIPPED | OUTPATIENT
Start: 2019-06-07 | End: 2019-10-21 | Stop reason: SDUPTHER

## 2019-06-07 RX ADMIN — DIAZEPAM 5 MG: 5 TABLET ORAL at 18:23

## 2019-06-07 RX ADMIN — ACETAMINOPHEN 650 MG: 325 TABLET ORAL at 08:59

## 2019-06-07 RX ADMIN — ACETAMINOPHEN 650 MG: 325 TABLET ORAL at 18:23

## 2019-06-07 RX ADMIN — DIAZEPAM 2.5 MG: 5 TABLET ORAL at 09:00

## 2019-06-07 RX ADMIN — FAMOTIDINE 20 MG: 20 TABLET ORAL at 08:58

## 2019-06-07 RX ADMIN — ATENOLOL 25 MG: 25 TABLET ORAL at 08:59

## 2019-06-07 RX ADMIN — HYDROCHLOROTHIAZIDE 25 MG: 25 TABLET ORAL at 08:57

## 2019-06-07 RX ADMIN — ATENOLOL 25 MG: 25 TABLET ORAL at 21:25

## 2019-06-07 RX ADMIN — HYDROCODONE BITARTRATE AND ACETAMINOPHEN 1 TABLET: 5; 325 TABLET ORAL at 04:20

## 2019-06-07 RX ADMIN — Medication 10 ML: at 09:01

## 2019-06-07 RX ADMIN — SENNOSIDES,DOCUSATE SODIUM 1 TABLET: 8.6; 5 TABLET, FILM COATED ORAL at 13:41

## 2019-06-07 RX ADMIN — Medication 10 ML: at 13:35

## 2019-06-07 RX ADMIN — HYDROCODONE BITARTRATE AND ACETAMINOPHEN 1 TABLET: 5; 325 TABLET ORAL at 13:49

## 2019-06-07 RX ADMIN — Medication 10 ML: at 21:26

## 2019-06-07 RX ADMIN — ACETAMINOPHEN 650 MG: 325 TABLET ORAL at 01:03

## 2019-06-07 RX ADMIN — FAMOTIDINE 20 MG: 20 TABLET ORAL at 18:22

## 2019-06-07 NOTE — DISCHARGE SUMMARY
Discharge SNF/Rehab Instructions/LTAC       PATIENT ID: Parviz Baker  MRN: [id number]   YOB: 1935    DATE OF ADMISSION: 6/3/2019  7:47 AM    DATE OF DISCHARGE: 06/08/19    PRIMARY CARE PROVIDER: Marquise Hunter MD       ATTENDING PHYSICIAN: Jimena Ratliff MD  DISCHARGING PROVIDER: Sadiq Gilliland NP     To contact this individual call [phone] and ask the  to german. CONSULTATIONS: None    PROCEDURES/SURGERIES: Procedure(s):  REVISION RIGHT FRONT PROXIMAL SHUNT    ADMITTING DIAGNOSES & HOSPITAL COURSE:   Normal Pressure Hydrocephalus    The patient has had difficulty walking and was found to have an increased ventricle size on imaging. She underwent a high volume LP to determine if her function improved at all before placing a  shunt. The patient states she was able to walk without a walker, which she has not been able to do in awhile. She states she felt like a new person and the physical therapist also documented quite a bit of improvement in the patient's gait and functional status after the LP. This lasted for about 48 hours before the patient returned to baseline. Therefore, the patient was thought to benefit from a  shunt placement. She underwent this procedure on 06/03/19. On her post-op scan, the proximal end of the shunt catheter tip was not in a good position, so she was taken back to the OR on 06/04/19 by Dr. Laboy Keluis to reposition the shunt. The intraoperative findings of both can be found in the respective operative notes. Post-operatively, the patient transferred to ICU. On POD1 of the second surgery, the patient transferred to [facility]STU. She was taking PO well, voiding on her own and ambulating with the assistance of a walker. She was afebrile and vital signs stable. She worked with PT/OT and inpatient rehab was recommended. She was ready for discharge to rehab. She will follow-up in the office 2 weeks after surgery.  Staples should be removed by 06/18/19. If the patient is still in rehab at this time, please remove them there. DISCHARGE DIAGNOSES / PLAN:      1. Normal pressure hydrocephalus              - s/p  shunt placement 06/03              - s/p shunt revision 06/04              - ADAT              - Normalize and mobilize              - Pain control              - PT/OT evals              - ok to go to rehab when accepted              - staples to be removed by 06/18/19. If still in rehab, please remove there. 2. HTN               - Cont atenolol, HCTZ from home              - Hydralazine/labetalol PRN              - SBP<140  3. Constipation - Resolved              - Cont pericolace scheduled              - Bisacodyl, miralax, milk of magnesia PRN  4. GERD              - Cont pepcid  5. Obesity              - Body mass index is 33.74 kg/m². - Counseling as able       1. PENDING TEST RESULTS:   At the time of discharge the following test results are still pending: None    FOLLOW UP APPOINTMENTS:    Follow-up Information     Follow up With Specialties Details Why Contact Info    Santy Khan MD Internal Medicine   [facility] [phone]  Internal Med [place] [address] [facility]  [phone]      Jv Diaz MD Neurosurgery Schedule an appointment as soon as possible for a visit in 2 weeks For suture removal [facility] [phone]: None    DIET: as tolerated    ACTIVITY: per rehab    WOUND CARE: ok to shower and get incision wet. Pat area dry. Do not scrub over the area. Do not apply any ointment to it. EQUIPMENT needed: walker      DISCHARGE MEDICATIONS:   See Medication Reconciliation Form      NOTIFY YOUR PHYSICIAN FOR ANY OF THE FOLLOWING:   Fever over 101 degrees for 24 hours.    Chest pain, shortness of breath, fever, chills, nausea, vomiting, diarrhea, change in mentation, falling, weakness, bleeding. Severe pain or pain not relieved by medications. Or, any other signs or symptoms that you may have questions about. DISPOSITION:    Home With:   OT  PT  HH  RN      X SNF/Inpatient Rehab/LTAC    Independent/assisted living    Hospice    Other:       PATIENT CONDITION AT DISCHARGE:     Functional status    Poor    X Deconditioned     Independent      Cognition   X  Lucid     Forgetful     Dementia      Catheters/lines (plus indication)    Rahman     PICC     PEG    X None      Code status    X Full code     DNR      PHYSICAL EXAMINATION AT DISCHARGE:  Gen:NAD. Resting head tremor  Neuro: A&Ox3. Follows commands. Speech clear. Affect normal.  PERRL. EOMI. Face symmetric. Tongue midline. JOSE spontaneously. Strength 5-/5 in UE and LE BL. Negative drift. Gait deferred. Skin: Right scalp incision C/D/I with staples in place. Dermabond in place over neck incision. Bruising over track of the shunt.  shunt valve pumps and refills.       CHRONIC MEDICAL DIAGNOSES:  Problem List as of 6/7/2019 Date Reviewed: 6/4/2019          Codes Class Noted - Resolved    RESOLVED: TIA (transient ischemic attack) ICD-10-CM: G45.9  ICD-9-CM: 435.9 Acute 4/11/2011 - 4/12/2011        NPH (normal pressure hydrocephalus) ICD-10-CM: G91.2  ICD-9-CM: 331.5  6/3/2019 - Present        Essential hypertension ICD-10-CM: I10  ICD-9-CM: 401.9  5/6/2015 - Present        Major depressive disorder, recurrent episode, severe ([facility] ICD-10-CM: F33.2  ICD-9-CM: 296.33  1/22/2015 - Present        Macular degeneration ICD-10-CM: H35.30  ICD-9-CM: 362.50  7/22/2014 - Present        Kidney stones ICD-10-CM: N20.0  ICD-9-CM: 592.0  11/17/2010 - Present        TIA (transient ischemic attack) ICD-10-CM: G45.9  ICD-9-CM: 435.9  11/16/2010 - Present    Overview Signed 11/16/2010  2:55 PM by Sherry Giron MD     2009 Dr Alexander Oliva             Sleep apnea ICD-10-CM: G47.30  ICD-9-CM: 780.57  11/16/2010 - Present    Overview Signed 11/16/2010  2:56 PM by Lashaun Garcia MD     cpap                     CDMP Checked: Yes X     PROBLEM LIST Updated:   Yes X         Signed:   Denise Garcia NP  6/7/2019  5:24 PM

## 2019-06-07 NOTE — DISCHARGE INSTRUCTIONS
Patient Education        Ventriculoperitoneal Shunt Surgery: What to Expect at Home  Your Recovery  Ventriculoperitoneal shunt surgery ( shunt surgery) helps control pressure in your brain by draining extra fluid out of your brain and into your belly. During  shunt surgery, the doctor placed two small tubes (catheters) and a valve under your skin. After surgery, your neck or belly may feel tender. You will probably feel tired, but you should not have much pain. For a few weeks after surgery, you may have headaches. It is common to feel some fluid moving around in your scalp. This will go away as your scalp heals. The area around the stitches or staples may feel tender for a week or more. If needed, the doctor will remove your stitches or staples 5 to 10 days after surgery. The shunt will not limit your activities. There will be a lump on your head where the valve is. This lump may not show when your hair grows back. You may or may not feel the shunt underneath your skin. In some cases, your doctor may need to adjust your shunt valve so the right amount of fluid is draining. Watch for signs of infection or signs that the shunt is not working right. If the shunt gets infected or stops working well, it may need to be removed or replaced. Without problems, your shunt may be left in place for years. This care sheet gives you a general idea about how long it will take for you to recover. But each person recovers at a different pace. Follow the steps below to get better as quickly as possible. How can you care for yourself at home? Activity    · Rest when you feel tired.  Getting enough sleep will help you recover.     · Do not touch the valve on your head.     · It is okay for you to lie on the side of your head with the shunt.     · For 6 weeks, do not do any activity that may cause you to hit your head.     · You will probably be able to return to work in less than 1 week.     · After your doctor says it is okay to remove the bandages, you can shower. Afterward, be sure to pat the incision areas dry.     · Do not swim or bathe until your stitches or staples are removed.     · Check with your doctor about when it is safe to travel by plane. Diet    · The tube in your belly will not affect how you digest food. You can eat as usual. If your stomach is upset, try bland, low-fat foods like plain rice, broiled chicken, toast, and yogurt.     · You may notice that your bowel movements are not regular right after your surgery. This is common. Try to avoid constipation and straining with bowel movements. You may want to take a fiber supplement every day. If you have not had a bowel movement after a couple of days, ask your doctor about taking a mild laxative. Medicines    · Your doctor will tell you if and when you can restart your medicines. He or she will also give you instructions about taking any new medicines.     · If you take blood thinners, such as warfarin (Coumadin), clopidogrel (Plavix), or aspirin, be sure to talk to your doctor. He or she will tell you if and when to start taking those medicines again. Make sure that you understand exactly what your doctor wants you to do.     · Be safe with medicines. Take pain medicines exactly as directed. ? If the doctor gave you a prescription medicine for pain, take it as prescribed. ? If you are not taking a prescription pain medicine, ask your doctor if you can take an over-the-counter medicine. ? Do not take two or more pain medicines at the same time unless the doctor told you to. Many pain medicines have acetaminophen, which is Tylenol. Too much acetaminophen (Tylenol) can be harmful.     · If you think your pain medicine is making you sick to your stomach:  ? Take your medicine after meals (unless your doctor has told you not to). ? Ask your doctor for a different pain medicine.     · If your doctor prescribed antibiotics, take them as directed.  Do not stop taking them just because you feel better. You need to take the full course of antibiotics. Incision care    · If you have strips of tape on the incisions the doctor made, leave the tape on for a week or until it falls off.     · Wash your incision areas daily with warm, soapy water, and gently pat them dry. Don't use hydrogen peroxide or alcohol, which can slow healing. You may cover the areas with a gauze bandage if they weep or rub against clothing. Change the bandages every day.     · Keep the areas clean and dry. Follow-up care is a key part of your treatment and safety. Be sure to make and go to all appointments, and call your doctor if you are having problems. It's also a good idea to know your test results and keep a list of the medicines you take. When should you call for help? Call 911 anytime you think you may need emergency care. For example, call if:    · You passed out (lost consciousness).     · You have sudden chest pain and shortness of breath, or you cough up blood.     · You have severe trouble breathing.     · It is hard to think, move, speak, or see.     · Your body is jerking or shaking.    Call your doctor now or seek immediate medical care if:    · You feel new bumps on your head 3 to 5 days after surgery or the bumps get bigger after 2 weeks.     · There is redness or swelling along the shunt.     · You have trouble thinking clearly.     · You have a fever with a stiff neck or a severe headache.     · Your incision comes open.     · You have signs of infection, such as:  ? Increased pain, swelling, warmth, or redness. ? Red streaks leading from the incision. ? Pus draining from the incision. ? Swollen lymph nodes in your neck, armpits, or groin.   ? A fever.     · You have any sudden vision changes.     · You have new or worse headaches.     · You are sleeping more than you are awake.     · You fall and hit your head.     · You have pain that does not get better after you take pain medicine.     · You have a fever over 100°F.     · You throw up.    Watch closely for changes in your health, and be sure to contact your doctor if you have any problems. Where can you learn more? Go to http://jonatan-elvis.info/. Enter H888 in the search box to learn more about \"Ventriculoperitoneal Shunt Surgery: What to Expect at Home. \"  Current as of: March 27, 2018  Content Version: 11.9  © 1127-5531 [employer], [employer]. Care instructions adapted under license by [employer] (which disclaims liability or warranty for this information). If you have questions about a medical condition or this instruction, always ask your healthcare professional. [address] any warranty or liability for your use of this information.

## 2019-06-07 NOTE — PROGRESS NOTES
Problem: Mobility Impaired (Adult and Pediatric)  Goal: *Acute Goals and Plan of Care (Insert Text)  Description  Physical Therapy Goals  Initiated 6/6/2019  1. Patient will move from supine to sit and sit to supine , scoot up and down and roll side to side in bed with modified independence within 7 day(s). 2.  Patient will transfer from bed to chair and chair to bed with modified independence using the least restrictive device within 7 day(s). 3.  Patient will perform sit to stand with modified independence within 7 day(s). 4.  Patient will ambulate with modified independence for 150 feet with the least restrictive device within 7 day(s). 5.  Patient will ascend/descend 1 stairs with no handrail(s) with modified independence within 7 day(s). Outcome: Progressing Towards Goal  PHYSICAL THERAPY TREATMENT  Patient: Anusha Russell (40 y.o. female)  Date: 6/7/2019  Diagnosis: NPH (normal pressure hydrocephalus) [G91.2] <principal problem not specified>  Procedure(s) (LRB):  REVISION RIGHT FRONT PROXIMAL SHUNT (Right) 3 Days Post-Op  Precautions:    Chart, physical therapy assessment, plan of care and goals were reviewed. ASSESSMENT:  Patient required Stephanie for supine to sit transfer, Stephanie for sit<>stand transfers, and CGA with verbal cuing for walker management during ambulation. Patient able to tolerate ambulating 50 ft with slow, shuffled unsteady gait but no overt LOB. Patient assisted to/from bathroom and required additional cuing for safety while walking and turning in small space. Discharge recommendation: Recommending IP rehab upon discharge to maximize safety and independence with functional mobility    PLOF: patient lives alone, is independent with ADLs, and ambulates Alan using rollator. She endorses history of falls but none since she started using rollator 2 months ago. She has not driven since December.       Progression toward goals:  ?    Improving appropriately and progressing toward goals  ? Improving slowly and progressing toward goals  ? Not making progress toward goals and plan of care will be adjusted     PLAN:  Patient continues to benefit from skilled intervention to address the above impairments. Continue treatment per established plan of care. Discharge Recommendations:  Inpatient Rehab  Further Equipment Recommendations for Discharge:  None      SUBJECTIVE:   Patient stated ? Yeah, lets walk in the hallway and scare people. ? said jokingly    OBJECTIVE DATA SUMMARY:   Critical Behavior:  Neurologic State: Alert, Eyes open spontaneously  Orientation Level: Oriented X4  Cognition: Appropriate decision making, Appropriate for age attention/concentration, Appropriate safety awareness, Follows commands     Functional Mobility Training:  Bed Mobility:     Supine to Sit: Minimum assistance              Transfers:  Sit to Stand: Minimum assistance  Stand to Sit: Minimum assistance        Bed to Chair: Minimum assistance                    Balance:  Sitting: Intact  Standing: Impaired; With support  Standing - Static: Good;Constant support  Standing - Dynamic : Good;Constant support  Ambulation/Gait Training:  Distance (ft): 50 Feet (ft)  Assistive Device: Gait belt;Walker, rollator  Ambulation - Level of Assistance: Contact guard assistance        Gait Abnormalities: Decreased step clearance;Shuffling gait              Speed/Lluvia: Shuffled  Step Length: Left shortened;Right shortened             Pain:  Pain Scale 1: Visual  Pain Intensity 1: 0  Pain Location 1: Head  Pain Orientation 1: Anterior  Pain Description 1: Aching  Pain Intervention(s) 1: Medication (see MAR)  Activity Tolerance:   VSS, NAD  Please refer to the flowsheet for vital signs taken during this treatment. After treatment:   ?    Patient left in no apparent distress sitting up in chair  ? Patient left in no apparent distress in bed  ? Call bell left within reach  ? Nursing notified  ?     Family present  ?     Bed alarm activated    COMMUNICATION/COLLABORATION:   The patient?s plan of care was discussed with: Occupational Therapist and Registered Nurse    Kayode Sesay PT, DPT   Time Calculation: 21 mins

## 2019-06-07 NOTE — PROGRESS NOTES
Problem: Falls - Risk of  Goal: *Absence of Falls  Description  Document Jennifer Rhodes Fall Risk and appropriate interventions in the flowsheet. Outcome: Progressing Towards Goal     Problem: Patient Education: Go to Patient Education Activity  Goal: Patient/Family Education  Outcome: Progressing Towards Goal     Problem: Pressure Injury - Risk of  Goal: *Prevention of pressure injury  Description  Document Jagdish Scale and appropriate interventions in the flowsheet.   Outcome: Progressing Towards Goal     Problem: Patient Education: Go to Patient Education Activity  Goal: Patient/Family Education  Outcome: Progressing Towards Goal

## 2019-06-07 NOTE — PROGRESS NOTES
Chart reviewed; Spoke with patient's son, Kelley Dorsey P# [phone] to discuss transitions of care. Offered freedom of choice. Made referral to [facility] via [facility]; Awaiting response. Son will provide transportation upon discharge. CM to follow. MINGO Law,CRM     14:15PM- Spoke with [facility] liaison P# [phone] to check status on referral. Patient has been accepted to [facility] pending medical stability. Accepted physician: Dr. Marshall Toribio M.D. Call nursing report to [phone]. CM to complete potion of EMLATA forms. Patient's son, Kelley Dorsey will provide transportation. CM will continue to follow.    MINGO Law,CRM

## 2019-06-08 ENCOUNTER — HOSPITAL ENCOUNTER (OUTPATIENT)
Dept: REHABILITATION | Age: 84
End: 2019-06-16
Attending: PHYSICAL MEDICINE & REHABILITATION | Admitting: PHYSICAL MEDICINE & REHABILITATION

## 2019-06-08 VITALS
WEIGHT: 189.6 LBS | TEMPERATURE: 98.2 F | OXYGEN SATURATION: 95 % | RESPIRATION RATE: 14 BRPM | HEART RATE: 63 BPM | BODY MASS INDEX: 33.59 KG/M2 | DIASTOLIC BLOOD PRESSURE: 68 MMHG | HEIGHT: 63 IN | SYSTOLIC BLOOD PRESSURE: 153 MMHG

## 2019-06-08 DIAGNOSIS — G91.2 IDIOPATHIC NORMAL PRESSURE HYDROCEPHALUS (HCC): ICD-10-CM

## 2019-06-08 LAB
APPEARANCE UR: CLEAR
BACTERIA URNS QL MICRO: NEGATIVE /HPF
BILIRUB UR QL: NEGATIVE
COLOR UR: NORMAL
EPITH CASTS URNS QL MICRO: NORMAL /LPF
GLUCOSE UR STRIP.AUTO-MCNC: NEGATIVE MG/DL
HGB UR QL STRIP: NEGATIVE
HYALINE CASTS URNS QL MICRO: NORMAL /LPF (ref 0–5)
KETONES UR QL STRIP.AUTO: NEGATIVE MG/DL
LEUKOCYTE ESTERASE UR QL STRIP.AUTO: NEGATIVE
NITRITE UR QL STRIP.AUTO: NEGATIVE
PH UR STRIP: 7.5 [PH] (ref 5–8)
PROT UR STRIP-MCNC: NEGATIVE MG/DL
RBC #/AREA URNS HPF: NORMAL /HPF (ref 0–5)
SP GR UR REFRACTOMETRY: 1.01 (ref 1–1.03)
UA: UC IF INDICATED,UAUC: NORMAL
UROBILINOGEN UR QL STRIP.AUTO: 0.2 EU/DL (ref 0.2–1)
WBC URNS QL MICRO: NORMAL /HPF (ref 0–4)

## 2019-06-08 PROCEDURE — 74011250637 HC RX REV CODE- 250/637: Performed by: NEUROLOGICAL SURGERY

## 2019-06-08 PROCEDURE — 74011250637 HC RX REV CODE- 250/637: Performed by: NURSE PRACTITIONER

## 2019-06-08 PROCEDURE — 81001 URINALYSIS AUTO W/SCOPE: CPT

## 2019-06-08 RX ADMIN — DIAZEPAM 2.5 MG: 5 TABLET ORAL at 08:58

## 2019-06-08 RX ADMIN — ATENOLOL 25 MG: 25 TABLET ORAL at 08:59

## 2019-06-08 RX ADMIN — HYDROCHLOROTHIAZIDE 25 MG: 25 TABLET ORAL at 08:58

## 2019-06-08 RX ADMIN — FAMOTIDINE 20 MG: 20 TABLET ORAL at 08:59

## 2019-06-08 RX ADMIN — Medication 10 ML: at 06:01

## 2019-06-08 RX ADMIN — ACETAMINOPHEN 650 MG: 325 TABLET ORAL at 08:59

## 2019-06-08 RX ADMIN — SENNOSIDES,DOCUSATE SODIUM 1 TABLET: 8.6; 5 TABLET, FILM COATED ORAL at 08:59

## 2019-06-08 NOTE — PROGRESS NOTES
CM spoke with [facility] [address] and they can accept today, bed available for family to transport and leaving [facility] at 12noon. Will update bedside nurse. Discharge envelope provided to nurse. Needs to include AVS, summary, kardex, MAR and EMTALA. Updated EMTALA with accepting physician of Dr. Beverly Pascual. Nurse to call report to [phone].     MINGO Reed

## 2019-06-08 NOTE — PROGRESS NOTES
Bedside and Verbal shift change report given to Bandar Chavez (oncoming nurse) by Lisa Manner (offgoing nurse). Report included the following information SBAR, Kardex, Procedure Summary, Intake/Output, MAR, Recent Results and Cardiac Rhythm NSR.

## 2019-06-08 NOTE — PROGRESS NOTES
Problem: Falls - Risk of  Goal: *Absence of Falls  Description  Document Thomas Browne Fall Risk and appropriate interventions in the flowsheet. Outcome: Progressing Towards Goal     Problem: Pressure Injury - Risk of  Goal: *Prevention of pressure injury  Description  Document Jagdish Scale and appropriate interventions in the flowsheet.   Outcome: Progressing Towards Goal

## 2019-06-09 LAB
25(OH)D3 SERPL-MCNC: 21.9 NG/ML (ref 30–100)
ALBUMIN SERPL-MCNC: 3.2 G/DL (ref 3.5–5)
ALBUMIN/GLOB SERPL: 0.9 {RATIO} (ref 1.1–2.2)
ALP SERPL-CCNC: 72 U/L (ref 45–117)
ALT SERPL-CCNC: 22 U/L (ref 12–78)
ANION GAP SERPL CALC-SCNC: 3 MMOL/L (ref 5–15)
AST SERPL-CCNC: 19 U/L (ref 15–37)
BASOPHILS # BLD: 0 K/UL (ref 0–0.1)
BASOPHILS NFR BLD: 1 % (ref 0–1)
BILIRUB SERPL-MCNC: 0.4 MG/DL (ref 0.2–1)
BUN SERPL-MCNC: 9 MG/DL (ref 6–20)
BUN/CREAT SERPL: 11 (ref 12–20)
CALCIUM SERPL-MCNC: 10 MG/DL (ref 8.5–10.1)
CHLORIDE SERPL-SCNC: 106 MMOL/L (ref 97–108)
CO2 SERPL-SCNC: 31 MMOL/L (ref 21–32)
CREAT SERPL-MCNC: 0.85 MG/DL (ref 0.55–1.02)
DIFFERENTIAL METHOD BLD: ABNORMAL
EOSINOPHIL # BLD: 0.2 K/UL (ref 0–0.4)
EOSINOPHIL NFR BLD: 3 % (ref 0–7)
ERYTHROCYTE [DISTWIDTH] IN BLOOD BY AUTOMATED COUNT: 12.6 % (ref 11.5–14.5)
GLOBULIN SER CALC-MCNC: 3.4 G/DL (ref 2–4)
GLUCOSE SERPL-MCNC: 84 MG/DL (ref 65–100)
HCT VFR BLD AUTO: 37.4 % (ref 35–47)
HGB BLD-MCNC: 12.3 G/DL (ref 11.5–16)
IMM GRANULOCYTES # BLD AUTO: 0 K/UL (ref 0–0.04)
IMM GRANULOCYTES NFR BLD AUTO: 1 % (ref 0–0.5)
LYMPHOCYTES # BLD: 3.1 K/UL (ref 0.8–3.5)
LYMPHOCYTES NFR BLD: 47 % (ref 12–49)
MAGNESIUM SERPL-MCNC: 2.6 MG/DL (ref 1.6–2.4)
MCH RBC QN AUTO: 30.9 PG (ref 26–34)
MCHC RBC AUTO-ENTMCNC: 32.9 G/DL (ref 30–36.5)
MCV RBC AUTO: 94 FL (ref 80–99)
MONOCYTES # BLD: 0.8 K/UL (ref 0–1)
MONOCYTES NFR BLD: 12 % (ref 5–13)
NEUTS SEG # BLD: 2.3 K/UL (ref 1.8–8)
NEUTS SEG NFR BLD: 36 % (ref 32–75)
NRBC # BLD: 0 K/UL (ref 0–0.01)
NRBC BLD-RTO: 0 PER 100 WBC
PLATELET # BLD AUTO: 334 K/UL (ref 150–400)
PMV BLD AUTO: 10.7 FL (ref 8.9–12.9)
POTASSIUM SERPL-SCNC: 3.8 MMOL/L (ref 3.5–5.1)
PROT SERPL-MCNC: 6.6 G/DL (ref 6.4–8.2)
RBC # BLD AUTO: 3.98 M/UL (ref 3.8–5.2)
SODIUM SERPL-SCNC: 140 MMOL/L (ref 136–145)
WBC # BLD AUTO: 6.4 K/UL (ref 3.6–11)

## 2019-06-09 PROCEDURE — 80053 COMPREHEN METABOLIC PANEL: CPT

## 2019-06-09 PROCEDURE — 85025 COMPLETE CBC W/AUTO DIFF WBC: CPT

## 2019-06-09 PROCEDURE — 83735 ASSAY OF MAGNESIUM: CPT

## 2019-06-09 PROCEDURE — 82306 VITAMIN D 25 HYDROXY: CPT

## 2019-06-09 PROCEDURE — 36415 COLL VENOUS BLD VENIPUNCTURE: CPT

## 2019-06-10 ENCOUNTER — PATIENT OUTREACH (OUTPATIENT)
Dept: INTERNAL MEDICINE CLINIC | Age: 84
End: 2019-06-10

## 2019-06-10 LAB
ANION GAP SERPL CALC-SCNC: 4 MMOL/L (ref 5–15)
BUN SERPL-MCNC: 10 MG/DL (ref 6–20)
BUN/CREAT SERPL: 13 (ref 12–20)
CALCIUM SERPL-MCNC: 9.8 MG/DL (ref 8.5–10.1)
CHLORIDE SERPL-SCNC: 109 MMOL/L (ref 97–108)
CO2 SERPL-SCNC: 27 MMOL/L (ref 21–32)
CREAT SERPL-MCNC: 0.77 MG/DL (ref 0.55–1.02)
ERYTHROCYTE [DISTWIDTH] IN BLOOD BY AUTOMATED COUNT: 12.7 % (ref 11.5–14.5)
GLUCOSE SERPL-MCNC: 90 MG/DL (ref 65–100)
HCT VFR BLD AUTO: 36.9 % (ref 35–47)
HGB BLD-MCNC: 12.3 G/DL (ref 11.5–16)
MCH RBC QN AUTO: 31.5 PG (ref 26–34)
MCHC RBC AUTO-ENTMCNC: 33.3 G/DL (ref 30–36.5)
MCV RBC AUTO: 94.6 FL (ref 80–99)
NRBC # BLD: 0 K/UL (ref 0–0.01)
NRBC BLD-RTO: 0 PER 100 WBC
PLATELET # BLD AUTO: 321 K/UL (ref 150–400)
PMV BLD AUTO: 10.3 FL (ref 8.9–12.9)
POTASSIUM SERPL-SCNC: 3.9 MMOL/L (ref 3.5–5.1)
RBC # BLD AUTO: 3.9 M/UL (ref 3.8–5.2)
SODIUM SERPL-SCNC: 140 MMOL/L (ref 136–145)
WBC # BLD AUTO: 5.6 K/UL (ref 3.6–11)

## 2019-06-10 PROCEDURE — 85027 COMPLETE CBC AUTOMATED: CPT

## 2019-06-10 PROCEDURE — 80048 BASIC METABOLIC PNL TOTAL CA: CPT

## 2019-06-10 PROCEDURE — 36415 COLL VENOUS BLD VENIPUNCTURE: CPT

## 2019-06-10 NOTE — PROGRESS NOTES
Transition of Care Coordination/Hospital to Post Acute Facility:     Date/Time:  6/10/2019 8:04 AM    Patient was admitted to [facility] on 6/3/19 for treatment of normal pressure hydrocephalus 2/2 insertion of  shunt. Patient was discharged 19 to [address] [place] [address] [place] [facility] for continuation of care. Inpatient RRAT score: 11    Top Challenges reviewed    F/u with Dr Aneta Burnett in 2wks- scheduled for staple removal  Or [facility]     Method of communication with care team :none     Presented to hospital for  shunt insertion on 6/3/19. After procedure CT healed revealed prox shunt was not in ventricle. Patient taken back to OR on 19 for revision of right front proximal shunt. Doing well after second procedure. C/O abdominal distention; CT abdomen showed distention 2/2 stool. Patient had BM. Nurse Navigator(NN) spoke with nurse Kike Aggarwal to provide introduction to self and explanation of the Nurse Navigator Role. Verified name and  as patient identifiers.      Discussed and reviewed  anticipated length of stay, anticipated needs at time of discharge, discharge summary, follow up appointments, medication reconciliation    ACP:   Does the patient have a current ACP (including DDNR):  yes  Does the post acute facility have a copy of the patients ACP:  N/A    Medication(s):   New Medications at Discharge:   HYDROcodone-acetaminophen 5-325 mg per tablet (NORCO)  naloxone 4 mg/actuation nasal spray (NARCAN)  Changed Medications at Discharge:  atenolol 25 mg tablet (TENORMIN)  diazePAM 5 mg tablet (VALIUM)   Discontinued Medications at Discharge:   aspirin delayed-release 81 mg tablet     PCP/Specialist follow up:   Future Appointments   Date Time Provider Tania Forde   2019  1:00 PM Cindy Hernandez, NP [address]   2019 11:40 AM Skyler Vásquez MD [facility] SCHED   2019  8:40 AM Nahun Jarvis MD [address] [place] [address] to ask questions was provided. Contact information was provided for future reference or further questions. Will continue to monitor.

## 2019-06-12 ENCOUNTER — APPOINTMENT (OUTPATIENT)
Dept: CT IMAGING | Age: 84
End: 2019-06-12

## 2019-06-12 PROCEDURE — 70450 CT HEAD/BRAIN W/O DYE: CPT

## 2019-06-13 ENCOUNTER — TELEPHONE (OUTPATIENT)
Dept: INTERNAL MEDICINE CLINIC | Age: 84
End: 2019-06-13

## 2019-06-13 LAB
ANION GAP SERPL CALC-SCNC: 7 MMOL/L (ref 5–15)
BUN SERPL-MCNC: 19 MG/DL (ref 6–20)
BUN/CREAT SERPL: 22 (ref 12–20)
CALCIUM SERPL-MCNC: 9.9 MG/DL (ref 8.5–10.1)
CHLORIDE SERPL-SCNC: 106 MMOL/L (ref 97–108)
CO2 SERPL-SCNC: 26 MMOL/L (ref 21–32)
CREAT SERPL-MCNC: 0.87 MG/DL (ref 0.55–1.02)
ERYTHROCYTE [DISTWIDTH] IN BLOOD BY AUTOMATED COUNT: 12.6 % (ref 11.5–14.5)
GLUCOSE SERPL-MCNC: 98 MG/DL (ref 65–100)
HCT VFR BLD AUTO: 38.7 % (ref 35–47)
HGB BLD-MCNC: 12.6 G/DL (ref 11.5–16)
MAGNESIUM SERPL-MCNC: 2.4 MG/DL (ref 1.6–2.4)
MCH RBC QN AUTO: 31.3 PG (ref 26–34)
MCHC RBC AUTO-ENTMCNC: 32.6 G/DL (ref 30–36.5)
MCV RBC AUTO: 96 FL (ref 80–99)
NRBC # BLD: 0 K/UL (ref 0–0.01)
NRBC BLD-RTO: 0 PER 100 WBC
PLATELET # BLD AUTO: 350 K/UL (ref 150–400)
PMV BLD AUTO: 10.5 FL (ref 8.9–12.9)
POTASSIUM SERPL-SCNC: 3.7 MMOL/L (ref 3.5–5.1)
RBC # BLD AUTO: 4.03 M/UL (ref 3.8–5.2)
SODIUM SERPL-SCNC: 139 MMOL/L (ref 136–145)
WBC # BLD AUTO: 6.2 K/UL (ref 3.6–11)

## 2019-06-13 PROCEDURE — 36415 COLL VENOUS BLD VENIPUNCTURE: CPT

## 2019-06-13 PROCEDURE — 85027 COMPLETE CBC AUTOMATED: CPT

## 2019-06-13 PROCEDURE — 83735 ASSAY OF MAGNESIUM: CPT

## 2019-06-13 PROCEDURE — 80048 BASIC METABOLIC PNL TOTAL CA: CPT

## 2019-06-13 NOTE — TELEPHONE ENCOUNTER
Nurse with [address] [place] [address] is requesting to speak with the nurse to confirm if PCP will sign off on [facility] orders and Plan of Care.  She can be reached at [phone]

## 2019-06-17 ENCOUNTER — PATIENT OUTREACH (OUTPATIENT)
Dept: INTERNAL MEDICINE CLINIC | Age: 84
End: 2019-06-17

## 2019-06-17 NOTE — PROGRESS NOTES
Transition of Care Coordination/Hospital to Post Acute Facility/Home:         Patient was admitted to [facility] on 6/3/19 for PLANNED  shunt insertion for treatment of normal pressue hydrocephalus. Patient was discharged 19 to [facility] for continuation of care. Patient was discharged home on 19. The physician discharge summary was not available at the time of outreach. Patient was contacted within one business day of discharge. Top Challenges reviewed with the provider   Patient's son to contact Dr Arlyn Buckley for follow up appt         Method of communication with provider :none    Inpatient RRAT score: 6  Was this a readmission? no   Patient stated reason for the readmission: NA    Nurse Navigator (NN) contacted the patient by telephone to perform post hospital discharge assessment. Verified name and  with patient as identifiers. Provided introduction to self, and explanation of the Nurse Navigator role. Discussed discharge instructions with patient who verbalized understanding. Patient given an opportunity to ask questions and does not have any further questions or concerns at this time. The patient agrees to contact the PCP office for questions related to their healthcare. NN provided contact information for future reference. Disease Specific:   N/A    Summary of patient's top problems:  1.  shunt insertion-presented to hospital for planned procedure. F/U CT revealed proximal shunt was not in ventricle. Patient taken back to OR on 19 for revision of right front proximal shunt. Doing well. Transferred to [address] [place] [address] for continuation of care.     Home Health orders at discharge: PT, OT, [address]: [facility]  Date of initial visit: unknown    Durable Medical Equipment ordered/company: NA  Durable Medical Equipment received: NA    Barriers to care? transportation    Advance Care Planning:   Does patient have an Advance Directive:  reviewed and current     Medication(s):   New Medications at Discharge: NA  Changed Medications at Discharge: NA  Discontinued Medications at Discharge: NA    Medication reconciliation was performed with patient, who verbalizes understanding of administration of home medications. There were no barriers to obtaining medications identified at this time. Referral to Pharm D needed: no     Current Outpatient Medications   Medication Sig    diazePAM (VALIUM) 5 mg tablet Take 1/2 tab PO in am and 1 tab PO at bedtime    atenolol (TENORMIN) 25 mg tablet Take 1 Tab by mouth every twelve (12) hours.  hydroCHLOROthiazide (HYDRODIURIL) 25 mg tablet TAKE 1 TABLET BY MOUTH EVERY DAY    raNITIdine (ZANTAC) 150 mg tablet Take 150 mg by mouth two (2) times a day.  polyethylene glycol (MIRALAX) 17 gram/dose powder Take 17 g by mouth daily.  ASCORBIC ACID/MULTIVIT-MIN (EMERGEN-C PO) Take  by mouth.  acetaminophen (TYLENOL EXTRA STRENGTH) 500 mg tablet Take  by mouth every six (6) hours as needed for Pain. No current facility-administered medications for this visit. Medications Discontinued During This Encounter   Medication Reason    naloxone [facility]) 4 mg/actuation nasal spray Not A Current Medication       [facility] follow up appointment(s):   Future Appointments   Date Time Provider Tania Forde   8/6/2019  1:00 PM Mike Hernandez NP [facility]    8/9/2019 11:40 AM [facility]   8/19/2019  8:40 AM Loan Spencer MD [address] [place] [address]      Non-[facility] follow up appointment(s): Patient's son to contact Dr Rex Mccormack for [facility]  [facility]:  information provided as a resource       Goals      Prevent complications post hospitalization.       06/17/19  · Rehab went well  · Admits to feeling tired  · Tolerating diet  · No bowel/bladder complaints  · Denies CP, SOB, fever  · Reports her friend from [place] is visiting today  · No driving until cleared from  Tristen Saleh  · Son to schedule f/u appt with Dr Tristen Saleh  · Reports staples were removed in rehab  · Deferred appt with pcp until after seen by Dr Tristen Saleh  · Call PCP/DH for fever, n/v/d falls, weakness  · Call 911 for CP, SOB, change in mentation  Heidi Randle RN

## 2019-06-18 ENCOUNTER — TELEPHONE (OUTPATIENT)
Dept: INTERNAL MEDICINE CLINIC | Age: 84
End: 2019-06-18

## 2019-06-18 NOTE — TELEPHONE ENCOUNTER
Kathy lynn/ [facility] home health called requesting to know if DR. Santiago Masterson will follow patient for home health. Please call to advise.  - Leave message if unable to answer phone [phone]

## 2019-06-19 ENCOUNTER — PATIENT OUTREACH (OUTPATIENT)
Dept: INTERNAL MEDICINE CLINIC | Age: 84
End: 2019-06-19

## 2019-06-21 ENCOUNTER — OFFICE VISIT (OUTPATIENT)
Dept: INTERNAL MEDICINE CLINIC | Age: 84
End: 2019-06-21

## 2019-06-21 VITALS
WEIGHT: 169.4 LBS | HEART RATE: 74 BPM | DIASTOLIC BLOOD PRESSURE: 69 MMHG | OXYGEN SATURATION: 95 % | RESPIRATION RATE: 14 BRPM | SYSTOLIC BLOOD PRESSURE: 132 MMHG | TEMPERATURE: 98.2 F | BODY MASS INDEX: 30.02 KG/M2 | HEIGHT: 63 IN

## 2019-06-21 DIAGNOSIS — I10 ESSENTIAL HYPERTENSION: ICD-10-CM

## 2019-06-21 DIAGNOSIS — R14.0 ABDOMINAL DISTENTION: ICD-10-CM

## 2019-06-21 DIAGNOSIS — R21 RASH: ICD-10-CM

## 2019-06-21 DIAGNOSIS — G91.2 NPH (NORMAL PRESSURE HYDROCEPHALUS) (HCC): Primary | ICD-10-CM

## 2019-06-21 RX ORDER — ATENOLOL 25 MG/1
25 TABLET ORAL DAILY
Qty: 90 TAB | Refills: 1 | Status: SHIPPED | OUTPATIENT
Start: 2019-06-21 | End: 2019-10-26 | Stop reason: SDUPTHER

## 2019-06-21 RX ORDER — NYSTATIN 100000 [USP'U]/G
POWDER TOPICAL 4 TIMES DAILY
Qty: 60 G | Refills: 3 | Status: SHIPPED | OUTPATIENT
Start: 2019-06-21 | End: 2019-09-25

## 2019-06-21 NOTE — PROGRESS NOTES
HISTORY OF PRESENT ILLNESS  Sherry Little is a 81 y.o. female. HPI  Pt notes that she has her shunt put in and was revised the next day. She was transferred to a rehab facility and discharged on 06/16/19. Pt notes when she was in rehab she walked and was able to walk of the stairs. She notes that before her Hydrocephalus her BP was in the 170's. She notes since she shunt was put in she lost 15 pounds of fluid and she is has a normal BP. Pt notes she f/u with MP Albert and Dr. Rex Mccormack (06/27/19). Pt's friend notes that she decreased her Atenolol to 12.5 mg/day when her BP dropped post-shunt revision. She notes she has not been able to take her Atenolol since last week because her pharmacy has 2 different prescriptions. Abdominal discomfort: Pt notes she has some abdominal discomfort for the last few days. She notes that when she woke up the other day she had severe pain, and had some XR's that showed blockage in her bowels. Pt's friend notes she has being taking Miralax every other day. She notes she took Senokot last night. Sore mouth: Pt notes that her mouth is sore and is has affected her appetite. She notes improvement over time. She notes she had some skin on her hard palate peel off. Pt's friend notes some taste disturbance accompanied with her pain as well. UTI: Pt noted some discomfort in her kidney's. She explains that she had a catheter. She is worried about a UTI. Abdominal rash: pt notes she has some abdominal rash in her abdominal folds after she sweats. She notes she takes Hydrocortisone with some improvement, but there is no disappearance of the rash after she uses the cream.    Review of Systems   HENT:        Sore mouth   Gastrointestinal: Positive for abdominal pain. Genitourinary: Positive for flank pain. Skin: Positive for rash. Physical Exam   Abdominal:   Noted good bowel sounds.    Skin:   Very small red patches       ASSESSMENT and PLAN  Diagnoses and all orders for this visit:    1. NPH (normal pressure hydrocephalus)  Advised pt to consider getting Life Alert or some other medical device in case she falls in the middle of the night. She mehta follow up with  in a week     2. Essential hypertension  Advised pt to continue on 12.5 mg/day and to monitor her BP. Informed pt that if her BP starts an elevated trend to take a full tablet 1x/day. -     atenolol (TENORMIN) 25 mg tablet; Take 1 Tab by mouth daily. 3. Abdominal distention  Advised pt to continue taking Miralax and Senokot to help her have good BM's. Informed pt to monitor her discomfort and discuss it with Dr. HOGAN at her f/u appt if it continues to bother her. 4. Rash  Discussed that the rash could be a small fungal rash since it appears in her abdominal folds after she sweats. Advised pt to use Nystatin powder. -     nystatin (MYCOSTATIN) powder; Apply  to affected area four (4) times daily. Lab results and schedule of future lab studies reviewed with patient. Reviewed diet, exercise and weight control. Written by Dipesh Medley, as dictated by Jaron Drummond MD.     Current diagnosis and concerns discussed with pt at length. Understands risks and benefits or current treatment plan and medications and accepts the treatment and medication with any possible risks. Pt asks appropriate questions which were answered. Pt instructed to call with any concerns or problems. This note will not be viewable in [address]

## 2019-06-24 ENCOUNTER — TELEPHONE (OUTPATIENT)
Dept: INTERNAL MEDICINE CLINIC | Age: 84
End: 2019-06-24

## 2019-06-28 ENCOUNTER — PATIENT OUTREACH (OUTPATIENT)
Dept: INTERNAL MEDICINE CLINIC | Age: 84
End: 2019-06-28

## 2019-06-28 NOTE — PROGRESS NOTES
Goals      Prevent complications post hospitalization.       06/17/19  · Rehab went well  · Admits to feeling tired  · Tolerating diet  · No bowel/bladder complaints  · Denies CP, SOB, fever  · Reports her friend from [place] is visiting today  · No driving until cleared from Dr Nisha Gray  · Son to schedule f/u appt with Dr Nisha Gray  · Reports staples were removed in rehab  · Deferred appt with pcp until after seen by Dr Nisha Gray  · Call PCP/ for fever, n/v/d falls, weakness  · Call 911 for CP, SOB, change in mentation  Zuri Tomlin RN    06/19/19   · Reports she is doing extremely well; \"feeling good\"  · Reports good blood pressures, 120/80 last night, 110/50 (during the day yesterday)   · Today, 130/80, HR 83 this morning  · Denies edema  · No f/u appt for Dr Nisha Gray as of yet  · Reviewed not putting head down, not bending over   · Reports cardiology early august  · Confirmed f/u appt with pcp 6/21/19   · Appreciated the call  · NN contacted Dr Peck Setting office, spoke to Hamp Pallas  · F/u appt scheduled 6/27 @ 1200  · NN updated patient, verbalized understanding  Zuri Tomlin RN

## 2019-06-28 NOTE — PROGRESS NOTES
Goals      Prevent complications post hospitalization. 06/17/19  · Rehab went well  · Admits to feeling tired  · Tolerating diet  · No bowel/bladder complaints  · Denies CP, SOB, fever  · Reports her friend from [place] is visiting today  · No driving until cleared from Dr Sabrina Abbott  · Son to schedule f/u appt with Dr Sabrina Abbott  · Reports staples were removed in rehab  · Deferred appt with pcp until after seen by Dr Sabrina Abbott  · Call PCP/DH for fever, n/v/d falls, weakness  · Call 911 for CP, SOB, change in mentation  Nicole Hodges RN    06/19/19   · Reports she is doing extremely well; \"feeling good\"  · Reports good blood pressures, 120/80 last night, 110/50 (during the day yesterday)   · Today, 130/80, HR 83 this morning  · Denies edema  · No f/u appt for Dr Sabrina Abbott as of yet  · Reviewed not putting head down, not bending over   · Reports cardiology early august  · Confirmed f/u appt with pcp 6/21/19   · Appreciated the call  · NN contacted Dr Janneth Evans office, spoke to Myriam Vasquez  · F/u appt scheduled 6/27 @ 1200  · NN updated patient, verbalized understanding  Nicole Hodges RN    06/28/19  · Attended follow up appointment with Dr Sabrina Abbott  · Reports appt went well  · 2 Xrays completed (negative) 2/2 right side discomfort (10 out of 10 pain scale)-?related to shunt placement  · Tylenol increased to (2) 500 mg tabs three times per day per Dr Sabrina Abbott  · Reports low grade fever, 99.7.  Dr Sabrina Abbott aware  · Follow up appt schedule with Dr Sabrina Abbott, 7/8/19  · CT scan scheduled 7/5  · BP stable, 118/80  · Denies edema  · Tolerating diet-mainly soup  · BM today  · Reinforced not putting head down, bending over  · [facility] PT scheduled to come today  · Patient appreciated the follow up call  Nicole Hodges RN

## 2019-07-05 ENCOUNTER — HOSPITAL ENCOUNTER (OUTPATIENT)
Dept: CT IMAGING | Age: 84
Discharge: HOME | End: 2019-07-05
Attending: NEUROLOGICAL SURGERY
Payer: MEDICARE

## 2019-07-05 DIAGNOSIS — R10.9 ABDOMINAL PAIN, ACUTE: ICD-10-CM

## 2019-07-05 PROCEDURE — 74177 CT ABD & PELVIS W/CONTRAST: CPT

## 2019-07-05 PROCEDURE — 74011636320 HC RX REV CODE- 636/320: Performed by: RADIOLOGY

## 2019-07-05 RX ADMIN — IOPAMIDOL 100 ML: 755 INJECTION, SOLUTION INTRAVENOUS at 12:09

## 2019-07-09 ENCOUNTER — PATIENT OUTREACH (OUTPATIENT)
Dept: INTERNAL MEDICINE CLINIC | Age: 84
End: 2019-07-09

## 2019-07-09 NOTE — PROGRESS NOTES
Patient has graduated from the Transitions of Care Coordination  program on 7/9/19. Patient's symptoms are stable at this time. Patient/family has the ability to self-manage. Care management goals have been completed at this time. No further nurse navigator follow up scheduled. Goals Addressed                 This Visit's Progress     Prevent complications post hospitalization. 06/17/19  · Rehab went well  · Admits to feeling tired  · Tolerating diet  · No bowel/bladder complaints  · Denies CP, SOB, fever  · Reports her friend from [place] is visiting today  · No driving until cleared from Dr Janeth Nielson  · Son to schedule f/u appt with Dr Janeth Nielson  · Reports fredis were removed in rehab  · Deferred appt with pcp until after seen by Dr Janeth Nielson  · Call PCP/DH for fever, n/v/d falls, weakness  · Call 911 for CP, SOB, change in mentation  Sheng Ayoub RN    06/19/19   · Reports she is doing extremely well; \"feeling good\"  · Reports good blood pressures, 120/80 last night, 110/50 (during the day yesterday)   · Today, 130/80, HR 83 this morning  · Denies edema  · No f/u appt for Dr Janeth Nielson as of yet  · Reviewed not putting head down, not bending over   · Reports cardiology early august  · Confirmed f/u appt with pcp 6/21/19   · Appreciated the call  · NN contacted Dr Edil Renteria office, spoke to Abi Epperson  · F/u appt scheduled 6/27 @ 1200  · NN updated patient, verbalized understanding  Sheng Ayoub RN    06/28/19  · Attended follow up appointment with Dr Janeth Nielson  · Reports appt went well  · 2 Xrays completed (negative) 2/2 right side discomfort (10 out of 10 pain scale)-?related to shunt placement  · Tylenol increased to (2) 500 mg tabs three times per day per Dr Janeth Nielson  · Reports low grade fever, 99.7.  Dr Janeth Nielson aware  · Follow up appt schedule with Dr Janeth Nielson, 7/8/19  · CT scan scheduled 7/5  · BP stable, 118/80  · Denies edema  · Tolerating diet-mainly soup  · BM today  · Reinforced not putting head down, bending over  · [facility]  PT scheduled to come today  · Patient appreciated the follow up call  Randall Sweeney RN    07/09/19  · Reports she is doing ok  · Denies falls  · Reports intermittent right-sided pain (describes as \"stabbing pain\")  · Taking Tylenol 500 mg TID (2 QAM & QHS, and one at lunch)  · Reviewed max APAP dosing  · Reports therapy is going well  · Tolerating diet  · /110  · Follow up appt with Dr Kathryn Abraham rescheduled for 7/15  · Admits to short-term memory loss  · Reviewed red flags  · Advised to contact office with any concerns  · Confirmed f/u appt 8/22  Randall Sweeney RN            Pt has nurse navigator's contact information for any further questions, concerns, or needs.   Patients upcoming visits:    Future Appointments   Date Time Provider Tania Forde   8/6/2019  1:00 PM Iris Hernandez NP [facility]    8/9/2019 11:40 AM Kayli Emanuel MD [facility]   8/19/2019  8:40 AM Geovany Solano  [facility]   8/22/2019 10:00 AM Leyda Moran MD [address] [place] [address]

## 2019-07-19 ENCOUNTER — TELEPHONE (OUTPATIENT)
Dept: INTERNAL MEDICINE CLINIC | Age: 84
End: 2019-07-19

## 2019-07-19 NOTE — TELEPHONE ENCOUNTER
Rob Powell is requesting a verbal order to continue [address] [facility] PT twice a week for 4 weeks. Thanks.

## 2019-08-22 ENCOUNTER — OFFICE VISIT (OUTPATIENT)
Dept: INTERNAL MEDICINE CLINIC | Age: 84
End: 2019-08-22

## 2019-08-22 VITALS
HEIGHT: 63 IN | HEART RATE: 58 BPM | OXYGEN SATURATION: 95 % | TEMPERATURE: 97.8 F | DIASTOLIC BLOOD PRESSURE: 83 MMHG | BODY MASS INDEX: 29.59 KG/M2 | RESPIRATION RATE: 16 BRPM | WEIGHT: 167 LBS | SYSTOLIC BLOOD PRESSURE: 132 MMHG

## 2019-08-22 DIAGNOSIS — Z00.00 MEDICARE ANNUAL WELLNESS VISIT, SUBSEQUENT: Primary | ICD-10-CM

## 2019-08-22 DIAGNOSIS — K59.09 OTHER CONSTIPATION: ICD-10-CM

## 2019-08-22 DIAGNOSIS — G91.2 NPH (NORMAL PRESSURE HYDROCEPHALUS) (HCC): ICD-10-CM

## 2019-08-22 DIAGNOSIS — I10 ESSENTIAL HYPERTENSION: ICD-10-CM

## 2019-08-22 NOTE — PROGRESS NOTES
This is the Subsequent Medicare Annual Wellness Exam, performed 12 months or more after the Initial AWV or the last Subsequent AWV    I have reviewed the patient's medical history in detail and updated the computerized patient record. History     Past Medical History:   Diagnosis Date    Arthritis     Endometriosis     YOU/BSO    Esophageal stricture     dilation with Dr. Elizabeth Wan GERD (gastroesophageal reflux disease)     Hypertension     Ill-defined condition     Meningioma at left temporal area    Ill-defined condition     Skull fractures from [address]    Ill-defined condition     \"SCALOPING IN RIGHT FEMUR WITH A HOLE\" PER PT    Ill-defined condition     PT HAS 2 KIDNEYS AND 4 URETERS    Kidney stones 11/17/2010    Macular degeneration, wet ([address]     Palpitation     Parathyroid disease ([address]     Psychiatric disorder     Anxiety    Seizures ([address]     Last seizure 2005    Sleep apnea 11/16/2010    uses cpap No longer needed after losing 10 lbs-LAST TEST WNL    TIA (transient ischemic attack) 2003,2009,2011,2014    tia's x 3  (she says it was related to estrogen use)      Past Surgical History:   Procedure Laterality Date    COLONOSCOPY N/A 1/23/2018    COLONOSCOPY, EGD performed by Keith Mitchell MD at [address] [place] [address], COLON, DIAGNOSTIC      2006 neg    HX ABDOMINAL WALL DEFECT REPAIR      biliary stones and ercp for pancreatitis    HX CATARACT REMOVAL Left 09/2018    HX CHOLECYSTECTOMY      20 years ago with adhesions found    HX GYN      you endometriosis    HX HYSTERECTOMY  1972    endometriosis    HX TONSILLECTOMY      x 2     Current Outpatient Medications   Medication Sig Dispense Refill    raNITIdine (ZANTAC) 150 mg tablet TAKE 1 TABLET BY MOUTH TWICE A DAY 60 Tab 5    atenolol (TENORMIN) 25 mg tablet Take 1 Tab by mouth daily.  90 Tab 1    diazePAM (VALIUM) 5 mg tablet Take 1/2 tab PO in am and 1 tab PO at bedtime 15 Tab 0    hydroCHLOROthiazide (HYDRODIURIL) 25 mg tablet TAKE 1 TABLET BY MOUTH EVERY DAY 30 Tab 4    polyethylene glycol (MIRALAX) 17 gram/dose powder Take 17 g by mouth daily.  acetaminophen (TYLENOL EXTRA STRENGTH) 500 mg tablet Take  by mouth every six (6) hours as needed for Pain.  nystatin (MYCOSTATIN) powder Apply  to affected area four (4) times daily. 60 g 3    atenolol (TENORMIN) 25 mg tablet Take 1 Tab by mouth every twelve (12) hours. (Patient taking differently: Take 25 mg by mouth daily. Start 1/2 tablet a day) 60 Tab 0    ASCORBIC ACID/MULTIVIT-MIN (EMERGEN-C PO) Take  by mouth. Allergies   Allergen Reactions    Levaquin [Levofloxacin] Other (comments)     Hemorrhage in the eyes      Ceclor [Cefaclor] Other (comments)     Pain flank, abd      Cleocin [Clindamycin Hcl] Other (comments)     Flank, abd pain    Codeine Nausea and Vomiting    Compazine [Prochlorperazine] Other (comments)     Hyper and anxious    Fosamax [Alendronate] Other (comments)     Indigestion      Macrobid [Nitrofurantoin Monohyd/M-Cryst] Swelling and Other (comments)     wheezing    Pcn [Penicillins] Rash    Phenobarbital Other (comments)     anxious    Sulfa (Sulfonamide Antibiotics) Rash     Headache       Family History   Problem Relation Age of Onset    Hypertension Mother    [facility] Other Mother         pulmonary hypotension    Heart Disease Father     Cancer Father     Atrial Fibrillation Father     Heart Disease Brother     Other Maternal Grandmother         Mental Illness    Ulcerative Colitis Son     Heart Disease Son     Anesth Problems Son         ASPIRATED WITH ANESTHESIA    Anesth Problems Other      Social History     Tobacco Use    Smoking status: Former Smoker     Years: 2.00     Last attempt to quit: 1966     Years since quittin.6    Smokeless tobacco: Never Used    Tobacco comment: stopped in her 25s   Substance Use Topics    Alcohol use:  Yes     Alcohol/week: 7.0 standard drinks     Types: 7 Glasses of wine per week Comment: Glass of wine every evening. Patient Active Problem List   Diagnosis Code    TIA (transient ischemic attack) G45.9    Sleep apnea G47.30    Kidney stones N20.0    Macular degeneration H35.30    Major depressive disorder, recurrent episode, severe ([facility] F33.2    Essential hypertension I10    NPH (normal pressure hydrocephalus) G91.2       Depression Risk Factor Screening:     3 most recent PHQ Screens 9/18/2018   Little interest or pleasure in doing things Not at all   Feeling down, depressed, irritable, or hopeless Not at all   Total Score PHQ 2 0     Alcohol Risk Factor Screening: You do not drink alcohol or very rarely. Functional Ability and Level of Safety:   Hearing Loss  Hearing is good. Activities of Daily Living  The home contains: handrails and grab bars  Patient needs help with:  transportation, shopping, preparing meals, laundry and housework    Fall Risk  Fall Risk Assessment, last 12 mths 6/17/2019   Able to walk? Yes   Fall in past 12 months? No       Abuse Screen  Patient is not abused    Cognitive Screening   Evaluation of Cognitive Function:  Has your family/caregiver stated any concerns about your memory: no  Normal    Patient Care Team   Patient Care Team:  Thomas Malik MD as PCP - General (Internal Medicine)  Zhagn Perkins MD (Cardiology)  Herve Winter MD as Physician (Obstetrics & Gynecology)    Assessment/Plan   Education and counseling provided:  Are appropriate based on today's review and evaluation  End-of-Life planning (with patient's consent)    Diagnoses and all orders for this visit:    1. NPH (normal pressure hydrocephalus)    2. Essential hypertension        Health Maintenance Due   Topic Date Due    Shingrix Vaccine Age 49> (1 of 2) 12/30/1986    GLAUCOMA SCREENING Q2Y  04/01/2016    MEDICARE YEARLY EXAM  05/23/2019     This note will not be viewable in [employer].

## 2019-08-22 NOTE — PROGRESS NOTES
HISTORY OF PRESENT ILLNESS  Braxton Gowers is a 81 y.o. female. HPI  Hydrocephalus: Stable. Pt reports increased discomfort on her right side near her ear. She notes she wakes in the night due to the pain. She wants her surgical cord evaluated. Pt reports that she is experiencing increased weakness in her legs in the last month. She notes the leg weakness has caused a balance issue. She is concerned with falling. Hypertension ROS: taking medications as instructed, no medication side effects noted, no TIA's, no chest pain on exertion, no dyspnea on exertion, no swelling of ankles. New concerns: BP in office today is 132/83. Pt continues to comply with Atenolol (25 mg). Hip pain: Pt notes increased hip pain. Constipation: Stable with Emergen-C and Miralax. Review of Systems   Constitutional:        Loss of balance   HENT:        Head pain/discomfort   Neurological: Positive for weakness. All other systems reviewed and are negative. Physical Exam   Constitutional: She is oriented to person, place, and time. She appears well-developed and well-nourished. HENT:   Head: Normocephalic and atraumatic. Right Ear: External ear normal.   Left Ear: External ear normal.   Nose: Nose normal.   Mouth/Throat: Oropharynx is clear and moist.   Good healing    Eyes: Pupils are equal, round, and reactive to light. Conjunctivae and EOM are normal.   Neck: Normal range of motion. Neck supple. Cardiovascular: Normal rate, regular rhythm, normal heart sounds and intact distal pulses. Pulmonary/Chest: Effort normal and breath sounds normal. Right breast exhibits no inverted nipple, no mass, no nipple discharge, no skin change and no tenderness. Left breast exhibits no inverted nipple, no mass, no nipple discharge, no skin change and no tenderness. No breast swelling, tenderness, discharge or bleeding. Breasts are symmetrical.   Abdominal: Soft.  Bowel sounds are normal.   Genitourinary: Rectum normal and vagina normal. Rectal exam shows anal tone normal and guaiac negative stool. No breast swelling, tenderness, discharge or bleeding. Musculoskeletal: Normal range of motion. Neurological: She is alert and oriented to person, place, and time. Skin: Skin is warm and dry. Psychiatric: She has a normal mood and affect. Her behavior is normal. Judgment and thought content normal.   Nursing note and vitals reviewed. ASSESSMENT and PLAN  Diagnoses and all orders for this visit:    1. Medicare annual wellness visit, subsequent    2. NPH (normal pressure hydrocephalus)  Advised pt to discuss her increased leg weakness, loss of balance, and head discomfort with her neurosurgeon. Will continue to monitor for improvements or changes. 3. Essential hypertension  BP is at goal with Atenolol. I do not recommend any change in medications. 4. Other constipation  Stable and well-managed with Emergen-C and Miralax. No change in treatment. Lab results and schedule of future lab studies reviewed with patient. Reviewed diet, exercise and weight control. Written by Rashmi Richard, as dictated by Alvina Regalado MD.     Current diagnosis and concerns discussed with pt at length. Understands risks and benefits or current treatment plan and medications and accepts the treatment and medication with any possible risks. Pt asks appropriate questions which were answered. Pt instructed to call with any concerns or problems. This note will not be viewable in [address]

## 2019-08-22 NOTE — PATIENT INSTRUCTIONS
Medicare Wellness Visit, Female     The best way to live healthy is to have a lifestyle where you eat a well-balanced diet, exercise regularly, limit alcohol use, and quit all forms of tobacco/nicotine, if applicable. Regular preventive services are another way to keep healthy. Preventive services (vaccines, screening tests, monitoring & exams) can help personalize your care plan, which helps you manage your own care. Screening tests can find health problems at the earliest stages, when they are easiest to treat. [facility] follows the current, evidence-based guidelines published by the [facility] ([facility]) when recommending preventive services for our patients. Because we follow these guidelines, sometimes recommendations change over time as research supports it. (For example, mammograms used to be recommended annually. Even though Medicare will still pay for an annual mammogram, the newer guidelines recommend a mammogram every two years for women of average risk.)  Of course, you and your doctor may decide to screen more often for some diseases, based on your risk and your health status. Preventive services for you include:  - Medicare offers their members a free annual wellness visit, which is time for you and your primary care provider to discuss and plan for your preventive service needs. Take advantage of this benefit every year!  -All adults over the age of 72 should receive the recommended pneumonia vaccines. Current USPSTF guidelines recommend a series of two vaccines for the best pneumonia protection.   -All adults should have a flu vaccine yearly and a tetanus vaccine every 10 years. All adults age 61 and older should receive a shingles vaccine once in their lifetime.    -A bone mass density test is recommended when a woman turns 65 to screen for osteoporosis. This test is only recommended one time, as a screening.  Some providers will use this same test as a disease monitoring tool if you already have osteoporosis. -All adults age 38-68 who are overweight should have a diabetes screening test once every three years.   -Other screening tests and preventive services for persons with diabetes include: an eye exam to screen for diabetic retinopathy, a kidney function test, a foot exam, and stricter control over your cholesterol.   -Cardiovascular screening for adults with routine risk involves an electrocardiogram (ECG) at intervals determined by your doctor.   -Colorectal cancer screenings should be done for adults age 54-65 with no increased risk factors for colorectal cancer. There are a number of acceptable methods of screening for this type of cancer. Each test has its own benefits and drawbacks. Discuss with your doctor what is most appropriate for you during your annual wellness visit. The different tests include: colonoscopy (considered the best screening method), a fecal occult blood test, a fecal DNA test, and sigmoidoscopy. -Breast cancer screenings are recommended every other year for women of normal risk, age 54-69.  -Cervical cancer screenings for women over age 72 are only recommended with certain risk factors.   -All adults born between [facility] should be screened once for Hepatitis C.      Here is a list of your current Health Maintenance items (your personalized list of preventive services) with a due date:  Health Maintenance Due   Topic Date Due    Shingles Vaccine (1 of 2) 12/30/1986    Glaucoma Screening   04/01/2016    Annual Well Visit  05/23/2019

## 2019-09-04 ENCOUNTER — OFFICE VISIT (OUTPATIENT)
Dept: NEUROLOGY | Age: 84
End: 2019-09-04

## 2019-09-04 VITALS
BODY MASS INDEX: 29.59 KG/M2 | OXYGEN SATURATION: 98 % | DIASTOLIC BLOOD PRESSURE: 80 MMHG | SYSTOLIC BLOOD PRESSURE: 142 MMHG | WEIGHT: 167 LBS | HEIGHT: 63 IN | HEART RATE: 89 BPM

## 2019-09-04 DIAGNOSIS — R41.3 MEMORY LOSS: ICD-10-CM

## 2019-09-04 DIAGNOSIS — R25.1 TREMOR: ICD-10-CM

## 2019-09-04 DIAGNOSIS — G91.2 NPH (NORMAL PRESSURE HYDROCEPHALUS) (HCC): ICD-10-CM

## 2019-09-04 DIAGNOSIS — R41.3 MEMORY LOSS: Primary | ICD-10-CM

## 2019-09-04 RX ORDER — ESCITALOPRAM OXALATE 5 MG/1
5 TABLET ORAL DAILY
Qty: 30 TAB | Refills: 5 | Status: SHIPPED | OUTPATIENT
Start: 2019-09-04 | End: 2019-10-08 | Stop reason: SDUPTHER

## 2019-09-04 RX ORDER — DICLOFENAC SODIUM 75 MG/1
75 TABLET, DELAYED RELEASE ORAL 2 TIMES DAILY
Qty: 60 TAB | Refills: 5 | Status: SHIPPED | OUTPATIENT
Start: 2019-09-04 | End: 2019-09-25

## 2019-09-04 RX ORDER — MULTIVIT WITH MINERALS/HERBS
1 TABLET ORAL DAILY
Qty: 30 TAB | Refills: 5 | Status: SHIPPED | OUTPATIENT
Start: 2019-09-04 | End: 2019-09-25

## 2019-09-04 NOTE — PROGRESS NOTES
2 shunts put in since last visit; first week of June. Accompanied by her son. Cortisone shot in right hip yesterday. Pain is worse since then.
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
## 2019-09-10 ENCOUNTER — OFFICE VISIT (OUTPATIENT)
Dept: NEUROLOGY | Age: 84
End: 2019-09-10

## 2019-09-10 DIAGNOSIS — G31.84 MILD COGNITIVE IMPAIRMENT: Primary | ICD-10-CM

## 2019-09-10 DIAGNOSIS — G91.2 NPH (NORMAL PRESSURE HYDROCEPHALUS) (HCC): ICD-10-CM

## 2019-09-10 DIAGNOSIS — R41.3 MEMORY LOSS: ICD-10-CM

## 2019-09-10 DIAGNOSIS — R26.81 GAIT INSTABILITY: ICD-10-CM

## 2019-09-10 DIAGNOSIS — R41.3 SHORT-TERM MEMORY LOSS: ICD-10-CM

## 2019-09-10 DIAGNOSIS — F32.9 REACTIVE DEPRESSION: ICD-10-CM

## 2019-09-10 DIAGNOSIS — G93.89 BRAIN MASS: ICD-10-CM

## 2019-09-10 DIAGNOSIS — R41.89 DIFFICULTY PROCESSING INFORMATION: ICD-10-CM

## 2019-09-10 DIAGNOSIS — Z86.73 HISTORY OF TIA (TRANSIENT ISCHEMIC ATTACK): ICD-10-CM
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
## 2019-09-25 ENCOUNTER — OFFICE VISIT (OUTPATIENT)
Dept: INTERNAL MEDICINE CLINIC | Age: 84
End: 2019-09-25

## 2019-09-25 ENCOUNTER — HOSPITAL ENCOUNTER (OUTPATIENT)
Dept: LAB | Age: 84
Discharge: HOME | End: 2019-09-25
Payer: MEDICARE

## 2019-09-25 VITALS
DIASTOLIC BLOOD PRESSURE: 84 MMHG | TEMPERATURE: 97.7 F | HEART RATE: 84 BPM | OXYGEN SATURATION: 95 % | WEIGHT: 160 LBS | RESPIRATION RATE: 16 BRPM | BODY MASS INDEX: 28.35 KG/M2 | HEIGHT: 63 IN | SYSTOLIC BLOOD PRESSURE: 129 MMHG

## 2019-09-25 DIAGNOSIS — F33.1 MODERATE EPISODE OF RECURRENT MAJOR DEPRESSIVE DISORDER (HCC): ICD-10-CM

## 2019-09-25 DIAGNOSIS — G91.2 NPH (NORMAL PRESSURE HYDROCEPHALUS) (HCC): ICD-10-CM

## 2019-09-25 DIAGNOSIS — R35.0 FREQUENT URINATION: Primary | ICD-10-CM

## 2019-09-25 DIAGNOSIS — I10 ESSENTIAL HYPERTENSION: ICD-10-CM

## 2019-09-25 LAB
BILIRUB UR QL STRIP: NEGATIVE
GLUCOSE UR-MCNC: NEGATIVE MG/DL
KETONES P FAST UR STRIP-MCNC: NEGATIVE MG/DL
PH UR STRIP: 6 [PH] (ref 4.6–8)
PROT UR QL STRIP: NEGATIVE
SP GR UR STRIP: 1.02 (ref 1–1.03)
UA UROBILINOGEN AMB POC: NORMAL (ref 0.2–1)
URINALYSIS CLARITY POC: NORMAL
URINALYSIS COLOR POC: NORMAL
URINE BLOOD POC: NORMAL
URINE LEUKOCYTES POC: NORMAL
URINE NITRITES POC: POSITIVE

## 2019-09-25 PROCEDURE — 87186 SC STD MICRODIL/AGAR DIL: CPT

## 2019-09-25 PROCEDURE — 87088 URINE BACTERIA CULTURE: CPT

## 2019-09-25 PROCEDURE — 87077 CULTURE AEROBIC IDENTIFY: CPT

## 2019-09-25 RX ORDER — CEPHALEXIN 500 MG/1
500 CAPSULE ORAL 2 TIMES DAILY
Qty: 14 CAP | Refills: 0 | Status: SHIPPED | OUTPATIENT
Start: 2019-09-25 | End: 2019-10-03 | Stop reason: SDUPTHER

## 2019-09-25 RX ORDER — GUAIFENESIN 100 MG/5ML
81 LIQUID (ML) ORAL DAILY
COMMUNITY

## 2019-09-25 NOTE — PROGRESS NOTES
HISTORY OF PRESENT ILLNESS  Bianca Olivera is a 81 y.o. female. HPI  UTI: Pt reports that for approx 1 week ago she experienced chills, fatigue,a dn cramping. She started in taking copious amounts of cranberry juice and using heating pads with improvement. Hypertension ROS: taking medications as instructed, no medication side effects noted, no TIA's, no chest pain on exertion, no dyspnea on exertion, no swelling of ankles. New concerns: BP in office today is 129/84. NPH: Pt reports she was recommended to an EEG (per neurologist). She deferred the test due to apathy. Mood: Pt reports improvement with her mood since starting Lexapro (per neurologist). Review of Systems   Constitutional: Positive for chills and malaise/fatigue. Genitourinary: Positive for flank pain and frequency. All other systems reviewed and are negative. Physical Exam   Constitutional: She is oriented to person, place, and time. She appears well-developed and well-nourished. HENT:   Head: Normocephalic and atraumatic. Right Ear: External ear normal.   Left Ear: External ear normal.   Nose: Nose normal.   Mouth/Throat: Oropharynx is clear and moist.   Eyes: Pupils are equal, round, and reactive to light. Conjunctivae and EOM are normal.   Neck: Normal range of motion. Neck supple. Cardiovascular: Normal rate, regular rhythm, normal heart sounds and intact distal pulses. Pulmonary/Chest: Effort normal and breath sounds normal. Right breast exhibits no inverted nipple, no mass, no nipple discharge, no skin change and no tenderness. Left breast exhibits no inverted nipple, no mass, no nipple discharge, no skin change and no tenderness. No breast swelling, tenderness, discharge or bleeding. Breasts are symmetrical.   Abdominal: Soft. Bowel sounds are normal.   Genitourinary: Rectum normal and vagina normal. Rectal exam shows anal tone normal and guaiac negative stool.  No breast swelling, tenderness, discharge or bleeding. Musculoskeletal: Normal range of motion. Neurological: She is alert and oriented to person, place, and time. Skin: Skin is warm and dry. Psychiatric: She has a normal mood and affect. Her behavior is normal. Judgment and thought content normal.   Nursing note and vitals reviewed. ASSESSMENT and PLAN  Diagnoses and all orders for this visit:    1. Frequent urination  Prescribed Keflex. Informed pt that her urine has WBC, nitrates, and blood. Informed pt that she has a UTI. Will continue to monitor for improvements or changes. -     AMB POC URINALYSIS DIP STICK AUTO W/O MICRO  -     CULTURE, URINE  -     cephALEXin (KEFLEX) 500 mg capsule; Take 1 Cap by mouth two (2) times a day. 2. NPH (normal pressure hydrocephalus)  Stable and well-managed with shunt and Asprin. No change in medications. Pt will continue to f/u with neurologist.     3. Essential hypertension  BP is at goal with Atenolol and HCTZ. I do not recommend any change in medications. 4. Moderate episode of recurrent major depressive disorder (HCC)  Stable and well-managed with Lexapro. No change in medications. Lab results and schedule of future lab studies reviewed with patient. Reviewed diet, exercise and weight control. Written by Jia Phillips, as dictated by Brooke Ordaz MD.     Current diagnosis and concerns discussed with pt at length. Understands risks and benefits or current treatment plan and medications and accepts the treatment and medication with any possible risks. Pt asks appropriate questions which were answered. Pt instructed to call with any concerns or problems. This note will not be viewable in [address]

## 2019-09-28 LAB — BACTERIA UR CULT: ABNORMAL

## 2019-10-03 DIAGNOSIS — R35.0 FREQUENT URINATION: ICD-10-CM

## 2019-10-03 RX ORDER — CEPHALEXIN 500 MG/1
500 CAPSULE ORAL 2 TIMES DAILY
Qty: 14 CAP | Refills: 0 | Status: SHIPPED | OUTPATIENT
Start: 2019-10-03 | End: 2019-11-26

## 2019-10-03 NOTE — TELEPHONE ENCOUNTER
Called pt and advised her that we did go ahead and refill the Keflex but if she is not better after this round she needs to come in to be evaluated again. Pt understood and was thankful for the call.

## 2019-10-08 RX ORDER — ESCITALOPRAM OXALATE 5 MG/1
5 TABLET ORAL DAILY
Qty: 90 TAB | Refills: 1 | Status: SHIPPED | OUTPATIENT
Start: 2019-10-08 | End: 2020-05-18

## 2019-10-10 ENCOUNTER — OFFICE VISIT (OUTPATIENT)
Dept: INTERNAL MEDICINE CLINIC | Age: 84
End: 2019-10-10

## 2019-10-10 ENCOUNTER — TELEPHONE (OUTPATIENT)
Dept: INTERNAL MEDICINE CLINIC | Age: 84
End: 2019-10-10

## 2019-10-10 ENCOUNTER — HOSPITAL ENCOUNTER (OUTPATIENT)
Dept: LAB | Age: 84
Discharge: HOME | End: 2019-10-10
Payer: MEDICARE

## 2019-10-10 VITALS
SYSTOLIC BLOOD PRESSURE: 126 MMHG | HEART RATE: 80 BPM | TEMPERATURE: 98.4 F | DIASTOLIC BLOOD PRESSURE: 84 MMHG | WEIGHT: 160 LBS | HEIGHT: 63 IN | OXYGEN SATURATION: 97 % | BODY MASS INDEX: 28.35 KG/M2 | RESPIRATION RATE: 14 BRPM

## 2019-10-10 DIAGNOSIS — R30.0 DYSURIA: Primary | ICD-10-CM

## 2019-10-10 DIAGNOSIS — I10 ESSENTIAL HYPERTENSION: ICD-10-CM

## 2019-10-10 PROCEDURE — 87086 URINE CULTURE/COLONY COUNT: CPT

## 2019-10-10 RX ORDER — AZITHROMYCIN 250 MG/1
250 TABLET, FILM COATED ORAL SEE ADMIN INSTRUCTIONS
Qty: 6 TAB | Refills: 0 | Status: SHIPPED | OUTPATIENT
Start: 2019-10-10 | End: 2019-10-15

## 2019-10-10 RX ORDER — HYDROCHLOROTHIAZIDE 25 MG/1
TABLET ORAL
Qty: 30 TAB | Refills: 5 | Status: SHIPPED | OUTPATIENT
Start: 2019-10-10 | End: 2020-04-17 | Stop reason: SDUPTHER

## 2019-10-10 NOTE — PROGRESS NOTES
HISTORY OF PRESENT ILLNESS  Blanka Valle is a 81 y.o. female. HPI  Presents for follow up from recent UTI where she was seen in office by Dr Danielle Carrasco on 9/25 and treated with Keflex 500 mg bid x 7 days; she called back to office on 10/3 with continued symptoms and was given another 7 day course of Keflex. She took her last dose yesterday and continues to have some lower pelvic pressure, urinary frequency and slight burning. Has also noted transient chills. Denies visible blood in urine. Has noted some lower back discomfort but no flank pain. She has multiple antibiotic allergies or intolerances.     Patient Active Problem List   Diagnosis Code    TIA (transient ischemic attack) G45.9    Sleep apnea G47.30    Kidney stones N20.0    Macular degeneration H35.30    Major depressive disorder, recurrent episode, severe ([facility] F33.2    Essential hypertension I10    NPH (normal pressure hydrocephalus) (HCC) G91.2     Past Surgical History:   Procedure Laterality Date    COLONOSCOPY N/A 1/23/2018    COLONOSCOPY, EGD performed by Tiff Godwin MD at [address] [place] [address] [place], [place], DIAGNOSTIC      2006 neg    HX ABDOMINAL WALL DEFECT REPAIR      biliary stones and ercp for pancreatitis    HX CATARACT REMOVAL Left 09/2018    HX CHOLECYSTECTOMY      20 years ago with adhesions found    HX GYN      anastasiia endometriosis    HX HYSTERECTOMY  1972    endometriosis    HX TONSILLECTOMY      x 2     Social History     Socioeconomic History    Marital status:      Spouse name: Not on file    Number of children: Not on file    Years of education: Not on file    Highest education level: Not on file   Occupational History    Not on file   Social Needs    Financial resource strain: Not on file    Food insecurity:     Worry: Not on file     Inability: Not on file    Transportation needs:     Medical: Not on file     Non-medical: Not on file   Tobacco Use    Smoking status: Former Smoker     Years: 2.00     Last attempt to quit: 1966     Years since quittin.8    Smokeless tobacco: Never Used    Tobacco comment: stopped in her 25s   Substance and Sexual Activity    Alcohol use: Yes     Alcohol/week: 7.0 standard drinks     Types: 7 Glasses of wine per week     Comment: Glass of wine every evening.  Drug use: No    Sexual activity: Not Currently   Lifestyle    Physical activity:     Days per week: Not on file     Minutes per session: Not on file    Stress: Not on file   Relationships    Social connections:     Talks on phone: Not on file     Gets together: Not on file     Attends Judaism service: Not on file     Active member of club or organization: Not on file     Attends meetings of clubs or organizations: Not on file     Relationship status: Not on file    Intimate partner violence:     Fear of current or ex partner: Not on file     Emotionally abused: Not on file     Physically abused: Not on file     Forced sexual activity: Not on file   Other Topics Concern    Not on file   Social History Narrative    Not on file     Family History   Problem Relation Age of Onset    Hypertension Mother    [facility] Other Mother         pulmonary hypotension    Heart Disease Father     Cancer Father     Atrial Fibrillation Father     Heart Disease Brother     Other Maternal Grandmother         Mental Illness    Ulcerative Colitis Son     Heart Disease Son     Anesth Problems Son         ASPIRATED WITH ANESTHESIA    Anesth Problems Other      Current Outpatient Medications   Medication Sig    hydroCHLOROthiazide (HYDRODIURIL) 25 mg tablet TAKE 1 TABLET BY MOUTH EVERY DAY    azithromycin (ZITHROMAX) 250 mg tablet Take 1 Tab by mouth See Admin Instructions for 5 days.  escitalopram oxalate (LEXAPRO) 5 mg tablet Take 1 Tab by mouth daily.  cephALEXin (KEFLEX) 500 mg capsule Take 1 Cap by mouth two (2) times a day.  aspirin 81 mg chewable tablet Take 81 mg by mouth daily.     raNITIdine (ZANTAC) 150 mg tablet TAKE 1 TABLET BY MOUTH TWICE A DAY    atenolol (TENORMIN) 25 mg tablet Take 1 Tab by mouth daily.  diazePAM (VALIUM) 5 mg tablet Take 1/2 tab PO in am and 1 tab PO at bedtime    polyethylene glycol (MIRALAX) 17 gram/dose powder Take 17 g by mouth daily.  ASCORBIC ACID/MULTIVIT-MIN (EMERGEN-C PO) Take  by mouth as needed.  acetaminophen (TYLENOL EXTRA STRENGTH) 500 mg tablet Take  by mouth every six (6) hours as needed for Pain. No current facility-administered medications for this visit. Allergies   Allergen Reactions    Levaquin [Levofloxacin] Other (comments)     Hemorrhage in the eyes      Ceclor [Cefaclor] Other (comments)     Pain flank, abd      Cleocin [Clindamycin Hcl] Other (comments)     Flank, abd pain    Codeine Nausea and Vomiting    Compazine [Prochlorperazine] Other (comments)     Hyper and anxious    Fosamax [Alendronate] Other (comments)     Indigestion      Macrobid [Nitrofurantoin Monohyd/M-Cryst] Swelling and Other (comments)     wheezing    Pcn [Penicillins] Rash    Phenobarbital Other (comments)     anxious    Sulfa (Sulfonamide Antibiotics) Rash     Headache       Immunization History   Administered Date(s) Administered    (RETIRED) Pneumococcal Vaccine (Unspecified Type) 05/20/2009    Influenza Vaccine 10/18/2018    Influenza Vaccine (Whole Virus) 01/01/2013    Influenza Vaccine Split 11/17/2010    Influenza Vaccine Whole 04/11/2010    PPD 03/23/2011    Pneumococcal Conjugate (PCV-13) 05/22/2017, 08/10/2017    Pneumococcal Polysaccharide (PPSV-23) 05/20/2009       Review of Systems   Constitutional: Positive for chills and malaise/fatigue. Negative for fever. HENT: Negative for congestion and sore throat. Respiratory: Negative for cough. Cardiovascular: Negative for chest pain and palpitations. Gastrointestinal: Positive for abdominal pain. Negative for blood in stool, constipation, diarrhea, nausea and vomiting.    Genitourinary: Positive for dysuria and frequency. Negative for flank pain, hematuria and urgency. Musculoskeletal: Positive for back pain. Neurological: Negative for dizziness and headaches. /84 (BP 1 Location: Left arm, BP Patient Position: Sitting)   Pulse 80   Temp 98.4 °F (36.9 °C) (Oral)   Resp 14   Ht 5' 3\" (1.6 m)   Wt 160 lb (72.6 kg)   SpO2 97%   BMI 28.34 kg/m²   Physical Exam   Constitutional: She is oriented to person, place, and time. She appears well-developed and well-nourished. HENT:   Head: Normocephalic and atraumatic. Neck: Normal range of motion. Neck supple. Cardiovascular: Normal rate and regular rhythm. Pulmonary/Chest: Effort normal and breath sounds normal.   Abdominal: Soft. Bowel sounds are normal. There is tenderness in the suprapubic area. There is no rebound, no guarding and no CVA tenderness. Neurological: She is alert and oriented to person, place, and time. Psychiatric: She has a normal mood and affect. Her behavior is normal.   Nursing note and vitals reviewed. ASSESSMENT and PLAN  Diagnoses and all orders for this visit:    1. Dysuria -- urine dip in office unremarkable except for 1+ blood; will send out another urine culture and treat with Luis Blank which she knows she can tolerate until culture returns. -     CULTURE, URINE  -     azithromycin (ZITHROMAX) 250 mg tablet; Take 1 Tab by mouth See Admin Instructions for 5 days.       lab results and schedule of future lab studies reviewed with patient  reviewed diet, exercise and weight control  reviewed medications and side effects in detail

## 2019-10-10 NOTE — PATIENT INSTRUCTIONS
Painful Urination (Dysuria): Care Instructions  Your Care Instructions  Burning pain with urination (dysuria) is a common symptom of a urinary tract infection or other urinary problems. The bladder may become inflamed. This can cause pain when the bladder fills and empties. You may also feel pain if the tube that carries urine from the bladder to the outside of the body (urethra) gets irritated or infected. Sexually transmitted infections (STIs) also may cause pain when you urinate. Sometimes the pain can be caused by things other than an infection. The urethra can be irritated by soaps, perfumes, or foreign objects in the urethra. Kidney stones can cause pain when they pass through the urethra. The cause may be hard to find. You may need tests. Treatment for painful urination depends on the cause. Follow-up care is a key part of your treatment and safety. Be sure to make and go to all appointments, and call your doctor if you are having problems. It's also a good idea to know your test results and keep a list of the medicines you take. How can you care for yourself at home? · Drink extra water for the next day or two. This will help make the urine less concentrated. (If you have kidney, heart, or liver disease and have to limit fluids, talk with your doctor before you increase the amount of fluids you drink.)  · Avoid drinks that are carbonated or have caffeine. They can irritate the bladder. · Urinate often. Try to empty your bladder each time. For women:  · Urinate right after you have sex. · After going to the bathroom, wipe from front to back. · Avoid douches, bubble baths, and feminine hygiene sprays. And avoid other feminine hygiene products that have deodorants. When should you call for help? Call your doctor now or seek immediate medical care if:    · You have new symptoms, such as fever, nausea, or vomiting.     · You have new or worse symptoms of a urinary problem. For example:  ?  You have blood or pus in your urine. ? You have chills or body aches. ? It hurts worse to urinate. ? You have groin or belly pain. ? You have pain in your back just below your rib cage (the flank area).    Watch closely for changes in your health, and be sure to contact your doctor if you have any problems. Where can you learn more? Go to http://jonatan-elvis.info/. Enter Z392 in the search box to learn more about \"Painful Urination (Dysuria): Care Instructions. \"  Current as of: December 19, 2018  Content Version: 12.2  © 2630-4122 [employer]. Care instructions adapted under license by [employer] (which disclaims liability or warranty for this information). If you have questions about a medical condition or this instruction, always ask your healthcare professional. [address] any warranty or liability for your use of this information.

## 2019-10-12 LAB — BACTERIA UR CULT: NO GROWTH

## 2019-10-13 NOTE — PROGRESS NOTES
Please call patient -- urine culture negative; return to office to see Dr Kayli Hoffmann if symptoms persist

## 2019-10-14 ENCOUNTER — TELEPHONE (OUTPATIENT)
Dept: INTERNAL MEDICINE CLINIC | Age: 84
End: 2019-10-14

## 2019-10-14 NOTE — TELEPHONE ENCOUNTER
I called pt per NP to notify urine culture negative; return to office to see Dr Jairo Valerio if symptoms persist. Pt verbalized understanding. She is having some diarrhea. I advise if sx persist, gets worse or she has a fever call the office back.

## 2019-10-14 NOTE — TELEPHONE ENCOUNTER
----- Message from Elvis Jalloh NP sent at 10/13/2019  5:22 PM EDT -----      ----- Message -----  From: [facility] Lab Results In  Sent: 10/12/2019   5:41 AM EDT  To: Elvis Jalloh NP

## 2019-10-18 ENCOUNTER — TELEPHONE (OUTPATIENT)
Dept: NEUROLOGY | Age: 84
End: 2019-10-18

## 2019-10-18 NOTE — TELEPHONE ENCOUNTER
----- Message from Bianca Gresham sent at 10/18/2019  3:05 PM EDT -----  Regarding: MELANY Verduzco/rx refill  Medication Refill    Caller (if not patient):      Relationship of caller (if not patient):      Best contact number(s):[phone]      Name of medication and dosage if known: Diazepam  5mg      Is patient out of this medication (yes/no):no has 3 left      Ph[facility] on [address] listed in chart? (yes/no):yes  Pharmacy phone       Details to clarify the request:      Bianca Gresham

## 2019-10-21 DIAGNOSIS — R56.9 SEIZURE (HCC): ICD-10-CM

## 2019-10-21 RX ORDER — DIAZEPAM 5 MG/1
TABLET ORAL
Qty: 45 TAB | Refills: 5 | Status: SHIPPED | OUTPATIENT
Start: 2019-10-21 | End: 2020-05-11 | Stop reason: SDUPTHER

## 2019-10-26 DIAGNOSIS — I10 ESSENTIAL HYPERTENSION: ICD-10-CM

## 2019-10-26 RX ORDER — ATENOLOL 25 MG/1
TABLET ORAL
Qty: 90 TAB | Refills: 1 | Status: SHIPPED | OUTPATIENT
Start: 2019-10-26 | End: 2020-06-24

## 2019-11-25 ENCOUNTER — TELEPHONE (OUTPATIENT)
Dept: NEUROLOGY | Age: 84
End: 2019-11-25

## 2019-11-25 NOTE — TELEPHONE ENCOUNTER
----- Message from Araceli Davila sent at 11/22/2019  1:31 PM EST -----  Regarding: Np David/telephone  General Message/Vendor Calls    Caller's first and last name: Abraham Clancy/pt      Reason for call: pt requesting a call back in regards to scheduling a med check appointment.  Pt also advised that she had a fall      Callback required yes/no and why: yes      Best contact number(s): [phone]      Details to clarify the request:      Araceli Davila

## 2019-11-26 ENCOUNTER — TELEPHONE (OUTPATIENT)
Dept: NEUROLOGY | Age: 84
End: 2019-11-26

## 2019-11-26 ENCOUNTER — OFFICE VISIT (OUTPATIENT)
Dept: NEUROLOGY | Age: 84
End: 2019-11-26

## 2019-11-26 VITALS
BODY MASS INDEX: 28.34 KG/M2 | SYSTOLIC BLOOD PRESSURE: 132 MMHG | HEART RATE: 84 BPM | OXYGEN SATURATION: 96 % | DIASTOLIC BLOOD PRESSURE: 80 MMHG | HEIGHT: 63 IN

## 2019-11-26 DIAGNOSIS — R55 SYNCOPE, UNSPECIFIED SYNCOPE TYPE: ICD-10-CM

## 2019-11-26 DIAGNOSIS — G90.9 AUTONOMIC DYSFUNCTION: Primary | ICD-10-CM

## 2019-11-26 DIAGNOSIS — G90.9 AUTONOMIC DYSFUNCTION: ICD-10-CM

## 2019-11-26 RX ORDER — PANTOPRAZOLE SODIUM 40 MG/1
TABLET, DELAYED RELEASE ORAL
Refills: 3 | COMMUNITY
Start: 2019-10-28 | End: 2020-01-27

## 2019-11-26 NOTE — TELEPHONE ENCOUNTER
No PA required for ans testing   Patient has medicare as primary    Hold atenolol 48hrs  hctz 3days  lexapro-7 days

## 2019-11-26 NOTE — PROGRESS NOTES
Pt states she has not been feeling well. Pt states she is having balance issues and she passed out on 11/11/19. She did not get medical treatment.
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
## 2019-12-03 DIAGNOSIS — M25.551 PAIN OF RIGHT HIP JOINT: Primary | ICD-10-CM

## 2019-12-05 ENCOUNTER — HOSPITAL ENCOUNTER (OUTPATIENT)
Dept: NEUROLOGY | Age: 84
Discharge: HOME | End: 2019-12-05
Attending: NURSE PRACTITIONER
Payer: MEDICARE

## 2019-12-05 ENCOUNTER — HOSPITAL ENCOUNTER (OUTPATIENT)
Dept: CT IMAGING | Age: 84
Discharge: HOME | End: 2019-12-05
Attending: NURSE PRACTITIONER
Payer: MEDICARE

## 2019-12-05 ENCOUNTER — HOSPITAL ENCOUNTER (OUTPATIENT)
Dept: GENERAL RADIOLOGY | Age: 84
Discharge: HOME | End: 2019-12-05
Attending: NURSE PRACTITIONER
Payer: MEDICARE

## 2019-12-05 DIAGNOSIS — G90.9 AUTONOMIC DYSFUNCTION: ICD-10-CM

## 2019-12-05 DIAGNOSIS — M25.551 PAIN OF RIGHT HIP JOINT: ICD-10-CM

## 2019-12-05 DIAGNOSIS — R55 SYNCOPE, UNSPECIFIED SYNCOPE TYPE: ICD-10-CM

## 2019-12-05 LAB — CREAT BLD-MCNC: 0.8 MG/DL (ref 0.6–1.3)

## 2019-12-05 PROCEDURE — 70470 CT HEAD/BRAIN W/O & W/DYE: CPT

## 2019-12-05 PROCEDURE — 74011636320 HC RX REV CODE- 636/320: Performed by: RADIOLOGY

## 2019-12-05 PROCEDURE — 95816 EEG AWAKE AND DROWSY: CPT

## 2019-12-05 PROCEDURE — 82565 ASSAY OF CREATININE: CPT

## 2019-12-05 PROCEDURE — 73502 X-RAY EXAM HIP UNI 2-3 VIEWS: CPT

## 2019-12-05 RX ADMIN — IOPAMIDOL 100 ML: 612 INJECTION, SOLUTION INTRAVENOUS at 14:59

## 2019-12-06 NOTE — PROCEDURES
[facility] [place] [address]  EEG    Name:  Frank Ward  MR#:  [id number]  :  1935  ACCOUNT #:  [de-identified]  DATE OF SERVICE:  2019      REFERRING PROVIDER:  Hurley Councilman, [employer]    CLINICAL HISTORY:  An EEG is requested on this 81year-old with syncope to evaluate for epileptiform abnormalities. MEDICATIONS:  Listed as Protonix, Tenormin, Valium, hydrochlorothiazide, Lexapro, MiraLax, and Tylenol. EEG REPORT:  This tracing is obtained during the awake state. During wakefulness, there are intermittent runs of posteriorly dominant and symmetrical low-to-medium amplitude 9-cycle-per-second activities, which attenuate with eye opening. Lower voltage faster frequency activities are seen symmetrically over the anterior head regions. Hyperventilation not performed due to her age. Intermittent photic stimulation induces symmetric posterior driving responses. Sleep is not attained. INTERPRETATION:  This EEG recorded during the awake state is normal.  No epileptiform abnormalities are seen.       Laurence Jennings MD      [id number]  D:  2019 10:49  T:  2019 14:47  JOB #:  [id number]

## 2019-12-11 NOTE — TELEPHONE ENCOUNTER
Called and spoke to patient scheduled for ans testing  Monday, December 23, 2019 01:00 PM  Reviewed all instructions on how to prepare for the test  Patient states understanding

## 2019-12-23 ENCOUNTER — OFFICE VISIT (OUTPATIENT)
Dept: NEUROLOGY | Age: 84
End: 2019-12-23

## 2019-12-23 DIAGNOSIS — R55 SYNCOPE AND COLLAPSE: Primary | ICD-10-CM

## 2019-12-27 NOTE — PROCEDURES
[facility]  [facility] [address] [facility], [address] [place], [address] [facility]  Phone: [phone]  FAX: [phone]     Clinical Autonomic Testing Report     Patient ID:  Dm Clancy  [id number]  42 y.o.  1935     REFERRED BY: Madison Khan NP  PCP: Vi Apple MD    Date of Testin2019     Indication/History:     Passing out spell with feeling lightheaded when she stands up. Patient is coming for syncope/autonomic dysfunction evaluation. Medications taken 48 hrs before the test: None     Procedure: This Autonomic Nervous System (ANS) testing is performed by utilizing [address] [place] [address] Process System [employer] System, with established protocol. Multiple procedures performed: Heart rate response to deep breathing (HRDB), Valsalva ratio, Heart rate and BP response to head up tilt (HUT), and Quantitative sudomotor axon reflex testing (QSWEAT) . Result:  1. Heart response to deep  breathing (HRDB): 2 series of 6 cycles were performed and the mean of 6 consecutive cycles was obtained. Average HR difference was 8.9 and E:I ratio was 1.11. Normal response. 2. Heart rate response to Valsalva maneuver:  [id number] BP to Valsalva was measured and BP response in all 4 phases was normal. Heart response was the opposite of BP, a normal response. ( VR = 1.53 )  3. HUT (head-up tilt) : [id number] BP and HR were measured, up to 15 minutes post tilt. No significant BP reduction or HR acceleration/deceleration. 4. SUDOMOTOR: QSWEAT response showed reduced sweat production in proximal leg and distal leg, comparing patient to age group. Impression:   ABNORMAL    1. Reduced sweat production in the proximal leg and distal leg, which is non-specific in etiology and can be seen in small fiber polyneuropathy. 2. Cardiovascular autonomic portion is within normal limits with no evidence of orthostatic hypotension or significant tachycardia.          Carmen Mejia MD [place] Rawls Board of Psychiatry and Neurology  Diplomate, Neuromuscular Medicine  Diplomate, American Board of Electrodiagnostic Medicine    Note: Raw Data will be scanned separately in Media

## 2020-01-27 ENCOUNTER — OFFICE VISIT (OUTPATIENT)
Dept: NEUROLOGY | Age: 85
End: 2020-01-27

## 2020-01-27 VITALS
BODY MASS INDEX: 29.77 KG/M2 | HEIGHT: 63 IN | WEIGHT: 168 LBS | OXYGEN SATURATION: 97 % | SYSTOLIC BLOOD PRESSURE: 132 MMHG | HEART RATE: 72 BPM | DIASTOLIC BLOOD PRESSURE: 68 MMHG

## 2020-01-27 DIAGNOSIS — R42 DIZZINESS: Primary | ICD-10-CM
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
## 2020-02-18 ENCOUNTER — HOSPITAL ENCOUNTER (OUTPATIENT)
Dept: GENERAL RADIOLOGY | Age: 85
Discharge: HOME | End: 2020-02-18
Attending: NURSE PRACTITIONER
Payer: MEDICARE

## 2020-02-18 DIAGNOSIS — R13.10 DYSPHAGIA: ICD-10-CM

## 2020-02-18 PROCEDURE — 74220 X-RAY XM ESOPHAGUS 1CNTRST: CPT

## 2020-04-16 ENCOUNTER — TELEPHONE (OUTPATIENT)
Dept: INTERNAL MEDICINE CLINIC | Age: 85
End: 2020-04-16

## 2020-04-16 DIAGNOSIS — I10 ESSENTIAL HYPERTENSION: ICD-10-CM

## 2020-04-16 RX ORDER — HYDROCHLOROTHIAZIDE 25 MG/1
TABLET ORAL
Qty: 90 TAB | Refills: 1 | OUTPATIENT
Start: 2020-04-16

## 2020-04-16 NOTE — TELEPHONE ENCOUNTER
----- Message from Nancy Reddy sent at 4/16/2020 10:15 AM EDT -----  Regarding: Jessy Worthy MD/ refill  Medication Refill    Caller (if not patient): Dixon Law       Relationship of caller (if not patient): PT      Best contact number(s):[phone]      Name of medication and dosage if known: Hydrochlorothiazide 25 mg      Is patient out of this medication (yes/no):  Pt only has 2 pills left      Pharmacy name: [facility] on [address] listed in chart? (yes/no): Yes   Pharmacy phone number: [phone]      Details to clarify the request:      Nancy Reddy

## 2020-04-16 NOTE — TELEPHONE ENCOUNTER
Spoke with patient and she states that she does not have the technology needed to do a virtual visit.   Pt consented to do a telephonic only call

## 2020-04-16 NOTE — PROGRESS NOTES
HISTORY OF PRESENT ILLNESS  Madhu Abarca is a 81 y.o. female. HPI   Patient presents and consents to a telephone visit. She had passed out and suddenly went backwards and did not remember anything- smiling and hit the floor- did EEG and CT scan and that was negative and tilt test was ok- she thought they had some neuropathy in right leg/hip- she is walking using a walker 100 per cent of times ; she did fall once and got up early in morning and always keeps walker locked and helps get her out of bed and she takes care of everything - she had forgetten to lock walker and did go backwars  Hypertension ROS: taking medications as instructed, no medication side effects noted, no TIA's, no chest pain on exertion, no dyspnea on exertion, no swelling of ankles. New concerns: BP in office today is 1298/53     NPH: Pt reports she was recommended to an EEG (per neurologist). She had that done and it was negative      Mood: Pt reports improvement with her mood since starting Lexapro (per neurologist). she saw neurology and was evaluated for a CT of head after a syncopal event, EEG and they did look for ANS which was neg  Her mood is good and each day she sets jobs she needs to do and she cooks and vacuum - still taking valium and lexapro ; helps with tremor     Gerd/Constipation- she has to be careful- they did a barium swallow in Feb - an hour later it was still where it got stuck and before stomach so they were going to do EGD and has been postponed until JUne- she is careful with what she eats ; she is elevated with her pillow at night- no choking recently and watching it closely ; she is careful    Review of Systems   Constitutional: Negative. Negative for chills, diaphoresis, fever, malaise/fatigue and weight loss. HENT: Negative for congestion, nosebleeds and tinnitus. Eyes: Negative for blurred vision, double vision and photophobia.    Respiratory: Negative for cough, hemoptysis, sputum production, shortness of breath and wheezing. Cardiovascular: Negative for chest pain, palpitations, orthopnea, claudication, leg swelling and PND. Gastrointestinal: Negative for abdominal pain, blood in stool, constipation, diarrhea, heartburn, melena, nausea and vomiting. Genitourinary: Negative for dysuria, frequency, hematuria and urgency. Musculoskeletal: Negative for back pain, joint pain, myalgias and neck pain. Skin: Negative for itching and rash. Neurological: Negative for dizziness, tingling, sensory change, speech change, focal weakness, weakness and headaches. Endo/Heme/Allergies: Negative for polydipsia. Does not bruise/bleed easily. Psychiatric/Behavioral: Negative for depression. The patient is not nervous/anxious and does not have insomnia. Physical Exam  Vitals signs and nursing note reviewed. Neurological:      Mental Status: She is alert and oriented to person, place, and time. Mental status is at baseline. Psychiatric:         Mood and Affect: Mood normal.         Behavior: Behavior normal.         Thought Content: Thought content normal.         Judgment: Judgment normal.         ASSESSMENT and PLAN  Diagnoses and all orders for this visit:    1. Severe episode of recurrent major depressive disorder, without psychotic features (HCC)-cont with lexapro as doing and valium helps with anxiety    2. NPH (normal pressure hydrocephalus) (HCC)-stable recent CT scan has been good    3. Essential hypertension- at goal    4.  Other dysphagia-cont to watch her swallowing and eating small frequent meals, she is not getting out much but being careful       Total time spent 15 min on telephone     lab results and schedule of future lab studies reviewed with patient  reviewed diet, exercise and weight control  cardiovascular risk and specific lipid/LDL goals reviewed  reviewed medications and side effects in detail

## 2020-04-17 ENCOUNTER — VIRTUAL VISIT (OUTPATIENT)
Dept: INTERNAL MEDICINE CLINIC | Age: 85
End: 2020-04-17

## 2020-04-17 ENCOUNTER — TELEPHONE (OUTPATIENT)
Dept: INTERNAL MEDICINE CLINIC | Age: 85
End: 2020-04-17

## 2020-04-17 VITALS — DIASTOLIC BLOOD PRESSURE: 53 MMHG | SYSTOLIC BLOOD PRESSURE: 128 MMHG

## 2020-04-17 DIAGNOSIS — I10 ESSENTIAL HYPERTENSION: ICD-10-CM

## 2020-04-17 DIAGNOSIS — R13.19 OTHER DYSPHAGIA: ICD-10-CM

## 2020-04-17 DIAGNOSIS — G91.2 NPH (NORMAL PRESSURE HYDROCEPHALUS) (HCC): ICD-10-CM

## 2020-04-17 DIAGNOSIS — F33.2 SEVERE EPISODE OF RECURRENT MAJOR DEPRESSIVE DISORDER, WITHOUT PSYCHOTIC FEATURES (HCC): Primary | ICD-10-CM

## 2020-04-17 RX ORDER — PANTOPRAZOLE SODIUM 20 MG/1
TABLET, DELAYED RELEASE ORAL
COMMUNITY
Start: 2020-04-09 | End: 2022-06-19

## 2020-04-17 RX ORDER — HYDROCHLOROTHIAZIDE 25 MG/1
TABLET ORAL
Qty: 30 TAB | Refills: 5 | Status: SHIPPED | OUTPATIENT
Start: 2020-04-17 | End: 2020-09-10

## 2020-05-11 DIAGNOSIS — R56.9 SEIZURE (HCC): ICD-10-CM

## 2020-05-11 RX ORDER — DIAZEPAM 5 MG/1
TABLET ORAL
Qty: 45 TAB | Refills: 5 | Status: SHIPPED | OUTPATIENT
Start: 2020-05-11 | End: 2020-12-09 | Stop reason: SDUPTHER

## 2020-05-18 RX ORDER — ESCITALOPRAM OXALATE 5 MG/1
TABLET ORAL
Qty: 90 TAB | Refills: 1 | Status: SHIPPED | OUTPATIENT
Start: 2020-05-18 | End: 2020-09-10

## 2020-06-09 ENCOUNTER — OFFICE VISIT (OUTPATIENT)
Dept: INTERNAL MEDICINE CLINIC | Age: 85
End: 2020-06-09

## 2020-06-09 ENCOUNTER — HOSPITAL ENCOUNTER (OUTPATIENT)
Dept: LAB | Age: 85
Discharge: HOME | End: 2020-06-09

## 2020-06-09 ENCOUNTER — OFFICE VISIT (OUTPATIENT)
Dept: NEUROLOGY | Age: 85
End: 2020-06-09

## 2020-06-09 VITALS
HEART RATE: 75 BPM | RESPIRATION RATE: 16 BRPM | HEIGHT: 63 IN | DIASTOLIC BLOOD PRESSURE: 86 MMHG | TEMPERATURE: 98.2 F | OXYGEN SATURATION: 97 % | SYSTOLIC BLOOD PRESSURE: 152 MMHG | BODY MASS INDEX: 29.95 KG/M2 | WEIGHT: 169 LBS

## 2020-06-09 VITALS
DIASTOLIC BLOOD PRESSURE: 80 MMHG | SYSTOLIC BLOOD PRESSURE: 144 MMHG | OXYGEN SATURATION: 98 % | HEIGHT: 63 IN | WEIGHT: 168 LBS | HEART RATE: 73 BPM | BODY MASS INDEX: 29.77 KG/M2 | TEMPERATURE: 97.9 F

## 2020-06-09 DIAGNOSIS — I10 ESSENTIAL HYPERTENSION: ICD-10-CM

## 2020-06-09 DIAGNOSIS — K21.9 GASTROESOPHAGEAL REFLUX DISEASE WITHOUT ESOPHAGITIS: ICD-10-CM

## 2020-06-09 DIAGNOSIS — F33.1 MODERATE EPISODE OF RECURRENT MAJOR DEPRESSIVE DISORDER (HCC): ICD-10-CM

## 2020-06-09 DIAGNOSIS — R10.9 ACUTE RIGHT FLANK PAIN: ICD-10-CM

## 2020-06-09 DIAGNOSIS — R51.9 NEW ONSET HEADACHE: ICD-10-CM

## 2020-06-09 DIAGNOSIS — R25.1 TREMOR: ICD-10-CM

## 2020-06-09 DIAGNOSIS — R10.9 ACUTE RIGHT FLANK PAIN: Primary | ICD-10-CM

## 2020-06-09 DIAGNOSIS — D32.9 MENINGIOMA (HCC): Primary | ICD-10-CM

## 2020-06-09 DIAGNOSIS — G91.2 NPH (NORMAL PRESSURE HYDROCEPHALUS) (HCC): ICD-10-CM

## 2020-06-09 LAB
ALBUMIN SERPL-MCNC: 4.2 G/DL (ref 3.5–5)
ALBUMIN/GLOB SERPL: 1.4 {RATIO} (ref 1.1–2.2)
ALP SERPL-CCNC: 81 U/L (ref 45–117)
ALT SERPL-CCNC: 56 U/L (ref 12–78)
ANION GAP SERPL CALC-SCNC: 5 MMOL/L (ref 5–15)
AST SERPL-CCNC: 35 U/L (ref 15–37)
BILIRUB SERPL-MCNC: 0.5 MG/DL (ref 0.2–1)
BILIRUB UR QL STRIP: NEGATIVE
BUN SERPL-MCNC: 18 MG/DL (ref 6–20)
BUN/CREAT SERPL: 20 (ref 12–20)
CALCIUM SERPL-MCNC: 10.8 MG/DL (ref 8.5–10.1)
CHLORIDE SERPL-SCNC: 106 MMOL/L (ref 97–108)
CHOLEST SERPL-MCNC: 200 MG/DL
CO2 SERPL-SCNC: 27 MMOL/L (ref 21–32)
CREAT SERPL-MCNC: 0.91 MG/DL (ref 0.55–1.02)
ERYTHROCYTE [DISTWIDTH] IN BLOOD BY AUTOMATED COUNT: 12.6 % (ref 11.5–14.5)
GLOBULIN SER CALC-MCNC: 3.1 G/DL (ref 2–4)
GLUCOSE SERPL-MCNC: 106 MG/DL (ref 65–100)
GLUCOSE UR-MCNC: NEGATIVE MG/DL
HCT VFR BLD AUTO: 44.6 % (ref 35–47)
HDLC SERPL-MCNC: 58 MG/DL
HDLC SERPL: 3.4 {RATIO} (ref 0–5)
HGB BLD-MCNC: 14.2 G/DL (ref 11.5–16)
KETONES P FAST UR STRIP-MCNC: NEGATIVE MG/DL
LDLC SERPL CALC-MCNC: 115.8 MG/DL (ref 0–100)
LIPID PROFILE,FLP: ABNORMAL
MCH RBC QN AUTO: 30.4 PG (ref 26–34)
MCHC RBC AUTO-ENTMCNC: 31.8 G/DL (ref 30–36.5)
MCV RBC AUTO: 95.5 FL (ref 80–99)
NRBC # BLD: 0 K/UL (ref 0–0.01)
NRBC BLD-RTO: 0 PER 100 WBC
PH UR STRIP: 7 [PH] (ref 4.6–8)
PLATELET # BLD AUTO: 396 K/UL (ref 150–400)
PMV BLD AUTO: 11 FL (ref 8.9–12.9)
POTASSIUM SERPL-SCNC: 4.9 MMOL/L (ref 3.5–5.1)
PROT SERPL-MCNC: 7.3 G/DL (ref 6.4–8.2)
PROT UR QL STRIP: NEGATIVE
RBC # BLD AUTO: 4.67 M/UL (ref 3.8–5.2)
SODIUM SERPL-SCNC: 138 MMOL/L (ref 136–145)
SP GR UR STRIP: 1.02 (ref 1–1.03)
TRIGL SERPL-MCNC: 131 MG/DL (ref ?–150)
UA UROBILINOGEN AMB POC: NORMAL (ref 0.2–1)
URINALYSIS CLARITY POC: CLEAR
URINALYSIS COLOR POC: YELLOW
URINE BLOOD POC: NORMAL
URINE LEUKOCYTES POC: NEGATIVE
URINE NITRITES POC: NEGATIVE
VLDLC SERPL CALC-MCNC: 26.2 MG/DL
WBC # BLD AUTO: 5.8 K/UL (ref 3.6–11)

## 2020-06-09 NOTE — PROGRESS NOTES
Pt balance is off. Pt is getting more tremors and sore on right side of her neck. Vision is not good but she is going to eye doctor at the end of the month.
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
## 2020-06-09 NOTE — PROGRESS NOTES
HISTORY OF PRESENT ILLNESS  Giselle Smith is a 81 y.o. female. HPI  R flank pain: Pt c/o intermittent R flank pain which she has been experiencing for a long period of time. She notes that she has been working outside in her garden more. Denies fever, dysuria, frequency, urgency, or hematuria. Confirms some occasional chills. NPH: followed by neurologist (Dr. Jenna Gonzales). Pt has an apt today. She notes experiencing occasional loss of balance during her \"bad days. \" Pt reports that when she turns her head that her neck feels weak and her head feels heavy since last month. She notes that she sleeps on her R side. Pt has completley stopped drinking wine due to 5 lbs weight gain. Tremor: Pt c/o increased tremors which are worse at night. Pt notes that the tremors are primarily in legs and R hand. Pt states that she does not want a more sedating medication to manage her tremors. GERD: Stable, pt continues to comply with Protonix and notes significant improvement. Mood: Stable, pt continues to comply with Lexapro. Hypertension ROS: taking medications as instructed, no medication side effects noted, no TIA's, no chest pain on exertion, no dyspnea on exertion, no swelling of ankles. New concerns: BP in office today is 152/88. Continues on HCTZ 25 mg and atenolol 25 mg. Pt does not have any other acute concerns today. Review of Systems   Constitutional: Positive for chills. Negative for fever. Genitourinary: Positive for flank pain (R). Negative for dysuria, frequency, hematuria and urgency. Neurological: Positive for tremors. Endo/Heme/Allergies: Positive for environmental allergies. All other systems reviewed and are negative. Physical Exam  Constitutional:       Appearance: Normal appearance.    HENT:      Right Ear: Hearing, tympanic membrane and external ear normal.      Left Ear: Hearing, tympanic membrane and external ear normal.      Mouth/Throat:      Mouth: Mucous membranes are moist.      Pharynx: Oropharynx is clear. Cardiovascular:      Rate and Rhythm: Normal rate and regular rhythm. Pulses: Normal pulses. Heart sounds: Normal heart sounds. Pulmonary:      Effort: Pulmonary effort is normal.      Breath sounds: Normal breath sounds and air entry. Musculoskeletal: Normal range of motion. Skin:     General: Skin is warm and dry. Neurological:      General: No focal deficit present. Mental Status: She is alert and oriented to person, place, and time. Psychiatric:         Mood and Affect: Mood normal.         Behavior: Behavior normal.         ASSESSMENT and PLAN  Diagnoses and all orders for this visit:    1. Acute right flank pain  Discussed with pt that pain is likely muscular since she has been working in her garden more. Will continue to monitor for improvements or changes. -     AMB POC URINALYSIS DIP STICK AUTO W/O MICRO  -     CULTURE, URINE; Future    2. Essential hypertension  BP is at goal. I do not recommend any change in medications. -     CBC W/O DIFF; Future  -     LIPID PANEL; Future  -     METABOLIC PANEL, COMPREHENSIVE; Future    3. NPH (normal pressure hydrocephalus) (HCC)  Followed by Dr. Lia Louis. Discussed with pt that her energy levels can affect her balance. Will continue to monitor for improvements or changes. 4. Moderate episode of recurrent major depressive disorder (HCC)  Stable and well-managed with Lexapro. No change in medications. 5. Tremor  Not stable with reported increase. Advised pt to discuss increase in tremors at her apt today with Dr. Lai Louis. Will continue to monitor for improvements or changes. 6. Gastroesophageal reflux disease without esophagitis  Stable and well-managed with Protonix. No change in medications. Lab results and schedule of future lab studies reviewed with patient. Reviewed diet, exercise and weight control.     Written by Carmelina Pearson, as dictated by Chasity Laurent MD.     Current diagnosis and concerns discussed with pt at length. Understands risks and benefits or current treatment plan and medications and accepts the treatment and medication with any possible risks. Pt asks appropriate questions which were answered. Pt instructed to call with any concerns or problems.

## 2020-06-10 LAB
BACTERIA SPEC CULT: NORMAL
SERVICE CMNT-IMP: NORMAL

## 2020-06-11 ENCOUNTER — HOSPITAL ENCOUNTER (OUTPATIENT)
Dept: CT IMAGING | Age: 85
Discharge: HOME | End: 2020-06-11
Attending: NURSE PRACTITIONER
Payer: MEDICARE

## 2020-06-11 DIAGNOSIS — D32.9 MENINGIOMA (HCC): ICD-10-CM

## 2020-06-11 DIAGNOSIS — R51.9 NEW ONSET HEADACHE: ICD-10-CM

## 2020-06-11 PROCEDURE — 70450 CT HEAD/BRAIN W/O DYE: CPT

## 2020-06-12 ENCOUNTER — TELEPHONE (OUTPATIENT)
Dept: INTERNAL MEDICINE CLINIC | Age: 85
End: 2020-06-12

## 2020-06-12 RX ORDER — CEPHALEXIN 500 MG/1
500 CAPSULE ORAL 2 TIMES DAILY
Qty: 14 CAP | Refills: 0 | Status: SHIPPED | OUTPATIENT
Start: 2020-06-12 | End: 2020-10-20

## 2020-06-12 NOTE — TELEPHONE ENCOUNTER
Spoke with patient and she states that her symptoms have become significantly worse since she saw Dr. Antonio Galindo in the office. Advised pt we will send in an antibiotic but if her symptoms persist after completion of the course she may want to follow-up with her urologist.  Pt understood and was thankful for the call.

## 2020-06-12 NOTE — TELEPHONE ENCOUNTER
Per patient she has a UTI , states she is having urination frequency chills and all the other symptoms associated .  Requesting a z pack be prescribed to her pharmacy on file      She can be reached at [phone]

## 2020-06-24 ENCOUNTER — OFFICE VISIT (OUTPATIENT)
Dept: PRIMARY CARE CLINIC | Age: 85
End: 2020-06-24

## 2020-06-24 DIAGNOSIS — Z01.818 PREOP TESTING: Primary | ICD-10-CM

## 2020-06-24 DIAGNOSIS — I10 ESSENTIAL HYPERTENSION: ICD-10-CM

## 2020-06-24 RX ORDER — ATENOLOL 25 MG/1
TABLET ORAL
Qty: 90 TAB | Refills: 1 | Status: SHIPPED | OUTPATIENT
Start: 2020-06-24 | End: 2020-10-24

## 2020-06-25 ENCOUNTER — TELEPHONE (OUTPATIENT)
Dept: NEUROLOGY | Age: 85
End: 2020-06-25

## 2020-06-25 NOTE — TELEPHONE ENCOUNTER
She had a scan last week which was normal.     The only new thing she was complaining of at that visit was a different new type of headache and  increase in headaches.      Nothing else

## 2020-06-25 NOTE — TELEPHONE ENCOUNTER
Patient states she had reaction to eye drops. She called the eye doctor and they told her is was neurology related. She wants to call eye doctor again and discuss the reaction with them. Eye doctor wanted to make tracie aware to see if eye drops could have affected her neurologically. She was given two different types of drops to check her cataract.

## 2020-06-25 NOTE — PERIOP NOTES
[address]  Endoscopy Preprocedure Instructions      1. On the day of your surgery, please report to registration located on the 2nd floor of the  [facility], located within [facility]. yes    2. You must have a responsible adult to drive you to the hospital, stay at the hospital during your procedure and drive you home. If they leave your procedure will not be started (no exceptions). yes    3. Do not have anything to eat or drink (including water, gum, mints, coffee, and juice) after midnight. This does not apply to the medications you were instructed to take by your physician. yesIf you are currently taking Plavix, Coumadin, Aspirin, or other blood-thinning agents, contact your physician for special instructions. yes,    4. If you are having a procedure that requires bowel prep: We recommend that you have only clear liquids the day before your procedure and begin your bowel prep by 5:00 pm.  You may continue to drink clear liquids until midnight. If for any reason you are not able to complete your prep please notify your physician immediately. not applicable    5. Have a list of all current medications, including vitamins, herbal supplements and any other over the counter medications. yes    6. If you wear glasses, contacts, dentures and/or hearing aids, they may be removed prior to procedure, please bring a case to store them in. yes    7. You should understand that if you do not follow these instructions your procedure may be cancelled. If your physical condition changes (I.e. fever, cold or flu) please contact your doctor as soon as possible. 8. It is important that you be on time.   If for any reason you are unable to keep your appointment please call [phone] the day of or your physicians office prior to your scheduled procedure

## 2020-06-28 LAB — SARS-COV-2, NAA: NOT DETECTED

## 2020-06-30 ENCOUNTER — HOSPITAL ENCOUNTER (OUTPATIENT)
Age: 85
Setting detail: OUTPATIENT SURGERY
Discharge: HOME | End: 2020-06-30
Attending: INTERNAL MEDICINE | Admitting: INTERNAL MEDICINE
Payer: MEDICARE

## 2020-06-30 ENCOUNTER — ANESTHESIA EVENT (OUTPATIENT)
Dept: ENDOSCOPY | Age: 85
End: 2020-06-30
Payer: MEDICARE

## 2020-06-30 ENCOUNTER — ANESTHESIA (OUTPATIENT)
Dept: ENDOSCOPY | Age: 85
End: 2020-06-30
Payer: MEDICARE

## 2020-06-30 VITALS
TEMPERATURE: 98.8 F | RESPIRATION RATE: 14 BRPM | DIASTOLIC BLOOD PRESSURE: 63 MMHG | HEART RATE: 66 BPM | WEIGHT: 169.31 LBS | OXYGEN SATURATION: 97 % | BODY MASS INDEX: 30 KG/M2 | HEIGHT: 63 IN | SYSTOLIC BLOOD PRESSURE: 153 MMHG

## 2020-06-30 PROCEDURE — 74011250636 HC RX REV CODE- 250/636: Performed by: NURSE ANESTHETIST, CERTIFIED REGISTERED

## 2020-06-30 PROCEDURE — 76060000031 HC ANESTHESIA FIRST 0.5 HR: Performed by: INTERNAL MEDICINE

## 2020-06-30 PROCEDURE — C1726 CATH, BAL DIL, NON-VASCULAR: HCPCS | Performed by: INTERNAL MEDICINE

## 2020-06-30 PROCEDURE — 76040000019: Performed by: INTERNAL MEDICINE

## 2020-06-30 RX ORDER — DEXTROMETHORPHAN/PSEUDOEPHED 2.5-7.5/.8
1.2 DROPS ORAL
Status: DISCONTINUED | OUTPATIENT
Start: 2020-06-30 | End: 2020-06-30 | Stop reason: HOSPADM

## 2020-06-30 RX ORDER — EPINEPHRINE 0.1 MG/ML
1 INJECTION INTRACARDIAC; INTRAVENOUS
Status: DISCONTINUED | OUTPATIENT
Start: 2020-06-30 | End: 2020-06-30 | Stop reason: HOSPADM

## 2020-06-30 RX ORDER — PROPOFOL 10 MG/ML
INJECTION, EMULSION INTRAVENOUS AS NEEDED
Status: DISCONTINUED | OUTPATIENT
Start: 2020-06-30 | End: 2020-06-30 | Stop reason: HOSPADM

## 2020-06-30 RX ORDER — SODIUM CHLORIDE 9 MG/ML
50 INJECTION, SOLUTION INTRAVENOUS CONTINUOUS
Status: DISCONTINUED | OUTPATIENT
Start: 2020-06-30 | End: 2020-06-30 | Stop reason: HOSPADM

## 2020-06-30 RX ORDER — SODIUM CHLORIDE 9 MG/ML
INJECTION, SOLUTION INTRAVENOUS
Status: DISCONTINUED | OUTPATIENT
Start: 2020-06-30 | End: 2020-06-30 | Stop reason: HOSPADM

## 2020-06-30 RX ORDER — ATROPINE SULFATE 0.1 MG/ML
0.4 INJECTION INTRAVENOUS
Status: DISCONTINUED | OUTPATIENT
Start: 2020-06-30 | End: 2020-06-30 | Stop reason: HOSPADM

## 2020-06-30 RX ORDER — NALOXONE HYDROCHLORIDE 0.4 MG/ML
0.4 INJECTION, SOLUTION INTRAMUSCULAR; INTRAVENOUS; SUBCUTANEOUS
Status: DISCONTINUED | OUTPATIENT
Start: 2020-06-30 | End: 2020-06-30 | Stop reason: HOSPADM

## 2020-06-30 RX ORDER — FLUMAZENIL 0.1 MG/ML
0.2 INJECTION INTRAVENOUS
Status: DISCONTINUED | OUTPATIENT
Start: 2020-06-30 | End: 2020-06-30 | Stop reason: HOSPADM

## 2020-06-30 RX ORDER — MIDAZOLAM HYDROCHLORIDE 1 MG/ML
.25-5 INJECTION, SOLUTION INTRAMUSCULAR; INTRAVENOUS
Status: DISCONTINUED | OUTPATIENT
Start: 2020-06-30 | End: 2020-06-30 | Stop reason: HOSPADM

## 2020-06-30 RX ADMIN — PROPOFOL 50 MG: 10 INJECTION, EMULSION INTRAVENOUS at 14:50

## 2020-06-30 RX ADMIN — PROPOFOL 20 MG: 10 INJECTION, EMULSION INTRAVENOUS at 14:52

## 2020-06-30 RX ADMIN — SODIUM CHLORIDE: 900 INJECTION, SOLUTION INTRAVENOUS at 14:30

## 2020-06-30 RX ADMIN — PROPOFOL 30 MG: 10 INJECTION, EMULSION INTRAVENOUS at 14:54

## 2020-06-30 NOTE — ANESTHESIA POSTPROCEDURE EVALUATION
Procedure(s):  ESOPHAGOGASTRODUODENOSCOPY (EGD)  ESOPHAGEAL DILATION. MAC    Anesthesia Post Evaluation      Multimodal analgesia: multimodal analgesia not used between 6 hours prior to anesthesia start to PACU discharge  Patient location during evaluation: PACU  Patient participation: complete - patient participated  Level of consciousness: awake and alert  Pain score: 0  Pain management: adequate  Airway patency: patent  Anesthetic complications: no  Cardiovascular status: hemodynamically stable and acceptable  Respiratory status: acceptable  Hydration status: acceptable  Comments: Patient seen and evaluated; no concerns. Post anesthesia nausea and vomiting:  none      INITIAL Post-op Vital signs:   Vitals Value Taken Time   /59 6/30/2020  3:22 PM   Temp 37.1 °C (98.8 °F) 6/30/2020  3:02 PM   Pulse 64 6/30/2020  3:23 PM   Resp 19 6/30/2020  3:23 PM   SpO2 96 % 6/30/2020  3:23 PM   Vitals shown include unvalidated device data.
Patient

## 2020-06-30 NOTE — DISCHARGE INSTRUCTIONS
Lalitha Guzman M.D.  [phone]           2020  [address]  :  1935  [employer] Medical Record Number:  [id number]        ENDOSCOPY FINDINGS:   Your endoscopy showed a benign stricture that was dilated and small size hiatal hernia, dilation was performed, otherwise mucosa within normal.      EGD DISCHARGE INSTRUCTIONS    DISCOMFORT:  Sore throat- throat lozenges or warm salt water gargle  redness at IV site- apply warm compress to area; if redness or soreness persist- contact your physician  Gaseous discomfort- walking, belching will help relieve any discomfort  You may not operate a vehicle for 12 hours  You may not engage in an occupation involving machinery or appliances for rest of today  You may not drink alcoholic beverages for at least 12 hours  Avoid making any critical decisions for at least 24 hour    DIET:   You may resume your soft diet. Chew food very well and eat slowly, taking sips of water with meals. ACTIVITY  Spend the remainder of the day resting -  avoid any strenuous activity. Avoid driving or operating machinery. CALL M.D. ANY SIGN OF   Increasing pain, nausea, vomiting  Abdominal distension (swelling)  New increased bleeding (oral or rectal)  Fever (chills)  Pain in chest area  Bloody discharge from nose or mouth  Shortness of breath    Follow-up Instructions:   Call Dr. Latoya Costa for any questions or problems. Telephone # [phone]     Continue with pantoprazole daily in the morning and can add Pepcid over the counter, 20 mg as needed in the evening or bedtime.

## 2020-06-30 NOTE — PROCEDURES
Shaina Patton M.D.  [phone]           2020                EGD Operative Report  Lila Waters  :  1935  [facility] Medical Record Number:  [id number]      Indication:  Dysphagia/odynophagia, GERD     : Glenis Sacks, MD    Referring Provider:  Ector Jessica MD      Anesthesia/Sedation:  MAC anesthesia    Airway assessment: No airway problems anticipated    Pre-Procedural Exam:      Airway: clear, no airway problems anticipated  Heart: RRR, without gallops or rubs  Lungs: clear bilaterally without wheezes, crackles, or rhonchi  Abdomen: soft, nontender, nondistended, bowel sounds present  Mental Status: awake, alert and oriented to person, place and time       Procedure Details     After infomed consent was obtained for the procedure, with all risks and benefits of procedure explained the patient was taken to the endoscopy suite and placed in the left lateral decubitus position. Following sequential administration of sedation as per above, the endoscope was inserted into the mouth and advanced under direct vision to second portion of the duodenum. A careful inspection was made as the gastroscope was withdrawn, including a retroflexed view of the proximal stomach; findings and interventions are described below. Findings:   Esophagus:Evidence of a Schatzki's ring at the GE-junction with a residual diameter of about 16 mm, no lesion or ulcers seen. No inflammation seen. Remaining mucosa appeared within normal. No signs of eosinophilic esophagitis seen. Stomach: Small size hiatal hernia, otherwise mucosa within normal.  Duodenum/jejunum: normal    Therapies:  Effective esophageal dilation with 18 to 20 mm sized balloon    Specimens: none           Complications:   None; patient tolerated the procedure well. EBL:  None.            Impression:    Schatzki's ring dilated to 20 mm                           Small size hiatal hernia Recommendations:    -Continue acid suppression with daily pantoprazole.   -Continue with soft diet, and strict anti-reflux measures  -Follow-up in the office    Lisseth Santiago MD

## 2020-06-30 NOTE — PERIOP NOTES
Received Report From Helder Gamez CRNA @ 4796, see anesthesia notes. Care of the patient transferred to procedure nurse DANIE Manley @ 1500    Out of Procedure and sent to post-recovery @ 1500    Post-recovery report given to HEATHER IVEY RN @ 0737    Patient ABD remains soft and non-tender post procedure. Pt has no complaints at this time and tolerated the procedure well. Endoscope was pre-cleaned at bedside immediately following procedure by Urvashi Radford. CRE balloon dilatation of the esophagus   18 mm Balloon inflated to 3 ATMs and held for 15 seconds. 19 mm Balloon inflated to 4.5 ATMs and held for 15 seconds. 20 mm Balloon inflated to 6 ATMs and held for 30 seconds. No subcutaneous crepitus of the chest or cervical region was noted post dilatation.

## 2020-06-30 NOTE — PROGRESS NOTES
Arden Clancy  1935  [id number]    Situation:  Verbal report received from:   Sulema Mustafa RN   Procedure: Procedure(s):  ESOPHAGOGASTRODUODENOSCOPY (EGD)  ESOPHAGEAL DILATION    Background:    Preoperative diagnosis: schatzki's ring  Postoperative diagnosis: schatzki's ring  hiatal hernia    :  Dr. Sammi Daigle   Assistant(s): Endoscopy Technician-1: Lacie Vergara  Endoscopy RN-1: Susan Mulligan RN    Specimens: * No specimens in log *  H. Pylori  no    Assessment:  Intra-procedure medications     Anesthesia gave intra-procedure sedation and medications, see anesthesia flow sheet yes    Intravenous fluids: NS@ KVO     Vital signs stable   yes    Abdominal assessment: round and soft   yes    Recommendation:  Discharge patient per MD order  yes.   Return to floor  outpatient  Family or Friend   Friend   Permission to share finding with family or friend yes

## 2020-06-30 NOTE — H&P
The patient is a 81year old female who presents with a complaint of Nausea. The patient presents due to reoccurrence of symptoms (Pt presents for follow up for nausea  She has been having nausea since changing from ranitidine to famotidine.  She has it all day long. ). The symptoms have been associated with diarrhea (Reports she had diarrhea which had lasted for a week but had resolved two weeks ago.), dysphagia, vomiting (yesterday had regurgication after eating a bit of her hamburger.) and heartburn, while the symptoms have not been associated with abdominal pain, fever or anorexia. 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 She states that when she eats she feels bloated and when she doesnt, she feels less bloated. She has been having small diarrhea stools and reports she feels constipated  Abdominal pain (789.00  R10.9)   Pt presents with upper abdominal pain and bloating which has persisting for 2 months. Feels like she does not empty when she has bowel movements. Dysphagia (787.20  R13.10)    Abdominal pain, epigastric (789.06  R10.13)      Past Surgical History (Iris Ruiz; 2/13/2020 12:27 PM)  Brain Surgery  [04/01/2019]: 04/02/2019  Cholecystectomy  [1992]:  Hysterectomy;  Total   1960's    Allergies (Iris Ruiz; 2/13/2020 12:27 PM)  PHENobarbital *HYPNOTICS*    Sulfa Drugs    Codeine/Codeine Derivatives    Penicillins    Seaweed    Fosamax *ENDOCRINE AND METABOLIC AGENTS - MISC.*    Cleocin *ANTI-INFECTIVE AGENTS - MISC. *    Levaquin *FLUOROQUINOLONES*      Medication History (The Logo Companysamanthae Shireleazar; 2/13/2020 12:34 PM)  Pepcid  (20MG Tablet, 1 (one) Tablet Oral BID, Taken starting 01/15/2020) Active. MiraLax  (Oral daily) Active. Atenolol  (25MG Tablet, Oral one daily) Active. Hydrochlorothiazide  (25MG Tablet, Oral 1/2 tab every other day) Active. Tylenol  (500MG Capsule, Oral) Active. Aspirin  (81MG Tablet, 1 tab Oral BID) Active. Medications Reconciled   diazePAM  (5MG Tablet, Oral daily) Active. Lexapro  (5MG Tablet, Oral daily) Active. Senokot Extra Strength  (17.2MG Tablet, Oral daily) Active. Family History (Chuck Garcia; 2/13/2020 12:27 PM)  Coronary artery disease   Mother, Father. Ulcerative colitis   Son. Social History (Proximetryeleazar; 2/13/2020 12:27 PM)  Employment status   Retired. Blood Transfusion   no  Marital status   . Alcohol Use   Drinks wine, Occasional alcohol use. Tobacco Use   Former smoker. Diagnostic Studies History (The Logo CompanysamanthaDataCerteleaazr; 2/13/2020 12:27 PM)  Endoscopy  [11/2018]:  Colonoscopy  [01/2018]:  ERCP      Health Maintenance History (Retty; 2/13/2020 12:27 PM)  Pneumovax  [2017]: prevnar 13  Flu Vaccine  [2019]: 2018        Review of Systems (Proximetryeleazar; 2/13/2020 12:26 PM)  General Not Present- Chronic Fatigue, Poor Appetite, Weight Gain and Weight Loss. Skin Not Present- Itching, Rash and Skin Color Changes. HEENT Not Present- Hearing Loss and Vertigo. Respiratory Not Present- Difficulty Breathing and TB exposure. Cardiovascular Not Present- Chest Pain, Use of Antibiotics before Dental Procedures and Use of Blood Thinners. Gastrointestinal Present- See HPI. Musculoskeletal Not Present- Arthritis, Hip Replacement Surgery and Knee Replacement Surgery. Neurological Not Present- Weakness. Psychiatric Not Present- Depression. Endocrine Not Present- Diabetes and Thyroid Problems.   Hematology Not Present- Anemia. Vitals (Tempie Shirts; 2/13/2020 12:30 PM)  2/13/2020 12:27 PM  Weight: 161 lb   Height: 63 in   Body Surface Area: 1.76 m²   Body Mass Index: 28.52 kg/m²    BP: 140/80 (Sitting, Left Arm, Standard)              Physical Exam (Villa Monahan; 2/13/2020 12:57 PM)  General  Mental Status - Alert. General Appearance - Cooperative, Pleasant, Not in acute distress. Orientation - Oriented X3. Integumentary  General Characteristics  Color - normal coloration of skin. Head and Neck  Neck  Global Assessment - supple. Eye  Sclera/Conjunctiva - Bilateral - No Jaundice. Chest and Lung Exam  Chest and lung exam reveals  - quiet, even and easy respiratory effort with no use of accessory muscles. Auscultation  Breath sounds - Normal. Adventitious sounds - No Adventitious sounds. Cardiovascular  Auscultation  Rhythm - Regular, No Tachycardia, No Bradycardia . Heart Sounds - Normal heart sounds , S1 WNL and S2 WNL, No S3, No Summation Gallop. Murmurs & Other Heart Sounds - Auscultation of the heart reveals - No Murmurs. Abdomen  Palpation/Percussion  Tenderness - Non-Tender. Rebound tenderness - No rebound. Rigidity (guarding) - No Rigidity. Dullness to percussion - No abnormal dullness to percussion. Liver - No hepatosplenomegaly. Abdominal Mass Palpable - No masses. Other Characteristics - No Ascites. Auscultation  Auscultation of the abdomen reveals - Bowel sounds normal, No Abdominal bruits and No Succussion splash. Rectal - Did not examine. Peripheral Vascular  Lower Extremity  Palpation - Edema - Left - No edema. Right - No edema. Neurologic  Motor  Involuntary Movements - Asterixis - not present. Assessment & Plan (Villa Guerreroalejandroronen AGACNP; 2/14/2020 2:15 PM)  Dysphagia (787.20  R13.10)  Story: Pt presents for follow up for GERD and dysphagia. At her last office isit she was switched from zantac to pepcid.  She has been having burning stomah discomfort and nausea persistent throughout the day. Impression: Had not tolerated PPI in the past 2/2 TIA like symptoms however she has a new diagnosis of hydrocephalus and now has a  shunt. Restart PPI as this likely not the casue of neurological symptoms  Instructed to eat slowly, chew food very well and have sips of water with meals, along with avoiding known triggers for her gerd symnptpoms Will proceed with barium swallow she as wants to wait on repeating an EGD with dilation. Follow up in the office in 4 to 6 weeks. Current Plans  Started Pantoprazole Sodium 20MG, 1 (one) Tablet daily, #30, 30 days starting 02/13/2020, Ref. x1. BARIUM SWALLOW ([id number])  Pt Education - How to access health information online: discussed with patient and provided information. Medical Decision Making (Villa MCALLISTER; 2/14/2020 2:16 PM)  Amount/complexity of data to be reviewed:  - Order and/or review of radiology test(s)  - Review and summarization of old records      Signed electronically by ESVIN Natarajan (2/14/2020 2:16 PM)

## 2020-06-30 NOTE — ANESTHESIA PREPROCEDURE EVALUATION
Anesthetic History   No history of anesthetic complications            Review of Systems / Medical History  Patient summary reviewed and nursing notes reviewed    Pulmonary        Sleep apnea           Neuro/Psych     seizures: well controlled    TIA and psychiatric history    Comments:  Shunt in place: treatment of hydrocephalus (brain tumor)  Last seizure 2005  Anxiety/depression Cardiovascular    Hypertension              Exercise tolerance: >4 METS     GI/Hepatic/Renal     GERD    Renal disease: stones      Comments: Esophageal stricture Endo/Other        Arthritis     Other Findings              Physical Exam    Airway  Mallampati: II    Neck ROM: normal range of motion   Mouth opening: Normal     Cardiovascular    Rhythm: regular  Rate: normal         Dental  No notable dental hx       Pulmonary  Breath sounds clear to auscultation               Abdominal         Other Findings            Anesthetic Plan    ASA: 3  Anesthesia type: MAC            Anesthetic plan and risks discussed with: Patient      Informed consent obtained.

## 2020-07-26 NOTE — TELEPHONE ENCOUNTER
Patient stated she would like a copy of the full workup she had earlier this year.   Phone: [phone] Spine appears normal, range of motion is not limited, no muscle or joint tenderness

## 2020-09-10 RX ORDER — ESCITALOPRAM OXALATE 5 MG/1
TABLET ORAL
Qty: 90 TAB | Refills: 1 | Status: SHIPPED | OUTPATIENT
Start: 2020-09-10 | End: 2021-03-23

## 2020-10-14 ENCOUNTER — APPOINTMENT (OUTPATIENT)
Dept: GENERAL RADIOLOGY | Age: 85
End: 2020-10-14
Attending: EMERGENCY MEDICINE
Payer: MEDICARE

## 2020-10-14 ENCOUNTER — HOSPITAL ENCOUNTER (EMERGENCY)
Age: 85
Discharge: HOME | End: 2020-10-14
Attending: EMERGENCY MEDICINE
Payer: MEDICARE

## 2020-10-14 ENCOUNTER — APPOINTMENT (OUTPATIENT)
Dept: CT IMAGING | Age: 85
End: 2020-10-14
Attending: EMERGENCY MEDICINE
Payer: MEDICARE

## 2020-10-14 VITALS
TEMPERATURE: 98.4 F | WEIGHT: 164 LBS | OXYGEN SATURATION: 97 % | HEIGHT: 63 IN | RESPIRATION RATE: 18 BRPM | HEART RATE: 67 BPM | BODY MASS INDEX: 29.06 KG/M2 | DIASTOLIC BLOOD PRESSURE: 54 MMHG | SYSTOLIC BLOOD PRESSURE: 144 MMHG

## 2020-10-14 DIAGNOSIS — J84.10 LUNG GRANULOMA (HCC): ICD-10-CM

## 2020-10-14 DIAGNOSIS — B34.9 VIRAL SYNDROME: Primary | ICD-10-CM

## 2020-10-14 LAB
ALBUMIN SERPL-MCNC: 4.2 G/DL (ref 3.5–5)
ALBUMIN/GLOB SERPL: 1.2 {RATIO} (ref 1.1–2.2)
ALP SERPL-CCNC: 86 U/L (ref 45–117)
ALT SERPL-CCNC: 65 U/L (ref 12–78)
ANION GAP SERPL CALC-SCNC: 4 MMOL/L (ref 5–15)
APPEARANCE UR: CLEAR
AST SERPL-CCNC: 56 U/L (ref 15–37)
BACTERIA URNS QL MICRO: NEGATIVE /HPF
BASOPHILS # BLD: 0 K/UL (ref 0–0.1)
BASOPHILS NFR BLD: 1 % (ref 0–1)
BILIRUB SERPL-MCNC: 0.5 MG/DL (ref 0.2–1)
BILIRUB UR QL: NEGATIVE
BUN SERPL-MCNC: 10 MG/DL (ref 6–20)
BUN/CREAT SERPL: 12 (ref 12–20)
CALCIUM SERPL-MCNC: 10 MG/DL (ref 8.5–10.1)
CHLORIDE SERPL-SCNC: 105 MMOL/L (ref 97–108)
CO2 SERPL-SCNC: 29 MMOL/L (ref 21–32)
COLOR UR: NORMAL
COMMENT, HOLDF: NORMAL
CREAT SERPL-MCNC: 0.83 MG/DL (ref 0.55–1.02)
DIFFERENTIAL METHOD BLD: ABNORMAL
EOSINOPHIL # BLD: 0.1 K/UL (ref 0–0.4)
EOSINOPHIL NFR BLD: 1 % (ref 0–7)
EPITH CASTS URNS QL MICRO: NORMAL /LPF
ERYTHROCYTE [DISTWIDTH] IN BLOOD BY AUTOMATED COUNT: 12.1 % (ref 11.5–14.5)
GLOBULIN SER CALC-MCNC: 3.6 G/DL (ref 2–4)
GLUCOSE SERPL-MCNC: 105 MG/DL (ref 65–100)
GLUCOSE UR STRIP.AUTO-MCNC: NEGATIVE MG/DL
HCT VFR BLD AUTO: 43.5 % (ref 35–47)
HGB BLD-MCNC: 14.6 G/DL (ref 11.5–16)
HGB UR QL STRIP: NEGATIVE
HYALINE CASTS URNS QL MICRO: NORMAL /LPF (ref 0–5)
IMM GRANULOCYTES # BLD AUTO: 0 K/UL (ref 0–0.04)
IMM GRANULOCYTES NFR BLD AUTO: 1 % (ref 0–0.5)
KETONES UR QL STRIP.AUTO: NEGATIVE MG/DL
LEUKOCYTE ESTERASE UR QL STRIP.AUTO: NEGATIVE
LYMPHOCYTES # BLD: 2.2 K/UL (ref 0.8–3.5)
LYMPHOCYTES NFR BLD: 35 % (ref 12–49)
MCH RBC QN AUTO: 30.8 PG (ref 26–34)
MCHC RBC AUTO-ENTMCNC: 33.6 G/DL (ref 30–36.5)
MCV RBC AUTO: 91.8 FL (ref 80–99)
MONOCYTES # BLD: 0.7 K/UL (ref 0–1)
MONOCYTES NFR BLD: 11 % (ref 5–13)
NEUTS SEG # BLD: 3.3 K/UL (ref 1.8–8)
NEUTS SEG NFR BLD: 51 % (ref 32–75)
NITRITE UR QL STRIP.AUTO: NEGATIVE
NRBC # BLD: 0 K/UL (ref 0–0.01)
NRBC BLD-RTO: 0 PER 100 WBC
PH UR STRIP: 8 [PH] (ref 5–8)
PLATELET # BLD AUTO: 403 K/UL (ref 150–400)
PMV BLD AUTO: 10 FL (ref 8.9–12.9)
POTASSIUM SERPL-SCNC: 3.9 MMOL/L (ref 3.5–5.1)
PROT SERPL-MCNC: 7.8 G/DL (ref 6.4–8.2)
PROT UR STRIP-MCNC: NEGATIVE MG/DL
RBC # BLD AUTO: 4.74 M/UL (ref 3.8–5.2)
RBC #/AREA URNS HPF: NORMAL /HPF (ref 0–5)
SAMPLES BEING HELD,HOLD: NORMAL
SODIUM SERPL-SCNC: 138 MMOL/L (ref 136–145)
SP GR UR REFRACTOMETRY: 1.01 (ref 1–1.03)
TROPONIN I SERPL-MCNC: <0.05 NG/ML
UR CULT HOLD, URHOLD: NORMAL
UROBILINOGEN UR QL STRIP.AUTO: 0.2 EU/DL (ref 0.2–1)
WBC # BLD AUTO: 6.3 K/UL (ref 3.6–11)
WBC URNS QL MICRO: NORMAL /HPF (ref 0–4)

## 2020-10-14 PROCEDURE — 85025 COMPLETE CBC W/AUTO DIFF WBC: CPT

## 2020-10-14 PROCEDURE — 74177 CT ABD & PELVIS W/CONTRAST: CPT

## 2020-10-14 PROCEDURE — 84484 ASSAY OF TROPONIN QUANT: CPT

## 2020-10-14 PROCEDURE — 81001 URINALYSIS AUTO W/SCOPE: CPT

## 2020-10-14 PROCEDURE — 93005 ELECTROCARDIOGRAM TRACING: CPT

## 2020-10-14 PROCEDURE — 87635 SARS-COV-2 COVID-19 AMP PRB: CPT

## 2020-10-14 PROCEDURE — 71045 X-RAY EXAM CHEST 1 VIEW: CPT

## 2020-10-14 PROCEDURE — 36415 COLL VENOUS BLD VENIPUNCTURE: CPT

## 2020-10-14 PROCEDURE — 70450 CT HEAD/BRAIN W/O DYE: CPT

## 2020-10-14 PROCEDURE — 99284 EMERGENCY DEPT VISIT MOD MDM: CPT

## 2020-10-14 PROCEDURE — 80053 COMPREHEN METABOLIC PANEL: CPT

## 2020-10-14 PROCEDURE — 74011000636 HC RX REV CODE- 636: Performed by: RADIOLOGY

## 2020-10-14 RX ORDER — ONDANSETRON 8 MG/1
8 TABLET, ORALLY DISINTEGRATING ORAL
Qty: 12 TAB | Refills: 0 | Status: SHIPPED | OUTPATIENT
Start: 2020-10-14 | End: 2022-02-03 | Stop reason: ALTCHOICE

## 2020-10-14 RX ADMIN — IOPAMIDOL 100 ML: 755 INJECTION, SOLUTION INTRAVENOUS at 13:42
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
## 2020-10-14 NOTE — ED TRIAGE NOTES
N/V/D started on Sunday night, pt states that she now feels like she has flu symptoms, including SOB, dry cough, nocturnal chills and myalgias.

## 2020-10-14 NOTE — DISCHARGE INSTRUCTIONS
Follow up with your PCP or Pulmonologist for lung lesion. Return to the ED for new or worsening symptoms. Your physician or healthcare provider has determined that you are at risk for the Coronavirus (COVID-19). If you have met CDC criteria, your physician may have sent a laboratory test.  Please adhere to the following restrictions until you obtain your results or are cleared by your primary care doctor:     Stay at home except to get medical care. Seek medical attention if you develop worsening symptoms or new concerns such as severe shortness of breath, chest pain, etc.     Separate yourself from other people and animals in your home. If possible, stay in a separate room and use a separate bathroom from others in your house. Restrict contact with pets, as there is a possibility of transmission of the virus.  Call your doctor before showing up at their office. Let them know you have or may have COVID-19     Wear a facemask when you are around other people.  Cover your mouth when you cough or sneeze.  Wash your hands often with warm soapy water for at least 20 seconds. If soap and water are not available, use an alcohol based hand .  Clean all high touch surfaces everyday. For example: counters, tabletops, doorknobs, bathroom fixtures, toilets, phones, keyboards, tablets, and bedside tables.  Monitor your symptoms at home. Seek prompt medical attention if you symptoms worsen. (i.e. difficulty breathing). Call your healthcare provider before coming and tell them you may have COVID-19. Wear a mask upon entering the facility.      Stay at home until all these things have happened:   You have had no fevers for at least 24 hours (without fever reducing meds)  AND   Your other symptoms have improved  (e.g. cough, shortness of breath) AND   At least 10 days have passed since the beginning of your symptoms      Source: [employer] ([employer])      If you have further questions about the Coronavirus (COVID-19), please contact the [address] [place] [address] at [phone], the [address] [place] [address] at [phone] or [facility] [phone].

## 2020-10-15 ENCOUNTER — TELEPHONE (OUTPATIENT)
Dept: INTERNAL MEDICINE CLINIC | Age: 85
End: 2020-10-15

## 2020-10-15 ENCOUNTER — PATIENT OUTREACH (OUTPATIENT)
Dept: CASE MANAGEMENT | Age: 85
End: 2020-10-15

## 2020-10-15 LAB
ATRIAL RATE: 56 BPM
CALCULATED P AXIS, ECG09: 11 DEGREES
CALCULATED R AXIS, ECG10: -19 DEGREES
CALCULATED T AXIS, ECG11: 6 DEGREES
DIAGNOSIS, 93000: NORMAL
P-R INTERVAL, ECG05: 198 MS
Q-T INTERVAL, ECG07: 448 MS
QRS DURATION, ECG06: 76 MS
QTC CALCULATION (BEZET), ECG08: 432 MS
VENTRICULAR RATE, ECG03: 56 BPM

## 2020-10-15 NOTE — TELEPHONE ENCOUNTER
----- Message from Abigail Keila sent at 10/15/2020 11:03 AM EDT -----  Regarding: Dr. Caitlyn Fierro first and last name: Pt  Reason for call: Wants to speak with PCP about hospital visit she had yesterday. Callback required yes/no and why: yes  Best contact number(s): [phone]  Details to clarify the request: Stated that she was in the hospital all day yesterday because she thought she had the flu. Doctor told her that there was a spot on her lung. Was given a COVID tests but results will not be back until tomorrow. Has some shortness of breath.

## 2020-10-15 NOTE — TELEPHONE ENCOUNTER
Spoke with patient and provided in office visit 10/20/20 at 10:45am to f/u from ED and spot seen on scan. Pt understood and was thankful for the call.

## 2020-10-16 LAB
COVID-19, XGCOVT: NOT DETECTED
HEALTH STATUS, XMCV2T: NORMAL
SOURCE, COVRS: NORMAL
SPECIMEN SOURCE, FCOV2M: NORMAL
SPECIMEN TYPE, XMCV1T: NORMAL

## 2020-10-20 ENCOUNTER — OFFICE VISIT (OUTPATIENT)
Dept: INTERNAL MEDICINE CLINIC | Age: 85
End: 2020-10-20
Payer: MEDICARE

## 2020-10-20 VITALS
HEART RATE: 63 BPM | RESPIRATION RATE: 16 BRPM | TEMPERATURE: 98.4 F | SYSTOLIC BLOOD PRESSURE: 139 MMHG | WEIGHT: 168.6 LBS | BODY MASS INDEX: 29.87 KG/M2 | OXYGEN SATURATION: 97 % | DIASTOLIC BLOOD PRESSURE: 78 MMHG

## 2020-10-20 DIAGNOSIS — I87.2 VENOUS INSUFFICIENCY: ICD-10-CM

## 2020-10-20 DIAGNOSIS — J01.00 ACUTE NON-RECURRENT MAXILLARY SINUSITIS: ICD-10-CM

## 2020-10-20 DIAGNOSIS — Z00.00 MEDICARE ANNUAL WELLNESS VISIT, SUBSEQUENT: Primary | ICD-10-CM

## 2020-10-20 DIAGNOSIS — G91.2 NPH (NORMAL PRESSURE HYDROCEPHALUS) (HCC): ICD-10-CM

## 2020-10-20 DIAGNOSIS — I10 ESSENTIAL HYPERTENSION: ICD-10-CM

## 2020-10-20 DIAGNOSIS — J92.9 PLEURAL PLAQUE: ICD-10-CM

## 2020-10-20 DIAGNOSIS — F33.1 MODERATE EPISODE OF RECURRENT MAJOR DEPRESSIVE DISORDER (HCC): ICD-10-CM

## 2020-10-20 PROCEDURE — G8754 DIAS BP LESS 90: HCPCS | Performed by: INTERNAL MEDICINE

## 2020-10-20 PROCEDURE — G0463 HOSPITAL OUTPT CLINIC VISIT: HCPCS | Performed by: INTERNAL MEDICINE

## 2020-10-20 PROCEDURE — G0439 PPPS, SUBSEQ VISIT: HCPCS | Performed by: INTERNAL MEDICINE

## 2020-10-20 PROCEDURE — G8536 NO DOC ELDER MAL SCRN: HCPCS | Performed by: INTERNAL MEDICINE

## 2020-10-20 PROCEDURE — 1101F PT FALLS ASSESS-DOCD LE1/YR: CPT | Performed by: INTERNAL MEDICINE

## 2020-10-20 PROCEDURE — 99214 OFFICE O/P EST MOD 30 MIN: CPT | Performed by: INTERNAL MEDICINE

## 2020-10-20 PROCEDURE — G9717 DOC PT DX DEP/BP F/U NT REQ: HCPCS | Performed by: INTERNAL MEDICINE

## 2020-10-20 PROCEDURE — G8752 SYS BP LESS 140: HCPCS | Performed by: INTERNAL MEDICINE

## 2020-10-20 PROCEDURE — G8399 PT W/DXA RESULTS DOCUMENT: HCPCS | Performed by: INTERNAL MEDICINE

## 2020-10-20 PROCEDURE — G8427 DOCREV CUR MEDS BY ELIG CLIN: HCPCS | Performed by: INTERNAL MEDICINE

## 2020-10-20 PROCEDURE — G8419 CALC BMI OUT NRM PARAM NOF/U: HCPCS | Performed by: INTERNAL MEDICINE

## 2020-10-20 PROCEDURE — 1090F PRES/ABSN URINE INCON ASSESS: CPT | Performed by: INTERNAL MEDICINE

## 2020-10-20 RX ORDER — AZITHROMYCIN 250 MG/1
250 TABLET, FILM COATED ORAL SEE ADMIN INSTRUCTIONS
Qty: 6 TAB | Refills: 0 | Status: SHIPPED | OUTPATIENT
Start: 2020-10-20 | End: 2020-10-25

## 2020-10-20 NOTE — PATIENT INSTRUCTIONS
Medicare Wellness Visit, Female The best way to live healthy is to have a lifestyle where you eat a well-balanced diet, exercise regularly, limit alcohol use, and quit all forms of tobacco/nicotine, if applicable. Regular preventive services are another way to keep healthy. Preventive services (vaccines, screening tests, monitoring & exams) can help personalize your care plan, which helps you manage your own care. Screening tests can find health problems at the earliest stages, when they are easiest to treat. [facility] follows the current, evidence-based guidelines published by the [facility] ([facility]) when recommending preventive services for our patients. Because we follow these guidelines, sometimes recommendations change over time as research supports it. (For example, mammograms used to be recommended annually. Even though Medicare will still pay for an annual mammogram, the newer guidelines recommend a mammogram every two years for women of average risk). Of course, you and your doctor may decide to screen more often for some diseases, based on your risk and your co-morbidities (chronic disease you are already diagnosed with). Preventive services for you include: - Medicare offers their members a free annual wellness visit, which is time for you and your primary care provider to discuss and plan for your preventive service needs. Take advantage of this benefit every year! 
-All adults over the age of 72 should receive the recommended pneumonia vaccines. Current USPSTF guidelines recommend a series of two vaccines for the best pneumonia protection.  
-All adults should have a flu vaccine yearly and a tetanus vaccine every 10 years.  
-All adults age 48 and older should receive the shingles vaccines (series of two vaccines). -All adults age 38-68 who are overweight should have a diabetes screening test once every three years. -All adults born between [birth year] and 1964 should be screened once for Hepatitis C. 
-Other screening tests and preventive services for persons with diabetes include: an eye exam to screen for diabetic retinopathy, a kidney function test, a foot exam, and stricter control over your cholesterol.  
-Cardiovascular screening for adults with routine risk involves an electrocardiogram (ECG) at intervals determined by your doctor.  
-Colorectal cancer screenings should be done for adults age 54-65 with no increased risk factors for colorectal cancer. There are a number of acceptable methods of screening for this type of cancer. Each test has its own benefits and drawbacks. Discuss with your doctor what is most appropriate for you during your annual wellness visit. The different tests include: colonoscopy (considered the best screening method), a fecal occult blood test, a fecal DNA test, and sigmoidoscopy. 
 
-A bone mass density test is recommended when a woman turns 65 to screen for osteoporosis. This test is only recommended one time, as a screening. Some providers will use this same test as a disease monitoring tool if you already have osteoporosis. -Breast cancer screenings are recommended every other year for women of normal risk, age 54-69. 
-Cervical cancer screenings for women over age 72 are only recommended with certain risk factors. Here is a list of your current Health Maintenance items (your personalized list of preventive services) with a due date: 
Health Maintenance Due Topic Date Due  Shingles Vaccine (1 of 2) 12/30/1986  Glaucoma Screening   04/01/2016 [facility] Annual Well Visit  08/22/2020  Yearly Flu Vaccine (1) 09/01/2020

## 2020-10-20 NOTE — PROGRESS NOTES
This is the Subsequent Medicare Annual Wellness Exam, performed 12 months or more after the Initial AWV or the last Subsequent AWV    I have reviewed the patient's medical history in detail and updated the computerized patient record.    She did fall in closet and had a box conforters that she fell on - she was up high putting scarf on top shelf andfell  History     Patient Active Problem List   Diagnosis Code    TIA (transient ischemic attack) G45.9    Sleep apnea G47.30    Kidney stones N20.0    Macular degeneration H35.30    Major depressive disorder, recurrent episode, severe ([address] F33.2    Essential hypertension I10    NPH (normal pressure hydrocephalus) (HCC) G91.2    Exudative age-related macular degeneration ([address] H35.3290    Nuclear nonsenile cataract H26.9     Past Medical History:   Diagnosis Date    Arthritis     Endometriosis     YOU/BSO    Esophageal stricture     dilation with Dr. Piedad Campbell GERD (gastroesophageal reflux disease)     Hypertension     Ill-defined condition     Meningioma at left temporal area    Ill-defined condition     Skull fractures from [address]    Ill-defined condition     \"SCALOPING IN RIGHT FEMUR WITH A HOLE\" PER PT    Ill-defined condition     PT HAS 2 KIDNEYS AND 4 URETERS    Ill-defined condition 2019    brain mass    Kidney stones 11/17/2010    Macular degeneration, wet ([address]     Palpitation     Parathyroid disease ([address]     Psychiatric disorder     Anxiety    Seizures ([address]     Last seizure 2005    Sleep apnea 11/16/2010    uses cpap No longer needed after losing 10 lbs-LAST TEST WNL    TIA (transient ischemic attack) 2003,2009,2011,2014    tia's x 3  (she says it was related to estrogen use)      Past Surgical History:   Procedure Laterality Date    COLONOSCOPY N/A 1/23/2018    COLONOSCOPY, EGD performed by Isela Guzman MD at [address] [place] [address] [place], COLON, DIAGNOSTIC      2006 neg    HX ABDOMINAL WALL DEFECT REPAIR      biliary stones and ercp for pancreatitis    HX CATARACT REMOVAL Left 09/2018    HX CHOLECYSTECTOMY      20 years ago with adhesions found    HX GYN      anastasiia endometriosis    HX HYSTERECTOMY  1972    endometriosis    HX OTHER SURGICAL  04/2019    shunt placement    HX TONSILLECTOMY      x 2     Current Outpatient Medications   Medication Sig Dispense Refill    ondansetron (ZOFRAN ODT) 8 mg disintegrating tablet Take 1 Tab by mouth every eight (8) hours as needed for Nausea or Vomiting. 12 Tab 0    escitalopram oxalate (LEXAPRO) 5 mg tablet TAKE 1 TABLET BY MOUTH EVERY DAY 90 Tab 1    hydroCHLOROthiazide (HYDRODIURIL) 25 mg tablet TAKE 1 TABLET BY MOUTH EVERY DAY 90 Tab 0    atenoloL (TENORMIN) 25 mg tablet TAKE 1 TABLET BY MOUTH EVERY DAY 90 Tab 1    diazePAM (Valium) 5 mg tablet Take 1/2 tab PO in am and 1 tab PO at bedtime 45 Tab 5    pantoprazole (PROTONIX) 20 mg tablet       aspirin 81 mg chewable tablet Take 81 mg by mouth daily.  polyethylene glycol (MIRALAX) 17 gram/dose powder Take 17 g by mouth daily.  ASCORBIC ACID/MULTIVIT-MIN (EMERGEN-C PO) Take  by mouth as needed.  acetaminophen (TYLENOL EXTRA STRENGTH) 500 mg tablet Take  by mouth every six (6) hours as needed for Pain.        Allergies   Allergen Reactions    Levaquin [Levofloxacin] Other (comments)     Hemorrhage in the eyes      Ceclor [Cefaclor] Other (comments)     Pain flank, abd      Cleocin [Clindamycin Hcl] Other (comments)     Flank, abd pain    Codeine Nausea and Vomiting    Compazine [Prochlorperazine] Other (comments)     Hyper and anxious    Fosamax [Alendronate] Other (comments)     Indigestion      Macrobid [Nitrofurantoin Monohyd/M-Cryst] Swelling and Other (comments)     wheezing    Pcn [Penicillins] Rash    Phenobarbital Other (comments)     anxious    Sulfa (Sulfonamide Antibiotics) Rash     Headache         Family History   Problem Relation Age of Onset    Hypertension Mother    [employer] Other Mother         pulmonary hypotension    Lung Disease Mother         pulmonary HTN    Heart Disease Father     Cancer Father     Atrial Fibrillation Father     Heart Disease Brother     Other Maternal Grandmother         Mental Illness    Ulcerative Colitis Son     Heart Disease Son     Anesth Problems Son         ASPIRATED WITH ANESTHESIA    Anesth Problems Other      Social History     Tobacco Use    Smoking status: Former Smoker     Years: 2.00     Last attempt to quit: 1966     Years since quittin.8    Smokeless tobacco: Never Used    Tobacco comment: stopped in her 25s   Substance Use Topics    Alcohol use: Not Currently     Alcohol/week: 7.0 standard drinks     Types: 7 Glasses of wine per week       Depression Risk Factor Screening:     3 most recent PHQ Screens 10/10/2019   Little interest or pleasure in doing things Not at all   Feeling down, depressed, irritable, or hopeless Not at all   Total Score PHQ 2 0       Alcohol Risk Screen   Do you average more than 1 drink per night or more than 7 drinks a week:  No    On any one occasion in the past three months have you have had more than 3 drinks containing alcohol:  No        Functional Ability and Level of Safety:   Hearing: The patient wears hearing aids. Activities of Daily Living: The home contains: handrails and grab bars  Patient does total self care     Ambulation: with no difficulty     Fall Risk:  Fall Risk Assessment, last 12 mths 2019   Able to walk? Yes   Fall in past 12 months?  No     Abuse Screen:  Patient is not abused       Cognitive Screening   Has your family/caregiver stated any concerns about your memory: no     Cognitive Screening: Normal - MMSE (Mini Mental Status Exam)    Patient Care Team   Patient Care Team:  Sharlette Hammans, MD as PCP - General (Internal Medicine)  Sharlette Hammans, MD as PCP - [facility]  Vivek Silveira MD (Cardiology)  Varsha Monahan MD as Physician (Obstetrics & Gynecology)  [facility], Royal Calles, RN as Ambulatory Care Manager    Assessment/Plan   Education and counseling provided:  Are appropriate based on today's review and evaluation    Diagnoses and all orders for this visit:    1. Essential hypertension    2. NPH (normal pressure hydrocephalus) ([facility]    3.  Moderate episode of recurrent major depressive disorder [facility])        Health Maintenance Due   Topic Date Due    Shingrix Vaccine Age 49> (1 of 2) 12/30/1986    GLAUCOMA SCREENING Q2Y  04/01/2016    Medicare Yearly Exam  08/22/2020    Flu Vaccine (1) 09/01/2020

## 2020-10-20 NOTE — PROGRESS NOTES
HISTORY OF PRESENT ILLNESS  Erica Nicholas is a 81 y.o. female. HPI  Pt presented to the ED on 10/14/20 c/o fever, SOB, vomiting, diarrhea, and coughing and expressed concern about having COVID. Pt was dx with viral syndrome and lung granuloma in ED and discharged. Sinus: Pt reports that she has had a sinus infections of weeks and that now it is affecting her balance. Pt states that she tested negative for COVID. She notes that she ears and head feels congested. Pt requests Z-Esvin. Pt notes that she has been complying with Mucinex qAM.     Hypertension ROS: taking medications as instructed, no medication side effects noted, no TIA's, no chest pain on exertion, no dyspnea on exertion, no swelling of ankles. New concerns: BP in office today is 139/78. NPH: Stable with no reported issues. Pt notes that she had XR when at the ED and everything was normal. Pt has f/u appt with specialist in December. Veinous insufficiency: Pt reports that she is experiencing more lower extremity edema which worsens throughout the day. Pt inquires if increasing her diurectic would help. Mood: Stable, pt continues to comply with Lexapro. Fall encounter: Pt reports recent fall when she lost her balance cleaning her closet. She notes that she fell into a box of comforters and had some difficulty getting out of the box. She states that she was able to crawl out of the box and scoot over to her desk and pull herself up. She reports that she has a small bruise on her R leg due to the fall. Review of Systems   HENT: Positive for congestion and sinus pain. All other systems reviewed and are negative. Physical Exam  Constitutional:       Appearance: Normal appearance. HENT:      Right Ear: Hearing, tympanic membrane and external ear normal.      Left Ear: Hearing, tympanic membrane and external ear normal.      Mouth/Throat:      Mouth: Mucous membranes are moist.      Pharynx: Oropharynx is clear. Cardiovascular:      Rate and Rhythm: Normal rate and regular rhythm. Pulses: Normal pulses. Heart sounds: Normal heart sounds. Pulmonary:      Effort: Pulmonary effort is normal.      Breath sounds: Normal air entry. Comments: Decreased breath sounds in the base   Musculoskeletal: Normal range of motion. Skin:     General: Skin is warm and dry. Neurological:      General: No focal deficit present. Mental Status: She is alert and oriented to person, place, and time. Psychiatric:         Mood and Affect: Mood normal.         Behavior: Behavior normal.         ASSESSMENT and PLAN  Diagnoses and all orders for this visit:    1. Medicare annual wellness visit, subsequent    2. Essential hypertension  BP is at goal. I do not recommend any change in medications. 3. NPH (normal pressure hydrocephalus) (HCC)  Stable and well-managed. No change in medications. 4. Moderate episode of recurrent major depressive disorder (HCC)  Stable and well-managed. No change in medications. 5. Acute non-recurrent maxillary sinusitis  Pt present with ongoing sinus infection. Rx Z-Esvin. Will continue to monitor for improvements or changes. -     azithromycin (ZITHROMAX) 250 mg tablet; Take 1 Tab by mouth See Admin Instructions for 5 days. 6. Venous insufficiency  Stable with some lower extremity edema which worsens throughout the day. Recommended pt wear compression stockings. Will continue to monitor for improvements or changes. Today, pt notes some concern about her abnormal C XR. Pt asks if we can wait and monitor her condition. Discussed with pt that I recommend following with pulmonologist to get all of the information and then we can determine the best course of management or treatment for her. Lab results and schedule of future lab studies reviewed with patient. Reviewed diet, exercise and weight control.     Written by Page Joe, as dictated by Sunshine Boles MD.     Current diagnosis and concerns discussed with pt at length. Understands risks and benefits or current treatment plan and medications and accepts the treatment and medication with any possible risks. Pt asks appropriate questions which were answered. Pt instructed to call with any concerns or problems.

## 2020-10-22 ENCOUNTER — PATIENT OUTREACH (OUTPATIENT)
Dept: CASE MANAGEMENT | Age: 85
End: 2020-10-22

## 2020-10-23 NOTE — PROGRESS NOTES
10/23/20 Reached out to patient for 7 day COVID follow up (results negative) and second CCM call. Unable to reach at this time, will follow up again next week and resolve if no contact.  JOHN

## 2020-10-24 DIAGNOSIS — I10 ESSENTIAL HYPERTENSION: ICD-10-CM

## 2020-10-24 RX ORDER — ATENOLOL 25 MG/1
TABLET ORAL
Qty: 90 TAB | Refills: 1 | Status: SHIPPED | OUTPATIENT
Start: 2020-10-24 | End: 2021-04-10

## 2020-10-28 ENCOUNTER — TRANSCRIBE ORDER (OUTPATIENT)
Dept: SCHEDULING | Age: 85
End: 2020-10-28

## 2020-10-28 DIAGNOSIS — R91.1 PULMONARY NODULE: Primary | ICD-10-CM

## 2020-11-18 ENCOUNTER — PATIENT OUTREACH (OUTPATIENT)
Dept: CASE MANAGEMENT | Age: 85
End: 2020-11-18

## 2020-11-19 NOTE — PROGRESS NOTES
Patient has graduated from the Complex Case Management  program on 11/19/20. Patient/family has the ability to self-manage at this time. [facility] has been unsuccessful at contacting patient. No further Ambulatory Care Manager follow up scheduled. Goals Addressed    None         Patient has Ambulatory Care Manager's contact information for any further questions, concerns, or needs.   Patients upcoming visits:    Future Appointments   Date Time Provider Tania Forde   12/9/2020 11:00 AM Poonam Hernandez NP [facility] BS AMB   1/12/2021 11:00 AM [facility] CT 1 [facility]

## 2020-12-09 DIAGNOSIS — R56.9 SEIZURE (HCC): ICD-10-CM

## 2020-12-09 RX ORDER — DIAZEPAM 5 MG/1
TABLET ORAL
Qty: 45 TAB | Refills: 5 | Status: SHIPPED | OUTPATIENT
Start: 2020-12-09 | End: 2021-06-30

## 2020-12-21 DIAGNOSIS — I10 ESSENTIAL HYPERTENSION: ICD-10-CM

## 2020-12-21 RX ORDER — HYDROCHLOROTHIAZIDE 25 MG/1
TABLET ORAL
Qty: 90 TAB | Refills: 0 | Status: SHIPPED | OUTPATIENT
Start: 2020-12-21 | End: 2021-01-20

## 2021-01-01 NOTE — PROGRESS NOTES
I have reviewed discharge instructions with the patient and son. The patient and son verbalized understanding. Peripheral IV removed from right wrist area without difficulty. All personal belongings sent with patient and son. Yocasta Calle and discharge summary sent with son in [place]. Patient has no c/o pain or discomfort at discharge. Patient taken to discharge area in wheelchair by RN. Report called to [facility]. Son transported the patient in his private vehicle. Breast milk

## 2021-01-20 DIAGNOSIS — I10 ESSENTIAL HYPERTENSION: ICD-10-CM

## 2021-01-20 RX ORDER — HYDROCHLOROTHIAZIDE 25 MG/1
TABLET ORAL
Qty: 90 TAB | Refills: 0 | Status: SHIPPED | OUTPATIENT
Start: 2021-01-20 | End: 2021-04-29 | Stop reason: SDUPTHER

## 2021-02-22 ENCOUNTER — OFFICE VISIT (OUTPATIENT)
Dept: NEUROLOGY | Age: 86
End: 2021-02-22
Payer: MEDICARE

## 2021-02-22 VITALS
SYSTOLIC BLOOD PRESSURE: 148 MMHG | DIASTOLIC BLOOD PRESSURE: 90 MMHG | OXYGEN SATURATION: 96 % | HEART RATE: 72 BPM | WEIGHT: 174 LBS | BODY MASS INDEX: 30.82 KG/M2

## 2021-02-22 DIAGNOSIS — Z98.2 DISORDER OF CEREBRAL VENTRICULAR SHUNT, INITIAL ENCOUNTER: Primary | ICD-10-CM

## 2021-02-22 DIAGNOSIS — H53.9 VISUAL CHANGES: ICD-10-CM

## 2021-02-22 DIAGNOSIS — T85.9XXA DISORDER OF CEREBRAL VENTRICULAR SHUNT, INITIAL ENCOUNTER: Primary | ICD-10-CM

## 2021-02-22 DIAGNOSIS — R42 DIZZINESS: ICD-10-CM

## 2021-02-22 PROCEDURE — G8755 DIAS BP > OR = 90: HCPCS | Performed by: NURSE PRACTITIONER

## 2021-02-22 PROCEDURE — G8753 SYS BP > OR = 140: HCPCS | Performed by: NURSE PRACTITIONER

## 2021-02-22 PROCEDURE — G8417 CALC BMI ABV UP PARAM F/U: HCPCS | Performed by: NURSE PRACTITIONER

## 2021-02-22 PROCEDURE — G9717 DOC PT DX DEP/BP F/U NT REQ: HCPCS | Performed by: NURSE PRACTITIONER

## 2021-02-22 PROCEDURE — 99214 OFFICE O/P EST MOD 30 MIN: CPT | Performed by: NURSE PRACTITIONER

## 2021-02-22 PROCEDURE — G8427 DOCREV CUR MEDS BY ELIG CLIN: HCPCS | Performed by: NURSE PRACTITIONER

## 2021-02-22 PROCEDURE — 1090F PRES/ABSN URINE INCON ASSESS: CPT | Performed by: NURSE PRACTITIONER

## 2021-02-22 PROCEDURE — G8536 NO DOC ELDER MAL SCRN: HCPCS | Performed by: NURSE PRACTITIONER

## 2021-02-22 PROCEDURE — G8399 PT W/DXA RESULTS DOCUMENT: HCPCS | Performed by: NURSE PRACTITIONER

## 2021-02-22 PROCEDURE — 1101F PT FALLS ASSESS-DOCD LE1/YR: CPT | Performed by: NURSE PRACTITIONER

## 2021-02-22 NOTE — PROGRESS NOTES
Ashanti Murillo is a 81 y.o. female who presents with the following  Chief Complaint   Patient presents with    Follow-up     lightheaded and  nausea, weak x 1 week    Numbness     r side of face hand and leg x 1 week       HPI       FU for tremors but new onset of feeling nausea, vomiting, balance changes, dizziness and some changes in memory. She is having right sided facial numbness, tingling, weakness X the past week also. Things came up quickly. She has noticed all of these symptoms and not getting better. She does have a HX of a shunt and meningioma. No recent falls or medications changes. She feels terrible. Very weak, fatigue. Can not do much day to day right now. Valium is helping her tremor. Taking nightly. Also on atenolol. No changes here. Decreased appetite also. She just does not feel right. Allergies   Allergen Reactions    Levaquin [Levofloxacin] Other (comments)     Hemorrhage in the eyes      Ceclor [Cefaclor] Other (comments)     Pain flank, abd      Cleocin [Clindamycin Hcl] Other (comments)     Flank, abd pain    Codeine Nausea and Vomiting    Compazine [Prochlorperazine] Other (comments)     Hyper and anxious    Fosamax [Alendronate] Other (comments)     Indigestion      Macrobid [Nitrofurantoin Monohyd/M-Cryst] Swelling and Other (comments)     wheezing    Pcn [Penicillins] Rash    Phenobarbital Other (comments)     anxious    Sulfa (Sulfonamide Antibiotics) Rash     Headache         Current Outpatient Medications   Medication Sig    hydroCHLOROthiazide (HYDRODIURIL) 25 mg tablet TAKE 1 TABLET BY MOUTH EVERY DAY    diazePAM (Valium) 5 mg tablet Take 1/2 tab PO in am and 1 tab PO at bedtime    atenoloL (TENORMIN) 25 mg tablet TAKE 1 TABLET BY MOUTH EVERY DAY    ondansetron (ZOFRAN ODT) 8 mg disintegrating tablet Take 1 Tab by mouth every eight (8) hours as needed for Nausea or Vomiting.     escitalopram oxalate (LEXAPRO) 5 mg tablet TAKE 1 TABLET BY MOUTH EVERY DAY    pantoprazole (PROTONIX) 20 mg tablet     aspirin 81 mg chewable tablet Take 81 mg by mouth daily.  polyethylene glycol (MIRALAX) 17 gram/dose powder Take 17 g by mouth daily.  ASCORBIC ACID/MULTIVIT-MIN (EMERGEN-C PO) Take  by mouth as needed.  acetaminophen (TYLENOL EXTRA STRENGTH) 500 mg tablet Take  by mouth every six (6) hours as needed for Pain. No current facility-administered medications for this visit.         Social History     Tobacco Use   Smoking Status Former Smoker    Years: 2.00    Quit date:     Years since quittin.1   Smokeless Tobacco Never Used   Tobacco Comment    stopped in her 25s       Past Medical History:   Diagnosis Date    Arthritis     Endometriosis     YOU/BSO    Esophageal stricture     dilation with Dr. Trena Melendez GERD (gastroesophageal reflux disease)     Hypertension     Ill-defined condition     Meningioma at left temporal area    Ill-defined condition     Skull fractures from [address]    Ill-defined condition     \"SCALOPING IN RIGHT FEMUR WITH A HOLE\" PER PT    Ill-defined condition     PT HAS 2 KIDNEYS AND 4 URETERS    Ill-defined condition 2019    brain mass    Kidney stones 2010    Macular degeneration, wet ([address]     Palpitation     Parathyroid disease ([address]     Psychiatric disorder     Anxiety    Seizures ([address]     Last seizure     Sleep apnea 2010    uses cpap No longer needed after losing 10 lbs-LAST TEST WNL    TIA (transient ischemic attack) ,,,    tia's x 3  (she says it was related to estrogen use)       Past Surgical History:   Procedure Laterality Date    COLONOSCOPY N/A 2018    COLONOSCOPY, EGD performed by Adrian Shha MD at [address] [place] [address], COLON, DIAGNOSTIC       neg    HX ABDOMINAL WALL DEFECT REPAIR      biliary stones and ercp for pancreatitis    HX CATARACT REMOVAL Left 2018    HX CHOLECYSTECTOMY      20 years ago with adhesions found    HX GYN      anastasiia endometriosis    HX HYSTERECTOMY  1972    endometriosis    HX OTHER SURGICAL  2019    shunt placement    HX TONSILLECTOMY      x 2       Family History   Problem Relation Age of Onset    Hypertension Mother    Zannie Cowden Other Mother         pulmonary hypotension    Lung Disease Mother         pulmonary HTN    Heart Disease Father     Cancer Father     Atrial Fibrillation Father     Heart Disease Brother     Other Maternal Grandmother         Mental Illness    Ulcerative Colitis Son     Heart Disease Son     Anesth Problems Son         ASPIRATED WITH ANESTHESIA    Anesth Problems Other        Social History     Socioeconomic History    Marital status:      Spouse name: Not on file    Number of children: Not on file    Years of education: Not on file    Highest education level: Not on file   Tobacco Use    Smoking status: Former Smoker     Years: 2.00     Quit date:      Years since quittin.1    Smokeless tobacco: Never Used    Tobacco comment: stopped in her 25s   Substance and Sexual Activity    Alcohol use: Not Currently     Alcohol/week: 7.0 standard drinks     Types: 7 Glasses of wine per week    Drug use: No    Sexual activity: Not Currently       Review of Systems   Constitutional: Positive for malaise/fatigue. Eyes: Positive for blurred vision. Negative for double vision and photophobia. Respiratory: Negative for shortness of breath and wheezing. Cardiovascular: Negative for chest pain and palpitations. Gastrointestinal: Positive for nausea and vomiting. Neurological: Positive for dizziness, tingling, tremors, sensory change, speech change, weakness and headaches. Negative for focal weakness, seizures and loss of consciousness. Remainder of comprehensive review is negative.      Physical Exam :    Visit Vitals  BP (!) 148/90   Pulse 72   Wt 78.9 kg (174 lb)   SpO2 96%   BMI 30.82 kg/m²       General: Well defined, nourished, and groomed individual in no acute distress.    Neck: Supple, nontender, no bruits, no pain with resistance to active range of motion.    Heart: Regular rate and rhythm, no murmurs, rub, or gallop. Normal S1S2. Lungs: Clear to auscultation bilaterally with equal chest expansion, no cough, no wheeze  Musculoskeletal: Extremities revealed no edema and had full range of motion of joints.    Psych: Good mood and bright affect    NEUROLOGICAL EXAMINATION:    Mental Status: Alert and oriented to person, place, and time    Cranial Nerves:    II, III, IV, VI: Visual acuity grossly intact. Visual fields are normal.    Pupils are equal, round, and reactive to light and accommodation.    Extra-ocular movements are full and fluid. Fundoscopic exam was benign, no ptosis or nystagmus.    V-XII: Hearing is grossly intact. Facial features are symmetric, with normal sensation and strength. The palate rises symmetrically and the tongue protrudes midline. Sternocleidomastoids 5/5. Motor Examination: Normal tone, bulk, and strength, 3/5 muscle strength throughout. Coordination: moderate ET and head latha. Gait and Station: unsteady with rollator. Reflexes: DTRs 1+ throughout. Neurosensory Exam:  Stocking glove sensory loss to temperature, vibration, and pinprick to the thighs. Results for orders placed or performed during the hospital encounter of 10/14/20   URINE CULTURE HOLD SAMPLE    Specimen: Serum; Urine   Result Value Ref Range    Urine culture hold        Urine on hold in Microbiology dept for 2 days. If unpreserved urine is submitted, it cannot be used for addtional testing after 24 hours, recollection will be required.    CBC WITH AUTOMATED DIFF   Result Value Ref Range    WBC 6.3 3.6 - 11.0 K/uL    RBC 4.74 3.80 - 5.20 M/uL    HGB 14.6 11.5 - 16.0 g/dL    HCT 43.5 35.0 - 47.0 %    MCV 91.8 80.0 - 99.0 FL    MCH 30.8 26.0 - 34.0 PG    MCHC 33.6 30.0 - 36.5 g/dL    RDW 12.1 11.5 - 14.5 %    PLATELET 194 (H) 612 - 400 K/uL MPV 10.0 8.9 - 12.9 FL    NRBC 0.0 0  WBC    ABSOLUTE NRBC 0.00 0.00 - 0.01 K/uL    NEUTROPHILS 51 32 - 75 %    LYMPHOCYTES 35 12 - 49 %    MONOCYTES 11 5 - 13 %    EOSINOPHILS 1 0 - 7 %    BASOPHILS 1 0 - 1 %    IMMATURE GRANULOCYTES 1 (H) 0.0 - 0.5 %    ABS. NEUTROPHILS 3.3 1.8 - 8.0 K/UL    ABS. LYMPHOCYTES 2.2 0.8 - 3.5 K/UL    ABS. MONOCYTES 0.7 0.0 - 1.0 K/UL    ABS. EOSINOPHILS 0.1 0.0 - 0.4 K/UL    ABS. BASOPHILS 0.0 0.0 - 0.1 K/UL    ABS. IMM. GRANS. 0.0 0.00 - 0.04 K/UL    DF AUTOMATED     METABOLIC PANEL, COMPREHENSIVE   Result Value Ref Range    Sodium 138 136 - 145 mmol/L    Potassium 3.9 3.5 - 5.1 mmol/L    Chloride 105 97 - 108 mmol/L    CO2 29 21 - 32 mmol/L    Anion gap 4 (L) 5 - 15 mmol/L    Glucose 105 (H) 65 - 100 mg/dL    BUN 10 6 - 20 MG/DL    Creatinine 0.83 0.55 - 1.02 MG/DL    BUN/Creatinine ratio 12 12 - 20      GFR est AA >60 >60 ml/min/1.73m2    GFR est non-AA >60 >60 ml/min/1.73m2    Calcium 10.0 8.5 - 10.1 MG/DL    Bilirubin, total 0.5 0.2 - 1.0 MG/DL    ALT (SGPT) 65 12 - 78 U/L    AST (SGOT) 56 (H) 15 - 37 U/L    Alk.  phosphatase 86 45 - 117 U/L    Protein, total 7.8 6.4 - 8.2 g/dL    Albumin 4.2 3.5 - 5.0 g/dL    Globulin 3.6 2.0 - 4.0 g/dL    A-G Ratio 1.2 1.1 - 2.2     TROPONIN I   Result Value Ref Range    Troponin-I, Qt. <0.05 <0.05 ng/mL   URINALYSIS W/MICROSCOPIC   Result Value Ref Range    Color YELLOW/STRAW      Appearance CLEAR CLEAR      Specific gravity 1.009 1.003 - 1.030      pH (UA) 8.0 5.0 - 8.0      Protein Negative NEG mg/dL    Glucose Negative NEG mg/dL    Ketone Negative NEG mg/dL    Bilirubin Negative NEG      Blood Negative NEG      Urobilinogen 0.2 0.2 - 1.0 EU/dL    Nitrites Negative NEG      Leukocyte Esterase Negative NEG      WBC 0-4 0 - 4 /hpf    RBC 0-5 0 - 5 /hpf    Epithelial cells FEW FEW /lpf    Bacteria Negative NEG /hpf    Hyaline cast 0-2 0 - 5 /lpf   SAMPLES BEING HELD   Result Value Ref Range    SAMPLES BEING HELD 1SST,1RED,1BLUE,BLDCS (PURPLE, BLUE)     COMMENT        Add-on orders for these samples will be processed based on acceptable specimen integrity and analyte stability, which may vary by analyte. SARS-COV-2   Result Value Ref Range    Specimen source Nasopharyngeal      Specimen source Nasopharyngeal      Specimen type NP Swab      Health status Symptomatic Testing      COVID-19 Not Detected Not Detected     EKG, 12 LEAD, INITIAL   Result Value Ref Range    Ventricular Rate 56 BPM    Atrial Rate 56 BPM    P-R Interval 198 ms    QRS Duration 76 ms    Q-T Interval 448 ms    QTC Calculation (Bezet) 432 ms    Calculated P Axis 11 degrees    Calculated R Axis -19 degrees    Calculated T Axis 6 degrees    Diagnosis       Sinus bradycardia  Inferior infarct (cited on or before 11-APR-2011)  Poor Anterior Forces  Abnormal ECG  When compared with ECG of 11-APR-2011 17:10,  No significant change was found  Confirmed by Aureliano Chapman MD, [address] ([id number]) on 10/15/2020 9:09:06 AM         Orders Placed This Encounter    CT HEAD WO CONT     Standing Status:   Future     Standing Expiration Date:   3/22/2022    XR SHUNT SERIES     Standing Status:   Future     Standing Expiration Date:   3/22/2022       1. Disorder of cerebral ventricular shunt, initial encounter    2. Dizziness    3. Visual changes        CT And XR shunt to look at her shunt as cause for malfunction vs other. Depending on this will look at treatment of tremor further or therapy or other. FU after. Call with results.              This note will not be viewable in [place]

## 2021-02-23 ENCOUNTER — HOSPITAL ENCOUNTER (OUTPATIENT)
Dept: GENERAL RADIOLOGY | Age: 86
Discharge: HOME | End: 2021-02-23
Attending: NURSE PRACTITIONER
Payer: MEDICARE

## 2021-02-23 ENCOUNTER — HOSPITAL ENCOUNTER (OUTPATIENT)
Dept: CT IMAGING | Age: 86
Discharge: HOME | End: 2021-02-23
Attending: NURSE PRACTITIONER
Payer: MEDICARE

## 2021-02-23 DIAGNOSIS — Z98.2 DISORDER OF CEREBRAL VENTRICULAR SHUNT, INITIAL ENCOUNTER: ICD-10-CM

## 2021-02-23 DIAGNOSIS — H53.9 VISUAL CHANGES: ICD-10-CM

## 2021-02-23 DIAGNOSIS — T85.9XXA DISORDER OF CEREBRAL VENTRICULAR SHUNT, INITIAL ENCOUNTER: ICD-10-CM

## 2021-02-23 DIAGNOSIS — R42 DIZZINESS: ICD-10-CM

## 2021-02-23 PROCEDURE — 70250 X-RAY EXAM OF SKULL: CPT

## 2021-02-23 PROCEDURE — 70450 CT HEAD/BRAIN W/O DYE: CPT

## 2021-02-25 DIAGNOSIS — R20.2 NUMBNESS AND TINGLING OF RIGHT ARM AND LEG: Primary | ICD-10-CM

## 2021-02-25 DIAGNOSIS — R42 DIZZINESS: Primary | ICD-10-CM

## 2021-02-25 DIAGNOSIS — R68.83 CHILLS: ICD-10-CM

## 2021-02-25 DIAGNOSIS — R11.0 NAUSEA: ICD-10-CM

## 2021-02-25 DIAGNOSIS — R20.0 NUMBNESS AND TINGLING OF RIGHT ARM AND LEG: Primary | ICD-10-CM

## 2021-02-25 DIAGNOSIS — R42 DIZZINESS: ICD-10-CM

## 2021-03-01 LAB
ALBUMIN SERPL-MCNC: 4.3 G/DL (ref 3.6–4.6)
ALBUMIN/GLOB SERPL: 2.2 {RATIO} (ref 1.2–2.2)
ALP SERPL-CCNC: 79 IU/L (ref 39–117)
ALT SERPL-CCNC: 44 IU/L (ref 0–32)
AST SERPL-CCNC: 45 IU/L (ref 0–40)
BASOPHILS # BLD AUTO: 0 X10E3/UL (ref 0–0.2)
BASOPHILS NFR BLD AUTO: 1 %
BILIRUB SERPL-MCNC: 0.4 MG/DL (ref 0–1.2)
BUN SERPL-MCNC: 14 MG/DL (ref 8–27)
BUN/CREAT SERPL: 20 (ref 12–28)
CALCIUM SERPL-MCNC: 10.2 MG/DL (ref 8.7–10.3)
CHLORIDE SERPL-SCNC: 103 MMOL/L (ref 96–106)
CO2 SERPL-SCNC: 24 MMOL/L (ref 20–29)
CREAT SERPL-MCNC: 0.7 MG/DL (ref 0.57–1)
EOSINOPHIL # BLD AUTO: 0.2 X10E3/UL (ref 0–0.4)
EOSINOPHIL NFR BLD AUTO: 2 %
ERYTHROCYTE [DISTWIDTH] IN BLOOD BY AUTOMATED COUNT: 12.2 % (ref 11.7–15.4)
FT4I SERPL CALC-MCNC: 1.9 (ref 1.2–4.9)
GLOBULIN SER CALC-MCNC: 2 G/DL (ref 1.5–4.5)
GLUCOSE SERPL-MCNC: 89 MG/DL (ref 65–99)
GLUCOSE UR QL: NORMAL
HCT VFR BLD AUTO: 39.8 % (ref 34–46.6)
HGB BLD-MCNC: 13.2 G/DL (ref 11.1–15.9)
IMM GRANULOCYTES # BLD AUTO: 0 X10E3/UL (ref 0–0.1)
IMM GRANULOCYTES NFR BLD AUTO: 1 %
KETONES UR QL STRIP: NORMAL
LYMPHOCYTES # BLD AUTO: 3 X10E3/UL (ref 0.7–3.1)
LYMPHOCYTES NFR BLD AUTO: 47 %
MCH RBC QN AUTO: 30.5 PG (ref 26.6–33)
MCHC RBC AUTO-ENTMCNC: 33.2 G/DL (ref 31.5–35.7)
MCV RBC AUTO: 92 FL (ref 79–97)
MONOCYTES # BLD AUTO: 0.7 X10E3/UL (ref 0.1–0.9)
MONOCYTES NFR BLD AUTO: 12 %
NEUTROPHILS # BLD AUTO: 2.3 X10E3/UL (ref 1.4–7)
NEUTROPHILS NFR BLD AUTO: 37 %
PH UR STRIP: NORMAL [PH]
PLATELET # BLD AUTO: 389 X10E3/UL (ref 150–450)
POTASSIUM SERPL-SCNC: 4.6 MMOL/L (ref 3.5–5.2)
PROT SERPL-MCNC: 6.3 G/DL (ref 6–8.5)
PROT UR QL STRIP: NORMAL
RBC # BLD AUTO: 4.33 X10E6/UL (ref 3.77–5.28)
SODIUM SERPL-SCNC: 139 MMOL/L (ref 134–144)
SP GR UR: NORMAL
T3RU NFR SERPL: 25 % (ref 24–39)
T4 SERPL-MCNC: 7.5 UG/DL (ref 4.5–12)
TSH SERPL DL<=0.005 MIU/L-ACNC: 1.77 UIU/ML (ref 0.45–4.5)
WBC # BLD AUTO: 6.2 X10E3/UL (ref 3.4–10.8)

## 2021-03-09 ENCOUNTER — OFFICE VISIT (OUTPATIENT)
Dept: NEUROLOGY | Age: 86
End: 2021-03-09

## 2021-03-09 ENCOUNTER — HOSPITAL ENCOUNTER (OUTPATIENT)
Dept: CT IMAGING | Age: 86
Discharge: HOME | End: 2021-03-09
Attending: INTERNAL MEDICINE
Payer: MEDICARE

## 2021-03-09 VITALS — TEMPERATURE: 97.7 F

## 2021-03-09 DIAGNOSIS — G62.9 PERIPHERAL NERVE DISORDER: Primary | ICD-10-CM

## 2021-03-09 DIAGNOSIS — R91.1 PULMONARY NODULE: ICD-10-CM

## 2021-03-09 PROCEDURE — 71250 CT THORAX DX C-: CPT

## 2021-03-09 NOTE — PROGRESS NOTES
This was an EMG and nerve conduction of patient's lower extremities and right upper extremity. The concerns went to peripheral neuropathy and/or entrapment neuropathy. Patient history. 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 More recently has had some right-sided sensory changes and such and this is one of the investigative tools that is being used. Patient exam.  Alert cooperative fluent articulate and appropriate. Cranial nerves II through XII normal.  Cerebellar testing finger-nose-finger toe to finger intact. Motor was reported as 3/5 diffusely. Positive evidence of essential tremor. Had a head latha feature. Unsteady with a Rollator on her feet and reflexes were +1 and sensibility defined as a length dependent stocking glove sensory loss. EMG and nerve conduction findings. 1.  Needle insertion and probing was normal.  Patient tolerated this without exception. No evidence of acute denervation or chronic denervation/reinnervation or myopathic potentials. Motor unit recruitment as to number morphology and sequencing was appropriate. 2.  The nerve conduction portion showed diminished right EDB amplitude but that muscle was severely atretic on exam.  The right median ulnar palmar sensory comparison showed an inferred right median sensory delay at the wrist.  The coincident tibial and ulnar F waves were normal as was the tibial H reflex. 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 The study does not rule out small fiber sensory neuropathy. Clinical correlation advised.   STEPHANIE OLIVAREZ.

## 2021-03-09 NOTE — PROGRESS NOTES
EMG/ NCS Report  [facility]  [address] [facility], [address]  [address] [place] [address]   Ph: [phone]   FAX: [phone]/ [phone]  Test Date:  2019      Test Date:  3/9/2021    Patient: Vishal Carrasco : 1935 Physician: Bailey Brewer, Gisell Espinal MD   Sex: Female Height: ' \" Ref Camacho Cash   ID#: [id number] Weight:  lbs. Technician: Lore Foss     Patient History / Exam:  CC:RT. SIDED NUMBNESS,TINGLING. EMG & NCV Findings:  Evaluation of the right Fibular motor nerve showed normal distal onset latency (3.4 ms), reduced amplitude (0.5 mV), normal conduction velocity (B Fib-Ankle, 44 m/s), and normal conduction velocity (Poplt-B Fib, 50 m/s). The right Fibular TA motor nerve showed normal distal onset latency (3.0 ms), normal amplitude (5.2 mV), and normal conduction velocity (Poplit-Fib Head, 77 m/s). The right median motor nerve showed normal distal onset latency (3.4 ms), normal amplitude (5.6 mV), and normal conduction velocity (Elbow-Wrist, 58 m/s). The right tibial motor nerve showed normal distal onset latency (3.9 ms), normal amplitude (7.6 mV), and normal conduction velocity (Knee-Ankle, 51 m/s). The right ulnar motor nerve showed normal distal onset latency (2.7 ms), normal amplitude (9.2 mV), normal conduction velocity (B Elbow-Wrist, 64 m/s), and normal conduction velocity (A Elbow-B Elbow, 56 m/s). The right median sensory, the right radial sensory, the right sural sensory, and the right ulnar sensory nerves showed normal distal peak latency (R3.1, R1.8, R3.3, R2.8 ms) and normal amplitude (R43.0, R66.5, R9.4, R48.6 µV). The right Sup Fibular sensory nerve showed normal distal peak latency (2.4 ms), reduced amplitude (3.9 µV), and normal conduction velocity (Lower leg-Lat ankle, 59 m/s).   The right median/ulnar (palm) comparison nerve showed normal distal onset latency (Median Palm, 1.4 ms), normal distal peak latency (Median Palm, 1.9 ms), normal amplitude (Median Palm, 38.2 µV), normal distal onset latency (Ulnar Palm, 1.1 ms), normal distal peak latency (Ulnar Palm, 1.5 ms), and normal amplitude (Ulnar Palm, 30.0 µV). All F Wave latencies were within normal limits. Needle evaluation of the right extensor digitorum brevis muscle showed increased insertional activity. All remaining muscles (as indicated in the following table) showed no evidence of electrical instability.         Impression:        ___________________________  Prince Delfino IV, MD      Nerve Conduction Studies  Anti Sensory Summary Table     Stim Site NR Peak (ms) Norm Peak (ms) P-T Amp (µV) Norm P-T Amp Site1 Site2 Dist (cm)   Right Median Anti Sensory (2nd Digit)  32.3°C   Wrist    3.1 <4 43.0 >13 Wrist 2nd Digit 14.0   Right Radial Anti Sensory (Base 1st Digit)  32.4°C   Wrist    1.8 <2.8 66.5 >11 Wrist Base 1st Digit 10.0   Right Sup Fibular Anti Sensory (Lat ankle)  29°C   Lower leg    2.4 <4.6 3.9 >4 Lower leg Lat ankle 10.0   Site 2    2.7  0.4       Site 3    2.4  4.6       Right Sural Anti Sensory (Lat Mall)  28.8°C   Calf    3.3 <4.5 9.4 >4.0 Calf Lat Mall 14.0   Site 2    3.3  8.4       Site 3    3.2  7.1       Right Ulnar Anti Sensory (5th Digit)  32.1°C   Wrist    2.8 <4.0 48.6 >9 Wrist 5th Digit 14.0     Motor Summary Table     Stim Site NR Onset (ms) Norm Onset (ms) O-P Amp (mV) Norm O-P Amp Amp (Prev) (%) Site1 Site2 Dist (cm) Moe (m/s) Norm Moe (m/s)   Right Fibular Motor (Ext Dig Brev)  29.1°C   Ankle    3.4 <6.5 0.5 >1.1 100.0 Ankle Ext Dig Brev 8.0     B Fib    10.2  0.3  60.0 B Fib Ankle 30.0 44 >38   Poplt    12.2  0.3  100.0 Poplt B Fib 10.0 50 >42   Right Fibular TA Motor (Tib Ant)  29.3°C   Fib Head    3.0 <4.5 5.2 >3.0 100.0 Fib Head Tib Ant 10.0     Poplit    4.3  5.3  101.9 Poplit Fib Head 10.0 77 >40   Right Median Motor (Abd Poll Brev)  32.6°C   Wrist    3.4 <4.5 5.6 >4.1 100.0 Wrist Abd Poll Brev 8.0     Elbow 6.7  5.2  92.9 Elbow Wrist 19.0 58 >49   Right Tibial Motor (Abd Blake Brev)  29.2°C   Ankle    3.9 <6.1 7.6 >1.1 100.0 Ankle Abd Blake Brev 8.0     Knee    10.6  5.3  69.7 Knee Ankle 34.0 51 >39   Right Ulnar Motor (Abd Dig Minimi)  31.9°C   Wrist    2.7 <3.1 9.2 >7.0 100.0 Wrist Abd Dig Minimi 8.0  >50   B Elbow    5.5  8.7  94.6 B Elbow Wrist 18.0 64 >50   A Elbow    7.3  8.0  92.0 A Elbow B Elbow 10.0 56 >50     Comparison Summary Table     Stim Site NR Peak (ms) P-T Amp (µV) Site1 Site2 Dist (cm) Delta-0 (ms)   Right Median/Ulnar Palm Comparison (Wrist)  32.6°C   Median Palm    1.9 59.0 Median Palm Ulnar Palm 8.0 0.3   Ulnar Palm    1.5 39.9         F Wave Studies     NR F-Lat (ms) Lat Norm (ms) L-R F-Lat (ms) L-R Lat Norm   Right Tibial (Mrkrs) (Abd Hallucis)  29.2°C      45.74 <56  <5.7   Right Ulnar (Mrkrs) (Abd Dig Min)  32.3°C      20.00 <32  <2.5     H Reflex Studies     NR H-Lat (ms) L-R H-Lat (ms) L-R Lat Norm   Right Tibial (Gastroc)  29.3°C      33.58  <2.0     EMG     Side Muscle Nerve Root Ins Act Fibs Psw Recrt Duration Amp Poly Comment   Right Ext Dig Brev Dp Br Peron L5, S1 Incr Nml Nml Nml Nml Nml Nml    Right AntTibialis Dp Br Peron L4-5 Nml Nml Nml Nml Nml Nml Nml    Right MedGastroc Tibial S1-2 Nml Nml Nml Nml Nml Nml Nml    Right VastusMed Femoral L2-4 Nml Nml Nml Nml Nml Nml Nml    Right BicepsFemL Sciatic L5-S2 Nml Nml Nml Nml Nml Nml Nml    Left Ext Dig Brev Dp Br Peron L5, S1 Nml Nml Nml Nml Nml Nml Nml    Left AntTibialis Dp Br Peron L4-5 Nml Nml Nml Nml Nml Nml Nml    Left MedGastroc Tibial S1-2 Nml Nml Nml Nml Nml Nml Nml    Left VastusMed Femoral L2-4 Nml Nml Nml Nml Nml Nml Nml    Left BicepsFemL Sciatic L5-S2 Nml Nml Nml Nml Nml Nml Nml    Right Abd Poll Brev Median C8-T1 Nml Nml Nml Nml Nml Nml Nml    Right BrachioRad Radial C5-6 Nml Nml Nml Nml Nml Nml Nml    Right Biceps Musculocut C5-6 Nml Nml Nml Nml Nml Nml Nml    Right Triceps Radial C6-7-8 Nml Nml Nml Nml Nml Nml Nml Right Deltoid Axillary C5-6 Nml Nml Nml Nml Nml Nml Nml                Nerve Conduction Studies  Anti Sensory Left/Right Comparison     Stim Site L Lat (ms) R Lat (ms) L-R Lat (ms) L Amp (µV) R Amp (µV) L-R Amp (%) Site1 Site2 L Moe (m/s) R Moe (m/s) L-R Moe (m/s)   Median Anti Sensory (2nd Digit)  32.3°C   Wrist  2.5   43.0  Wrist 2nd Digit  56    Radial Anti Sensory (Base 1st Digit)  32.4°C   Wrist  1.4   66.5  Wrist Base 1st Digit  71    Sup Fibular Anti Sensory (Lat ankle)  29°C   Lower leg  1.7   3.9  Lower leg Lat ankle  59    Site 2  2.4   0.4         Site 3  1.8   4.6         Sural Anti Sensory (Lat Mall)  28.8°C   Calf  2.7   9.4  Calf Lat Mall  52    Site 2  2.7   8.4         Site 3  2.6   7.1         Ulnar Anti Sensory (5th Digit)  32.1°C   Wrist  2.2   48.6  Wrist 5th Digit  64      Motor Left/Right Comparison     Stim Site L Lat (ms) R Lat (ms) L-R Lat (ms) L Amp (mV) R Amp (mV) L-R Amp (%) Site1 Site2 L Moe (m/s) R Moe (m/s) L-R Moe (m/s)   Fibular Motor (Ext Dig Brev)  29.1°C   Ankle  3.4   0.5  Ankle Ext Dig Brev      B Fib  10.2   0.3  B Fib Ankle  44    Poplt  12.2   0.3  Poplt B Fib  50    Fibular TA Motor (Tib Ant)  29.3°C   Fib Head  3.0   5.2  Fib Head Tib Ant      Poplit  4.3   5.3  Poplit Fib Head  77    Median Motor (Abd Poll Brev)  32.6°C   Wrist  3.4   5.6  Wrist Abd Poll Brev      Elbow  6.7   5.2  Elbow Wrist  58    Tibial Motor (Abd Blake Brev)  29.2°C   Ankle  3.9   7.6  Ankle Abd Blake Brev      Knee  10.6   5.3  Knee Ankle  51    Ulnar Motor (Abd Dig Minimi)  31.9°C   Wrist  2.7   9.2  Wrist Abd Dig Minimi      B Elbow  5.5   8.7  B Elbow Wrist  64    A Elbow  7.3   8.0  A Elbow B Elbow  56      Comparison Left/Right Comparison     Stim Site L Lat (ms) R Lat (ms) L-R Lat (ms) L Amp (µV) R Amp (µV) L-R Amp (%)   Median/Ulnar Palm Comparison (Wrist)  32.6°C   Median Palm  1.4   38.2    Ulnar Palm  1.1   30.0          Waveforms:

## 2021-03-18 ENCOUNTER — TRANSCRIBE ORDER (OUTPATIENT)
Dept: SCHEDULING | Age: 86
End: 2021-03-18

## 2021-03-18 DIAGNOSIS — R91.8 MULTIPLE PULMONARY NODULES: Primary | ICD-10-CM

## 2021-03-23 RX ORDER — ESCITALOPRAM OXALATE 5 MG/1
TABLET ORAL
Qty: 90 TAB | Refills: 1 | Status: SHIPPED | OUTPATIENT
Start: 2021-03-23 | End: 2021-09-02

## 2021-04-10 ENCOUNTER — TELEPHONE (OUTPATIENT)
Dept: INTERNAL MEDICINE CLINIC | Age: 86
End: 2021-04-10

## 2021-04-29 ENCOUNTER — OFFICE VISIT (OUTPATIENT)
Dept: INTERNAL MEDICINE CLINIC | Age: 86
End: 2021-04-29
Payer: MEDICARE

## 2021-04-29 VITALS
WEIGHT: 174.2 LBS | SYSTOLIC BLOOD PRESSURE: 146 MMHG | HEART RATE: 71 BPM | OXYGEN SATURATION: 96 % | TEMPERATURE: 98.2 F | BODY MASS INDEX: 30.87 KG/M2 | RESPIRATION RATE: 18 BRPM | DIASTOLIC BLOOD PRESSURE: 77 MMHG | HEIGHT: 63 IN

## 2021-04-29 DIAGNOSIS — J84.10 LUNG GRANULOMA (HCC): ICD-10-CM

## 2021-04-29 DIAGNOSIS — F33.1 MODERATE EPISODE OF RECURRENT MAJOR DEPRESSIVE DISORDER (HCC): ICD-10-CM

## 2021-04-29 DIAGNOSIS — R53.81 PHYSICAL DEBILITY: ICD-10-CM

## 2021-04-29 DIAGNOSIS — I10 ESSENTIAL HYPERTENSION: Primary | ICD-10-CM

## 2021-04-29 DIAGNOSIS — G91.2 NPH (NORMAL PRESSURE HYDROCEPHALUS) (HCC): ICD-10-CM

## 2021-04-29 DIAGNOSIS — K21.9 GASTROESOPHAGEAL REFLUX DISEASE WITHOUT ESOPHAGITIS: ICD-10-CM

## 2021-04-29 DIAGNOSIS — H35.3290 EXUDATIVE AGE-RELATED MACULAR DEGENERATION, UNSPECIFIED LATERALITY, UNSPECIFIED STAGE (HCC): ICD-10-CM

## 2021-04-29 DIAGNOSIS — R56.9 SEIZURE (HCC): ICD-10-CM

## 2021-04-29 DIAGNOSIS — D32.9 MENINGIOMA (HCC): ICD-10-CM

## 2021-04-29 PROCEDURE — G8754 DIAS BP LESS 90: HCPCS | Performed by: INTERNAL MEDICINE

## 2021-04-29 PROCEDURE — 99215 OFFICE O/P EST HI 40 MIN: CPT | Performed by: INTERNAL MEDICINE

## 2021-04-29 PROCEDURE — G0463 HOSPITAL OUTPT CLINIC VISIT: HCPCS | Performed by: INTERNAL MEDICINE

## 2021-04-29 PROCEDURE — G9717 DOC PT DX DEP/BP F/U NT REQ: HCPCS | Performed by: INTERNAL MEDICINE

## 2021-04-29 PROCEDURE — G8427 DOCREV CUR MEDS BY ELIG CLIN: HCPCS | Performed by: INTERNAL MEDICINE

## 2021-04-29 PROCEDURE — 1101F PT FALLS ASSESS-DOCD LE1/YR: CPT | Performed by: INTERNAL MEDICINE

## 2021-04-29 PROCEDURE — G8753 SYS BP > OR = 140: HCPCS | Performed by: INTERNAL MEDICINE

## 2021-04-29 PROCEDURE — G8536 NO DOC ELDER MAL SCRN: HCPCS | Performed by: INTERNAL MEDICINE

## 2021-04-29 PROCEDURE — G8399 PT W/DXA RESULTS DOCUMENT: HCPCS | Performed by: INTERNAL MEDICINE

## 2021-04-29 PROCEDURE — G8417 CALC BMI ABV UP PARAM F/U: HCPCS | Performed by: INTERNAL MEDICINE

## 2021-04-29 PROCEDURE — 1090F PRES/ABSN URINE INCON ASSESS: CPT | Performed by: INTERNAL MEDICINE

## 2021-04-29 RX ORDER — HYDROCHLOROTHIAZIDE 25 MG/1
25 TABLET ORAL DAILY
Qty: 90 TAB | Refills: 3 | Status: SHIPPED | OUTPATIENT
Start: 2021-04-29 | End: 2022-10-24 | Stop reason: ALTCHOICE

## 2021-04-29 RX ORDER — LORATADINE 10 MG/1
10 TABLET ORAL DAILY
COMMUNITY
End: 2022-02-03 | Stop reason: ALTCHOICE

## 2021-04-29 NOTE — PROGRESS NOTES
Jesse Sewell (: 1935) is a 81 y.o. female, established patient, here for evaluation of the following chief complaint(s):  Follow-up (labs)       ASSESSMENT/PLAN:  Below is the assessment and plan developed based on review of pertinent history, physical exam, labs, studies, and medications. 1. Essential hypertension  BP is at goal. I do not recommend any change in atenolol, HCTZ. -     hydroCHLOROthiazide (HYDRODIURIL) 25 mg tablet; Take 1 Tab by mouth daily. , Normal, Disp-90 Tab, R-3    2. Exudative age-related macular degeneration, unspecified laterality, unspecified stage ([facility]   Stable and well-managed. Continue with ongoing regimen of opthamology evaluation. Will continue to monitor for improvements or changes. 3. NPH (normal pressure hydrocephalus) (HCC)  Stable and well-managed. Continue with ongoing monitoring by Dr. Franc Nix neurology group. Will continue to monitor for improvements or changes. 4. Moderate episode of recurrent major depressive disorder (HCC)  Stable and well-managed. Continue with ongoing regimen of Lexapro. 5. Seizure ([facility]  Stable and well-managed. Continue with ongoing regimen of Valium. Followed by Dr. Franc Nix. 6. [facility]  Followed by Dr. Jamel Jackson. Explained that CT showed new nodules and will have f/u in 2021. Will continue to monitor for improvements or changes. 7. Meningioma ([facility]  Stable with no reported issues. Will continue to monitor for improvements or changes. 8. Gastroesophageal reflux disease without esophagitis  Stable and well-managed with some sfx from Protonix reported. Directed pt to decrease Protonix to every other day to reduce loose stools. Will continue to monitor for improvements or changes. Deconditioning  Not at goal with decrease in mobility and overall strength. Filled out forms for handicap tags. Will set up pt for at-home PT due to decrease in mobility and strengthening. Pt cannot drive.  Will continue to monitor for improvements or changes. SUBJECTIVE/OBJECTIVE:  HPI  Hypertension ROS: taking medications as instructed, no medication side effects noted, no TIA's, no chest pain on exertion, no dyspnea on exertion, no swelling of ankles. New concerns: BP in office today is 146/77. She notes that her BP is at goal at home. Seizures: Followed by neurologist every 6 months. Stable, pt continues to comply with diazepam. Pt reports that her last seizure was over a year ago. Hydrocephalus: Followed by neurologist (Dr. Franc Nix) every 6 months. Pt reports that she had shunt evaluated in 2/2021 and everything was normal.    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 Pt reports that she had Ct scan to evaluate  Known nodule and scans showed 5 nodules. She notes that she has more SOB due to decrease mobility. GERD: Stable, pt continues to comply with Protonix. Pt is concerned that Protonix is causing diarrhea. Mood: Stable, pt continues to comply with Lexapro. Pt inquires about handicap tag since her mobility has decreased. She notes that she is not driving. Pt reports that she hears some fluid in her ears when she lays down to sleep. Review of Systems   All other systems reviewed and are negative. Physical Exam  Constitutional:       Appearance: Normal appearance. HENT:      Right Ear: Tympanic membrane and external ear normal.      Left Ear: Tympanic membrane and external ear normal.      Mouth/Throat:      Mouth: Mucous membranes are moist.      Pharynx: Oropharynx is clear. Cardiovascular:      Rate and Rhythm: Normal rate and regular rhythm. Pulses: Normal pulses. Heart sounds: Normal heart sounds. Pulmonary:      Effort: Pulmonary effort is normal.      Breath sounds: Normal breath sounds. Musculoskeletal:      Comments: Pt ambulates with walker   Skin:     General: Skin is warm and dry. Neurological:      General: No focal deficit present.       Mental Status: She is alert and oriented to person, place, and time. Psychiatric:         Mood and Affect: Mood normal.         Behavior: Behavior normal.           On this date 04/29/2021 I have spent 45 minutes reviewing previous notes, test results and face to face with the patient discussing the diagnosis and importance of compliance with the treatment plan as well as documenting on the day of the visit. Lab results and schedule of future lab studies reviewed with patient. Reviewed diet, exercise and weight control. Written by Adrianne Figueroa, as dictated by Mortimer Holes, MD.     Current diagnosis and concerns discussed with pt at length. Understands risks and benefits or current treatment plan and medications and accepts the treatment and medication with any possible risks. Pt asks appropriate questions which were answered. Pt instructed to call with any concerns or problems.

## 2021-06-30 DIAGNOSIS — R56.9 SEIZURE (HCC): ICD-10-CM

## 2021-06-30 DIAGNOSIS — I10 ESSENTIAL HYPERTENSION: ICD-10-CM

## 2021-06-30 RX ORDER — DIAZEPAM 5 MG/1
TABLET ORAL
Qty: 45 TABLET | Refills: 5 | Status: SHIPPED | OUTPATIENT
Start: 2021-06-30 | End: 2022-01-28

## 2021-06-30 RX ORDER — ATENOLOL 25 MG/1
TABLET ORAL
Qty: 90 TABLET | Refills: 0 | Status: SHIPPED | OUTPATIENT
Start: 2021-06-30 | End: 2021-11-03

## 2021-09-02 RX ORDER — ESCITALOPRAM OXALATE 5 MG/1
TABLET ORAL
Qty: 90 TABLET | Refills: 1 | Status: SHIPPED | OUTPATIENT
Start: 2021-09-02 | End: 2022-03-22 | Stop reason: ALTCHOICE

## 2021-09-10 ENCOUNTER — HOSPITAL ENCOUNTER (OUTPATIENT)
Dept: CT IMAGING | Age: 86
Discharge: HOME | End: 2021-09-10
Attending: INTERNAL MEDICINE
Payer: MEDICARE

## 2021-09-10 DIAGNOSIS — R91.8 MULTIPLE PULMONARY NODULES: ICD-10-CM

## 2021-09-10 PROCEDURE — 71250 CT THORAX DX C-: CPT

## 2021-11-03 DIAGNOSIS — I10 ESSENTIAL HYPERTENSION: ICD-10-CM

## 2021-11-03 RX ORDER — ATENOLOL 25 MG/1
TABLET ORAL
Qty: 90 TABLET | Refills: 0 | Status: SHIPPED | OUTPATIENT
Start: 2021-11-03 | End: 2021-12-17

## 2021-11-15 NOTE — TELEPHONE ENCOUNTER
----- Message from Santosh Robison MD sent at 8/10/2018  7:36 PM EDT -----  Regarding: please call pt  Please call. Echo 8/10/18 - LVEF 60 % to 65 %. Grade 1 diastolic dysfunction. RV normal.   AV sclerosis. Exercise cardiolite 8/10/18 - walked 4 min (7 METS), normal stress EKG and MPI. Will have nurse call with normal echo and stress test findings. Holter pending.      Thanks,  SK Hemigard Intro: Due to skin fragility and wound tension, it was decided to use HEMIGARD adhesive retention suture devices to permit a linear closure. The skin was cleaned and dried for a 6cm distance away from the wound. Excessive hair, if present, was removed to allow for adhesion.

## 2021-12-17 DIAGNOSIS — I10 ESSENTIAL HYPERTENSION: ICD-10-CM

## 2021-12-17 RX ORDER — ATENOLOL 25 MG/1
TABLET ORAL
Qty: 90 TABLET | Refills: 0 | Status: SHIPPED | OUTPATIENT
Start: 2021-12-17 | End: 2022-03-14

## 2022-01-28 DIAGNOSIS — R56.9 SEIZURE (HCC): ICD-10-CM

## 2022-01-28 RX ORDER — DIAZEPAM 5 MG/1
TABLET ORAL
Qty: 45 TABLET | Refills: 5 | Status: SHIPPED | OUTPATIENT
Start: 2022-01-28 | End: 2022-08-16 | Stop reason: SDUPTHER

## 2022-02-03 ENCOUNTER — OFFICE VISIT (OUTPATIENT)
Dept: INTERNAL MEDICINE CLINIC | Age: 87
End: 2022-02-03
Payer: MEDICARE

## 2022-02-03 VITALS
SYSTOLIC BLOOD PRESSURE: 145 MMHG | HEART RATE: 72 BPM | BODY MASS INDEX: 30.69 KG/M2 | OXYGEN SATURATION: 95 % | HEIGHT: 63 IN | WEIGHT: 173.2 LBS | RESPIRATION RATE: 16 BRPM | DIASTOLIC BLOOD PRESSURE: 80 MMHG | TEMPERATURE: 98.8 F

## 2022-02-03 DIAGNOSIS — G91.2 NPH (NORMAL PRESSURE HYDROCEPHALUS) (HCC): ICD-10-CM

## 2022-02-03 DIAGNOSIS — I10 PRIMARY HYPERTENSION: ICD-10-CM

## 2022-02-03 DIAGNOSIS — R35.0 URINARY FREQUENCY: ICD-10-CM

## 2022-02-03 DIAGNOSIS — J84.10 LUNG GRANULOMA (HCC): ICD-10-CM

## 2022-02-03 DIAGNOSIS — R09.81 SINUS CONGESTION: Primary | ICD-10-CM

## 2022-02-03 DIAGNOSIS — F33.1 MODERATE EPISODE OF RECURRENT MAJOR DEPRESSIVE DISORDER (HCC): ICD-10-CM

## 2022-02-03 DIAGNOSIS — R56.9 SEIZURE (HCC): ICD-10-CM

## 2022-02-03 DIAGNOSIS — H35.3290 EXUDATIVE AGE-RELATED MACULAR DEGENERATION, UNSPECIFIED LATERALITY, UNSPECIFIED STAGE (HCC): ICD-10-CM

## 2022-02-03 DIAGNOSIS — D32.9 MENINGIOMA (HCC): ICD-10-CM

## 2022-02-03 DIAGNOSIS — L50.9 HIVES: ICD-10-CM

## 2022-02-03 LAB
BILIRUB UR QL STRIP: NEGATIVE
GLUCOSE UR-MCNC: NEGATIVE MG/DL
KETONES P FAST UR STRIP-MCNC: NEGATIVE MG/DL
PH UR STRIP: 7 [PH] (ref 4.6–8)
PROT UR QL STRIP: NEGATIVE
SP GR UR STRIP: 1.02 (ref 1–1.03)
UA UROBILINOGEN AMB POC: NORMAL (ref 0.2–1)
URINALYSIS CLARITY POC: CLEAR
URINALYSIS COLOR POC: YELLOW
URINE BLOOD POC: NEGATIVE
URINE LEUKOCYTES POC: NEGATIVE
URINE NITRITES POC: NEGATIVE

## 2022-02-03 PROCEDURE — G8427 DOCREV CUR MEDS BY ELIG CLIN: HCPCS | Performed by: INTERNAL MEDICINE

## 2022-02-03 PROCEDURE — G8417 CALC BMI ABV UP PARAM F/U: HCPCS | Performed by: INTERNAL MEDICINE

## 2022-02-03 PROCEDURE — 1090F PRES/ABSN URINE INCON ASSESS: CPT | Performed by: INTERNAL MEDICINE

## 2022-02-03 PROCEDURE — 81001 URINALYSIS AUTO W/SCOPE: CPT | Performed by: INTERNAL MEDICINE

## 2022-02-03 PROCEDURE — G9717 DOC PT DX DEP/BP F/U NT REQ: HCPCS | Performed by: INTERNAL MEDICINE

## 2022-02-03 PROCEDURE — 99215 OFFICE O/P EST HI 40 MIN: CPT | Performed by: INTERNAL MEDICINE

## 2022-02-03 PROCEDURE — G8399 PT W/DXA RESULTS DOCUMENT: HCPCS | Performed by: INTERNAL MEDICINE

## 2022-02-03 PROCEDURE — G8536 NO DOC ELDER MAL SCRN: HCPCS | Performed by: INTERNAL MEDICINE

## 2022-02-03 PROCEDURE — 1101F PT FALLS ASSESS-DOCD LE1/YR: CPT | Performed by: INTERNAL MEDICINE

## 2022-02-03 PROCEDURE — G8753 SYS BP > OR = 140: HCPCS | Performed by: INTERNAL MEDICINE

## 2022-02-03 PROCEDURE — G8754 DIAS BP LESS 90: HCPCS | Performed by: INTERNAL MEDICINE

## 2022-02-03 PROCEDURE — G0463 HOSPITAL OUTPT CLINIC VISIT: HCPCS | Performed by: INTERNAL MEDICINE

## 2022-02-03 RX ORDER — GUAIFENESIN 600 MG/1
600 TABLET, EXTENDED RELEASE ORAL 2 TIMES DAILY
COMMUNITY
End: 2022-10-24 | Stop reason: ALTCHOICE

## 2022-02-03 RX ORDER — AZITHROMYCIN 250 MG/1
250 TABLET, FILM COATED ORAL SEE ADMIN INSTRUCTIONS
Qty: 6 TABLET | Refills: 0 | Status: SHIPPED | OUTPATIENT
Start: 2022-02-03 | End: 2022-02-08

## 2022-02-03 NOTE — PROGRESS NOTES
Marlee Hassan (: 1935) is a 81 y.o. female, established patient, here for evaluation of the following chief complaint(s): Allergic Reaction (hives on head, R arm and hip. Feels off balance) and Follow-up (having sinus drainage, nausea, and periodic dark urine with pain in the L side)       ASSESSMENT/PLAN:  Below is the assessment and plan developed based on review of pertinent history, physical exam, labs, studies, and medications. 1. Sinus congestion  Given her c/o congestion and rash, I am concerned that she may be allergic to something in her environment. I recommended that she take Zyrtec and use Flonase. If this does not improve her sx, I recommended that she consult an ENT. 2. Exudative age-related macular degeneration, unspecified laterality, unspecified stage ([facility] [address] [facility]  I encouraged her to schedule a f/u with her ophthalmologist given her visual decline. 3. NPH (normal pressure hydrocephalus) ([facility] [address] [facility]  I encouraged her to schedule a f/u with neurology secondary to her reported HA. 4. Moderate episode of recurrent major depressive disorder (HCC)  Stable and well-managed. Continue with ongoing regimen of Lexapro. 5. Seizure ([facility] [address] [facility]  Stable and well-managed. Continue with ongoing regimen of Diazepam.  6. Lung granuloma (HCC)  Stable and followed by Dr. Katlin Pascal. 7. Meningioma ([facility]  Stable and followed by Dr. Jacqueline Sinclair. 8. Urinary frequency  -     AMB POC URINALYSIS DIP STICK AUTO W/ MICRO  UA normal.   9. Hives  Given her c/o congestion and rash, I am concerned that she may be allergic to something in her environment. I recommended that she take Zyrtec and use Flonase. If this does not improve her sx, I recommended that she consult an ENT. 10. Primary hypertension  BP is at goal. I do not recommend any change in medications (Atenolol and HCTZ). No follow-ups on file. SUBJECTIVE/OBJECTIVE:  HPI    Rash: Pt presents today with concerns about a rash on her head, L arm, and R hip.  She states that her rash was initially itchy, but is now painful. Pt denies new soaps or detergents. She reports feeling tender on her scalp and that it was \"red and hot yesterday,\" per her friend. Sinus sx: Pt c/o congestion, drainage, ear fullness, hoarseness, chest tightness, and cough for a year. She denies a fever. Pt tried to manage her sx with Claritin, but stopped when she broke out. She is taking tylenol extra strength and mucinex. NPH: Pt hasn't seen her neurologist in 2022 and plans to schedule an apt secondary to soreness around her shunt. She states that she cannot use a brush or comb on the area. Hypertension ROS: taking medications as instructed, no medication side effects noted, no TIA's, no chest pain on exertion, no dyspnea on exertion, no swelling of ankles. BP in office today is 145/80. Other: Pt notes a decline in her vision and hearing, which she believes is due to pantoprazole. Review of Systems   HENT: Positive for congestion, ear pain, postnasal drip and voice change. Eyes: Positive for visual disturbance. Respiratory: Positive for cough and chest tightness. Genitourinary: Positive for frequency. Skin: Positive for rash. Neurological: Positive for headaches. All other systems reviewed and are negative. Physical Exam  Constitutional:       Appearance: Normal appearance. HENT:      Right Ear: Tympanic membrane and external ear normal.      Left Ear: Tympanic membrane and external ear normal.      Mouth/Throat:      Mouth: Mucous membranes are moist.      Pharynx: Oropharynx is clear. Cardiovascular:      Rate and Rhythm: Normal rate and regular rhythm. Pulses: Normal pulses. Heart sounds: Normal heart sounds. Pulmonary:      Effort: Pulmonary effort is normal.      Breath sounds: Normal breath sounds. Musculoskeletal:         General: Normal range of motion. Skin:     General: Skin is warm and dry.    Neurological:      General: No focal deficit present. Mental Status: She is alert and oriented to person, place, and time. Psychiatric:         Mood and Affect: Mood normal.         Behavior: Behavior normal.       On this date 02/03/2022 I have spent 45 minutes reviewing previous notes, test results and face to face with the patient discussing the diagnosis and importance of compliance with the treatment plan as well as documenting on the day of the visit. An electronic signature was used to authenticate this note. Written by Christine Álvarez as dictated by Dr. Annalisa Garces.    -- Christine Álvarez

## 2022-02-04 LAB
ALBUMIN SERPL-MCNC: 4.2 G/DL (ref 3.5–5)
ALBUMIN/GLOB SERPL: 1.4 {RATIO} (ref 1.1–2.2)
ALP SERPL-CCNC: 95 U/L (ref 45–117)
ALT SERPL-CCNC: 56 U/L (ref 12–78)
ANION GAP SERPL CALC-SCNC: 5 MMOL/L (ref 5–15)
AST SERPL-CCNC: 40 U/L (ref 15–37)
BASOPHILS # BLD: 0.1 K/UL (ref 0–0.1)
BASOPHILS NFR BLD: 1 % (ref 0–1)
BILIRUB SERPL-MCNC: 0.3 MG/DL (ref 0.2–1)
BUN SERPL-MCNC: 13 MG/DL (ref 6–20)
BUN/CREAT SERPL: 16 (ref 12–20)
CALCIUM SERPL-MCNC: 10.9 MG/DL (ref 8.5–10.1)
CHLORIDE SERPL-SCNC: 104 MMOL/L (ref 97–108)
CHOLEST SERPL-MCNC: 177 MG/DL
CO2 SERPL-SCNC: 27 MMOL/L (ref 21–32)
CREAT SERPL-MCNC: 0.79 MG/DL (ref 0.55–1.02)
DIFFERENTIAL METHOD BLD: ABNORMAL
EOSINOPHIL # BLD: 0.2 K/UL (ref 0–0.4)
EOSINOPHIL NFR BLD: 2 % (ref 0–7)
ERYTHROCYTE [DISTWIDTH] IN BLOOD BY AUTOMATED COUNT: 13 % (ref 11.5–14.5)
GLOBULIN SER CALC-MCNC: 2.9 G/DL (ref 2–4)
GLUCOSE SERPL-MCNC: 97 MG/DL (ref 65–100)
HCT VFR BLD AUTO: 41.4 % (ref 35–47)
HDLC SERPL-MCNC: 50 MG/DL
HDLC SERPL: 3.5 {RATIO} (ref 0–5)
HGB BLD-MCNC: 13.7 G/DL (ref 11.5–16)
IMM GRANULOCYTES # BLD AUTO: 0 K/UL (ref 0–0.04)
IMM GRANULOCYTES NFR BLD AUTO: 0 % (ref 0–0.5)
LDLC SERPL CALC-MCNC: 100.8 MG/DL (ref 0–100)
LYMPHOCYTES # BLD: 3.4 K/UL (ref 0.8–3.5)
LYMPHOCYTES NFR BLD: 43 % (ref 12–49)
MCH RBC QN AUTO: 30.4 PG (ref 26–34)
MCHC RBC AUTO-ENTMCNC: 33.1 G/DL (ref 30–36.5)
MCV RBC AUTO: 92 FL (ref 80–99)
MONOCYTES # BLD: 0.8 K/UL (ref 0–1)
MONOCYTES NFR BLD: 11 % (ref 5–13)
NEUTS SEG # BLD: 3.3 K/UL (ref 1.8–8)
NEUTS SEG NFR BLD: 43 % (ref 32–75)
NRBC # BLD: 0 K/UL (ref 0–0.01)
NRBC BLD-RTO: 0 PER 100 WBC
PLATELET # BLD AUTO: 471 K/UL (ref 150–400)
PMV BLD AUTO: 10.6 FL (ref 8.9–12.9)
POTASSIUM SERPL-SCNC: 4 MMOL/L (ref 3.5–5.1)
PROT SERPL-MCNC: 7.1 G/DL (ref 6.4–8.2)
RBC # BLD AUTO: 4.5 M/UL (ref 3.8–5.2)
SODIUM SERPL-SCNC: 136 MMOL/L (ref 136–145)
TRIGL SERPL-MCNC: 131 MG/DL (ref ?–150)
VLDLC SERPL CALC-MCNC: 26.2 MG/DL
WBC # BLD AUTO: 7.7 K/UL (ref 3.6–11)

## 2022-02-05 NOTE — TELEPHONE ENCOUNTER
I will send the Levaquin to her pharmacy. Take with food. Pt to ED with c/o general weakness and feeling like she may pass out. Pt states she got up to use the washroom when the s/s started. Pt states she has a hx of similar episodes starting a year ago.

## 2022-02-07 DIAGNOSIS — E83.52 HYPERCALCEMIA: Primary | ICD-10-CM

## 2022-03-11 LAB
CALCIUM SERPL-MCNC: 11.1 MG/DL (ref 8.7–10.3)
PTH-INTACT SERPL-MCNC: 35 PG/ML (ref 15–65)

## 2022-03-14 DIAGNOSIS — I10 ESSENTIAL HYPERTENSION: ICD-10-CM

## 2022-03-14 RX ORDER — ATENOLOL 25 MG/1
TABLET ORAL
Qty: 90 TABLET | Refills: 0 | Status: SHIPPED | OUTPATIENT
Start: 2022-03-14

## 2022-03-20 PROBLEM — G91.2 NPH (NORMAL PRESSURE HYDROCEPHALUS) (HCC): Status: ACTIVE | Noted: 2019-06-03

## 2022-03-22 ENCOUNTER — OFFICE VISIT (OUTPATIENT)
Dept: NEUROLOGY | Age: 87
End: 2022-03-22
Payer: MEDICARE

## 2022-03-22 ENCOUNTER — HOSPITAL ENCOUNTER (OUTPATIENT)
Dept: GENERAL RADIOLOGY | Age: 87
Discharge: HOME | End: 2022-03-22
Payer: MEDICARE

## 2022-03-22 VITALS
DIASTOLIC BLOOD PRESSURE: 92 MMHG | HEART RATE: 81 BPM | OXYGEN SATURATION: 95 % | SYSTOLIC BLOOD PRESSURE: 136 MMHG | TEMPERATURE: 97.4 F

## 2022-03-22 DIAGNOSIS — T85.9XXA DISORDER OF CEREBRAL VENTRICULAR SHUNT, INITIAL ENCOUNTER: Primary | ICD-10-CM

## 2022-03-22 DIAGNOSIS — Z98.2 DISORDER OF CEREBRAL VENTRICULAR SHUNT, INITIAL ENCOUNTER: ICD-10-CM

## 2022-03-22 DIAGNOSIS — R51.9 NEW ONSET HEADACHE: ICD-10-CM

## 2022-03-22 DIAGNOSIS — R42 DIZZINESS: ICD-10-CM

## 2022-03-22 DIAGNOSIS — Z98.2 DISORDER OF CEREBRAL VENTRICULAR SHUNT, INITIAL ENCOUNTER: Primary | ICD-10-CM

## 2022-03-22 DIAGNOSIS — T85.9XXA DISORDER OF CEREBRAL VENTRICULAR SHUNT, INITIAL ENCOUNTER: ICD-10-CM

## 2022-03-22 PROCEDURE — G8752 SYS BP LESS 140: HCPCS | Performed by: NURSE PRACTITIONER

## 2022-03-22 PROCEDURE — 74018 RADEX ABDOMEN 1 VIEW: CPT

## 2022-03-22 PROCEDURE — 99215 OFFICE O/P EST HI 40 MIN: CPT | Performed by: NURSE PRACTITIONER

## 2022-03-22 PROCEDURE — 1090F PRES/ABSN URINE INCON ASSESS: CPT | Performed by: NURSE PRACTITIONER

## 2022-03-22 PROCEDURE — G8755 DIAS BP > OR = 90: HCPCS | Performed by: NURSE PRACTITIONER

## 2022-03-22 PROCEDURE — G8536 NO DOC ELDER MAL SCRN: HCPCS | Performed by: NURSE PRACTITIONER

## 2022-03-22 PROCEDURE — G8417 CALC BMI ABV UP PARAM F/U: HCPCS | Performed by: NURSE PRACTITIONER

## 2022-03-22 PROCEDURE — G8399 PT W/DXA RESULTS DOCUMENT: HCPCS | Performed by: NURSE PRACTITIONER

## 2022-03-22 PROCEDURE — G8428 CUR MEDS NOT DOCUMENT: HCPCS | Performed by: NURSE PRACTITIONER

## 2022-03-22 PROCEDURE — G9717 DOC PT DX DEP/BP F/U NT REQ: HCPCS | Performed by: NURSE PRACTITIONER

## 2022-03-22 PROCEDURE — 1101F PT FALLS ASSESS-DOCD LE1/YR: CPT | Performed by: NURSE PRACTITIONER

## 2022-03-22 RX ORDER — LORATADINE 10 MG/1
10 TABLET ORAL DAILY
COMMUNITY
End: 2022-10-24 | Stop reason: ALTCHOICE

## 2022-03-22 RX ORDER — ONDANSETRON 4 MG/1
4 TABLET, ORALLY DISINTEGRATING ORAL
Qty: 12 TABLET | Refills: 4 | Status: SHIPPED | OUTPATIENT
Start: 2022-03-22

## 2022-03-22 RX ORDER — ESCITALOPRAM OXALATE 10 MG/1
10 TABLET ORAL DAILY
Qty: 90 TABLET | Refills: 1 | Status: SHIPPED | OUTPATIENT
Start: 2022-03-22 | End: 2022-06-30

## 2022-03-22 RX ORDER — FLUTICASONE PROPIONATE 50 MCG
2 SPRAY, SUSPENSION (ML) NASAL DAILY
COMMUNITY
End: 2022-10-24 | Stop reason: ALTCHOICE
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
## 2022-03-24 ENCOUNTER — HOSPITAL ENCOUNTER (OUTPATIENT)
Dept: CT IMAGING | Age: 87
Discharge: HOME | End: 2022-03-24
Attending: NURSE PRACTITIONER
Payer: MEDICARE

## 2022-03-24 DIAGNOSIS — T85.9XXA DISORDER OF CEREBRAL VENTRICULAR SHUNT, INITIAL ENCOUNTER: ICD-10-CM

## 2022-03-24 DIAGNOSIS — R51.9 NEW ONSET HEADACHE: ICD-10-CM

## 2022-03-24 DIAGNOSIS — R42 DIZZINESS: ICD-10-CM

## 2022-03-24 DIAGNOSIS — Z98.2 DISORDER OF CEREBRAL VENTRICULAR SHUNT, INITIAL ENCOUNTER: ICD-10-CM

## 2022-03-24 PROCEDURE — 70450 CT HEAD/BRAIN W/O DYE: CPT

## 2022-04-11 ENCOUNTER — TRANSCRIBE ORDER (OUTPATIENT)
Dept: SCHEDULING | Age: 87
End: 2022-04-11

## 2022-04-11 DIAGNOSIS — G91.2 NPH (NORMAL PRESSURE HYDROCEPHALUS) (HCC): Primary | ICD-10-CM

## 2022-04-28 ENCOUNTER — HOSPITAL ENCOUNTER (OUTPATIENT)
Dept: CT IMAGING | Age: 87
Discharge: HOME | End: 2022-04-28
Attending: NEUROLOGICAL SURGERY
Payer: MEDICARE

## 2022-04-28 DIAGNOSIS — G91.2 NPH (NORMAL PRESSURE HYDROCEPHALUS) (HCC): ICD-10-CM

## 2022-04-28 PROCEDURE — 70450 CT HEAD/BRAIN W/O DYE: CPT

## 2022-05-06 ENCOUNTER — TRANSCRIBE ORDER (OUTPATIENT)
Dept: SCHEDULING | Age: 87
End: 2022-05-06

## 2022-05-06 DIAGNOSIS — M54.9 SPINE PAIN, MULTILEVEL: ICD-10-CM

## 2022-05-06 DIAGNOSIS — G91.2 NPH (NORMAL PRESSURE HYDROCEPHALUS) (HCC): Primary | ICD-10-CM

## 2022-05-17 RX ORDER — ESCITALOPRAM OXALATE 5 MG/1
TABLET ORAL
Qty: 90 TABLET | Refills: 1 | Status: SHIPPED | OUTPATIENT
Start: 2022-05-17

## 2022-05-19 ENCOUNTER — HOSPITAL ENCOUNTER (OUTPATIENT)
Dept: CT IMAGING | Age: 87
Discharge: HOME | End: 2022-05-19
Attending: NEUROLOGICAL SURGERY
Payer: MEDICARE

## 2022-05-19 DIAGNOSIS — M54.9 SPINE PAIN, MULTILEVEL: ICD-10-CM

## 2022-05-19 DIAGNOSIS — G91.2 NPH (NORMAL PRESSURE HYDROCEPHALUS) (HCC): ICD-10-CM

## 2022-05-19 PROCEDURE — 70450 CT HEAD/BRAIN W/O DYE: CPT

## 2022-05-19 PROCEDURE — 72131 CT LUMBAR SPINE W/O DYE: CPT

## 2022-05-19 PROCEDURE — 72128 CT CHEST SPINE W/O DYE: CPT

## 2022-05-20 ENCOUNTER — TRANSCRIBE ORDER (OUTPATIENT)
Dept: SCHEDULING | Age: 87
End: 2022-05-20

## 2022-05-20 DIAGNOSIS — G91.2 NPH (NORMAL PRESSURE HYDROCEPHALUS) (HCC): Primary | ICD-10-CM

## 2022-05-25 ENCOUNTER — HOSPITAL ENCOUNTER (OUTPATIENT)
Dept: CT IMAGING | Age: 87
Discharge: HOME | End: 2022-05-25
Attending: NEUROLOGICAL SURGERY
Payer: MEDICARE

## 2022-05-25 DIAGNOSIS — G91.2 NPH (NORMAL PRESSURE HYDROCEPHALUS) (HCC): ICD-10-CM

## 2022-05-25 PROCEDURE — 70450 CT HEAD/BRAIN W/O DYE: CPT

## 2022-05-27 ENCOUNTER — TRANSCRIBE ORDER (OUTPATIENT)
Dept: SCHEDULING | Age: 87
End: 2022-05-27

## 2022-05-27 DIAGNOSIS — S06.5XAA SUBDURAL HEMATOMA: Primary | ICD-10-CM

## 2022-06-06 ENCOUNTER — HOSPITAL ENCOUNTER (OUTPATIENT)
Dept: CT IMAGING | Age: 87
Discharge: HOME | End: 2022-06-06
Attending: NEUROLOGICAL SURGERY
Payer: MEDICARE

## 2022-06-06 DIAGNOSIS — S06.5XAA SUBDURAL HEMATOMA: ICD-10-CM

## 2022-06-06 PROCEDURE — 70450 CT HEAD/BRAIN W/O DYE: CPT

## 2022-06-09 ENCOUNTER — TRANSCRIBE ORDER (OUTPATIENT)
Dept: SCHEDULING | Age: 87
End: 2022-06-09

## 2022-06-09 DIAGNOSIS — S06.5XAA SUBDURAL HEMATOMA: Primary | ICD-10-CM

## 2022-06-19 ENCOUNTER — HOSPITAL ENCOUNTER (EMERGENCY)
Age: 87
Discharge: OTHER INSTITUTION | End: 2022-06-19
Attending: EMERGENCY MEDICINE
Payer: MEDICARE

## 2022-06-19 ENCOUNTER — HOSPITAL ENCOUNTER (EMERGENCY)
Dept: GENERAL RADIOLOGY | Age: 87
Discharge: HOME | End: 2022-06-19
Attending: EMERGENCY MEDICINE
Payer: MEDICARE

## 2022-06-19 ENCOUNTER — APPOINTMENT (OUTPATIENT)
Dept: CT IMAGING | Age: 87
End: 2022-06-19
Attending: EMERGENCY MEDICINE
Payer: MEDICARE

## 2022-06-19 ENCOUNTER — HOSPITAL ENCOUNTER (INPATIENT)
Age: 87
LOS: 3 days | Discharge: NURSING, REHAB OR LONG-TERM CARE | DRG: 086 | End: 2022-06-22
Attending: HOSPITALIST | Admitting: STUDENT IN AN ORGANIZED HEALTH CARE EDUCATION/TRAINING PROGRAM
Payer: MEDICARE

## 2022-06-19 VITALS
TEMPERATURE: 98.4 F | OXYGEN SATURATION: 96 % | HEIGHT: 65 IN | BODY MASS INDEX: 28.82 KG/M2 | RESPIRATION RATE: 19 BRPM | DIASTOLIC BLOOD PRESSURE: 83 MMHG | SYSTOLIC BLOOD PRESSURE: 151 MMHG | HEART RATE: 95 BPM | WEIGHT: 173 LBS

## 2022-06-19 DIAGNOSIS — I62.03 ACUTE ON CHRONIC INTRACRANIAL SUBDURAL HEMATOMA (HCC): Primary | ICD-10-CM

## 2022-06-19 DIAGNOSIS — I62.01 ACUTE ON CHRONIC INTRACRANIAL SUBDURAL HEMATOMA (HCC): Primary | ICD-10-CM

## 2022-06-19 PROBLEM — S06.5XAA BILATERAL SUBDURAL HEMATOMAS (HCC): Status: ACTIVE | Noted: 2022-06-19

## 2022-06-19 PROBLEM — S06.5XAA BILATERAL SUBDURAL HEMATOMAS: Status: ACTIVE | Noted: 2022-06-19

## 2022-06-19 LAB
ALBUMIN SERPL-MCNC: 3.9 G/DL (ref 3.5–5)
ALBUMIN/GLOB SERPL: 1.1 {RATIO} (ref 1.1–2.2)
ALP SERPL-CCNC: 98 U/L (ref 45–117)
ALT SERPL-CCNC: 55 U/L (ref 12–78)
ANION GAP SERPL CALC-SCNC: 10 MMOL/L (ref 5–15)
AST SERPL-CCNC: 53 U/L (ref 15–37)
BASOPHILS # BLD: 0 K/UL (ref 0–0.1)
BASOPHILS NFR BLD: 0 % (ref 0–1)
BILIRUB SERPL-MCNC: 0.6 MG/DL (ref 0.2–1)
BUN SERPL-MCNC: 17 MG/DL (ref 6–20)
BUN/CREAT SERPL: 19 (ref 12–20)
CALCIUM SERPL-MCNC: 10.2 MG/DL (ref 8.5–10.1)
CHLORIDE SERPL-SCNC: 106 MMOL/L (ref 97–108)
CK SERPL-CCNC: 259 U/L (ref 26–192)
CO2 SERPL-SCNC: 25 MMOL/L (ref 21–32)
COVID-19 RAPID TEST, COVR: NOT DETECTED
CREAT SERPL-MCNC: 0.9 MG/DL (ref 0.55–1.02)
DIFFERENTIAL METHOD BLD: ABNORMAL
EOSINOPHIL # BLD: 0 K/UL (ref 0–0.4)
EOSINOPHIL NFR BLD: 0 % (ref 0–7)
ERYTHROCYTE [DISTWIDTH] IN BLOOD BY AUTOMATED COUNT: 12.9 % (ref 11.5–14.5)
GLOBULIN SER CALC-MCNC: 3.5 G/DL (ref 2–4)
GLUCOSE SERPL-MCNC: 93 MG/DL (ref 65–100)
HCT VFR BLD AUTO: 42.9 % (ref 35–47)
HGB BLD-MCNC: 14 G/DL (ref 11.5–16)
IMM GRANULOCYTES # BLD AUTO: 0.1 K/UL (ref 0–0.04)
IMM GRANULOCYTES NFR BLD AUTO: 0 % (ref 0–0.5)
LYMPHOCYTES # BLD: 1 K/UL (ref 0.8–3.5)
LYMPHOCYTES NFR BLD: 8 % (ref 12–49)
MCH RBC QN AUTO: 30.1 PG (ref 26–34)
MCHC RBC AUTO-ENTMCNC: 32.6 G/DL (ref 30–36.5)
MCV RBC AUTO: 92.3 FL (ref 80–99)
MONOCYTES # BLD: 1 K/UL (ref 0–1)
MONOCYTES NFR BLD: 8 % (ref 5–13)
NEUTS SEG # BLD: 11.2 K/UL (ref 1.8–8)
NEUTS SEG NFR BLD: 84 % (ref 32–75)
NRBC # BLD: 0 K/UL (ref 0–0.01)
NRBC BLD-RTO: 0 PER 100 WBC
PLATELET # BLD AUTO: 405 K/UL (ref 150–400)
PMV BLD AUTO: 10.6 FL (ref 8.9–12.9)
POTASSIUM SERPL-SCNC: 3.5 MMOL/L (ref 3.5–5.1)
PROT SERPL-MCNC: 7.4 G/DL (ref 6.4–8.2)
RBC # BLD AUTO: 4.65 M/UL (ref 3.8–5.2)
SODIUM SERPL-SCNC: 141 MMOL/L (ref 136–145)
SOURCE, COVRS: NORMAL
WBC # BLD AUTO: 13.4 K/UL (ref 3.6–11)

## 2022-06-19 PROCEDURE — 73030 X-RAY EXAM OF SHOULDER: CPT

## 2022-06-19 PROCEDURE — 80053 COMPREHEN METABOLIC PANEL: CPT

## 2022-06-19 PROCEDURE — 72125 CT NECK SPINE W/O DYE: CPT

## 2022-06-19 PROCEDURE — 96374 THER/PROPH/DIAG INJ IV PUSH: CPT

## 2022-06-19 PROCEDURE — 85025 COMPLETE CBC W/AUTO DIFF WBC: CPT

## 2022-06-19 PROCEDURE — 87635 SARS-COV-2 COVID-19 AMP PRB: CPT

## 2022-06-19 PROCEDURE — 36415 COLL VENOUS BLD VENIPUNCTURE: CPT

## 2022-06-19 PROCEDURE — 74011250636 HC RX REV CODE- 250/636: Performed by: EMERGENCY MEDICINE

## 2022-06-19 PROCEDURE — 65270000029 HC RM PRIVATE

## 2022-06-19 PROCEDURE — 71045 X-RAY EXAM CHEST 1 VIEW: CPT

## 2022-06-19 PROCEDURE — 82550 ASSAY OF CK (CPK): CPT

## 2022-06-19 PROCEDURE — 72192 CT PELVIS W/O DYE: CPT

## 2022-06-19 PROCEDURE — 99285 EMERGENCY DEPT VISIT HI MDM: CPT

## 2022-06-19 PROCEDURE — 74011250637 HC RX REV CODE- 250/637: Performed by: STUDENT IN AN ORGANIZED HEALTH CARE EDUCATION/TRAINING PROGRAM

## 2022-06-19 PROCEDURE — 74011250637 HC RX REV CODE- 250/637: Performed by: EMERGENCY MEDICINE

## 2022-06-19 PROCEDURE — 72128 CT CHEST SPINE W/O DYE: CPT

## 2022-06-19 PROCEDURE — 72131 CT LUMBAR SPINE W/O DYE: CPT

## 2022-06-19 PROCEDURE — 70450 CT HEAD/BRAIN W/O DYE: CPT

## 2022-06-19 RX ORDER — ATENOLOL 25 MG/1
25 TABLET ORAL DAILY
Status: DISCONTINUED | OUTPATIENT
Start: 2022-06-20 | End: 2022-06-22 | Stop reason: HOSPADM

## 2022-06-19 RX ORDER — SODIUM CHLORIDE 0.9 % (FLUSH) 0.9 %
5-40 SYRINGE (ML) INJECTION AS NEEDED
Status: DISCONTINUED | OUTPATIENT
Start: 2022-06-19 | End: 2022-06-22 | Stop reason: HOSPADM

## 2022-06-19 RX ORDER — ACETAMINOPHEN 650 MG/1
650 SUPPOSITORY RECTAL
Status: DISCONTINUED | OUTPATIENT
Start: 2022-06-19 | End: 2022-06-22 | Stop reason: HOSPADM

## 2022-06-19 RX ORDER — ACETAMINOPHEN 325 MG/1
650 TABLET ORAL
Status: DISCONTINUED | OUTPATIENT
Start: 2022-06-19 | End: 2022-06-22 | Stop reason: HOSPADM

## 2022-06-19 RX ORDER — ONDANSETRON 4 MG/1
4 TABLET, ORALLY DISINTEGRATING ORAL
Status: DISCONTINUED | OUTPATIENT
Start: 2022-06-19 | End: 2022-06-22 | Stop reason: HOSPADM

## 2022-06-19 RX ORDER — POLYETHYLENE GLYCOL 3350 17 G/17G
17 POWDER, FOR SOLUTION ORAL DAILY PRN
Status: DISCONTINUED | OUTPATIENT
Start: 2022-06-19 | End: 2022-06-22 | Stop reason: HOSPADM

## 2022-06-19 RX ORDER — ACETAMINOPHEN 500 MG
1000 TABLET ORAL ONCE
Status: COMPLETED | OUTPATIENT
Start: 2022-06-19 | End: 2022-06-19

## 2022-06-19 RX ORDER — SODIUM CHLORIDE 9 MG/ML
25 INJECTION, SOLUTION INTRAVENOUS CONTINUOUS
Status: DISCONTINUED | OUTPATIENT
Start: 2022-06-20 | End: 2022-06-21

## 2022-06-19 RX ORDER — HYDROCHLOROTHIAZIDE 25 MG/1
25 TABLET ORAL DAILY
Status: DISCONTINUED | OUTPATIENT
Start: 2022-06-20 | End: 2022-06-22 | Stop reason: HOSPADM

## 2022-06-19 RX ORDER — ONDANSETRON 2 MG/ML
4 INJECTION INTRAMUSCULAR; INTRAVENOUS
Status: DISCONTINUED | OUTPATIENT
Start: 2022-06-19 | End: 2022-06-22 | Stop reason: HOSPADM

## 2022-06-19 RX ORDER — DIAZEPAM 2 MG/1
2 TABLET ORAL
Status: DISCONTINUED | OUTPATIENT
Start: 2022-06-19 | End: 2022-06-22 | Stop reason: HOSPADM

## 2022-06-19 RX ORDER — MORPHINE SULFATE 4 MG/ML
4 INJECTION INTRAVENOUS ONCE
Status: COMPLETED | OUTPATIENT
Start: 2022-06-19 | End: 2022-06-19

## 2022-06-19 RX ORDER — MORPHINE SULFATE 4 MG/ML
4 INJECTION INTRAVENOUS ONCE
Status: DISCONTINUED | OUTPATIENT
Start: 2022-06-19 | End: 2022-06-19

## 2022-06-19 RX ORDER — SODIUM CHLORIDE 0.9 % (FLUSH) 0.9 %
5-40 SYRINGE (ML) INJECTION EVERY 8 HOURS
Status: DISCONTINUED | OUTPATIENT
Start: 2022-06-20 | End: 2022-06-22 | Stop reason: HOSPADM

## 2022-06-19 RX ORDER — ESCITALOPRAM OXALATE 10 MG/1
10 TABLET ORAL DAILY
Status: DISCONTINUED | OUTPATIENT
Start: 2022-06-20 | End: 2022-06-22 | Stop reason: HOSPADM

## 2022-06-19 RX ADMIN — ACETAMINOPHEN 1000 MG: 500 TABLET ORAL at 18:52

## 2022-06-19 RX ADMIN — SODIUM CHLORIDE 1000 ML: 9 INJECTION, SOLUTION INTRAVENOUS at 15:42

## 2022-06-19 RX ADMIN — ACETAMINOPHEN 1000 MG: 500 TABLET ORAL at 13:29

## 2022-06-19 RX ADMIN — MORPHINE SULFATE 4 MG: 4 INJECTION INTRAVENOUS at 15:43

## 2022-06-19 RX ADMIN — ACETAMINOPHEN 650 MG: 325 TABLET ORAL at 23:55

## 2022-06-19 NOTE — ED TRIAGE NOTES
Pt arrives via EMS she states that the this [id number] she was leaning against the wall to get her under clothes on and the rug slid out causing her to fall onto the floor landing on her bottom. She then crawled from the bathroom to the bedroom several times to find her cell phone then she got a hold of a towel and kept throwing on it on the vanity until her phone fell down,  She called her son, he broke in and called 911. The crew assisted her to the bed and she could feel pain in both hips but more on the right and back/neck pain. Dr Evelin Phan at the bedside. NO LOC pt states that she was on the floor crawling around for 5 hrs before she got a hold of anyone.
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
## 2022-06-20 ENCOUNTER — APPOINTMENT (OUTPATIENT)
Dept: CT IMAGING | Age: 87
DRG: 086 | End: 2022-06-20
Attending: HOSPITALIST
Payer: MEDICARE

## 2022-06-20 PROCEDURE — 74011250637 HC RX REV CODE- 250/637: Performed by: STUDENT IN AN ORGANIZED HEALTH CARE EDUCATION/TRAINING PROGRAM

## 2022-06-20 PROCEDURE — 65270000029 HC RM PRIVATE

## 2022-06-20 PROCEDURE — 70450 CT HEAD/BRAIN W/O DYE: CPT

## 2022-06-20 PROCEDURE — 97530 THERAPEUTIC ACTIVITIES: CPT

## 2022-06-20 PROCEDURE — 74011000250 HC RX REV CODE- 250: Performed by: STUDENT IN AN ORGANIZED HEALTH CARE EDUCATION/TRAINING PROGRAM

## 2022-06-20 PROCEDURE — 74011250637 HC RX REV CODE- 250/637: Performed by: HOSPITALIST

## 2022-06-20 PROCEDURE — 97165 OT EVAL LOW COMPLEX 30 MIN: CPT

## 2022-06-20 PROCEDURE — 97116 GAIT TRAINING THERAPY: CPT

## 2022-06-20 PROCEDURE — 97161 PT EVAL LOW COMPLEX 20 MIN: CPT

## 2022-06-20 PROCEDURE — 74011250636 HC RX REV CODE- 250/636: Performed by: STUDENT IN AN ORGANIZED HEALTH CARE EDUCATION/TRAINING PROGRAM

## 2022-06-20 PROCEDURE — 97535 SELF CARE MNGMENT TRAINING: CPT

## 2022-06-20 RX ORDER — BISMUTH SUBSALICYLATE 262 MG/15ML
30 LIQUID ORAL
Status: DISCONTINUED | OUTPATIENT
Start: 2022-06-20 | End: 2022-06-22 | Stop reason: HOSPADM

## 2022-06-20 RX ADMIN — SODIUM CHLORIDE, PRESERVATIVE FREE 10 ML: 5 INJECTION INTRAVENOUS at 00:36

## 2022-06-20 RX ADMIN — ESCITALOPRAM OXALATE 10 MG: 10 TABLET ORAL at 10:15

## 2022-06-20 RX ADMIN — SODIUM CHLORIDE, PRESERVATIVE FREE 10 ML: 5 INJECTION INTRAVENOUS at 05:55

## 2022-06-20 RX ADMIN — SODIUM CHLORIDE 125 ML/HR: 9 INJECTION, SOLUTION INTRAVENOUS at 10:22

## 2022-06-20 RX ADMIN — ACETAMINOPHEN 650 MG: 325 TABLET ORAL at 07:37

## 2022-06-20 RX ADMIN — SODIUM CHLORIDE, PRESERVATIVE FREE 10 ML: 5 INJECTION INTRAVENOUS at 17:25

## 2022-06-20 RX ADMIN — Medication 30 ML: at 13:00

## 2022-06-20 RX ADMIN — ATENOLOL 25 MG: 25 TABLET ORAL at 10:15

## 2022-06-20 RX ADMIN — ACETAMINOPHEN 650 MG: 325 TABLET ORAL at 17:24

## 2022-06-20 RX ADMIN — SODIUM CHLORIDE 125 ML/HR: 9 INJECTION, SOLUTION INTRAVENOUS at 00:36

## 2022-06-20 NOTE — PROGRESS NOTES
Problem: Pressure Injury - Risk of  Goal: *Prevention of pressure injury  Description: Document Jagdish Scale and appropriate interventions in the flowsheet. Outcome: Progressing Towards Goal  Note: Pressure Injury Interventions:       Moisture Interventions: Absorbent underpads,Internal/External urinary devices,Minimize layers    Activity Interventions: Increase time out of bed,Pressure redistribution bed/mattress(bed type),PT/OT evaluation    Mobility Interventions: HOB 30 degrees or less,Pressure redistribution bed/mattress (bed type),PT/OT evaluation    Nutrition Interventions: Document food/fluid/supplement intake    Friction and Shear Interventions: HOB 30 degrees or less,Lift sheet,Minimize layers                Problem: Falls - Risk of  Goal: *Absence of Falls  Description: Document Kapil Fall Risk and appropriate interventions in the flowsheet.   Outcome: Progressing Towards Goal  Note: Fall Risk Interventions:  Mobility Interventions: Assess mobility with egress test,Communicate number of staff needed for ambulation/transfer,OT consult for ADLs,Patient to call before getting OOB,PT Consult for mobility concerns,PT Consult for assist device competence,Utilize walker, cane, or other assistive device              Elimination Interventions: Call light in reach,Patient to call for help with toileting needs,Stay With Me (per policy),Toilet paper/wipes in reach,Toileting schedule/hourly rounds    History of Falls Interventions: Consult care management for discharge planning,Door open when patient unattended,Evaluate medications/consider consulting pharmacy,Investigate reason for fall,Room close to nurse's station         Problem: Pain  Goal: *Control of Pain  Outcome: Progressing Towards Goal  Goal: *PALLIATIVE CARE:  Alleviation of Pain  Outcome: Progressing Towards Goal
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
## 2022-06-20 NOTE — ED NOTES
[address] [facility] access center called to check on room status. Per Access center, RM [phone], bed 1. [facility] called for transport; eta pending.
[facility] eta 2045.
Attempted to call report to KENDRICK ROMARIO. Waiting for nurse to give report.
Bedside and Verbal shift change report given to April RN (oncoming nurse) by Anastasia Lockett RN (offgoing nurse). Report included the following information ED Summary and MAR.
Bedside and Verbal shift change report given to Darya Cantrell (oncoming nurse) by Kathline Cooks (offgoing nurse). Report included the following information SBAR, ED Summary, Intake/Output, MAR, Recent Results and Cardiac Rhythm SR,rate 92.
Bedside and Verbal shift change report given to Kellie Samuel (oncoming nurse) by Jerry Thurman (offgoing nurse). Report included the following information SBAR, ED Summary and MAR.
[phone][facility] to give report. Waiting for nurse to give report.
Informed Dr Neville Barrios pt with some assistance able to move to bedside commode and back to stretcher.
Patient assisted x 1 to bedside commode to void. Voided approx 200ml.
Patient resting on stretcher; requests something to drink. Per Dr. Manda Bryan, patient may have water. Waiting for room assignment from [facility].
TRANSFER - OUT REPORT:    Verbal report given to Heide Vega on [address]  being transferred to [facility] [phone] for routine progression of care       Report consisted of patients Situation, Background, Assessment and   Recommendations(SBAR). Information from the following report(s) SBAR, ED Summary, [facility] and Recent Results was reviewed with the receiving nurse. Lines:   Peripheral IV 06/19/22 Right Antecubital (Active)   Site Assessment Clean, dry, & intact 06/19/22 2019   Phlebitis Assessment 0 06/19/22 2019   Infiltration Assessment 0 06/19/22 2019   Dressing Status Clean, dry, & intact 06/19/22 2019        Opportunity for questions and clarification was provided. Patient transported with:  Patient belongings.
172.72

## 2022-06-20 NOTE — PROGRESS NOTES
[address] [facility] Adult  Hospitalist Group                                                                                          Hospitalist Progress Note  Claudia Alatorre MD  Answering service: [phone] from in house phone        Date of Service:  2022  NAME:  Jorgito Broderick  :  1935  MRN:  [id number]      Admission Summary:     Jorgito Broderick is a 81 y.o. female with past medical history of macular degeneration, NPH, seizure disorder, major depressive disorder with generalized anxiety disorder, history of meningioma who presented to ER from home for evaluation after a fall. Loss of balance, slipped on a rug and fell. Had pain CT in ER remarkable for concerns and slight increase in size of bilateral chronic subdural hematomas. Neurosurgery was consulted and recommended outpatient repeat imaging. Per chart review, patient was an unsafe discharge home and decision was made to admit her to hospital for disposition. She is seen and examined at bedside. Resting comfortably. Denies any headaches. No acute complaints or concerns. The patient denies any fever, chills, chest or abdominal pain, nausea, vomiting, cough, congestion, recent illness, palpitations, or dysuria.     Interval history / Subjective:     She said she feels the same, on and off headache, no left side chest pain  Son in the room, updated the clinical finding and care plan, questions answered     Assessment & Plan:     Acute on chronic bilateral subdural hematomas   -repeated CT head on  stable CT imaging appearance of the head with unchanged appearance of righttrans-frontal ventriculostomy tube, bilateral subdural hematomas, and atrial left ventricular mass lesion.  -continue neuro check and supportive care  -Neurosurgery recommended outpatient follow-up and imaging  -PT/OT     Fall   -CT pelvis no evidence of fracture  -CT cervical spine and lumbar spine no evidence of fracture or subluxation.  -fall precaution   -Continue PT/OT    Hx of NPH   -stable    Depressive disorder with generalized anxiety   -stable  -continue lexapro    HTN  -BP fairly controlled, continue atenolol, hydrochlorothiazide and monitor BP    Hx of seizure disorder   -stable, patient on valium at home, on valium 2 mg po bid prn  -seizure precaution     Mildly elevated CK   -  -continue normal saline   -repeat CK level in am    Leukocytosis likely reactive  -afebrile   -no evidence of infection          Code status: Full Code  Prophylaxis: SCD  Care Plan discussed with: Patient, Nurse and CM  Anticipated Disposition: TBD     Hospital Problems  Date Reviewed: 3/9/2021          Codes Class Noted POA    Bilateral subdural hematomas ([facility] [address] ICD-10-CM: L39.1M4W  ICD-9-CM: 432.1  6/19/2022 Unknown               Vital Signs:    Last 24hrs VS reviewed since prior progress note. Most recent are:  Visit Vitals  BP (!) 140/67   Pulse 65   Temp 98.4 °F (36.9 °C)   Resp 20   SpO2 93%       No intake or output data in the 24 hours ending 06/20/22 0900     Physical Examination:     I had a face to face encounter with this patient and independently examined them on 6/20/2022 as outlined below:          Constitutional:  No acute distress, cooperative, pleasant    ENT:  Oral mucosa moist, oropharynx benign. Resp:  CTA bilaterally. No wheezing/rhonchi/rales. No accessory muscle use. CV:  Regular rhythm, normal rate, no murmurs, gallops, rubs    GI:  Soft, non distended, non tender.  normoactive bowel sounds, no hepatosplenomegaly     Musculoskeletal:  No edema t    Neurologic:  Conscious and alert, moves all extremities, oriented to place and person            Data Review:    Review and/or order of clinical lab test  Review and/or order of tests in the radiology section of CPT  Review and/or order of tests in the medicine section of CPT      Labs:     Recent Labs     06/19/22  1325   WBC 13.4*   HGB 14.0   HCT 42.9   * Recent Labs     06/19/22  1325      K 3.5      CO2 25   BUN 17   CREA 0.90   GLU 93   CA 10.2*     Recent Labs     06/19/22  1325   ALT 55   AP 98   TBILI 0.6   TP 7.4   ALB 3.9   GLOB 3.5     No results for input(s): INR, PTP, APTT, INREXT in the last 72 hours. No results for input(s): FE, TIBC, PSAT, FERR in the last 72 hours. Lab Results   Component Value Date/Time    Folate >24.0 (H) 08/13/2009 08:40 AM      No results for input(s): PH, PCO2, PO2 in the last 72 hours.   Recent Labs     06/19/22  1325   *     Lab Results   Component Value Date/Time    Cholesterol, total 177 02/03/2022 03:17 PM    HDL Cholesterol 50 02/03/2022 03:17 PM    LDL, calculated 100.8 (H) 02/03/2022 03:17 PM    Triglyceride 131 02/03/2022 03:17 PM    CHOL/HDL Ratio 3.5 02/03/2022 03:17 PM     Lab Results   Component Value Date/Time    Glucose (POC) 95 07/10/2009 01:33 PM     Lab Results   Component Value Date/Time    Color YELLOW/STRAW 10/14/2020 12:55 PM    Appearance CLEAR 10/14/2020 12:55 PM    Specific gravity 1.009 10/14/2020 12:55 PM    Specific gravity 1.020 02/15/2018 03:50 PM    pH (UA) CANCELED 02/26/2021 11:01 AM    Protein Negative 10/14/2020 12:55 PM    Glucose Negative 10/14/2020 12:55 PM    Ketone CANCELED 02/26/2021 11:01 AM    Bilirubin Negative 10/14/2020 12:55 PM    Urobilinogen 0.2 10/14/2020 12:55 PM    Nitrites Negative 10/14/2020 12:55 PM    Leukocyte Esterase Negative 10/14/2020 12:55 PM    Epithelial cells FEW 10/14/2020 12:55 PM    Bacteria Negative 10/14/2020 12:55 PM    WBC 0-4 10/14/2020 12:55 PM    RBC 0-5 10/14/2020 12:55 PM         Medications Reviewed:     Current Facility-Administered Medications   Medication Dose Route Frequency    atenoloL (TENORMIN) tablet 25 mg  25 mg Oral DAILY    diazePAM (VALIUM) tablet 2 mg  2 mg Oral Q12H PRN    escitalopram oxalate (LEXAPRO) tablet 10 mg  10 mg Oral DAILY    hydroCHLOROthiazide (HYDRODIURIL) tablet 25 mg  25 mg Oral DAILY    sodium chloride (NS) flush 5-40 mL  5-40 mL IntraVENous Q8H    sodium chloride (NS) flush 5-40 mL  5-40 mL IntraVENous PRN    acetaminophen (TYLENOL) tablet 650 mg  650 mg Oral Q6H PRN    Or    acetaminophen (TYLENOL) suppository 650 mg  650 mg Rectal Q6H PRN    polyethylene glycol (MIRALAX) packet 17 g  17 g Oral DAILY PRN    ondansetron (ZOFRAN ODT) tablet 4 mg  4 mg Oral Q8H PRN    Or    ondansetron (ZOFRAN) injection 4 mg  4 mg IntraVENous Q6H PRN    0.9% sodium chloride infusion  125 mL/hr IntraVENous CONTINUOUS     ______________________________________________________________________  EXPECTED LENGTH OF STAY: - - -  ACTUAL LENGTH OF STAY:          1                 Kimberlee Vazquez MD

## 2022-06-20 NOTE — PROGRESS NOTES
Problem: Pressure Injury - Risk of  Goal: *Prevention of pressure injury  Description: Document Jagdish Scale and appropriate interventions in the flowsheet. Outcome: Progressing Towards Goal  Note: Pressure Injury Interventions:       Moisture Interventions: Absorbent underpads,Check for incontinence Q2 hours and as needed    Activity Interventions: Increase time out of bed,PT/OT evaluation    Mobility Interventions: HOB 30 degrees or less,PT/OT evaluation,Turn and reposition approx. every two hours(pillow and wedges)    Nutrition Interventions: Document food/fluid/supplement intake    Friction and Shear Interventions: Lift team/patient mobility team,Transferring/repositioning devices                Problem: Patient Education: Go to Patient Education Activity  Goal: Patient/Family Education  Outcome: Progressing Towards Goal     Problem: Falls - Risk of  Goal: *Absence of Falls  Description: Document Kapil Fall Risk and appropriate interventions in the flowsheet.   Outcome: Progressing Towards Goal  Note: Fall Risk Interventions:  Mobility Interventions: Bed/chair exit alarm,Communicate number of staff needed for ambulation/transfer,OT consult for ADLs,Patient to call before getting OOB,PT Consult for mobility concerns              Elimination Interventions: Call light in reach,Patient to call for help with toileting needs,Toileting schedule/hourly rounds    History of Falls Interventions: Consult care management for discharge planning,Investigate reason for fall,Bed/chair exit alarm         Problem: Patient Education: Go to Patient Education Activity  Goal: Patient/Family Education  Outcome: Progressing Towards Goal     Problem: Pain  Goal: *Control of Pain  Outcome: Progressing Towards Goal  Goal: *PALLIATIVE CARE:  Alleviation of Pain  Outcome: Progressing Towards Goal     Problem: Patient Education: Go to Patient Education Activity  Goal: Patient/Family Education  Outcome: Progressing Towards Goal Problem: Patient Education: Go to Patient Education Activity  Goal: Patient/Family Education  Outcome: Progressing Towards Goal     Problem: Patient Education: Go to Patient Education Activity  Goal: Patient/Family Education  Outcome: Progressing Towards Goal     Problem: Discharge Planning  Goal: *Discharge to safe environment  Outcome: Progressing Towards Goal  Goal: *Knowledge of medication management  Outcome: Progressing Towards Goal  Goal: *Knowledge of discharge instructions  Outcome: Progressing Towards Goal     Problem: Patient Education: Go to Patient Education Activity  Goal: Patient/Family Education  Outcome: Progressing Towards Goal

## 2022-06-20 NOTE — PROGRESS NOTES
Transition of Care Plan: IPR ([facility]-referral pending)    RUR: 10% low    PCP F/U: Ibrahima Guillory    Disposition: IPR    Transportation: S    Main Contact: Rubin GRACEOKJNEJZ-[phone]    3116: Patient not the room. Spoke with sonJose via telephone call. Introduced self and role of CM. Confirmed demographics. States patient lives alone in a ground floor apartment. Has been independent, but states she may need more assistance at home. Discussed recommendation for IPR vs HH with 1 assist. States that he would like a referral sent to [facility]. Referral sent. States if they do not have a bed, then would also consider [facility]. Notified of IMM letter. Requests it be sent to: [email or web address]. Email sent. Will continue to follow    Jose Luis Olsen RN, CRM    Residency:  ground floor apartment  Lives With: alone  Prior functioning: independent   Prior DME used: walker (predominately), cane  Rehab history: Home Health: Unsure of agency. IPR: [facility]  Pharmacy: [facility] on [address]. Reason for Admission: Bilateral subdural hematomas                     RUR Score: 10% low                    Plan for utilizing home health: Therapy recommending IPR or HH with 1 assist at all times        PCP: First and Last name:  Lindsay Leger MD     Name of Practice:    Are you a current patient: Yes/No: yes   Approximate date of last visit: 4months ago   Can you participate in a virtual visit with your PCP:                     Current Advanced Directive/Advance Care Plan: Full Code      Healthcare Decision Maker:   Click here to complete [address] including selection of the Healthcare Decision Maker Relationship (ie \"Primary\")  Rubin TAVAREZ-[phone]                  Transition of Care Plan:  IPR                    Care Management Interventions  PCP Verified by CM:  Yes (Dr. Ibrahima Richard)  Mode of Transport at Discharge: S  Transition of Care Consult (CM Consult): Acute Rehab  Physical Therapy Consult: Yes  Occupational Therapy Consult: Yes  Support Systems: Child(nathaniel)  Confirm Follow Up Transport: Other (see comment) (BLS)  The Plan for Transition of Care is Related to the Following Treatment Goals : IPR  The Patient and/or Patient Representative was Provided with a Choice of Provider and Agrees with the Discharge Plan?: Yes  Name of the Patient Representative Who was Provided with a Choice of Provider and Agrees with the Discharge Plan: Magda Lucas  [place] of Choice List was Provided with Basic Dialogue that Supports the Patient's Individualized Plan of Care/Goals, Treatment Preferences and Shares the Quality Data Associated with the Providers?: Yes  Discharge Location  Patient Expects to be Discharged to[de-identified] Rehab hospital/unit acute

## 2022-06-20 NOTE — PROGRESS NOTES
Occupational Therapy    Orders received, chart reviewed and patient evaluated by occupational therapy. Pending progression with skilled acute occupational therapy, recommend:  Therapy 3 hours per day 5-7 days per week     Recommend with nursing ADLs with supervision/setup, OOB to chair 3x/day and toileting via functional mobility to and from bathroom min assist and walker. Thank you for completing as able in order to maintain patient strength, endurance and independence. Full evaluation to follow.              Evelyn Ordoñez, OT
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
## 2022-06-21 LAB
ALBUMIN SERPL-MCNC: 3.4 G/DL (ref 3.5–5)
ALBUMIN/GLOB SERPL: 1 {RATIO} (ref 1.1–2.2)
ALP SERPL-CCNC: 88 U/L (ref 45–117)
ALT SERPL-CCNC: 36 U/L (ref 12–78)
ANION GAP SERPL CALC-SCNC: 7 MMOL/L (ref 5–15)
AST SERPL-CCNC: 28 U/L (ref 15–37)
BILIRUB SERPL-MCNC: 0.6 MG/DL (ref 0.2–1)
BUN SERPL-MCNC: 11 MG/DL (ref 6–20)
BUN/CREAT SERPL: 15 (ref 12–20)
CALCIUM SERPL-MCNC: 9.9 MG/DL (ref 8.5–10.1)
CHLORIDE SERPL-SCNC: 109 MMOL/L (ref 97–108)
CK SERPL-CCNC: 295 U/L (ref 26–192)
CO2 SERPL-SCNC: 24 MMOL/L (ref 21–32)
COVID-19 RAPID TEST, COVR: NOT DETECTED
CREAT SERPL-MCNC: 0.72 MG/DL (ref 0.55–1.02)
ERYTHROCYTE [DISTWIDTH] IN BLOOD BY AUTOMATED COUNT: 12.9 % (ref 11.5–14.5)
FLUAV RNA SPEC QL NAA+PROBE: NOT DETECTED
FLUBV RNA SPEC QL NAA+PROBE: NOT DETECTED
GLOBULIN SER CALC-MCNC: 3.4 G/DL (ref 2–4)
GLUCOSE SERPL-MCNC: 96 MG/DL (ref 65–100)
HCT VFR BLD AUTO: 41 % (ref 35–47)
HGB BLD-MCNC: 13.6 G/DL (ref 11.5–16)
MCH RBC QN AUTO: 30.1 PG (ref 26–34)
MCHC RBC AUTO-ENTMCNC: 33.2 G/DL (ref 30–36.5)
MCV RBC AUTO: 90.7 FL (ref 80–99)
NRBC # BLD: 0 K/UL (ref 0–0.01)
NRBC BLD-RTO: 0 PER 100 WBC
PLATELET # BLD AUTO: 374 K/UL (ref 150–400)
PMV BLD AUTO: 10.6 FL (ref 8.9–12.9)
POTASSIUM SERPL-SCNC: 3.2 MMOL/L (ref 3.5–5.1)
PROT SERPL-MCNC: 6.8 G/DL (ref 6.4–8.2)
RBC # BLD AUTO: 4.52 M/UL (ref 3.8–5.2)
SARS-COV-2, COV2: NORMAL
SARS-COV-2, COV2: NOT DETECTED
SODIUM SERPL-SCNC: 140 MMOL/L (ref 136–145)
SOURCE, COVRS: NORMAL
WBC # BLD AUTO: 6.7 K/UL (ref 3.6–11)

## 2022-06-21 PROCEDURE — 82550 ASSAY OF CK (CPK): CPT

## 2022-06-21 PROCEDURE — 36415 COLL VENOUS BLD VENIPUNCTURE: CPT

## 2022-06-21 PROCEDURE — 80053 COMPREHEN METABOLIC PANEL: CPT

## 2022-06-21 PROCEDURE — 87636 SARSCOV2 & INF A&B AMP PRB: CPT

## 2022-06-21 PROCEDURE — 97116 GAIT TRAINING THERAPY: CPT

## 2022-06-21 PROCEDURE — 85027 COMPLETE CBC AUTOMATED: CPT

## 2022-06-21 PROCEDURE — 74011000250 HC RX REV CODE- 250: Performed by: STUDENT IN AN ORGANIZED HEALTH CARE EDUCATION/TRAINING PROGRAM

## 2022-06-21 PROCEDURE — 97530 THERAPEUTIC ACTIVITIES: CPT

## 2022-06-21 PROCEDURE — 74011250637 HC RX REV CODE- 250/637: Performed by: HOSPITALIST

## 2022-06-21 PROCEDURE — 87635 SARS-COV-2 COVID-19 AMP PRB: CPT

## 2022-06-21 PROCEDURE — 65270000029 HC RM PRIVATE

## 2022-06-21 PROCEDURE — 74011250636 HC RX REV CODE- 250/636: Performed by: STUDENT IN AN ORGANIZED HEALTH CARE EDUCATION/TRAINING PROGRAM

## 2022-06-21 PROCEDURE — 74011250637 HC RX REV CODE- 250/637: Performed by: STUDENT IN AN ORGANIZED HEALTH CARE EDUCATION/TRAINING PROGRAM

## 2022-06-21 RX ORDER — POTASSIUM CHLORIDE 750 MG/1
40 TABLET, FILM COATED, EXTENDED RELEASE ORAL
Status: COMPLETED | OUTPATIENT
Start: 2022-06-21 | End: 2022-06-21

## 2022-06-21 RX ORDER — BISMUTH SUBSALICYLATE 262 MG/15ML
30 LIQUID ORAL
Qty: 473 ML | Refills: 0 | Status: SHIPPED | OUTPATIENT
Start: 2022-06-21 | End: 2022-07-01

## 2022-06-21 RX ADMIN — ATENOLOL 25 MG: 25 TABLET ORAL at 08:41

## 2022-06-21 RX ADMIN — SODIUM CHLORIDE, PRESERVATIVE FREE 10 ML: 5 INJECTION INTRAVENOUS at 06:00

## 2022-06-21 RX ADMIN — HYDROCHLOROTHIAZIDE 25 MG: 25 TABLET ORAL at 08:41

## 2022-06-21 RX ADMIN — ACETAMINOPHEN 650 MG: 325 TABLET ORAL at 05:21

## 2022-06-21 RX ADMIN — SODIUM CHLORIDE, PRESERVATIVE FREE 10 ML: 5 INJECTION INTRAVENOUS at 14:00

## 2022-06-21 RX ADMIN — SODIUM CHLORIDE 125 ML/HR: 9 INJECTION, SOLUTION INTRAVENOUS at 04:35

## 2022-06-21 RX ADMIN — ESCITALOPRAM OXALATE 10 MG: 10 TABLET ORAL at 08:42

## 2022-06-21 RX ADMIN — POTASSIUM CHLORIDE 40 MEQ: 750 TABLET, EXTENDED RELEASE ORAL at 08:41

## 2022-06-21 NOTE — PROGRESS NOTES
Problem: Mobility Impaired (Adult and Pediatric)  Goal: *Acute Goals and Plan of Care (Insert Text)  Description: FUNCTIONAL STATUS PRIOR TO ADMISSION: Pt was independent with the use of a 3-wheel rollator for all mobility. Pt was independent with ADLs and has not been driving for years (family assists). 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 HOME SUPPORT PRIOR TO ADMISSION: Pt lives alone, son and grandson lives nearby. Physical Therapy Goals  Initiated 6/20/2022  1. Patient will move from supine to sit and sit to supine , scoot up and down, and roll side to side in bed with modified independence within 7 day(s). 2.  Patient will transfer from bed to chair and chair to bed with supervision using the least restrictive device within 7 day(s). 3.  Patient will perform sit to stand with supervision within 7 day(s). 4.  Patient will ambulate with supervision for 50 feet with the least restrictive device within 7 day(s). Outcome: Progressing Towards Goal   PHYSICAL THERAPY TREATMENT  Patient: Alcides Parikh (15 y.o. female)  Date: 6/21/2022  Diagnosis: Bilateral subdural hematomas ([facility] [address] [facility] [S06.5X9A] <principal problem not specified>       Precautions:    Chart, physical therapy assessment, plan of care and goals were reviewed. ASSESSMENT  Pt cleared by RN. Patient continues with skilled PT services and is progressing towards goals. Pt [place]; agreeable to bed mobility, transfers, gait, and OOB to chair for lunch post session events. Pt admits to confusion and memory deficits. She reports dizziness, HAs and VSS when checked in sitting, standing, and post walk. Pt left in NAD in chair with her meal and son present following requested tasks. Pt planned for D/C to IPR once medically stable.      Current Level of Function Impacting Discharge (mobility/balance): upwards of Stephanie with DME and safety concerns    Other factors to consider for discharge: pt pleasant and agreeable for session events, she is self-limiting during ambulation citing pain \"all over\"         PLAN :  Patient continues to benefit from skilled intervention to address the above impairments. Continue treatment per established plan of care. to address goals. Recommendation for discharge: (in order for the patient to meet his/her long term goals)  Therapy 3 hours per day 5-7 days per week    This discharge recommendation:  Has been made in collaboration with the attending provider and/or case management    IF patient discharges home will need the following DME: to be determined (TBD)       SUBJECTIVE:   Patient stated I want someone to bring my curling iron. ..    OBJECTIVE DATA SUMMARY:   Critical Behavior:  Neurologic State: Appropriate for age  Orientation Level: Oriented X4  Cognition: Appropriate for age attention/concentration     Functional Mobility Training:  Bed Mobility:  Supine to Sit: Contact guard assistance; Additional time;Bed Modified (HOB elevated and rail used)  Sit to Supine:  (NT; OOB to chair)  Scooting: Stand-by assistance; Additional time  Transfers:  Sit to Stand: Minimum assistance; Additional time (cues to push from support surface/not pull from RW)  Stand to Sit: Contact guard assistance; Additional time (cues for alignment, reach back, controlled descent)  Bed to Chair:  (NT)  Balance:  Sitting: Intact  Sitting - Static: Good (unsupported)  Sitting - Dynamic: Good (unsupported)  Standing: Impaired; With support  Standing - Static: Fair;Constant support  Standing - Dynamic : Fair;Constant support  Ambulation/Gait Training:  Distance (ft): 12 Feet (ft) (x2)  Assistive Device: Gait belt;Walker, rolling  Ambulation - Level of Assistance: Minimal assistance; Additional time  Gait Abnormalities: Decreased step clearance;Shuffling gait  Speed/Lluvia: Slow;Shuffled  Step Length: Right shortened;Left shortened  Pain Rating:  Pt did not provide a VAS for \"pain all over\"    Activity Tolerance:   Fair and SpO2 stable on RA    After treatment patient left in no apparent distress:   Sitting in chair, Call bell within reach, Bed / chair alarm activated, and Caregiver / family present    COMMUNICATION/COLLABORATION:   The patients plan of care was discussed with: Registered nurse.      Rhonda Quiñones   Time Calculation: 24 mins

## 2022-06-21 NOTE — PROGRESS NOTES
Physician Progress Note      PATIENT:               Dina Auguste [facility]  CSN #:                  [id number]  :                       1935  ADMIT DATE:       2022 10:23 PM  [address] DATE:  RESPONDING  PROVIDER #:        Kami Lerma MD          QUERY TEXT:    Patient admitted with acute on chronic bilateral subdural hematoma. If possible, please document in progress notes and discharge summary if you are treating/evaluating any of the following: The medical record reflects the following:    Risk Factors: Chronic SDH  HTN      Clinical Indicators:  H&P 22  Neurologic: CNII-XII Intact. Over 5 strength in bilateral upper and lower extremities    Acute on chronic bilateral subdural hematomas  -Neurosurgery recommended outpatient follow-up and imaging  -Patient admitted due to poor home disposition  -No need for repeat neurosurgical consult at this time  -Plan for repeat CT head prior to discharge    CT HEAD WO CONT   There is suggestion of slight increase in size of the bilateral acute on chronic  subdural hematomas. The right frontal approach ventricular shunt catheter is in  stable position.  The left intraventricular meningioma is unchanged    CT of the Head   Stable CT imaging appearance of the head with unchanged appearance of right  trans-frontal ventriculostomy tube, bilateral subdural hematomas, and atrial  left ventricular mass lesion      BP: 137//80        Treatment:  neuro ck  PT/OT eval  Tenormin 25mg po qd  HCTZ 25mg po qd    Thank you,  Romayne Farber RN [facility]  Clinical Documentation  [phone] or via [address] provided:  -- Nontraumatic acute on chronic bilateral subdural hematoma  -- Traumatic acute on chronic bilateral subdural hematoma  -- Other - I will add my own diagnosis  -- Disagree - Not applicable / Not valid  -- Disagree - Clinically unable to determine / Unknown  -- Refer to Clinical Documentation Reviewer    PROVIDER RESPONSE TEXT:    This patient has a traumatic acute on chronic bilateral subdural hematoma.     Query created by: Veda Tran on 6/21/2022 10:16 AM      Electronically signed by:  Rigoberto Baird MD 6/21/2022 6:54 PM

## 2022-06-21 NOTE — PROGRESS NOTES
Problem: Pressure Injury - Risk of  Goal: *Prevention of pressure injury  Description: Document Jagdish Scale and appropriate interventions in the flowsheet. Outcome: Progressing Towards Goal  Note: Pressure Injury Interventions:       Moisture Interventions: Check for incontinence Q2 hours and as needed    Activity Interventions: Increase time out of bed,Pressure redistribution bed/mattress(bed type),PT/OT evaluation    Mobility Interventions: HOB 30 degrees or less,PT/OT evaluation,Turn and reposition approx. every two hours(pillow and wedges)    Nutrition Interventions: Offer support with meals,snacks and hydration    Friction and Shear Interventions: Lift team/patient mobility team,Transferring/repositioning devices                Problem: Falls - Risk of  Goal: *Absence of Falls  Description: Document Kapil Fall Risk and appropriate interventions in the flowsheet.   Outcome: Progressing Towards Goal  Note: Fall Risk Interventions:  Mobility Interventions: Patient to call before getting OOB,Utilize walker, cane, or other assistive device         Medication Interventions: Patient to call before getting OOB,Teach patient to arise slowly    Elimination Interventions: Call light in reach,Patient to call for help with toileting needs    History of Falls Interventions: Door open when patient unattended

## 2022-06-21 NOTE — ROUTINE PROCESS
TRANSFER - OUT REPORT:    Verbal report given to DANIE Ramirez(name) on Karen Treviño  being transferred to  (unit) for routine progression of care       Report consisted of patients Situation, Background, Assessment and   Recommendations(SBAR). Information from the following report(s) SBAR, Kardex, MAR, Cardiac Rhythm NSR and Dual Neuro Assessment was reviewed with the receiving nurse. Lines:   Peripheral IV 06/19/22 Anterior;Proximal;Right Forearm (Active)   Site Assessment Clean, dry, & intact 06/20/22 1600   Phlebitis Assessment 0 06/20/22 1600   Infiltration Assessment 0 06/20/22 1600   Dressing Status Clean, dry, & intact 06/20/22 1600   Dressing Type Transparent;Tape 06/20/22 1600   Hub Color/Line Status Pink 06/20/22 1600   Action Taken Open ports on tubing capped 06/20/22 1600   Alcohol Cap Used Yes 06/20/22 1600        Opportunity for questions and clarification was provided.       Patient transported with:   [employer]

## 2022-06-21 NOTE — PROGRESS NOTES
Pt to transfer to [facility]. Reported called to [place], [address] [facility] @ [phone]. All questions answered and cb# given for any further question.

## 2022-06-21 NOTE — PROGRESS NOTES
Problem: Pressure Injury - Risk of  Goal: *Prevention of pressure injury  Description: Document Jagdish Scale and appropriate interventions in the flowsheet. Outcome: Resolved/Met     Problem: Patient Education: Go to Patient Education Activity  Goal: Patient/Family Education  Outcome: Resolved/Met     Problem: Falls - Risk of  Goal: *Absence of Falls  Description: Document Kapil Fall Risk and appropriate interventions in the flowsheet.   Outcome: Resolved/Met     Problem: Patient Education: Go to Patient Education Activity  Goal: Patient/Family Education  Outcome: Resolved/Met     Problem: Pain  Goal: *Control of Pain  Outcome: Resolved/Met  Goal: *PALLIATIVE CARE:  Alleviation of Pain  Outcome: Resolved/Met     Problem: Patient Education: Go to Patient Education Activity  Goal: Patient/Family Education  Outcome: Resolved/Met     Problem: Patient Education: Go to Patient Education Activity  Goal: Patient/Family Education  Outcome: Resolved/Met     Problem: Patient Education: Go to Patient Education Activity  Goal: Patient/Family Education  Outcome: Resolved/Met     Problem: Discharge Planning  Goal: *Discharge to safe environment  Outcome: Resolved/Met  Goal: *Knowledge of medication management  Outcome: Resolved/Met  Goal: *Knowledge of discharge instructions  Outcome: Resolved/Met     Problem: Patient Education: Go to Patient Education Activity  Goal: Patient/Family Education  Outcome: Resolved/Met

## 2022-06-21 NOTE — PROGRESS NOTES
Transition of Care Plan   RUR- Low 10%    DISPOSITION: The disposition plan is IPR: [facility]-   o Call Report: [phone]   F/U with PCP/Specialist     Transport: [facility] 5:45p      Inpatient Rehab/ Hospital to Hospital Transition of Care Note /Discharge Note       EMTALA filed and ready for MD to sign at bedside chart. Accepting Physician: Dr. Lauren Tracey     Discharging Physician: Dr. Jose Lopez     Accepting Representative: Karn Boas: Carmelina Vila RN call to report: [phone]    [facility]: [facility] phone [phone]     time: 5:45pm    Ambulance packet at bedside chart. The attending physician and the primary nurse were notified of the plan. Medicare pt has received, reviewed, and signed 2nd IM letter informing them of their right to appeal the discharge. Signed copy has been placed on pt bedside chart.     CM: [address]. MSW,   [phone]

## 2022-06-21 NOTE — PROGRESS NOTES
[facility] Adult  Hospitalist Group                                                                                          Hospitalist Progress Note  Ellen Dutta MD  Answering service: [phone] from in house phone        Date of Service:  2022  NAME:  Yenni Hernandez  :  1935  MRN:  [id number]      Admission Summary:     Yenni Hernandez is a 81 y.o. female with past medical history of macular degeneration, NPH, seizure disorder, major depressive disorder with generalized anxiety disorder, history of meningioma who presented to ER from home for evaluation after a fall. Loss of balance, slipped on a rug and fell. Had pain CT in ER remarkable for concerns and slight increase in size of bilateral chronic subdural hematomas. Neurosurgery was consulted and recommended outpatient repeat imaging. Per chart review, patient was an unsafe discharge home and decision was made to admit her to hospital for disposition. She is seen and examined at bedside. Resting comfortably. Denies any headaches. No acute complaints or concerns. The patient denies any fever, chills, chest or abdominal pain, nausea, vomiting, cough, congestion, recent illness, palpitations, or dysuria. Interval history / Subjective:     She said she feels better. I called her son and updated the clinical finding, care plan, including discharge plan and questions answered     Assessment & Plan:     Acute on chronic bilateral subdural hematomas   -repeated CT head on  stable CT imaging appearance of the head with unchanged appearance of righttrans-frontal ventriculostomy tube, bilateral subdural hematomas, and atrial left ventricular mass lesion.   -stable, conscious and alert, answer questions and moves all extremities  -continue neuro check and supportive care  -Neurosurgery recommended outpatient follow-up and imaging  -continue PT/OT     Fall   -CT pelvis no evidence of fracture  -CT cervical spine and lumbar spine no evidence of fracture or subluxation.  -fall precaution   -Continue PT/OT    Hx of NPH   -stable    Depressive disorder with generalized anxiety   -stable  -continue lexapro    HTN  -BP fairly controlled, continue atenolol, hydrochlorothiazide and monitor BP    Hx of seizure disorder   -stable, patient on valium at home, on valium 2 mg po bid prn  -seizure precaution     Mildly elevated CK   -stable,   -encourage mor fluid    Leukocytosis likely reactive  -afebrile   -no evidence of infection  -resolved       Code status: Full Code  Prophylaxis: SCD  Care Plan discussed with: Patient, Nurse and CM  Anticipated Disposition: SNF      Hospital Problems  Date Reviewed: 3/9/2021          Codes Class Noted POA    Bilateral subdural hematomas ([facility] [address] ICD-10-CM: S46.0G9V  ICD-9-CM: 432.1  6/19/2022 Unknown               Vital Signs:    Last 24hrs VS reviewed since prior progress note. Most recent are:  Visit Vitals  BP (!) 168/95 (BP 1 Location: Left upper arm, BP Patient Position: Other (Comment))   Pulse 76   Temp 97.9 °F (36.6 °C)   Resp 17   SpO2 93%         Intake/Output Summary (Last 24 hours) at 6/21/2022 1241  Last data filed at 6/21/2022 0901  Gross per 24 hour   Intake 1265 ml   Output --   Net 1265 ml        Physical Examination:     I had a face to face encounter with this patient and independently examined them on 6/21/2022 as outlined below:          Constitutional:  No acute distress, cooperative, pleasant    ENT:  Oral mucosa moist, oropharynx benign. Resp:  CTA bilaterally. No wheezing/rhonchi/rales. No accessory muscle use. CV:  Regular rhythm, normal rate, no murmurs, gallops, rubs    GI:  Soft, non distended, non tender.  normoactive bowel sounds, no hepatosplenomegaly     Musculoskeletal:  No edema      Neurologic:  Conscious and alert, moves all extremities, oriented to place and person            Data Review:    Review and/or order of clinical lab test  Review and/or order of tests in the radiology section of CPT  Review and/or order of tests in the medicine section of CPT      Labs:     Recent Labs     06/21/22  0248 06/19/22  1325   WBC 6.7 13.4*   HGB 13.6 14.0   HCT 41.0 42.9    405*     Recent Labs     06/21/22  0248 06/19/22  1325    141   K 3.2* 3.5   * 106   CO2 24 25   BUN 11 17   CREA 0.72 0.90   GLU 96 93   CA 9.9 10.2*     Recent Labs     06/21/22  0248 06/19/22  1325   ALT 36 55   AP 88 98   TBILI 0.6 0.6   TP 6.8 7.4   ALB 3.4* 3.9   GLOB 3.4 3.5     No results for input(s): INR, PTP, APTT, INREXT, INREXT in the last 72 hours. No results for input(s): FE, TIBC, PSAT, FERR in the last 72 hours. Lab Results   Component Value Date/Time    Folate >24.0 (H) 08/13/2009 08:40 AM      No results for input(s): PH, PCO2, PO2 in the last 72 hours.   Recent Labs     06/21/22  0248 06/19/22  1325   * 259*     Lab Results   Component Value Date/Time    Cholesterol, total 177 02/03/2022 03:17 PM    HDL Cholesterol 50 02/03/2022 03:17 PM    LDL, calculated 100.8 (H) 02/03/2022 03:17 PM    Triglyceride 131 02/03/2022 03:17 PM    CHOL/HDL Ratio 3.5 02/03/2022 03:17 PM     Lab Results   Component Value Date/Time    Glucose (POC) 95 07/10/2009 01:33 PM     Lab Results   Component Value Date/Time    Color YELLOW/STRAW 10/14/2020 12:55 PM    Appearance CLEAR 10/14/2020 12:55 PM    Specific gravity 1.009 10/14/2020 12:55 PM    Specific gravity 1.020 02/15/2018 03:50 PM    pH (UA) CANCELED 02/26/2021 11:01 AM    Protein Negative 10/14/2020 12:55 PM    Glucose Negative 10/14/2020 12:55 PM    Ketone CANCELED 02/26/2021 11:01 AM    Bilirubin Negative 10/14/2020 12:55 PM    Urobilinogen 0.2 10/14/2020 12:55 PM    Nitrites Negative 10/14/2020 12:55 PM    Leukocyte Esterase Negative 10/14/2020 12:55 PM    Epithelial cells FEW 10/14/2020 12:55 PM    Bacteria Negative 10/14/2020 12:55 PM    WBC 0-4 10/14/2020 12:55 PM    RBC 0-5 10/14/2020 12:55 PM         Medications Reviewed:     Current Facility-Administered Medications   Medication Dose Route Frequency    bismuth subsalicylate (PEPTO-BISMOL) oral suspension 30 mL  30 mL Oral Q6H PRN    atenoloL (TENORMIN) tablet 25 mg  25 mg Oral DAILY    diazePAM (VALIUM) tablet 2 mg  2 mg Oral Q12H PRN    escitalopram oxalate (LEXAPRO) tablet 10 mg  10 mg Oral DAILY    hydroCHLOROthiazide (HYDRODIURIL) tablet 25 mg  25 mg Oral DAILY    sodium chloride (NS) flush 5-40 mL  5-40 mL IntraVENous Q8H    sodium chloride (NS) flush 5-40 mL  5-40 mL IntraVENous PRN    acetaminophen (TYLENOL) tablet 650 mg  650 mg Oral Q6H PRN    Or    acetaminophen (TYLENOL) suppository 650 mg  650 mg Rectal Q6H PRN    polyethylene glycol (MIRALAX) packet 17 g  17 g Oral DAILY PRN    ondansetron (ZOFRAN ODT) tablet 4 mg  4 mg Oral Q8H PRN    Or    ondansetron (ZOFRAN) injection 4 mg  4 mg IntraVENous Q6H PRN     ______________________________________________________________________  EXPECTED LENGTH OF STAY: 2d 0h  ACTUAL LENGTH OF STAY:          2                 Skylar Ruiz MD

## 2022-06-21 NOTE — DISCHARGE SUMMARY
Discharge Summary       PATIENT ID: Yenni Hernandez  MRN: [id number]   YOB: 1935    DATE OF ADMISSION: 6/19/2022 10:23 PM    DATE OF DISCHARGE: 6/21/2022   PRIMARY CARE PROVIDER: Ana Buck MD     ATTENDING PHYSICIAN: Kenya Rosas MD   DISCHARGING PROVIDER: Ellen Dutta MD    To contact this individual call [phone] and ask the  to page. If unavailable ask to be transferred the Adult Hospitalist Department. CONSULTATIONS: None    PROCEDURES/SURGERIES: * No surgery found *    ADMITTING DIAGNOSES & HOSPITAL COURSE:     Marika Clancy is a 81 y. o. female with past medical history of macular degeneration, NPH, seizure disorder, major depressive disorder with generalized anxiety disorder, history of meningioma who presented to ER from home for evaluation after a fall.  Loss of balance, slipped on a rug and fell. Had pain CT in ER remarkable for concerns and slight increase in size of bilateral chronic subdural hematomas. Neurosurgery was consulted and recommended outpatient repeat imaging. Per chart review, patient was an unsafe discharge home and decision was made to admit her to hospital for disposition. She is seen and examined at bedside.  Resting comfortably.  Denies any headaches.  No acute complaints or concerns. The patient denies any fever, chills, chest or abdominal pain, nausea, vomiting, cough, congestion, recent illness, palpitations, or dysuria. Acute on chronic bilateral subdural hematomas   -repeated CT head on 6/20 stable CT imaging appearance of the head with unchanged appearance of righttrans-frontal ventriculostomy tube, bilateral subdural hematomas, and atrial left ventricular mass lesion.   -stable, conscious and alert, answer questions and moves all extremities  -continue neuro check and supportive care  -Neurosurgery recommended outpatient follow-up and imaging  -continue PT/OT   Fall   -CT pelvis no evidence of fracture  -CT cervical spine and lumbar spine no evidence of fracture or subluxation.  -fall precaution   -Continue PT/OT   Hx of NPH   -stable  Depressive disorder with generalized anxiety   -stable  -continue lexapro  HTN  -BP fairly controlled, continue atenolol, hydrochlorothiazide and monitor BP  Hx of seizure disorder   -stable, patient on valium at home, on valium 2 mg po bid prn  -seizure precaution   Mildly elevated CK   -stable,   -encourage mor fluid  Leukocytosis likely reactive  -afebrile   -no evidence of infection  -resolved        Code status: Full Code  Prophylaxis: SCD    DISCHARGE DIAGNOSES / PLAN:      Acute on chronic bilateral subdural hematomas   -stable, conscious and alert, oriented to place and person, answer questions and moves all extremities  -continue PT/OT   Fall   -fall precaution   -Continue PT/OT   Hx of NPH   -stable  Depressive disorder with generalized anxiety   -continue lexapro  HTN  - continue atenolol, hydrochlorothiazide and monitor BP  Hx of seizure disorder   -stable, patient on valium at home   -seizure precaution   Mildly elevated CK   -stable  -encourage mor fluid  Leukocytosis likely reactive  -resolved       PENDING TEST RESULTS:   At the time of discharge the following test results are still pending: None    FOLLOW UP APPOINTMENTS:    Follow-up Information     Follow up With Specialties Details Why Contact Info    Ana M Canales MD Internal Medicine Physician In one week  [address]  [phone]              ADDITIONAL CARE RECOMMENDATIONS: None    DIET: Regular Diet      ACTIVITY: Activity as tolerated    WOUND CARE: None    EQUIPMENT needed: None       DISCHARGE MEDICATIONS:  Current Discharge Medication List      START taking these medications    Details   bismuth subsalicylate (PEPTO-BISMOL) 262 mg/15 mL suspension Take 30 mL by mouth every six (6) hours as needed for Indigestion for up to 10 days.  Duanne Felty: 473 mL, Refills: 0  Start date: 6/21/2022, End date: 7/1/2022         CONTINUE these medications which have NOT CHANGED    Details   !! escitalopram oxalate (LEXAPRO) 5 mg tablet TAKE 1 TABLET BY MOUTH EVERY DAY  Qty: 90 Tablet, Refills: 1      loratadine (Claritin) 10 mg tablet Take 10 mg by mouth daily. fluticasone propionate (Flonase Allergy Relief) 50 mcg/actuation nasal spray 2 Sprays by Both Nostrils route daily. !! escitalopram oxalate (LEXAPRO) 10 mg tablet Take 1 Tablet by mouth daily. Qty: 90 Tablet, Refills: 1      ondansetron (ZOFRAN ODT) 4 mg disintegrating tablet Take 1 Tablet by mouth every eight (8) hours as needed for Nausea or Vomiting. Qty: 12 Tablet, Refills: 4      atenoloL (TENORMIN) 25 mg tablet TAKE 1 TABLET BY MOUTH EVERY DAY  Qty: 90 Tablet, Refills: 0    Associated Diagnoses: Essential hypertension      guaiFENesin ER (Mucinex) 600 mg ER tablet Take 600 mg by mouth two (2) times a day. diazePAM (VALIUM) 5 mg tablet TAKE 1/2 TABLET BY MOUTH IN THE MORNING AND 1 AT BEDTIME  Qty: 45 Tablet, Refills: 5    Comments: This request is for a new prescription for a controlled substance as required by Federal/State law. Associated Diagnoses: Seizure ([address]      hydroCHLOROthiazide (HYDRODIURIL) 25 mg tablet Take 1 Tab by mouth daily. Qty: 90 Tab, Refills: 3    Associated Diagnoses: Essential hypertension      aspirin 81 mg chewable tablet Take 81 mg by mouth daily. polyethylene glycol (MIRALAX) 17 gram/dose powder Take 17 g by mouth daily. ASCORBIC ACID/MULTIVIT-MIN (EMERGEN-C PO) Take  by mouth as needed. acetaminophen (TYLENOL EXTRA STRENGTH) 500 mg tablet Take  by mouth every six (6) hours as needed for Pain. !! - Potential duplicate medications found. Please discuss with provider. NOTIFY YOUR PHYSICIAN FOR ANY OF THE FOLLOWING:   Fever over 101 degrees for 24 hours.    Chest pain, shortness of breath, fever, chills, nausea, vomiting, diarrhea, change in mentation, falling, weakness, bleeding. Severe pain or pain not relieved by medications. Or, any other signs or symptoms that you may have questions about.     DISPOSITION:    Home With:   OT  PT  HH  RN      x Long term SNF/Inpatient Rehab    Independent/assisted living    Hospice    Other:       PATIENT CONDITION AT DISCHARGE:     Functional status    Poor    x Deconditioned     Independent      Cognition     Lucid    x Forgetful     Dementia      Catheters/lines (plus indication)    Rahman     PICC     PEG    x None      Code status   x  Full code     DNR      PHYSICAL EXAMINATION AT DISCHARGE:   Refer to Progress Note       CHRONIC MEDICAL DIAGNOSES:  Problem List as of 6/21/2022 Date Reviewed: 3/9/2021          Codes Class Noted - Resolved    RESOLVED: TIA (transient ischemic attack) ICD-10-CM: G45.9  ICD-9-CM: 435.9 Acute 4/11/2011 - 4/12/2011        Bilateral subdural hematomas ([facility] ICD-10-CM: H70.6U7O  ICD-9-CM: 432.1  6/19/2022 - Present        NPH (normal pressure hydrocephalus) ([facility] ICD-10-CM: G91.2  ICD-9-CM: 331.5  6/3/2019 - Present        Exudative age-related macular degeneration ([facility] ICD-10-CM: H35.3290  ICD-9-CM: 362.52  1/27/2016 - Present        Nuclear nonsenile cataract ICD-10-CM: H26.9  ICD-9-CM: 366.04  1/27/2016 - Present        Essential hypertension ICD-10-CM: I10  ICD-9-CM: 401.9  5/6/2015 - Present        Major depressive disorder, recurrent episode, severe ([facility] ICD-10-CM: F33.2  ICD-9-CM: 296.33  1/22/2015 - Present        Macular degeneration ICD-10-CM: H35.30  ICD-9-CM: 362.50  7/22/2014 - Present        Kidney stones ICD-10-CM: N20.0  ICD-9-CM: 592.0  11/17/2010 - Present        TIA (transient ischemic attack) ICD-10-CM: G45.9  ICD-9-CM: 435.9  11/16/2010 - Present    Overview Signed 11/16/2010  2:55 PM by Tran Piper MD     2009 Dr Ajit Flowers             Sleep apnea ICD-10-CM: G47.30  ICD-9-CM: 780.57  11/16/2010 - Present    Overview Signed 11/16/2010 2:56 PM by Nicolas Mak MD     cpap                   Greater than 45 minutes were spent with the patient on counseling and coordination of care    Signed:   Susana Hoffmann MD  6/21/2022  1:23 PM

## 2022-06-22 VITALS
HEART RATE: 92 BPM | RESPIRATION RATE: 18 BRPM | TEMPERATURE: 98.5 F | DIASTOLIC BLOOD PRESSURE: 66 MMHG | OXYGEN SATURATION: 93 % | SYSTOLIC BLOOD PRESSURE: 146 MMHG

## 2022-06-22 PROCEDURE — 74011250637 HC RX REV CODE- 250/637: Performed by: STUDENT IN AN ORGANIZED HEALTH CARE EDUCATION/TRAINING PROGRAM

## 2022-06-22 RX ADMIN — ACETAMINOPHEN 650 MG: 325 TABLET ORAL at 05:57

## 2022-06-22 NOTE — DISCHARGE INSTRUCTIONS
Discharge SNF/Rehab Instructions/LTAC       PATIENT ID: Karen Treviño  MRN: [id number]   YOB: 1935    DATE OF ADMISSION: 6/19/2022 10:23 PM    DATE OF DISCHARGE: 6/21/2022    PRIMARY CARE PROVIDER: Marie Saprks MD       ATTENDING PHYSICIAN: Leo Ferrell MD  DISCHARGING PROVIDER: Kimberlee Vazquez MD     To contact this individual call [phone] and ask the  to page. If unavailable ask to be transferred the Adult Hospitalist Department. CONSULTATIONS: None    PROCEDURES/SURGERIES: * No surgery found *    ADMITTING DIAGNOSES & HOSPITAL COURSE:     Britta Clancy is a 81 y. o. female with past medical history of macular degeneration, NPH, seizure disorder, major depressive disorder with generalized anxiety disorder, history of meningioma who presented to ER from home for evaluation after a fall.  Loss of balance, slipped on a rug and fell. Had pain CT in ER remarkable for concerns and slight increase in size of bilateral chronic subdural hematomas. Neurosurgery was consulted and recommended outpatient repeat imaging. Per chart review, patient was an unsafe discharge home and decision was made to admit her to hospital for disposition. She is seen and examined at bedside.  Resting comfortably.  Denies any headaches.  No acute complaints or concerns. The patient denies any fever, chills, chest or abdominal pain, nausea, vomiting, cough, congestion, recent illness, palpitations, or dysuria. Acute on chronic bilateral subdural hematomas   -repeated CT head on 6/20 stable CT imaging appearance of the head with unchanged appearance of righttrans-frontal ventriculostomy tube, bilateral subdural hematomas, and atrial left ventricular mass lesion.   -stable, conscious and alert, answer questions and moves all extremities  -continue neuro check and supportive care  -Neurosurgery recommended outpatient follow-up and imaging  -continue PT/OT   Fall   -CT pelvis no evidence of fracture  -CT cervical spine and lumbar spine no evidence of fracture or subluxation.  -fall precaution   -Continue PT/OT   Hx of NPH   -stable  Depressive disorder with generalized anxiety   -stable  -continue lexapro  HTN  -BP fairly controlled, continue atenolol, hydrochlorothiazide and monitor BP  Hx of seizure disorder   -stable, patient on valium at home, on valium 2 mg po bid prn  -seizure precaution   Mildly elevated CK   -stable,   -encourage mor fluid  Leukocytosis likely reactive  -afebrile   -no evidence of infection  -resolved        Code status: Full Code  Prophylaxis: SCD    DISCHARGE DIAGNOSES / PLAN:      Acute on chronic bilateral subdural hematomas   -stable, conscious and alert, oriented to place and person, answer questions and moves all extremities  -continue PT/OT   Fall   -fall precaution   -Continue PT/OT   Hx of NPH   -stable  Depressive disorder with generalized anxiety   -continue lexapro  HTN  - continue atenolol, hydrochlorothiazide and monitor BP  Hx of seizure disorder   -stable, patient on valium at home   -seizure precaution   Mildly elevated CK   -stable  -encourage mor fluid  Leukocytosis likely reactive  -resolved      PENDING TEST RESULTS:   At the time of discharge the following test results are still pending: None    FOLLOW UP APPOINTMENTS:    Follow-up Information     Follow up With Specialties Details Why Contact Info    Juanita Pino MD Internal Medicine Physician In one week   [address]  [address]  [address] [phone]          ADDITIONAL CARE RECOMMENDATIONS:      DIET: Regular Diet     TUBE FEEDING INSTRUCTIONS: None    OXYGEN / BiPAP SETTINGS: None    ACTIVITY: Activity as tolerated    WOUND CARE: None    EQUIPMENT needed: None      DISCHARGE MEDICATIONS:   See Medication Reconciliation Form      NOTIFY YOUR PHYSICIAN FOR ANY OF THE FOLLOWING:   Fever over 101 degrees for 24 hours. Chest pain, shortness of breath, fever, chills, nausea, vomiting, diarrhea, change in mentation, falling, weakness, bleeding. Severe pain or pain not relieved by medications. Or, any other signs or symptoms that you may have questions about.     DISPOSITION:    Home With:   OT  PT  HH  RN      x SNF/Inpatient Rehab/LTAC    Independent/assisted living    Hospice    Other:       PATIENT CONDITION AT DISCHARGE:     Functional status    Poor    x Deconditioned     Independent      Cognition     Lucid    x Forgetful     Dementia      Catheters/lines (plus indication)    Rahman     PICC     PEG    x None      Code status    x Full code     DNR      PHYSICAL EXAMINATION AT DISCHARGE:   Refer to Progress Note       CHRONIC MEDICAL DIAGNOSES:  Problem List as of 6/21/2022 Date Reviewed: 3/9/2021          Codes Class Noted - Resolved    RESOLVED: TIA (transient ischemic attack) ICD-10-CM: G45.9  ICD-9-CM: 435.9 Acute 4/11/2011 - 4/12/2011        Bilateral subdural hematomas ([facility] ICD-10-CM: J65.3Q1C  ICD-9-CM: 432.1  6/19/2022 - Present        NPH (normal pressure hydrocephalus) ([facility] ICD-10-CM: G91.2  ICD-9-CM: 331.5  6/3/2019 - Present        Exudative age-related macular degeneration ([facility] ICD-10-CM: H35.3290  ICD-9-CM: 362.52  1/27/2016 - Present        Nuclear nonsenile cataract ICD-10-CM: H26.9  ICD-9-CM: 366.04  1/27/2016 - Present        Essential hypertension ICD-10-CM: I10  ICD-9-CM: 401.9  5/6/2015 - Present        Major depressive disorder, recurrent episode, severe ([facility] [address] ICD-10-CM: F33.2  ICD-9-CM: 296.33  1/22/2015 - Present        Macular degeneration ICD-10-CM: H35.30  ICD-9-CM: 362.50  7/22/2014 - Present        Kidney stones ICD-10-CM: N20.0  ICD-9-CM: 592.0  11/17/2010 - Present        TIA (transient ischemic attack) ICD-10-CM: G45.9  ICD-9-CM: 435.9  11/16/2010 - Present    Overview Signed 11/16/2010  2:55 PM by Jd Rush, MD     2009 Dr Salud Salmon             Sleep apnea ICD-10-CM: G47.30  ICD-9-CM: 780.57  11/16/2010 - Present    Overview Signed 11/16/2010  2:56 PM by Marek Bang MD     cpap                     CDMP Checked:   Yes x     PROBLEM LIST Updated:  Yes x         Signed:   Kimberlee Vazquez MD  6/21/2022  1:15 PM

## 2022-06-22 NOTE — PROGRESS NOTES
Informed by dayshift charge RN, Edgardo Tariq, that she called [facility] to reschedule pickup time for 6/22/22 at 7am. Will call report to [facility] in the AM.     0615: Report called to [facility]

## 2022-06-23 ENCOUNTER — PATIENT OUTREACH (OUTPATIENT)
Dept: CASE MANAGEMENT | Age: 87
End: 2022-06-23

## 2022-06-23 NOTE — PROGRESS NOTES
Transition of care outreach postponed for 7 days due to patient's discharge to inpatient rehab facility.   D/C to [facility] 6/22/22 follow up 7d

## 2022-06-29 ENCOUNTER — TELEPHONE (OUTPATIENT)
Dept: NEUROLOGY | Age: 87
End: 2022-06-29

## 2022-06-29 NOTE — TELEPHONE ENCOUNTER
Dr. Car Burris w/ [facility] calling because patient is currently inpatient and she is wondering if the patient is on any seizure medications. She stated the discharge paperwork stated the patient was only on Diazapam as needed, they also would like to know the dosage. She is requesting a call back.

## 2022-06-30 ENCOUNTER — PATIENT OUTREACH (OUTPATIENT)
Dept: CASE MANAGEMENT | Age: 87
End: 2022-06-30

## 2022-06-30 RX ORDER — ESCITALOPRAM OXALATE 10 MG/1
TABLET ORAL
Qty: 90 TABLET | Refills: 1 | Status: SHIPPED | OUTPATIENT
Start: 2022-06-30

## 2022-06-30 NOTE — TELEPHONE ENCOUNTER
Dr. Zoila Bhatt calling to check status of previous message.  Please call Mohs Histo Method Verbiage: The epidermis and dermis were  from the fat as distinct tissue sections.  Each section was then chromacoded and processed in the Mohs lab using the Mohs protocol for melanocytic lesions and submitted for frozen section. The epidermal/dermal pieces were stained with H&E and additionally with an immunohistochemical stain (on separate slides). The fat was stained with H&E alone.

## 2022-06-30 NOTE — TELEPHONE ENCOUNTER
Let them know   No AED     She is on Valium 1/2 tablet in the morning and 1 tablet at night   For ET     Thanks!

## 2022-07-05 ENCOUNTER — TELEPHONE (OUTPATIENT)
Dept: INTERNAL MEDICINE CLINIC | Age: 87
End: 2022-07-05

## 2022-07-05 NOTE — TELEPHONE ENCOUNTER
Home health is calling wanted to let the doctor know they are seeing pt for physical therapy twice a week for 2 weeks.

## 2022-07-07 NOTE — TELEPHONE ENCOUNTER
Returned her call. She stated the patient had already been discharged but they couldn't figure out what she was on the Diazepam for. She was given a small supply to take with her. She has verbalized understanding. I explained to her providers message regarding she was on the medication for ET and the dosage she was on.

## 2022-07-08 ENCOUNTER — PATIENT OUTREACH (OUTPATIENT)
Dept: CASE MANAGEMENT | Age: 87
End: 2022-07-08

## 2022-07-12 ENCOUNTER — PATIENT OUTREACH (OUTPATIENT)
Dept: CASE MANAGEMENT | Age: 87
End: 2022-07-12

## 2022-07-12 NOTE — PROGRESS NOTES
D/C to [facility] [place] 6/22/22 home 7/1/22 LM  Follow up 3d

## 2022-07-15 ENCOUNTER — PATIENT OUTREACH (OUTPATIENT)
Dept: CASE MANAGEMENT | Age: 87
End: 2022-07-15

## 2022-07-21 ENCOUNTER — PATIENT OUTREACH (OUTPATIENT)
Dept: CASE MANAGEMENT | Age: 87
End: 2022-07-21

## 2022-07-21 NOTE — PROGRESS NOTES
D/C to [facility] 6/22 and home 7/1/22  Final attempt to contact patient   resolved

## 2022-08-16 DIAGNOSIS — R56.9 SEIZURE (HCC): ICD-10-CM

## 2022-08-16 RX ORDER — DIAZEPAM 5 MG/1
5 TABLET ORAL 2 TIMES DAILY
Qty: 45 TABLET | Refills: 5 | Status: SHIPPED | OUTPATIENT
Start: 2022-08-16 | End: 2022-09-15

## 2022-08-19 RX ORDER — DIAZEPAM 5 MG/1
TABLET ORAL
Qty: 45 TABLET | Refills: 5 | OUTPATIENT
Start: 2022-08-19

## 2022-09-01 ENCOUNTER — TRANSCRIBE ORDER (OUTPATIENT)
Dept: SCHEDULING | Age: 87
End: 2022-09-01

## 2022-09-01 DIAGNOSIS — R91.8 MULTIPLE PULMONARY NODULES: Primary | ICD-10-CM

## 2022-09-10 ENCOUNTER — TRANSCRIBE ORDER (OUTPATIENT)
Dept: SCHEDULING | Age: 87
End: 2022-09-10

## 2022-09-10 DIAGNOSIS — R91.8 MULTIPLE PULMONARY NODULES: Primary | ICD-10-CM

## 2022-09-12 ENCOUNTER — HOSPITAL ENCOUNTER (OUTPATIENT)
Dept: CT IMAGING | Age: 87
Discharge: HOME | End: 2022-09-12
Attending: NEUROLOGICAL SURGERY
Payer: MEDICARE

## 2022-09-12 DIAGNOSIS — S06.5XAA SUBDURAL HEMATOMA: ICD-10-CM

## 2022-09-12 DIAGNOSIS — R91.8 MULTIPLE PULMONARY NODULES: ICD-10-CM

## 2022-09-12 PROCEDURE — 70450 CT HEAD/BRAIN W/O DYE: CPT

## 2022-09-12 PROCEDURE — 71250 CT THORAX DX C-: CPT

## 2022-09-19 ENCOUNTER — TRANSCRIBE ORDER (OUTPATIENT)
Dept: SCHEDULING | Age: 87
End: 2022-09-19

## 2022-09-19 DIAGNOSIS — R91.8 MULTIPLE PULMONARY NODULES: Primary | ICD-10-CM
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
## 2022-10-23 NOTE — PATIENT INSTRUCTIONS
Medicare Wellness Visit, Female     The best way to live healthy is to have a lifestyle where you eat a well-balanced diet, exercise regularly, limit alcohol use, and quit all forms of tobacco/nicotine, if applicable. Regular preventive services are another way to keep healthy. Preventive services (vaccines, screening tests, monitoring & exams) can help personalize your care plan, which helps you manage your own care. Screening tests can find health problems at the earliest stages, when they are easiest to treat. [facility] follows the current, evidence-based guidelines published by the [facility] ([facility]) when recommending preventive services for our patients. Because we follow these guidelines, sometimes recommendations change over time as research supports it. (For example, mammograms used to be recommended annually. Even though Medicare will still pay for an annual mammogram, the newer guidelines recommend a mammogram every two years for women of average risk). Of course, you and your doctor may decide to screen more often for some diseases, based on your risk and your co-morbidities (chronic disease you are already diagnosed with). Preventive services for you include:  - Medicare offers their members a free annual wellness visit, which is time for you and your primary care provider to discuss and plan for your preventive service needs. Take advantage of this benefit every year!  -All adults over the age of 72 should receive the recommended pneumonia vaccines. Current USPSTF guidelines recommend a series of two vaccines for the best pneumonia protection.   -All adults should have a flu vaccine yearly and a tetanus vaccine every 10 years.   -All adults age 48 and older should receive the shingles vaccines (series of two vaccines).       -All adults age 38-68 who are overweight should have a diabetes screening test once every three years.   -All adults born between [birth year] and 1964 should be screened once for Hepatitis C.  -Other screening tests and preventive services for persons with diabetes include: an eye exam to screen for diabetic retinopathy, a kidney function test, a foot exam, and stricter control over your cholesterol.   -Cardiovascular screening for adults with routine risk involves an electrocardiogram (ECG) at intervals determined by your doctor.   -Colorectal cancer screenings should be done for adults age 54-65 with no increased risk factors for colorectal cancer. There are a number of acceptable methods of screening for this type of cancer. Each test has its own benefits and drawbacks. Discuss with your doctor what is most appropriate for you during your annual wellness visit. The different tests include: colonoscopy (considered the best screening method), a fecal occult blood test, a fecal DNA test, and sigmoidoscopy.    -A bone mass density test is recommended when a woman turns 65 to screen for osteoporosis. This test is only recommended one time, as a screening. Some providers will use this same test as a disease monitoring tool if you already have osteoporosis. -Breast cancer screenings are recommended every other year for women of normal risk, age 54-69.  -Cervical cancer screenings for women over age 72 are only recommended with certain risk factors.      Here is a list of your current Health Maintenance items (your personalized list of preventive services) with a due date:  Health Maintenance Due   Topic Date Due    Shingles Vaccine (1 of 2) Never done    Annual Well Visit  10/21/2021    COVID-19 Vaccine (4 - Booster for [employer] series) 03/23/2022    Yearly Flu Vaccine (1) 08/01/2022

## 2022-10-24 ENCOUNTER — OFFICE VISIT (OUTPATIENT)
Dept: INTERNAL MEDICINE CLINIC | Age: 87
End: 2022-10-24
Payer: MEDICARE

## 2022-10-24 VITALS
HEART RATE: 85 BPM | BODY MASS INDEX: 27.66 KG/M2 | RESPIRATION RATE: 14 BRPM | WEIGHT: 166 LBS | OXYGEN SATURATION: 94 % | SYSTOLIC BLOOD PRESSURE: 148 MMHG | HEIGHT: 65 IN | DIASTOLIC BLOOD PRESSURE: 85 MMHG | TEMPERATURE: 97.9 F

## 2022-10-24 DIAGNOSIS — S06.5XAA BILATERAL SUBDURAL HEMATOMAS: ICD-10-CM

## 2022-10-24 DIAGNOSIS — G45.9 TIA (TRANSIENT ISCHEMIC ATTACK): ICD-10-CM

## 2022-10-24 DIAGNOSIS — G91.2 NPH (NORMAL PRESSURE HYDROCEPHALUS) (HCC): ICD-10-CM

## 2022-10-24 DIAGNOSIS — F33.1 MODERATE EPISODE OF RECURRENT MAJOR DEPRESSIVE DISORDER (HCC): ICD-10-CM

## 2022-10-24 DIAGNOSIS — I10 ESSENTIAL HYPERTENSION: ICD-10-CM

## 2022-10-24 DIAGNOSIS — J20.8 ACUTE BRONCHITIS DUE TO OTHER SPECIFIED ORGANISMS: ICD-10-CM

## 2022-10-24 DIAGNOSIS — Z00.00 MEDICARE ANNUAL WELLNESS VISIT, SUBSEQUENT: Primary | ICD-10-CM

## 2022-10-24 DIAGNOSIS — D32.9 MENINGIOMA (HCC): ICD-10-CM

## 2022-10-24 PROCEDURE — 99214 OFFICE O/P EST MOD 30 MIN: CPT | Performed by: INTERNAL MEDICINE

## 2022-10-24 PROCEDURE — G8754 DIAS BP LESS 90: HCPCS | Performed by: INTERNAL MEDICINE

## 2022-10-24 PROCEDURE — G8427 DOCREV CUR MEDS BY ELIG CLIN: HCPCS | Performed by: INTERNAL MEDICINE

## 2022-10-24 PROCEDURE — G9717 DOC PT DX DEP/BP F/U NT REQ: HCPCS | Performed by: INTERNAL MEDICINE

## 2022-10-24 PROCEDURE — G8753 SYS BP > OR = 140: HCPCS | Performed by: INTERNAL MEDICINE

## 2022-10-24 PROCEDURE — G8536 NO DOC ELDER MAL SCRN: HCPCS | Performed by: INTERNAL MEDICINE

## 2022-10-24 PROCEDURE — 1090F PRES/ABSN URINE INCON ASSESS: CPT | Performed by: INTERNAL MEDICINE

## 2022-10-24 PROCEDURE — G0463 HOSPITAL OUTPT CLINIC VISIT: HCPCS | Performed by: INTERNAL MEDICINE

## 2022-10-24 PROCEDURE — G0439 PPPS, SUBSEQ VISIT: HCPCS | Performed by: INTERNAL MEDICINE

## 2022-10-24 PROCEDURE — 1101F PT FALLS ASSESS-DOCD LE1/YR: CPT | Performed by: INTERNAL MEDICINE

## 2022-10-24 PROCEDURE — G8417 CALC BMI ABV UP PARAM F/U: HCPCS | Performed by: INTERNAL MEDICINE

## 2022-10-24 PROCEDURE — G8399 PT W/DXA RESULTS DOCUMENT: HCPCS | Performed by: INTERNAL MEDICINE

## 2022-10-24 RX ORDER — DOXYCYCLINE 100 MG/1
100 CAPSULE ORAL 2 TIMES DAILY
Qty: 20 CAPSULE | Refills: 0 | Status: SHIPPED | OUTPATIENT
Start: 2022-10-24 | End: 2022-10-25 | Stop reason: SDUPTHER

## 2022-10-24 RX ORDER — ALBUTEROL SULFATE 90 UG/1
AEROSOL, METERED RESPIRATORY (INHALATION)
COMMUNITY
Start: 2022-09-16

## 2022-10-25 DIAGNOSIS — J20.8 ACUTE BRONCHITIS DUE TO OTHER SPECIFIED ORGANISMS: ICD-10-CM

## 2022-10-25 RX ORDER — DOXYCYCLINE 100 MG/1
100 CAPSULE ORAL 2 TIMES DAILY
Qty: 20 CAPSULE | Refills: 0 | OUTPATIENT
Start: 2022-10-25

## 2022-10-25 RX ORDER — DOXYCYCLINE 100 MG/1
100 CAPSULE ORAL 2 TIMES DAILY
Qty: 20 CAPSULE | Refills: 0 | Status: SHIPPED | OUTPATIENT
Start: 2022-10-25

## 2022-10-29 ENCOUNTER — HOSPITAL ENCOUNTER (EMERGENCY)
Age: 87
Discharge: HOME | End: 2022-10-29
Attending: STUDENT IN AN ORGANIZED HEALTH CARE EDUCATION/TRAINING PROGRAM
Payer: MEDICARE

## 2022-10-29 ENCOUNTER — APPOINTMENT (OUTPATIENT)
Dept: CT IMAGING | Age: 87
End: 2022-10-29
Attending: STUDENT IN AN ORGANIZED HEALTH CARE EDUCATION/TRAINING PROGRAM
Payer: MEDICARE

## 2022-10-29 ENCOUNTER — APPOINTMENT (OUTPATIENT)
Dept: GENERAL RADIOLOGY | Age: 87
End: 2022-10-29
Attending: STUDENT IN AN ORGANIZED HEALTH CARE EDUCATION/TRAINING PROGRAM
Payer: MEDICARE

## 2022-10-29 VITALS
WEIGHT: 162 LBS | HEART RATE: 110 BPM | DIASTOLIC BLOOD PRESSURE: 79 MMHG | TEMPERATURE: 98.1 F | BODY MASS INDEX: 28.7 KG/M2 | HEIGHT: 63 IN | RESPIRATION RATE: 14 BRPM | OXYGEN SATURATION: 94 % | SYSTOLIC BLOOD PRESSURE: 168 MMHG

## 2022-10-29 DIAGNOSIS — R05.9 COUGH, UNSPECIFIED TYPE: ICD-10-CM

## 2022-10-29 DIAGNOSIS — J06.9 ACUTE UPPER RESPIRATORY INFECTION: Primary | ICD-10-CM

## 2022-10-29 LAB
ALBUMIN SERPL-MCNC: 4.1 G/DL (ref 3.5–5)
ALBUMIN/GLOB SERPL: 1.1 {RATIO} (ref 1.1–2.2)
ALP SERPL-CCNC: 98 U/L (ref 45–117)
ALT SERPL-CCNC: 38 U/L (ref 12–78)
ANION GAP SERPL CALC-SCNC: 8 MMOL/L (ref 5–15)
AST SERPL-CCNC: 31 U/L (ref 15–37)
BASOPHILS # BLD: 0.1 K/UL (ref 0–0.1)
BASOPHILS NFR BLD: 1 % (ref 0–1)
BILIRUB SERPL-MCNC: 0.6 MG/DL (ref 0.2–1)
BUN SERPL-MCNC: 14 MG/DL (ref 6–20)
BUN/CREAT SERPL: 16 (ref 12–20)
CALCIUM SERPL-MCNC: 10.7 MG/DL (ref 8.5–10.1)
CHLORIDE SERPL-SCNC: 110 MMOL/L (ref 97–108)
CO2 SERPL-SCNC: 22 MMOL/L (ref 21–32)
COMMENT, HOLDF: NORMAL
COVID-19 RAPID TEST, COVR: NOT DETECTED
CREAT SERPL-MCNC: 0.88 MG/DL (ref 0.55–1.02)
DIFFERENTIAL METHOD BLD: ABNORMAL
EOSINOPHIL # BLD: 0.2 K/UL (ref 0–0.4)
EOSINOPHIL NFR BLD: 2 % (ref 0–7)
ERYTHROCYTE [DISTWIDTH] IN BLOOD BY AUTOMATED COUNT: 13 % (ref 11.5–14.5)
FLUAV AG NPH QL IA: NEGATIVE
FLUBV AG NOSE QL IA: NEGATIVE
GLOBULIN SER CALC-MCNC: 3.6 G/DL (ref 2–4)
GLUCOSE SERPL-MCNC: 112 MG/DL (ref 65–100)
HCT VFR BLD AUTO: 44.1 % (ref 35–47)
HGB BLD-MCNC: 14.7 G/DL (ref 11.5–16)
IMM GRANULOCYTES # BLD AUTO: 0 K/UL (ref 0–0.04)
IMM GRANULOCYTES NFR BLD AUTO: 0 % (ref 0–0.5)
LYMPHOCYTES # BLD: 2.8 K/UL (ref 0.8–3.5)
LYMPHOCYTES NFR BLD: 31 % (ref 12–49)
MCH RBC QN AUTO: 30.3 PG (ref 26–34)
MCHC RBC AUTO-ENTMCNC: 33.3 G/DL (ref 30–36.5)
MCV RBC AUTO: 90.9 FL (ref 80–99)
MONOCYTES # BLD: 1 K/UL (ref 0–1)
MONOCYTES NFR BLD: 11 % (ref 5–13)
NEUTS SEG # BLD: 4.9 K/UL (ref 1.8–8)
NEUTS SEG NFR BLD: 55 % (ref 32–75)
NRBC # BLD: 0 K/UL (ref 0–0.01)
NRBC BLD-RTO: 0 PER 100 WBC
PLATELET # BLD AUTO: 459 K/UL (ref 150–400)
PMV BLD AUTO: 10.3 FL (ref 8.9–12.9)
POTASSIUM SERPL-SCNC: 3.8 MMOL/L (ref 3.5–5.1)
PROT SERPL-MCNC: 7.7 G/DL (ref 6.4–8.2)
RBC # BLD AUTO: 4.85 M/UL (ref 3.8–5.2)
SAMPLES BEING HELD,HOLD: NORMAL
SODIUM SERPL-SCNC: 140 MMOL/L (ref 136–145)
SOURCE, COVRS: NORMAL
WBC # BLD AUTO: 8.8 K/UL (ref 3.6–11)

## 2022-10-29 PROCEDURE — 74011250637 HC RX REV CODE- 250/637: Performed by: STUDENT IN AN ORGANIZED HEALTH CARE EDUCATION/TRAINING PROGRAM

## 2022-10-29 PROCEDURE — 36415 COLL VENOUS BLD VENIPUNCTURE: CPT

## 2022-10-29 PROCEDURE — 71250 CT THORAX DX C-: CPT

## 2022-10-29 PROCEDURE — 80053 COMPREHEN METABOLIC PANEL: CPT

## 2022-10-29 PROCEDURE — 99285 EMERGENCY DEPT VISIT HI MDM: CPT

## 2022-10-29 PROCEDURE — 93005 ELECTROCARDIOGRAM TRACING: CPT

## 2022-10-29 PROCEDURE — 87635 SARS-COV-2 COVID-19 AMP PRB: CPT

## 2022-10-29 PROCEDURE — 74011250636 HC RX REV CODE- 250/636: Performed by: STUDENT IN AN ORGANIZED HEALTH CARE EDUCATION/TRAINING PROGRAM

## 2022-10-29 PROCEDURE — 71045 X-RAY EXAM CHEST 1 VIEW: CPT

## 2022-10-29 PROCEDURE — 96361 HYDRATE IV INFUSION ADD-ON: CPT

## 2022-10-29 PROCEDURE — 87804 INFLUENZA ASSAY W/OPTIC: CPT

## 2022-10-29 PROCEDURE — 96360 HYDRATION IV INFUSION INIT: CPT

## 2022-10-29 PROCEDURE — 85025 COMPLETE CBC W/AUTO DIFF WBC: CPT

## 2022-10-29 RX ORDER — BENZONATATE 100 MG/1
100 CAPSULE ORAL
Qty: 30 CAPSULE | Refills: 0 | Status: SHIPPED | OUTPATIENT
Start: 2022-10-29 | End: 2022-11-05

## 2022-10-29 RX ORDER — BENZONATATE 100 MG/1
100 CAPSULE ORAL
Status: COMPLETED | OUTPATIENT
Start: 2022-10-29 | End: 2022-10-29

## 2022-10-29 RX ADMIN — BENZONATATE 100 MG: 100 CAPSULE ORAL at 21:23

## 2022-10-29 RX ADMIN — SODIUM CHLORIDE 500 ML: 9 INJECTION, SOLUTION INTRAVENOUS at 18:25

## 2022-10-29 NOTE — ED TRIAGE NOTES
Patient started on doxy Monday by PCP for possible pneumonia, reports continued cough and diarrhea with X1 episode of vomiting and intermittent chest discomfort and SOB. Had a fever or 102 today, [facility]

## 2022-10-29 NOTE — ED NOTES
Visit Vitals  BP (!) 168/101 (BP 1 Location: Left upper arm, BP Patient Position: Semi fowlers)   Pulse (!) 110   Temp 98.1 °F (36.7 °C)   Resp 14   Ht 5' 3\" (1.6 m)   Wt 73.5 kg (162 lb)   SpO2 94%   BMI 28.70 kg/m²
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
## 2022-10-29 NOTE — ED NOTES
Verbal shift change report given to Regis Reece RN (oncoming nurse) by Gretchen Medrano RN (offgoing nurse). Report included the following information SBAR, Kardex, MAR and Recent Results.

## 2022-10-30 NOTE — DISCHARGE INSTRUCTIONS
Please start using your albuterol inhaler during your illness as this can help with the cough. Take tessalon pearles as prescribed. If fever returns you can take tylenol or ibuprofen for the fever. Return to the ER if you have difficultly breathing, chest pain, any other concerns or problems.

## 2022-10-31 LAB
ATRIAL RATE: 111 BPM
CALCULATED P AXIS, ECG09: 40 DEGREES
CALCULATED R AXIS, ECG10: -37 DEGREES
CALCULATED T AXIS, ECG11: 50 DEGREES
DIAGNOSIS, 93000: NORMAL
P-R INTERVAL, ECG05: 224 MS
Q-T INTERVAL, ECG07: 304 MS
QRS DURATION, ECG06: 66 MS
QTC CALCULATION (BEZET), ECG08: 413 MS
VENTRICULAR RATE, ECG03: 111 BPM

## 2022-11-30 ENCOUNTER — TELEPHONE (OUTPATIENT)
Dept: INTERNAL MEDICINE CLINIC | Age: 87
End: 2022-11-30

## 2022-11-30 DIAGNOSIS — N39.0 LOWER URINARY TRACT INFECTIOUS DISEASE: ICD-10-CM

## 2022-11-30 RX ORDER — CEPHALEXIN 500 MG/1
500 CAPSULE ORAL 2 TIMES DAILY
Qty: 14 CAPSULE | Refills: 0 | Status: SHIPPED | OUTPATIENT
Start: 2022-11-30 | End: 2022-12-07

## 2022-11-30 NOTE — TELEPHONE ENCOUNTER
Spoke with patient's son and advised her that Dr. Natanael Glover is willing to treat her since the patient is confident she has a UTI. Advised if symptoms do not resolve after antibiotics course she will need an appointment. He understood and was thankful for the call.

## 2023-01-02 ENCOUNTER — APPOINTMENT (OUTPATIENT)
Dept: CT IMAGING | Age: 88
End: 2023-01-02
Attending: EMERGENCY MEDICINE
Payer: MEDICARE

## 2023-01-02 ENCOUNTER — HOSPITAL ENCOUNTER (EMERGENCY)
Age: 88
Discharge: HOME | End: 2023-01-02
Attending: STUDENT IN AN ORGANIZED HEALTH CARE EDUCATION/TRAINING PROGRAM
Payer: MEDICARE

## 2023-01-02 VITALS
BODY MASS INDEX: 28.17 KG/M2 | SYSTOLIC BLOOD PRESSURE: 172 MMHG | HEART RATE: 82 BPM | DIASTOLIC BLOOD PRESSURE: 83 MMHG | TEMPERATURE: 98.4 F | OXYGEN SATURATION: 94 % | RESPIRATION RATE: 16 BRPM | WEIGHT: 159 LBS | HEIGHT: 63 IN

## 2023-01-02 DIAGNOSIS — R10.9 ACUTE RIGHT FLANK PAIN: ICD-10-CM

## 2023-01-02 DIAGNOSIS — R91.1 LUNG NODULE: ICD-10-CM

## 2023-01-02 DIAGNOSIS — B02.9 HERPES ZOSTER WITHOUT COMPLICATION: Primary | ICD-10-CM

## 2023-01-02 LAB
ALBUMIN SERPL-MCNC: 3.9 G/DL (ref 3.5–5)
ALBUMIN/GLOB SERPL: 1 {RATIO} (ref 1.1–2.2)
ALP SERPL-CCNC: 90 U/L (ref 45–117)
ALT SERPL-CCNC: 32 U/L (ref 12–78)
ANION GAP SERPL CALC-SCNC: 6 MMOL/L (ref 5–15)
APPEARANCE UR: CLEAR
AST SERPL-CCNC: 23 U/L (ref 15–37)
BACTERIA URNS QL MICRO: NEGATIVE /HPF
BASOPHILS # BLD: 0 K/UL (ref 0–0.1)
BASOPHILS NFR BLD: 1 % (ref 0–1)
BILIRUB DIRECT SERPL-MCNC: 0.1 MG/DL (ref 0–0.2)
BILIRUB SERPL-MCNC: 0.4 MG/DL (ref 0.2–1)
BILIRUB UR QL: NEGATIVE
BUN SERPL-MCNC: 13 MG/DL (ref 6–20)
BUN/CREAT SERPL: 16 (ref 12–20)
CALCIUM SERPL-MCNC: 10.5 MG/DL (ref 8.5–10.1)
CHLORIDE SERPL-SCNC: 111 MMOL/L (ref 97–108)
CO2 SERPL-SCNC: 24 MMOL/L (ref 21–32)
COLOR UR: ABNORMAL
COMMENT, HOLDF: NORMAL
CREAT SERPL-MCNC: 0.79 MG/DL (ref 0.55–1.02)
DIFFERENTIAL METHOD BLD: ABNORMAL
EOSINOPHIL # BLD: 0.1 K/UL (ref 0–0.4)
EOSINOPHIL NFR BLD: 2 % (ref 0–7)
EPITH CASTS URNS QL MICRO: ABNORMAL /LPF
ERYTHROCYTE [DISTWIDTH] IN BLOOD BY AUTOMATED COUNT: 12.6 % (ref 11.5–14.5)
GLOBULIN SER CALC-MCNC: 3.8 G/DL (ref 2–4)
GLUCOSE SERPL-MCNC: 112 MG/DL (ref 65–100)
GLUCOSE UR STRIP.AUTO-MCNC: NEGATIVE MG/DL
HCT VFR BLD AUTO: 43.9 % (ref 35–47)
HGB BLD-MCNC: 14.6 G/DL (ref 11.5–16)
HGB UR QL STRIP: NEGATIVE
HYALINE CASTS URNS QL MICRO: ABNORMAL /LPF (ref 0–2)
IMM GRANULOCYTES # BLD AUTO: 0 K/UL (ref 0–0.04)
IMM GRANULOCYTES NFR BLD AUTO: 0 % (ref 0–0.5)
KETONES UR QL STRIP.AUTO: NEGATIVE MG/DL
LEUKOCYTE ESTERASE UR QL STRIP.AUTO: ABNORMAL
LYMPHOCYTES # BLD: 1.6 K/UL (ref 0.8–3.5)
LYMPHOCYTES NFR BLD: 32 % (ref 12–49)
MCH RBC QN AUTO: 30.5 PG (ref 26–34)
MCHC RBC AUTO-ENTMCNC: 33.3 G/DL (ref 30–36.5)
MCV RBC AUTO: 91.6 FL (ref 80–99)
MONOCYTES # BLD: 0.8 K/UL (ref 0–1)
MONOCYTES NFR BLD: 15 % (ref 5–13)
NEUTS SEG # BLD: 2.6 K/UL (ref 1.8–8)
NEUTS SEG NFR BLD: 50 % (ref 32–75)
NITRITE UR QL STRIP.AUTO: NEGATIVE
NRBC # BLD: 0 K/UL (ref 0–0.01)
NRBC BLD-RTO: 0 PER 100 WBC
PH UR STRIP: 7.5 [PH] (ref 5–8)
PLATELET # BLD AUTO: 481 K/UL (ref 150–400)
PMV BLD AUTO: 10.2 FL (ref 8.9–12.9)
POTASSIUM SERPL-SCNC: 4.2 MMOL/L (ref 3.5–5.1)
PROT SERPL-MCNC: 7.7 G/DL (ref 6.4–8.2)
PROT UR STRIP-MCNC: NEGATIVE MG/DL
RBC # BLD AUTO: 4.79 M/UL (ref 3.8–5.2)
RBC #/AREA URNS HPF: ABNORMAL /HPF (ref 0–5)
SAMPLES BEING HELD,HOLD: NORMAL
SODIUM SERPL-SCNC: 141 MMOL/L (ref 136–145)
SP GR UR REFRACTOMETRY: 1.01 (ref 1–1.03)
UR CULT HOLD, URHOLD: NORMAL
UROBILINOGEN UR QL STRIP.AUTO: 0.2 EU/DL (ref 0.2–1)
WBC # BLD AUTO: 5.1 K/UL (ref 3.6–11)
WBC URNS QL MICRO: ABNORMAL /HPF (ref 0–4)

## 2023-01-02 PROCEDURE — 74011250637 HC RX REV CODE- 250/637: Performed by: EMERGENCY MEDICINE

## 2023-01-02 PROCEDURE — 85025 COMPLETE CBC W/AUTO DIFF WBC: CPT

## 2023-01-02 PROCEDURE — 80048 BASIC METABOLIC PNL TOTAL CA: CPT

## 2023-01-02 PROCEDURE — 36415 COLL VENOUS BLD VENIPUNCTURE: CPT

## 2023-01-02 PROCEDURE — 99284 EMERGENCY DEPT VISIT MOD MDM: CPT

## 2023-01-02 PROCEDURE — 81001 URINALYSIS AUTO W/SCOPE: CPT

## 2023-01-02 PROCEDURE — 80076 HEPATIC FUNCTION PANEL: CPT

## 2023-01-02 PROCEDURE — 74176 CT ABD & PELVIS W/O CONTRAST: CPT

## 2023-01-02 RX ORDER — HYDROCODONE BITARTRATE AND ACETAMINOPHEN 5; 325 MG/1; MG/1
1 TABLET ORAL
Qty: 18 TABLET | Refills: 0 | Status: SHIPPED | OUTPATIENT
Start: 2023-01-02 | End: 2023-01-05

## 2023-01-02 RX ORDER — HYDROCODONE BITARTRATE AND ACETAMINOPHEN 5; 325 MG/1; MG/1
1 TABLET ORAL
Status: COMPLETED | OUTPATIENT
Start: 2023-01-02 | End: 2023-01-02

## 2023-01-02 RX ORDER — VALACYCLOVIR HYDROCHLORIDE 1 G/1
1000 TABLET, FILM COATED ORAL 3 TIMES DAILY
Qty: 21 TABLET | Refills: 0 | Status: SHIPPED | OUTPATIENT
Start: 2023-01-02 | End: 2023-01-09

## 2023-01-02 RX ADMIN — HYDROCODONE BITARTRATE AND ACETAMINOPHEN 1 TABLET: 5; 325 TABLET ORAL at 12:24

## 2023-01-02 NOTE — ED TRIAGE NOTES
Pt reports abd pain right sided that radiates to back. Was seen at urologist for possible kidney stones. Pt states they did not see kidney stones however did treat for UTI. Pt reports a rash to abdomen at this time after taking antibiotics and still has abd pain. Denies blood in urine.

## 2023-01-02 NOTE — DISCHARGE INSTRUCTIONS
Your urinary tract infection seems to be improving with antibiotics, continue all medications tablets until you run out of medication. Your CT scan does not show any kidney infection or kidney stone. Incidentally we do see several lung nodules which appear stable but should be followed by your PCP.     Your pain and rash are consistent with shingles, antiviral medication and pain medication has been sent to your pharmacy
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
## 2023-01-02 NOTE — LETTER
[facility] EMERGENCY DEPT    Date: 1/2/2023    Yamilet Lopez MD  [address] / [place] [address]    Regarding: Mary Kay Denise    MRN: [id number]    YOB: 1935    Dear Yamilet Lopez MD,    Your patient, Mary Kay Denise was seen in the Emergency Department on 1/2/2023. Attached to this letter is a full report on that visit. She has a lung nodule which is enlarged compared to previous study. Radiologist had recommended a outpatient dedicated chest CT.       Sincerely,    Karlos Avila MD

## 2023-01-18 PROBLEM — R56.9 SEIZURE (HCC): Status: ACTIVE | Noted: 2023-01-18

## 2023-01-18 PROBLEM — D32.9 MENINGIOMA (HCC): Status: ACTIVE | Noted: 2023-01-18

## 2023-01-18 PROBLEM — R56.9 SEIZURE (HCC): Status: RESOLVED | Noted: 2023-01-18 | Resolved: 2023-01-18

## 2023-01-18 PROBLEM — J84.10 LUNG GRANULOMA (HCC): Status: ACTIVE | Noted: 2023-01-18

## 2023-01-19 NOTE — PROGRESS NOTES
Derek Marshall (: 1935) is a 81 y.o. female, established patient, here for evaluation of the following chief complaint(s):  Follow-up (Follow up on shingles )       ASSESSMENT/PLAN:  Below is the assessment and plan developed based on review of pertinent history, physical exam, labs, studies, and medications. 1. Herpes zoster without complication  The following orders have not been finalized:  -     HYDROcodone-acetaminophen (Norco) 5-325 mg per tablet  2. Essential hypertension- at goal  cont with atenolol   3. 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 Meningioma [facility])- she has been stable and has yearly follow up with    5. Exudative age-related macular degeneration, unspecified laterality, unspecified stage ([facility]  6. NPH (normal pressure hydrocephalus) ([facility] [address]  7. Moderate episode of recurrent major depressive disorder (HCC)-she is off lexapro and right now feels well and wantst to stay off of it  8. TIA (transient ischemic attack)- off asa     No follow-ups on file. SUBJECTIVE/OBJECTIVE:  HPI  Patient is an 81-year-old female with history of arthritis, GERD, hypertension, parathyroid disease, kidney stones, TIA, presenting to ED  on 2023 for evaluation of right-sided abdominal pain. She stated that pain began 5 days prior she was seen the next day by her urologist who ruled out a kidney stone but diagnosed her with a UTI and have her an IM abx dose and started her on a 7-day course of twice daily Keflex. She felt like her urethral pain and urinary frequency has been improving but the pain in her flank has been worsening. Also notes that 3 days ago she started to develop a painful rash to her right flank, noted that she has never had shingles before but that her son recently recovered from shingles rash 6 weeks ago. She denies fever, nausea, vomiting, hematuria, stool changes, or any additional medical complaints at this time.      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 BP in office today is 148/85     Other: Pt notes a decline in her vision and hearing, which she believes is due to pantoprazole. Depression and anxiety- she cont to take her meds in a stable pattern ; doing well with lexapro     She does not want covid shot      Review of Systems   All other systems reviewed and are negative. Physical Exam  Vitals and nursing note reviewed. Constitutional:       Appearance: She is well-developed. HENT:      Head: Normocephalic and atraumatic. Right Ear: External ear normal.      Left Ear: External ear normal.      Nose: Nose normal.   Eyes:      Conjunctiva/sclera: Conjunctivae normal.   Neck:      Thyroid: No thyroid mass or thyromegaly. Vascular: No carotid bruit. Cardiovascular:      Rate and Rhythm: Normal rate and regular rhythm. Heart sounds: Normal heart sounds. Pulmonary:      Effort: Pulmonary effort is normal.      Breath sounds: Normal breath sounds. Abdominal:      General: Bowel sounds are normal.      Palpations: Abdomen is soft. Musculoskeletal:         General: Normal range of motion. Cervical back: Normal range of motion and neck supple. Skin:     General: Skin is warm and dry. Findings: No abrasion or rash. Neurological:      Mental Status: She is alert and oriented to person, place, and time. Cranial Nerves: No cranial nerve deficit. Sensory: No sensory deficit. Coordination: Coordination normal.   Psychiatric:         Behavior: Behavior normal.         Thought Content:  Thought content normal.         Judgment: Judgment normal.         On this date 01/20/2023 I have spent 40 minutes reviewing previous notes, test results and face to face with the patient discussing the diagnosis and importance of compliance with the treatment plan as well as documenting on the day of the visit. An electronic signature was used to authenticate this note.   -- Roya Wilson MD

## 2023-01-20 ENCOUNTER — OFFICE VISIT (OUTPATIENT)
Dept: INTERNAL MEDICINE CLINIC | Age: 88
End: 2023-01-20
Payer: MEDICARE

## 2023-01-20 VITALS
WEIGHT: 156 LBS | BODY MASS INDEX: 27.64 KG/M2 | HEIGHT: 63 IN | HEART RATE: 77 BPM | OXYGEN SATURATION: 95 % | RESPIRATION RATE: 14 BRPM | SYSTOLIC BLOOD PRESSURE: 148 MMHG | TEMPERATURE: 97.6 F | DIASTOLIC BLOOD PRESSURE: 86 MMHG

## 2023-01-20 DIAGNOSIS — B02.9 HERPES ZOSTER WITHOUT COMPLICATION: Primary | ICD-10-CM

## 2023-01-20 DIAGNOSIS — G45.9 TIA (TRANSIENT ISCHEMIC ATTACK): ICD-10-CM

## 2023-01-20 DIAGNOSIS — I10 ESSENTIAL HYPERTENSION: ICD-10-CM

## 2023-01-20 DIAGNOSIS — F33.1 MODERATE EPISODE OF RECURRENT MAJOR DEPRESSIVE DISORDER (HCC): ICD-10-CM

## 2023-01-20 DIAGNOSIS — H35.3290 EXUDATIVE AGE-RELATED MACULAR DEGENERATION, UNSPECIFIED LATERALITY, UNSPECIFIED STAGE (HCC): ICD-10-CM

## 2023-01-20 DIAGNOSIS — G91.2 NPH (NORMAL PRESSURE HYDROCEPHALUS) (HCC): ICD-10-CM

## 2023-01-20 DIAGNOSIS — J84.10 LUNG GRANULOMA (HCC): ICD-10-CM

## 2023-01-20 DIAGNOSIS — D32.9 MENINGIOMA (HCC): ICD-10-CM

## 2023-01-20 RX ORDER — HYDROCODONE BITARTRATE AND ACETAMINOPHEN 5; 325 MG/1; MG/1
1 TABLET ORAL
Qty: 30 TABLET | Refills: 0 | Status: SHIPPED | OUTPATIENT
Start: 2023-01-20 | End: 2023-01-30

## 2023-02-03 ENCOUNTER — TELEPHONE (OUTPATIENT)
Dept: INTERNAL MEDICINE CLINIC | Age: 88
End: 2023-02-03

## 2023-02-03 NOTE — TELEPHONE ENCOUNTER
----- Message from Leighann Camacho sent at 2/3/2023 10:38 AM EST -----  Subject: Appointment Request    Reason for Call: Established Patient Appointment needed: Routine Existing   Condition Follow Up    QUESTIONS    Reason for appointment request? Available appointments did not meet   patient need     Additional Information for Provider?  Patient would like an appointment for   medication review for shingles   ---------------------------------------------------------------------------  --------------  Messi JERRY  [phone]; OK to leave message on voicemail  ---------------------------------------------------------------------------  --------------  SCRIPT ANSWERS  COVID Screen: Jes Zhao

## 2023-02-03 NOTE — TELEPHONE ENCOUNTER
PSR  returned call, states the hydrocodone is not helping, states her shingles does wake her up at night, asking to be worked in Wednesday next week to discuss another medication that may help, states she runs out of medication on Monday , has enough to last through Sunday , requesting refill to get her to Wednesday if PCP can work her in.c/b [phone]

## 2023-02-06 ENCOUNTER — OFFICE VISIT (OUTPATIENT)
Dept: INTERNAL MEDICINE CLINIC | Age: 88
End: 2023-02-06
Payer: MEDICARE

## 2023-02-06 VITALS
HEIGHT: 63 IN | HEART RATE: 117 BPM | OXYGEN SATURATION: 96 % | RESPIRATION RATE: 14 BRPM | BODY MASS INDEX: 27.04 KG/M2 | TEMPERATURE: 97.8 F | WEIGHT: 152.6 LBS | DIASTOLIC BLOOD PRESSURE: 83 MMHG | SYSTOLIC BLOOD PRESSURE: 128 MMHG

## 2023-02-06 DIAGNOSIS — B02.9 HERPES ZOSTER WITHOUT COMPLICATION: Primary | ICD-10-CM

## 2023-02-06 DIAGNOSIS — R11.0 NAUSEA: ICD-10-CM

## 2023-02-06 DIAGNOSIS — N39.0 URINARY TRACT INFECTION WITHOUT HEMATURIA, SITE UNSPECIFIED: ICD-10-CM

## 2023-02-06 DIAGNOSIS — I10 ESSENTIAL HYPERTENSION: ICD-10-CM

## 2023-02-06 PROCEDURE — 1101F PT FALLS ASSESS-DOCD LE1/YR: CPT | Performed by: INTERNAL MEDICINE

## 2023-02-06 PROCEDURE — G8427 DOCREV CUR MEDS BY ELIG CLIN: HCPCS | Performed by: INTERNAL MEDICINE

## 2023-02-06 PROCEDURE — G9717 DOC PT DX DEP/BP F/U NT REQ: HCPCS | Performed by: INTERNAL MEDICINE

## 2023-02-06 PROCEDURE — G0463 HOSPITAL OUTPT CLINIC VISIT: HCPCS | Performed by: INTERNAL MEDICINE

## 2023-02-06 PROCEDURE — 99214 OFFICE O/P EST MOD 30 MIN: CPT | Performed by: INTERNAL MEDICINE

## 2023-02-06 PROCEDURE — 1090F PRES/ABSN URINE INCON ASSESS: CPT | Performed by: INTERNAL MEDICINE

## 2023-02-06 PROCEDURE — G8536 NO DOC ELDER MAL SCRN: HCPCS | Performed by: INTERNAL MEDICINE

## 2023-02-06 PROCEDURE — G8417 CALC BMI ABV UP PARAM F/U: HCPCS | Performed by: INTERNAL MEDICINE

## 2023-02-06 RX ORDER — HYDROMORPHONE HYDROCHLORIDE 2 MG/1
2 TABLET ORAL
Qty: 30 TABLET | Refills: 0 | Status: SHIPPED | OUTPATIENT
Start: 2023-02-06 | End: 2023-02-07 | Stop reason: SDUPTHER

## 2023-02-06 RX ORDER — CEFUROXIME AXETIL 500 MG/1
500 TABLET ORAL 2 TIMES DAILY
COMMUNITY
Start: 2022-12-30

## 2023-02-06 RX ORDER — HYDROMORPHONE HYDROCHLORIDE 2 MG/1
2 TABLET ORAL
Qty: 30 TABLET | Refills: 0 | Status: CANCELLED | OUTPATIENT
Start: 2023-02-06 | End: 2023-02-16

## 2023-02-06 RX ORDER — DIAZEPAM 5 MG/1
TABLET ORAL
COMMUNITY
Start: 2023-01-16

## 2023-02-06 NOTE — PROGRESS NOTES
Katarina Arvizu (: 1935) is a 81 y.o. female, established patient, here for evaluation of the following chief complaint(s):  UTI (Patient also has shingles pain and nausea )       ASSESSMENT/PLAN:  Below is the assessment and plan developed based on review of pertinent history, physical exam, labs, studies, and medications. 1. Herpes zoster without complication-she is having a lot of pain and discomfort but is having nausea which we think is from the hydrocodone was lost some weight. We can stop the hydrocodone try Dilaudid but the main treatment for pain is can be the salon pads, ibuprofen alternating with Tylenol  2. Essential hypertension- at goal off medicine   3. Nausea- I think this is secondary to pain pills will try dilaudid as she has tolerating that before but if not better   4. Urinary tract infection without hematuria, site unspecified-? U/A normal cannot give more and they rather wait and watch due to all SE due to medicine? Keflex if they do call    She is reporting having some night sweats at night where she is soaking it this is all since the shingles. I was honest with her son said potentially something else is going on. Trying to work on managing her pain and her response to the shingles first.  Something more specific develops and she has more symptoms that point is in a direction to please let me know so we can run some more tests'; labs done were normal just a month ago and urine nromal.    No follow-ups on file. 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 BP in office today is 148/85     Other: Pt notes a decline in her vision and hearing, which she believes is due to pantoprazole. Depression and anxiety- she cont to take her meds in a stable pattern ; doing well with lexapro     Review of Systems   All other systems reviewed and are negative. Physical Exam  Vitals and nursing note reviewed. Constitutional:       Appearance: She is well-developed. HENT:      Head: Normocephalic and atraumatic. Right Ear: External ear normal.      Left Ear: External ear normal.      Nose: Nose normal.   Neck:      Thyroid: No thyroid mass or thyromegaly. Vascular: No carotid bruit or JVD. Cardiovascular:      Rate and Rhythm: Normal rate and regular rhythm. Pulses: Normal pulses. Heart sounds: Normal heart sounds, S1 normal and S2 normal. No murmur heard. No friction rub. No gallop. Pulmonary:      Effort: Pulmonary effort is normal.      Breath sounds: Normal breath sounds. Abdominal:      General: Bowel sounds are normal.      Palpations: Abdomen is soft. Musculoskeletal:         General: Normal range of motion. Cervical back: Normal range of motion and neck supple. Skin:     General: Skin is warm and dry. Neurological:      Mental Status: She is alert and oriented to person, place, and time. Psychiatric:         Behavior: Behavior normal.         Thought Content: Thought content normal.         Judgment: Judgment normal.             An electronic signature was used to authenticate this note.   -- Antonino Moya MD

## 2023-02-07 DIAGNOSIS — B02.9 HERPES ZOSTER WITHOUT COMPLICATION: ICD-10-CM

## 2023-02-07 RX ORDER — HYDROMORPHONE HYDROCHLORIDE 2 MG/1
2 TABLET ORAL
Qty: 30 TABLET | Refills: 0 | Status: SHIPPED | OUTPATIENT
Start: 2023-02-07 | End: 2023-02-17

## 2023-02-09 ENCOUNTER — APPOINTMENT (OUTPATIENT)
Dept: CT IMAGING | Age: 88
End: 2023-02-09
Attending: EMERGENCY MEDICINE
Payer: MEDICARE

## 2023-02-09 ENCOUNTER — TELEPHONE (OUTPATIENT)
Dept: INTERNAL MEDICINE CLINIC | Age: 88
End: 2023-02-09

## 2023-02-09 ENCOUNTER — HOSPITAL ENCOUNTER (EMERGENCY)
Age: 88
Discharge: HOME | End: 2023-02-09
Attending: EMERGENCY MEDICINE
Payer: MEDICARE

## 2023-02-09 VITALS
OXYGEN SATURATION: 97 % | SYSTOLIC BLOOD PRESSURE: 163 MMHG | RESPIRATION RATE: 18 BRPM | HEIGHT: 63 IN | DIASTOLIC BLOOD PRESSURE: 94 MMHG | BODY MASS INDEX: 26.93 KG/M2 | WEIGHT: 152 LBS | HEART RATE: 117 BPM | TEMPERATURE: 97.7 F

## 2023-02-09 DIAGNOSIS — R10.9 RIGHT FLANK PAIN: Primary | ICD-10-CM

## 2023-02-09 LAB
ALBUMIN SERPL-MCNC: 4.3 G/DL (ref 3.5–5)
ALBUMIN/GLOB SERPL: 1.2 (ref 1.1–2.2)
ALP SERPL-CCNC: 90 U/L (ref 45–117)
ALT SERPL-CCNC: 31 U/L (ref 12–78)
ANION GAP SERPL CALC-SCNC: 7 MMOL/L (ref 5–15)
APPEARANCE UR: CLEAR
AST SERPL-CCNC: 21 U/L (ref 15–37)
BACTERIA URNS QL MICRO: NEGATIVE /HPF
BASOPHILS # BLD: 0 K/UL (ref 0–0.1)
BASOPHILS NFR BLD: 0 % (ref 0–1)
BILIRUB SERPL-MCNC: 0.8 MG/DL (ref 0.2–1)
BILIRUB UR QL: NEGATIVE
BUN SERPL-MCNC: 11 MG/DL (ref 6–20)
BUN/CREAT SERPL: 13 (ref 12–20)
CALCIUM SERPL-MCNC: 11 MG/DL (ref 8.5–10.1)
CHLORIDE SERPL-SCNC: 107 MMOL/L (ref 97–108)
CO2 SERPL-SCNC: 23 MMOL/L (ref 21–32)
COLOR UR: ABNORMAL
COMMENT, HOLDF: NORMAL
COMMENT, HOLDF: NORMAL
CREAT SERPL-MCNC: 0.83 MG/DL (ref 0.55–1.02)
DIFFERENTIAL METHOD BLD: ABNORMAL
EOSINOPHIL # BLD: 0.1 K/UL (ref 0–0.4)
EOSINOPHIL NFR BLD: 1 % (ref 0–7)
EPITH CASTS URNS QL MICRO: ABNORMAL /LPF
ERYTHROCYTE [DISTWIDTH] IN BLOOD BY AUTOMATED COUNT: 12.7 % (ref 11.5–14.5)
GLOBULIN SER CALC-MCNC: 3.7 G/DL (ref 2–4)
GLUCOSE SERPL-MCNC: 112 MG/DL (ref 65–100)
GLUCOSE UR STRIP.AUTO-MCNC: NEGATIVE MG/DL
HCT VFR BLD AUTO: 45.1 % (ref 35–47)
HGB BLD-MCNC: 15.4 G/DL (ref 11.5–16)
HGB UR QL STRIP: NEGATIVE
HYALINE CASTS URNS QL MICRO: ABNORMAL /LPF (ref 0–2)
IMM GRANULOCYTES # BLD AUTO: 0 K/UL (ref 0–0.04)
IMM GRANULOCYTES NFR BLD AUTO: 1 % (ref 0–0.5)
KETONES UR QL STRIP.AUTO: 40 MG/DL
LEUKOCYTE ESTERASE UR QL STRIP.AUTO: ABNORMAL
LIPASE SERPL-CCNC: 207 U/L (ref 73–393)
LYMPHOCYTES # BLD: 2.2 K/UL (ref 0.8–3.5)
LYMPHOCYTES NFR BLD: 31 % (ref 12–49)
MCH RBC QN AUTO: 30.9 PG (ref 26–34)
MCHC RBC AUTO-ENTMCNC: 34.1 G/DL (ref 30–36.5)
MCV RBC AUTO: 90.4 FL (ref 80–99)
MONOCYTES # BLD: 0.7 K/UL (ref 0–1)
MONOCYTES NFR BLD: 10 % (ref 5–13)
NEUTS SEG # BLD: 4.1 K/UL (ref 1.8–8)
NEUTS SEG NFR BLD: 57 % (ref 32–75)
NITRITE UR QL STRIP.AUTO: NEGATIVE
NRBC # BLD: 0 K/UL (ref 0–0.01)
NRBC BLD-RTO: 0 PER 100 WBC
PH UR STRIP: 6.5 (ref 5–8)
PLATELET # BLD AUTO: 482 K/UL (ref 150–400)
PMV BLD AUTO: 10 FL (ref 8.9–12.9)
POTASSIUM SERPL-SCNC: 3.9 MMOL/L (ref 3.5–5.1)
PROT SERPL-MCNC: 8 G/DL (ref 6.4–8.2)
PROT UR STRIP-MCNC: ABNORMAL MG/DL
RBC # BLD AUTO: 4.99 M/UL (ref 3.8–5.2)
RBC #/AREA URNS HPF: ABNORMAL /HPF (ref 0–5)
SAMPLES BEING HELD,HOLD: NORMAL
SAMPLES BEING HELD,HOLD: NORMAL
SODIUM SERPL-SCNC: 137 MMOL/L (ref 136–145)
SP GR UR REFRACTOMETRY: 1.01 (ref 1–1.03)
UROBILINOGEN UR QL STRIP.AUTO: 0.2 EU/DL (ref 0.2–1)
WBC # BLD AUTO: 7.1 K/UL (ref 3.6–11)
WBC URNS QL MICRO: ABNORMAL /HPF (ref 0–4)

## 2023-02-09 PROCEDURE — 74011000636 HC RX REV CODE- 636: Performed by: EMERGENCY MEDICINE

## 2023-02-09 PROCEDURE — 93005 ELECTROCARDIOGRAM TRACING: CPT

## 2023-02-09 PROCEDURE — 83690 ASSAY OF LIPASE: CPT

## 2023-02-09 PROCEDURE — 85025 COMPLETE CBC W/AUTO DIFF WBC: CPT

## 2023-02-09 PROCEDURE — 36415 COLL VENOUS BLD VENIPUNCTURE: CPT

## 2023-02-09 PROCEDURE — 80053 COMPREHEN METABOLIC PANEL: CPT

## 2023-02-09 PROCEDURE — 99285 EMERGENCY DEPT VISIT HI MDM: CPT

## 2023-02-09 PROCEDURE — 81001 URINALYSIS AUTO W/SCOPE: CPT

## 2023-02-09 PROCEDURE — 74177 CT ABD & PELVIS W/CONTRAST: CPT

## 2023-02-09 RX ADMIN — IOPAMIDOL 85 ML: 755 INJECTION, SOLUTION INTRAVENOUS at 16:16

## 2023-02-09 NOTE — TELEPHONE ENCOUNTER
Cannot admit for testing but she should go to ER if she is getting worse as this does not fit shingles pain

## 2023-02-09 NOTE — TELEPHONE ENCOUNTER
Patient's son sent a [employer] message advising they decided to go to the ER on their own. Patient is at [address] [place] [address] being evaluated.

## 2023-02-09 NOTE — ED TRIAGE NOTES
Pt reports right sided pain along ribs to right shoulder x10 days. Pt reports nausea and vomiting. Hx of pancreatitis. Reports gallbladder has been removed. Primary care started pt on dilaudid 2mg  for pain on 2/7/23 for rib pain. Pt last BM on 2/6/23 has taken miralax and laxative without relief. Pt denies chest pain and SOB.
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
## 2023-02-09 NOTE — TELEPHONE ENCOUNTER
Per patient's son Hattie Lane, patient's pain has worsen, asking for PCP to admit patient to [facility] for testing.  C/b [phone]

## 2023-02-10 LAB
CALCULATED R AXIS, ECG10: -58 DEGREES
CALCULATED T AXIS, ECG11: 62 DEGREES
DIAGNOSIS, 93000: NORMAL
Q-T INTERVAL, ECG07: 368 MS
QRS DURATION, ECG06: 60 MS
QTC CALCULATION (BEZET), ECG08: 526 MS
VENTRICULAR RATE, ECG03: 123 BPM

## 2023-02-16 ENCOUNTER — TELEPHONE (OUTPATIENT)
Dept: INTERNAL MEDICINE CLINIC | Age: 88
End: 2023-02-16

## 2023-02-16 RX ORDER — CEPHALEXIN 500 MG/1
500 CAPSULE ORAL 2 TIMES DAILY
Qty: 14 CAPSULE | Refills: 0 | Status: SHIPPED | OUTPATIENT
Start: 2023-02-16

## 2023-02-16 NOTE — TELEPHONE ENCOUNTER
Patient states she has a bladder infection and it has started to have a smell like ammonia - has been forcing fluids but it is not getting better  Patient states provider said she could start on [facility] and wants to know if she can still do this

## 2023-03-22 NOTE — PROGRESS NOTES
Vick Romero (: 1935) is a 81 y.o. female, established patient, here for evaluation of the following chief complaint(s):  Follow-up (Follow up on shingles and stomach nausea. Med refill)       ASSESSMENT/PLAN:  Below is the assessment and plan developed based on review of pertinent history, physical exam, labs, studies, and medications. 1. Herpes zoster without complication-I am hoping that helping her sleep at night will help her cope better with the pain during the day. I did empathize with her pain and told her to continue with over-the-counter medicines but we are going to avoid any stronger pain meds as they cause side effects and were simply not working  -     mirtazapine (REMERON) 7.5 mg tablet; Take 1 Tablet by mouth nightly., Normal, Disp-30 Tablet, R-3  2. Essential hypertension-at goal off medicine  3. Nausea-I think this is multifactorial due to not sleeping and having persistent pain she has not been able to tolerate any of the pain medicines were going to stick to Tylenol. She is not sleeping at night due to the pain so we will try some Remeron and see if that does not help with sleep as well as stimulating her appetite  4. Seizure (HCC)-she has not seen neurology in quite some time and was getting a half a tablet of Valium for tremors I will refill this until she can get back in with them  -     diazePAM (VALIUM) 5 mg tablet; Take 1 Tablet by mouth two (2) times a day for 30 days. Max Daily Amount: 10 mg. 0.5 tablet bid, Normal, Disp-30 Tablet, R-3This request is for a new prescription for a controlled substance as required by Federal/State law. No follow-ups on file.       SUBJECTIVE/OBJECTIVE:  HPI  The pain from shingles is getting worse and hydrocodone is not helping her; but she is nauseated and not having appetite and was able to ; pain causes tightness around it and feels contraction      NPH: had scans this past summer when she fell and shunt had stable appearance and had chronic hematomas that were unchanged      Hypertension ROS: taking medications as instructed, no medication side effects noted, no TIA's, no chest pain on exertion, no dyspnea on exertion, no swelling of ankles. BP in office today is      Other: Pt notes a decline in her vision and hearing, which she believes is due to pantoprazole. Depression and anxiety- she cont to take her meds in a stable pattern ; doing well with lexapro    She has a history of lung granuoloma which is followed by         Review of Systems   All other systems reviewed and are negative. Physical Exam  Vitals and nursing note reviewed. Constitutional:       Appearance: She is well-developed. HENT:      Head: Normocephalic and atraumatic. Right Ear: External ear normal.      Left Ear: External ear normal.      Nose: Nose normal.   Neck:      Thyroid: No thyroid mass or thyromegaly. Vascular: No carotid bruit or JVD. Cardiovascular:      Rate and Rhythm: Normal rate and regular rhythm. Pulses: Normal pulses. Heart sounds: Normal heart sounds, S1 normal and S2 normal. No murmur heard. No friction rub. No gallop. Pulmonary:      Effort: Pulmonary effort is normal.      Breath sounds: Normal breath sounds. Abdominal:      General: Bowel sounds are normal.      Palpations: Abdomen is soft. Musculoskeletal:         General: Normal range of motion. Cervical back: Normal range of motion and neck supple. Skin:     General: Skin is warm and dry. Neurological:      Mental Status: She is alert and oriented to person, place, and time. Psychiatric:         Behavior: Behavior normal.         Thought Content:  Thought content normal.         Judgment: Judgment normal.         On this date 03/23/2023 I have spent 30 minutes reviewing previous notes, test results and face to face with the patient discussing the diagnosis and importance of compliance with the treatment plan as well as documenting on the day of the visit. An electronic signature was used to authenticate this note.   -- Miller Barentt MD

## 2023-03-23 ENCOUNTER — OFFICE VISIT (OUTPATIENT)
Dept: INTERNAL MEDICINE CLINIC | Age: 88
End: 2023-03-23
Payer: MEDICARE

## 2023-03-23 VITALS
OXYGEN SATURATION: 95 % | WEIGHT: 142 LBS | DIASTOLIC BLOOD PRESSURE: 82 MMHG | TEMPERATURE: 97.4 F | SYSTOLIC BLOOD PRESSURE: 139 MMHG | BODY MASS INDEX: 25.16 KG/M2 | HEART RATE: 93 BPM | RESPIRATION RATE: 14 BRPM | HEIGHT: 63 IN

## 2023-03-23 DIAGNOSIS — B02.9 HERPES ZOSTER WITHOUT COMPLICATION: Primary | ICD-10-CM

## 2023-03-23 DIAGNOSIS — R56.9 SEIZURE (HCC): ICD-10-CM

## 2023-03-23 DIAGNOSIS — I10 ESSENTIAL HYPERTENSION: ICD-10-CM

## 2023-03-23 DIAGNOSIS — R11.0 NAUSEA: ICD-10-CM

## 2023-03-23 PROCEDURE — G0463 HOSPITAL OUTPT CLINIC VISIT: HCPCS | Performed by: INTERNAL MEDICINE

## 2023-03-23 RX ORDER — MIRTAZAPINE 7.5 MG/1
7.5 TABLET, FILM COATED ORAL
Qty: 30 TABLET | Refills: 3 | Status: SHIPPED | OUTPATIENT
Start: 2023-03-23

## 2023-03-23 RX ORDER — DIAZEPAM 5 MG/1
5 TABLET ORAL 2 TIMES DAILY
Qty: 30 TABLET | Refills: 3 | Status: SHIPPED | OUTPATIENT
Start: 2023-03-23 | End: 2023-04-22

## 2023-03-27 RX ORDER — ESCITALOPRAM OXALATE 10 MG/1
TABLET ORAL
Qty: 90 TABLET | Refills: 1 | Status: SHIPPED | OUTPATIENT
Start: 2023-03-27

## 2023-04-21 DIAGNOSIS — R91.8 MULTIPLE PULMONARY NODULES: Primary | ICD-10-CM

## 2023-05-17 ENCOUNTER — OFFICE VISIT (OUTPATIENT)
Age: 88
End: 2023-05-17
Payer: MEDICARE

## 2023-05-17 VITALS
TEMPERATURE: 98 F | RESPIRATION RATE: 18 BRPM | DIASTOLIC BLOOD PRESSURE: 90 MMHG | OXYGEN SATURATION: 97 % | WEIGHT: 142 LBS | SYSTOLIC BLOOD PRESSURE: 138 MMHG | HEART RATE: 104 BPM | BODY MASS INDEX: 25.16 KG/M2 | HEIGHT: 63 IN

## 2023-05-17 DIAGNOSIS — I10 ESSENTIAL (PRIMARY) HYPERTENSION: ICD-10-CM

## 2023-05-17 DIAGNOSIS — G91.2 (IDIOPATHIC) NORMAL PRESSURE HYDROCEPHALUS (HCC): ICD-10-CM

## 2023-05-17 DIAGNOSIS — R56.9 CONVULSIONS, UNSPECIFIED CONVULSION TYPE (HCC): ICD-10-CM

## 2023-05-17 DIAGNOSIS — Z86.19 HISTORY OF SHINGLES: ICD-10-CM

## 2023-05-17 DIAGNOSIS — M25.561 ACUTE PAIN OF RIGHT KNEE: Primary | ICD-10-CM

## 2023-05-17 PROCEDURE — 1123F ACP DISCUSS/DSCN MKR DOCD: CPT | Performed by: INTERNAL MEDICINE

## 2023-05-17 PROCEDURE — 1090F PRES/ABSN URINE INCON ASSESS: CPT | Performed by: INTERNAL MEDICINE

## 2023-05-17 PROCEDURE — G8419 CALC BMI OUT NRM PARAM NOF/U: HCPCS | Performed by: INTERNAL MEDICINE

## 2023-05-17 PROCEDURE — 99214 OFFICE O/P EST MOD 30 MIN: CPT | Performed by: INTERNAL MEDICINE

## 2023-05-17 PROCEDURE — 1036F TOBACCO NON-USER: CPT | Performed by: INTERNAL MEDICINE

## 2023-05-17 PROCEDURE — G8427 DOCREV CUR MEDS BY ELIG CLIN: HCPCS | Performed by: INTERNAL MEDICINE

## 2023-05-17 ASSESSMENT — PATIENT HEALTH QUESTIONNAIRE - PHQ9
SUM OF ALL RESPONSES TO PHQ QUESTIONS 1-9: 0
1. LITTLE INTEREST OR PLEASURE IN DOING THINGS: 0
SUM OF ALL RESPONSES TO PHQ QUESTIONS 1-9: 0
SUM OF ALL RESPONSES TO PHQ9 QUESTIONS 1 & 2: 0
2. FEELING DOWN, DEPRESSED OR HOPELESS: 0

## 2023-05-17 NOTE — PROGRESS NOTES
Kavita Eason (:  1935) is a 81 y.o. female,Established patient, here for evaluation of the following chief complaint(s):  Joint Pain         ASSESSMENT/PLAN:  1. Acute pain of right knee-told her to get the brace, continue with Tylenol and referred her back to Dr. David Mahan she may need a joint injection as there is some swelling and tenderness around the anterior part of the knee  2. Essential (primary) hypertension-at goal on atenolol 25 mg once a day  3. Convulsions, unspecified convulsion type (HCC)-continue to monitor no signs of acute symptoms takes half a tablet of Valium twice a day  4. (Idiopathic) normal pressure hydrocephalus (HCC)-last checkup by neurosurgery said scans and shunt were normal  5. History of shingles-she continues to have pain but takes Tylenol as needed as she could not tolerate any of the stronger pain meds      No follow-ups on file. Subjective   SUBJECTIVE/OBJECTIVE:  Joint Pain       Patient is here for joint pain that has been going on now for- right knee pain that has been going on for a month- she fell 4 weeks ago with big bruise - pain on outside of right knee and behind the knee     NPH: had scans this past summer when she fell and shunt had stable appearance and had chronic hematomas that were unchanged      Hypertension ROS: taking medications as instructed, no medication side effects noted, no TIA's, no chest pain on exertion, no dyspnea on exertion, no swelling of ankles. BP in office today is 138/90     Other: Pt notes a decline in her vision and hearing, which she believes is due to pantoprazole. Depression and anxiety- she cont to take her meds in a stable pattern ; doing well with lexapro     She has a history of lung granuoloma which is followed by         Review of Systems   Musculoskeletal:  Positive for arthralgias. All other systems reviewed and are negative. Objective   Physical Exam  Vitals and nursing note reviewed.

## 2023-05-25 ENCOUNTER — OFFICE VISIT (OUTPATIENT)
Age: 88
End: 2023-05-25
Payer: MEDICARE

## 2023-05-25 VITALS
SYSTOLIC BLOOD PRESSURE: 138 MMHG | WEIGHT: 140 LBS | RESPIRATION RATE: 16 BRPM | TEMPERATURE: 97.9 F | HEIGHT: 63 IN | HEART RATE: 82 BPM | DIASTOLIC BLOOD PRESSURE: 80 MMHG | OXYGEN SATURATION: 97 % | BODY MASS INDEX: 24.8 KG/M2

## 2023-05-25 DIAGNOSIS — B02.29 POST HERPETIC NEURALGIA: Primary | ICD-10-CM

## 2023-05-25 DIAGNOSIS — R26.9 GAIT DISTURBANCE: ICD-10-CM

## 2023-05-25 DIAGNOSIS — R51.9 NONINTRACTABLE HEADACHE, UNSPECIFIED CHRONICITY PATTERN, UNSPECIFIED HEADACHE TYPE: ICD-10-CM

## 2023-05-25 DIAGNOSIS — G91.2 NPH (NORMAL PRESSURE HYDROCEPHALUS) (HCC): ICD-10-CM

## 2023-05-25 PROCEDURE — G8427 DOCREV CUR MEDS BY ELIG CLIN: HCPCS | Performed by: NURSE PRACTITIONER

## 2023-05-25 PROCEDURE — 1090F PRES/ABSN URINE INCON ASSESS: CPT | Performed by: NURSE PRACTITIONER

## 2023-05-25 PROCEDURE — 1036F TOBACCO NON-USER: CPT | Performed by: NURSE PRACTITIONER

## 2023-05-25 PROCEDURE — 99215 OFFICE O/P EST HI 40 MIN: CPT | Performed by: NURSE PRACTITIONER

## 2023-05-25 PROCEDURE — G8420 CALC BMI NORM PARAMETERS: HCPCS | Performed by: NURSE PRACTITIONER

## 2023-05-25 PROCEDURE — 1123F ACP DISCUSS/DSCN MKR DOCD: CPT | Performed by: NURSE PRACTITIONER

## 2023-05-25 RX ORDER — DIAZEPAM 5 MG/1
TABLET ORAL
Qty: 90 TABLET | Refills: 1 | Status: SHIPPED | OUTPATIENT
Start: 2023-05-25 | End: 2023-08-24

## 2023-05-25 RX ORDER — PREGABALIN 50 MG/1
50 CAPSULE ORAL 2 TIMES DAILY
Qty: 60 CAPSULE | Refills: 3 | Status: SHIPPED | OUTPATIENT
Start: 2023-05-25 | End: 2023-06-24

## 2023-05-25 ASSESSMENT — PATIENT HEALTH QUESTIONNAIRE - PHQ9
1. LITTLE INTEREST OR PLEASURE IN DOING THINGS: 0
2. FEELING DOWN, DEPRESSED OR HOPELESS: 1
SUM OF ALL RESPONSES TO PHQ QUESTIONS 1-9: 1
SUM OF ALL RESPONSES TO PHQ9 QUESTIONS 1 & 2: 1
SUM OF ALL RESPONSES TO PHQ QUESTIONS 1-9: 1

## 2023-05-25 NOTE — PROGRESS NOTES
Patient identified with two identification factors, Name and Date of Birth. Chief Complaint   Patient presents with    Follow-up    Tremors       /80 (Site: Left Upper Arm, Position: Sitting, Cuff Size: Small Adult)   Pulse 82   Temp 97.9 °F (36.6 °C) (Temporal)   Resp 16   Ht 5' 3\" (1.6 m)   Wt 140 lb (63.5 kg)   SpO2 97%   BMI 24.80 kg/m²       1. \"Have you been to the ER, urgent care clinic since your last visit? Hospitalized since your last visit? \" Yes. 03/01/23 for sickness [facility]    2. \"Have you seen or consulted any other health care providers outside of the [address] [place] [address] since your last visit? \" No

## 2023-06-15 ENCOUNTER — HOSPITAL ENCOUNTER (OUTPATIENT)
Facility: HOSPITAL | Age: 88
Discharge: HOME | End: 2023-06-18
Payer: MEDICARE

## 2023-06-15 DIAGNOSIS — R51.9 NONINTRACTABLE HEADACHE, UNSPECIFIED CHRONICITY PATTERN, UNSPECIFIED HEADACHE TYPE: ICD-10-CM

## 2023-06-15 DIAGNOSIS — B02.29 POST HERPETIC NEURALGIA: ICD-10-CM

## 2023-06-15 DIAGNOSIS — R26.9 GAIT DISTURBANCE: ICD-10-CM

## 2023-06-15 DIAGNOSIS — G91.2 NPH (NORMAL PRESSURE HYDROCEPHALUS) (HCC): ICD-10-CM

## 2023-06-15 PROCEDURE — 70450 CT HEAD/BRAIN W/O DYE: CPT

## 2023-07-13 ENCOUNTER — TELEPHONE (OUTPATIENT)
Age: 88
End: 2023-07-13

## 2023-07-13 RX ORDER — ATENOLOL 25 MG/1
25 TABLET ORAL DAILY
Qty: 90 TABLET | Refills: 1 | Status: SHIPPED | OUTPATIENT
Start: 2023-07-13

## 2023-07-13 NOTE — TELEPHONE ENCOUNTER
Refill rx request:    atenolol (TENORMIN) 25 MG tablet    Send to:  [facility]/pharmacy [address] [facility], [facility] [place] [phone] - F [phone]

## 2023-07-23 ENCOUNTER — TELEPHONE (OUTPATIENT)
Age: 88
End: 2023-07-23

## 2023-07-23 RX ORDER — CEPHALEXIN 500 MG/1
500 CAPSULE ORAL 2 TIMES DAILY
Qty: 14 CAPSULE | Refills: 0 | Status: SHIPPED | OUTPATIENT
Start: 2023-07-23 | End: 2023-07-30

## 2023-08-03 ENCOUNTER — TELEPHONE (OUTPATIENT)
Age: 88
End: 2023-08-03

## 2023-08-03 DIAGNOSIS — R30.0 DYSURIA: ICD-10-CM

## 2023-08-03 DIAGNOSIS — R82.90 ABNORMAL URINE ODOR: ICD-10-CM

## 2023-08-03 DIAGNOSIS — R30.0 DYSURIA: Primary | ICD-10-CM

## 2023-08-03 NOTE — TELEPHONE ENCOUNTER
Spoke with patient and advised her that we will need to check a urine prior to treating her again since she was treated by the doctor on-call on 7/23/23. Advised there is no appointment availability in office and provided lab orders to take a urine specimen to [address] [place] [address] near her home. Pt understood and was thankful for the call.

## 2023-08-03 NOTE — TELEPHONE ENCOUNTER
Patient called still having an ammonia smell in urine, was hoping to get more medication, is requesting a z-luis alfredo bc the prior medication upset her stomach.      Ileana Padgett [phone] [facility])

## 2023-08-04 LAB
APPEARANCE UR: CLEAR
BACTERIA #/AREA URNS HPF: ABNORMAL /[HPF]
BILIRUB UR QL STRIP: NEGATIVE
CASTS URNS QL MICRO: ABNORMAL /LPF
COLOR UR: YELLOW
CRYSTALS URNS MICRO: ABNORMAL
EPI CELLS #/AREA URNS HPF: ABNORMAL /HPF (ref 0–10)
GLUCOSE UR QL STRIP: NEGATIVE
HGB UR QL STRIP: NEGATIVE
KETONES UR QL STRIP: NEGATIVE
LEUKOCYTE ESTERASE UR QL STRIP: NEGATIVE
MICRO URNS: NORMAL
MICRO URNS: NORMAL
NITRITE UR QL STRIP: NEGATIVE
PH UR STRIP: 7 [PH] (ref 5–7.5)
PROT UR QL STRIP: NEGATIVE
RBC #/AREA URNS HPF: ABNORMAL /HPF (ref 0–2)
SP GR UR STRIP: 1.01 (ref 1–1.03)
UNIDENT CRYS URNS QL MICRO: PRESENT
UROBILINOGEN UR STRIP-MCNC: 0.2 MG/DL (ref 0.2–1)
WBC #/AREA URNS HPF: ABNORMAL /HPF (ref 0–5)

## 2023-08-06 LAB — BACTERIA UR CULT: NO GROWTH

## 2023-08-07 ENCOUNTER — PATIENT MESSAGE (OUTPATIENT)
Age: 88
End: 2023-08-07

## 2023-08-07 DIAGNOSIS — R30.0 DYSURIA: Primary | ICD-10-CM

## 2023-08-10 DIAGNOSIS — R30.0 DYSURIA: ICD-10-CM

## 2023-08-10 NOTE — TELEPHONE ENCOUNTER
Windle Osler, MD 8/9/2023 5:51 PM EDT    ,ok to do  ----- Message -----  From: Valdo Stone  Sent: 8/9/2023 3:23 PM EDT  To: , *  Subject: urine     YES,WILL FIND TRANSPORTATIONAND LET YOU KNOW--THANK YOU--

## 2023-08-11 ENCOUNTER — TELEPHONE (OUTPATIENT)
Age: 88
End: 2023-08-11

## 2023-08-11 NOTE — TELEPHONE ENCOUNTER
Patient is requesting a call back to get result for her urine test. She also stated she is having trouble getting into her portal and is unable to read messages on my chart.    [phone]

## 2023-08-14 LAB
BACTERIA UR CULT: ABNORMAL
BACTERIA UR CULT: ABNORMAL

## 2023-08-14 RX ORDER — CEPHALEXIN 500 MG/1
500 CAPSULE ORAL 2 TIMES DAILY
Qty: 14 CAPSULE | Refills: 0 | Status: SHIPPED | OUTPATIENT
Start: 2023-08-14 | End: 2023-08-21

## 2023-08-18 ENCOUNTER — TELEPHONE (OUTPATIENT)
Age: 88
End: 2023-08-18

## 2023-08-18 NOTE — TELEPHONE ENCOUNTER
Patient would like a call from [facility] regarding her Physical Therapy. Stated the Therapist Iván Whalen from [place] in [place] was 40 minutes late, inappropriate with his questions and insisted on taking her walker away. Patient does not want him to come back to her house.     Please contact

## 2023-08-28 ENCOUNTER — OFFICE VISIT (OUTPATIENT)
Age: 88
End: 2023-08-28
Payer: MEDICARE

## 2023-08-28 ENCOUNTER — HOSPITAL ENCOUNTER (OUTPATIENT)
Facility: HOSPITAL | Age: 88
Discharge: HOME | End: 2023-08-31
Payer: MEDICARE

## 2023-08-28 VITALS
SYSTOLIC BLOOD PRESSURE: 124 MMHG | OXYGEN SATURATION: 97 % | RESPIRATION RATE: 16 BRPM | DIASTOLIC BLOOD PRESSURE: 74 MMHG | TEMPERATURE: 98.1 F | HEART RATE: 100 BPM

## 2023-08-28 DIAGNOSIS — B02.29 POST HERPETIC NEURALGIA: ICD-10-CM

## 2023-08-28 DIAGNOSIS — R51.9 NONINTRACTABLE HEADACHE, UNSPECIFIED CHRONICITY PATTERN, UNSPECIFIED HEADACHE TYPE: Primary | ICD-10-CM

## 2023-08-28 DIAGNOSIS — R22.2 MASS OF RIGHT CHEST WALL: ICD-10-CM

## 2023-08-28 PROCEDURE — 1090F PRES/ABSN URINE INCON ASSESS: CPT | Performed by: NURSE PRACTITIONER

## 2023-08-28 PROCEDURE — 1123F ACP DISCUSS/DSCN MKR DOCD: CPT | Performed by: NURSE PRACTITIONER

## 2023-08-28 PROCEDURE — 99215 OFFICE O/P EST HI 40 MIN: CPT | Performed by: NURSE PRACTITIONER

## 2023-08-28 PROCEDURE — 71046 X-RAY EXAM CHEST 2 VIEWS: CPT

## 2023-08-28 PROCEDURE — 1036F TOBACCO NON-USER: CPT | Performed by: NURSE PRACTITIONER

## 2023-08-28 PROCEDURE — G8420 CALC BMI NORM PARAMETERS: HCPCS | Performed by: NURSE PRACTITIONER

## 2023-08-28 PROCEDURE — G8427 DOCREV CUR MEDS BY ELIG CLIN: HCPCS | Performed by: NURSE PRACTITIONER

## 2023-08-28 RX ORDER — DIAZEPAM 5 MG/1
5 TABLET ORAL EVERY 8 HOURS PRN
Qty: 90 TABLET | Refills: 3 | Status: SHIPPED | OUTPATIENT
Start: 2023-08-28 | End: 2023-09-27

## 2023-08-28 RX ORDER — DIAZEPAM 5 MG/1
5 TABLET ORAL EVERY 6 HOURS PRN
COMMUNITY
End: 2023-08-28 | Stop reason: SDUPTHER

## 2023-08-28 RX ORDER — DULOXETIN HYDROCHLORIDE 20 MG/1
20 CAPSULE, DELAYED RELEASE ORAL DAILY
Qty: 30 CAPSULE | Refills: 3 | Status: SHIPPED | OUTPATIENT
Start: 2023-08-28 | End: 2023-09-01 | Stop reason: SDUPTHER

## 2023-08-29 ENCOUNTER — TELEPHONE (OUTPATIENT)
Age: 88
End: 2023-08-29

## 2023-08-29 NOTE — PROGRESS NOTES
above      CT Result (most recent):  CT HEAD WO CONTRAST 06/15/2023    Narrative  CLINICAL HISTORY: Headache, unspecified; Other postherpetic nervous system  involvement; Unspecified abnormalities of gait and mobility; (Idiopathic) normal  pressure hydrocephalus  INDICATION: Headache, unspecified; Other postherpetic nervous system  involvement; Unspecified abnormalities of gait and mobility; (Idiopathic) normal  pressure hydrocephalus  COMPARISON: 2022. CT dose reduction was achieved through use of a standardized protocol tailored  for this examination and automatic exposure control for dose modulation. TECHNIQUE: Serial axial images with a collimation of 5 mm were obtained from the  skull base through the vertex  FINDINGS:  There is sulcal and ventricular prominence. Confluent periventricular and  scattered foci of hypodensity in the cerebral white matter. There is no evidence  of an acute infarction, hemorrhage, or mass-effect. There is no evidence of  midline shift or hydrocephalus. Posterior fossa structures are unremarkable. No  extra-axial collections are seen. Mastoid air cells are well pneumatized and clear. Stable lesion in the left atrium hyperdense and 13 mm in size. Ventriculostomy  catheter right frontal tract unchanged. Impression  No acute intracranial process. No significant interval change. Imaging findings consistent with mild chronic microvascular ischemic change. There is a mild to moderate degree of cerebral atrophy.       EEG Result:      Carotid Doppler:        Recent Labs:  Lab Results   Component Value Date    WBC 7.1 02/09/2023    HGB 15.4 02/09/2023    HCT 45.1 02/09/2023    MCV 90.4 02/09/2023     (H) 02/09/2023     Lab Results   Component Value Date     02/09/2023    K 3.9 02/09/2023     02/09/2023    CO2 23 02/09/2023    BUN 11 02/09/2023    CREATININE 0.83 02/09/2023    GLUCOSE 112 (H) 02/09/2023    CALCIUM 11.0 (H) 02/09/2023    PROT 8.0 02/09/2023

## 2023-09-01 DIAGNOSIS — R93.89 ABNORMAL X-RAY: ICD-10-CM

## 2023-09-01 DIAGNOSIS — R22.2 MASS OF RIGHT CHEST WALL: Primary | ICD-10-CM

## 2023-09-01 RX ORDER — DULOXETIN HYDROCHLORIDE 20 MG/1
20 CAPSULE, DELAYED RELEASE ORAL 2 TIMES DAILY
Qty: 60 CAPSULE | Refills: 4 | Status: SHIPPED | OUTPATIENT
Start: 2023-09-01

## 2023-09-21 ENCOUNTER — HOSPITAL ENCOUNTER (OUTPATIENT)
Facility: HOSPITAL | Age: 88
Discharge: HOME | End: 2023-09-21
Attending: INTERNAL MEDICINE
Payer: MEDICARE

## 2023-09-21 ENCOUNTER — HOSPITAL ENCOUNTER (OUTPATIENT)
Facility: HOSPITAL | Age: 88
End: 2023-09-21
Attending: INTERNAL MEDICINE
Payer: MEDICARE

## 2023-09-21 DIAGNOSIS — R22.2 MASS OF RIGHT CHEST WALL: ICD-10-CM

## 2023-09-21 DIAGNOSIS — R93.89 ABNORMAL X-RAY: ICD-10-CM

## 2023-09-21 DIAGNOSIS — R91.8 MULTIPLE PULMONARY NODULES: ICD-10-CM

## 2023-09-21 PROCEDURE — 74177 CT ABD & PELVIS W/CONTRAST: CPT

## 2023-09-21 PROCEDURE — 6360000004 HC RX CONTRAST MEDICATION: Performed by: NURSE PRACTITIONER

## 2023-09-21 PROCEDURE — 71250 CT THORAX DX C-: CPT

## 2023-09-21 RX ADMIN — IOPAMIDOL 75 ML: 755 INJECTION, SOLUTION INTRAVENOUS at 17:57

## 2023-10-13 ENCOUNTER — APPOINTMENT (OUTPATIENT)
Facility: HOSPITAL | Age: 88
End: 2023-10-13
Payer: MEDICARE

## 2023-10-13 ENCOUNTER — HOSPITAL ENCOUNTER (OUTPATIENT)
Facility: HOSPITAL | Age: 88
Setting detail: OBSERVATION
LOS: 1 days | Discharge: HOME | End: 2023-10-19
Attending: EMERGENCY MEDICINE | Admitting: FAMILY MEDICINE
Payer: MEDICARE

## 2023-10-13 DIAGNOSIS — R29.6 FREQUENT FALLS: Primary | ICD-10-CM

## 2023-10-13 DIAGNOSIS — R26.2 DIFFICULTY IN WALKING: ICD-10-CM

## 2023-10-13 LAB
25(OH)D3 SERPL-MCNC: <9 NG/ML (ref 30–100)
ALBUMIN SERPL-MCNC: 3.6 G/DL (ref 3.5–5)
ALBUMIN/GLOB SERPL: 1.1 (ref 1.1–2.2)
ALP SERPL-CCNC: 85 U/L (ref 45–117)
ALT SERPL-CCNC: 21 U/L (ref 12–78)
ANION GAP SERPL CALC-SCNC: 5 MMOL/L (ref 5–15)
APPEARANCE UR: CLEAR
AST SERPL-CCNC: 12 U/L (ref 15–37)
BACTERIA URNS QL MICRO: NEGATIVE /HPF
BASOPHILS # BLD: 0 K/UL (ref 0–0.1)
BASOPHILS NFR BLD: 0 % (ref 0–1)
BILIRUB SERPL-MCNC: 0.7 MG/DL (ref 0.2–1)
BILIRUB UR QL: NEGATIVE
BUN SERPL-MCNC: 16 MG/DL (ref 6–20)
BUN/CREAT SERPL: 21 (ref 12–20)
CALCIUM SERPL-MCNC: 10.3 MG/DL (ref 8.5–10.1)
CALCIUM SERPL-MCNC: 10.7 MG/DL (ref 8.5–10.1)
CHLORIDE SERPL-SCNC: 109 MMOL/L (ref 97–108)
CO2 SERPL-SCNC: 25 MMOL/L (ref 21–32)
COLOR UR: NORMAL
COMMENT:: NORMAL
CREAT SERPL-MCNC: 0.78 MG/DL (ref 0.55–1.02)
DIFFERENTIAL METHOD BLD: ABNORMAL
EKG ATRIAL RATE: 67 BPM
EKG DIAGNOSIS: NORMAL
EKG P AXIS: 3 DEGREES
EKG P-R INTERVAL: 254 MS
EKG Q-T INTERVAL: 398 MS
EKG QRS DURATION: 72 MS
EKG QTC CALCULATION (BAZETT): 420 MS
EKG R AXIS: -35 DEGREES
EKG T AXIS: 7 DEGREES
EKG VENTRICULAR RATE: 67 BPM
EOSINOPHIL # BLD: 0.1 K/UL (ref 0–0.4)
EOSINOPHIL NFR BLD: 1 % (ref 0–7)
EPITH CASTS URNS QL MICRO: NORMAL /LPF
ERYTHROCYTE [DISTWIDTH] IN BLOOD BY AUTOMATED COUNT: 12.3 % (ref 11.5–14.5)
GLOBULIN SER CALC-MCNC: 3.4 G/DL (ref 2–4)
GLUCOSE SERPL-MCNC: 118 MG/DL (ref 65–100)
GLUCOSE UR STRIP.AUTO-MCNC: NEGATIVE MG/DL
HCT VFR BLD AUTO: 41.5 % (ref 35–47)
HGB BLD-MCNC: 13.8 G/DL (ref 11.5–16)
HGB UR QL STRIP: NEGATIVE
HYALINE CASTS URNS QL MICRO: NORMAL /LPF (ref 0–2)
IMM GRANULOCYTES # BLD AUTO: 0.1 K/UL (ref 0–0.04)
IMM GRANULOCYTES NFR BLD AUTO: 1 % (ref 0–0.5)
KETONES UR QL STRIP.AUTO: NEGATIVE MG/DL
LEUKOCYTE ESTERASE UR QL STRIP.AUTO: NEGATIVE
LYMPHOCYTES # BLD: 1.6 K/UL (ref 0.8–3.5)
LYMPHOCYTES NFR BLD: 14 % (ref 12–49)
MCH RBC QN AUTO: 30.5 PG (ref 26–34)
MCHC RBC AUTO-ENTMCNC: 33.3 G/DL (ref 30–36.5)
MCV RBC AUTO: 91.8 FL (ref 80–99)
MONOCYTES # BLD: 0.9 K/UL (ref 0–1)
MONOCYTES NFR BLD: 8 % (ref 5–13)
NEUTS SEG # BLD: 8.6 K/UL (ref 1.8–8)
NEUTS SEG NFR BLD: 76 % (ref 32–75)
NITRITE UR QL STRIP.AUTO: NEGATIVE
NRBC # BLD: 0 K/UL (ref 0–0.01)
NRBC BLD-RTO: 0 PER 100 WBC
PH UR STRIP: 6.5 (ref 5–8)
PLATELET # BLD AUTO: 618 K/UL (ref 150–400)
PMV BLD AUTO: 9.6 FL (ref 8.9–12.9)
POTASSIUM SERPL-SCNC: 3.8 MMOL/L (ref 3.5–5.1)
PROT SERPL-MCNC: 7 G/DL (ref 6.4–8.2)
PROT UR STRIP-MCNC: NEGATIVE MG/DL
PTH-INTACT SERPL-MCNC: 94.3 PG/ML (ref 18.4–88)
RBC # BLD AUTO: 4.52 M/UL (ref 3.8–5.2)
RBC #/AREA URNS HPF: NORMAL /HPF (ref 0–5)
RBC MORPH BLD: ABNORMAL
SODIUM SERPL-SCNC: 139 MMOL/L (ref 136–145)
SP GR UR REFRACTOMETRY: 1.01 (ref 1–1.03)
SPECIMEN HOLD: NORMAL
UROBILINOGEN UR QL STRIP.AUTO: 0.2 EU/DL (ref 0.2–1)
WBC # BLD AUTO: 11.3 K/UL (ref 3.6–11)
WBC URNS QL MICRO: NORMAL /HPF (ref 0–4)

## 2023-10-13 PROCEDURE — 6360000002 HC RX W HCPCS: Performed by: FAMILY MEDICINE

## 2023-10-13 PROCEDURE — 72125 CT NECK SPINE W/O DYE: CPT

## 2023-10-13 PROCEDURE — 83970 ASSAY OF PARATHORMONE: CPT

## 2023-10-13 PROCEDURE — 99285 EMERGENCY DEPT VISIT HI MDM: CPT

## 2023-10-13 PROCEDURE — 36415 COLL VENOUS BLD VENIPUNCTURE: CPT

## 2023-10-13 PROCEDURE — 93005 ELECTROCARDIOGRAM TRACING: CPT | Performed by: EMERGENCY MEDICINE

## 2023-10-13 PROCEDURE — 70450 CT HEAD/BRAIN W/O DYE: CPT

## 2023-10-13 PROCEDURE — 73130 X-RAY EXAM OF HAND: CPT

## 2023-10-13 PROCEDURE — 97535 SELF CARE MNGMENT TRAINING: CPT

## 2023-10-13 PROCEDURE — 97165 OT EVAL LOW COMPLEX 30 MIN: CPT

## 2023-10-13 PROCEDURE — 2700000000 HC OXYGEN THERAPY PER DAY

## 2023-10-13 PROCEDURE — 93010 ELECTROCARDIOGRAM REPORT: CPT | Performed by: INTERNAL MEDICINE

## 2023-10-13 PROCEDURE — 2580000003 HC RX 258: Performed by: FAMILY MEDICINE

## 2023-10-13 PROCEDURE — 6370000000 HC RX 637 (ALT 250 FOR IP): Performed by: FAMILY MEDICINE

## 2023-10-13 PROCEDURE — 97161 PT EVAL LOW COMPLEX 20 MIN: CPT

## 2023-10-13 PROCEDURE — 82306 VITAMIN D 25 HYDROXY: CPT

## 2023-10-13 PROCEDURE — 80053 COMPREHEN METABOLIC PANEL: CPT

## 2023-10-13 PROCEDURE — 94761 N-INVAS EAR/PLS OXIMETRY MLT: CPT

## 2023-10-13 PROCEDURE — 85025 COMPLETE CBC W/AUTO DIFF WBC: CPT

## 2023-10-13 PROCEDURE — 81001 URINALYSIS AUTO W/SCOPE: CPT

## 2023-10-13 PROCEDURE — 1100000000 HC RM PRIVATE

## 2023-10-13 PROCEDURE — 71045 X-RAY EXAM CHEST 1 VIEW: CPT

## 2023-10-13 PROCEDURE — 97530 THERAPEUTIC ACTIVITIES: CPT

## 2023-10-13 RX ORDER — ATENOLOL 50 MG/1
25 TABLET ORAL DAILY
Status: DISCONTINUED | OUTPATIENT
Start: 2023-10-13 | End: 2023-10-19 | Stop reason: HOSPADM

## 2023-10-13 RX ORDER — SODIUM CHLORIDE 0.9 % (FLUSH) 0.9 %
5-40 SYRINGE (ML) INJECTION EVERY 12 HOURS SCHEDULED
Status: DISCONTINUED | OUTPATIENT
Start: 2023-10-13 | End: 2023-10-19 | Stop reason: HOSPADM

## 2023-10-13 RX ORDER — ONDANSETRON 4 MG/1
4 TABLET, ORALLY DISINTEGRATING ORAL EVERY 8 HOURS PRN
Status: DISCONTINUED | OUTPATIENT
Start: 2023-10-13 | End: 2023-10-19 | Stop reason: HOSPADM

## 2023-10-13 RX ORDER — SODIUM CHLORIDE 9 MG/ML
INJECTION, SOLUTION INTRAVENOUS PRN
Status: DISCONTINUED | OUTPATIENT
Start: 2023-10-13 | End: 2023-10-19 | Stop reason: HOSPADM

## 2023-10-13 RX ORDER — DIAZEPAM 5 MG/1
5 TABLET ORAL EVERY 8 HOURS PRN
Status: DISCONTINUED | OUTPATIENT
Start: 2023-10-13 | End: 2023-10-19 | Stop reason: HOSPADM

## 2023-10-13 RX ORDER — SODIUM CHLORIDE 0.9 % (FLUSH) 0.9 %
5-40 SYRINGE (ML) INJECTION PRN
Status: DISCONTINUED | OUTPATIENT
Start: 2023-10-13 | End: 2023-10-19 | Stop reason: HOSPADM

## 2023-10-13 RX ORDER — POLYETHYLENE GLYCOL 3350 17 G/17G
17 POWDER, FOR SOLUTION ORAL DAILY PRN
Status: DISCONTINUED | OUTPATIENT
Start: 2023-10-13 | End: 2023-10-19 | Stop reason: HOSPADM

## 2023-10-13 RX ORDER — ACETAMINOPHEN 325 MG/1
650 TABLET ORAL EVERY 6 HOURS PRN
Status: DISCONTINUED | OUTPATIENT
Start: 2023-10-13 | End: 2023-10-19 | Stop reason: HOSPADM

## 2023-10-13 RX ORDER — ACETAMINOPHEN 650 MG/1
650 SUPPOSITORY RECTAL EVERY 6 HOURS PRN
Status: DISCONTINUED | OUTPATIENT
Start: 2023-10-13 | End: 2023-10-19 | Stop reason: HOSPADM

## 2023-10-13 RX ORDER — ONDANSETRON 2 MG/ML
4 INJECTION INTRAMUSCULAR; INTRAVENOUS EVERY 6 HOURS PRN
Status: DISCONTINUED | OUTPATIENT
Start: 2023-10-13 | End: 2023-10-19 | Stop reason: HOSPADM

## 2023-10-13 RX ORDER — SODIUM CHLORIDE 9 MG/ML
INJECTION, SOLUTION INTRAVENOUS CONTINUOUS
Status: DISCONTINUED | OUTPATIENT
Start: 2023-10-13 | End: 2023-10-14

## 2023-10-13 RX ORDER — ENOXAPARIN SODIUM 100 MG/ML
40 INJECTION SUBCUTANEOUS DAILY
Status: DISCONTINUED | OUTPATIENT
Start: 2023-10-13 | End: 2023-10-19 | Stop reason: HOSPADM

## 2023-10-13 RX ADMIN — ENOXAPARIN SODIUM 40 MG: 100 INJECTION SUBCUTANEOUS at 08:39

## 2023-10-13 RX ADMIN — ACETAMINOPHEN 650 MG: 325 TABLET ORAL at 11:52

## 2023-10-13 RX ADMIN — ACETAMINOPHEN 650 MG: 325 TABLET ORAL at 21:56

## 2023-10-13 RX ADMIN — SODIUM CHLORIDE, PRESERVATIVE FREE 10 ML: 5 INJECTION INTRAVENOUS at 21:00

## 2023-10-13 RX ADMIN — SODIUM CHLORIDE: 9 INJECTION, SOLUTION INTRAVENOUS at 07:41

## 2023-10-13 RX ADMIN — ATENOLOL 25 MG: 50 TABLET ORAL at 08:39

## 2023-10-13 RX ADMIN — SODIUM CHLORIDE, PRESERVATIVE FREE 10 ML: 5 INJECTION INTRAVENOUS at 07:41

## 2023-10-13 ASSESSMENT — PAIN DESCRIPTION - DESCRIPTORS
DESCRIPTORS: SHARP
DESCRIPTORS: SHARP
DESCRIPTORS: ACHING

## 2023-10-13 ASSESSMENT — PAIN DESCRIPTION - ORIENTATION
ORIENTATION: RIGHT
ORIENTATION: RIGHT;LEFT;UPPER

## 2023-10-13 ASSESSMENT — PAIN DESCRIPTION - LOCATION
LOCATION: HEAD
LOCATION: BREAST
LOCATION: ABDOMEN

## 2023-10-13 ASSESSMENT — PAIN - FUNCTIONAL ASSESSMENT
PAIN_FUNCTIONAL_ASSESSMENT: NONE - DENIES PAIN
PAIN_FUNCTIONAL_ASSESSMENT: ACTIVITIES ARE NOT PREVENTED

## 2023-10-13 ASSESSMENT — LIFESTYLE VARIABLES
HOW OFTEN DO YOU HAVE A DRINK CONTAINING ALCOHOL: NEVER
HOW MANY STANDARD DRINKS CONTAINING ALCOHOL DO YOU HAVE ON A TYPICAL DAY: PATIENT DOES NOT DRINK

## 2023-10-13 ASSESSMENT — PAIN DESCRIPTION - PAIN TYPE: TYPE: ACUTE PAIN

## 2023-10-13 ASSESSMENT — PAIN SCALES - GENERAL
PAINLEVEL_OUTOF10: 3
PAINLEVEL_OUTOF10: 6
PAINLEVEL_OUTOF10: 7
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
## 2023-10-13 NOTE — ED NOTES
TRANSFER - OUT REPORT:    Verbal report given to DIMITRI Daugherty on [address]  being transferred to 4th floor Rm. 430 for routine progression of patient care       Report consisted of patient's Situation, Background, Assessment and   Recommendations(SBAR). Information from the following report(s) Nurse Handoff Report, ED SBAR, Intake/Output, MAR, and Recent Results was reviewed with the receiving nurse. [place] [facility] Assessment:    Presents to emergency department  because of falls (Syncope, seizure, or loss of consciousness): Yes  Age > 79: Yes  Altered Mental Status, Intoxication with alcohol or substance confusion (Disorientation, impaired judgment, poor safety awaremess, or inability to follow instructions): Yes  Impaired Mobility: Ambulates or transfers with assistive devices or assistance; Unable to ambulate or transer.: Yes  Nursing Judgement: Yes          Lines:   Peripheral IV 10/13/23 Right Antecubital (Active)        Opportunity for questions and clarification was provided.       Patient transported with:  Alie Díaz, [address]  10/13/23 1500

## 2023-10-13 NOTE — ED NOTES
Attempted to ambulate Pt. Pt had great difficulty getting up of of stretcher. Pt very shaky, and unsteady.  Pt only able to transfer to chair at bedside     Liyah Magana  10/13/23 [id number]
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
## 2023-10-14 LAB
ANION GAP SERPL CALC-SCNC: 2 MMOL/L (ref 5–15)
BASOPHILS # BLD: 0.1 K/UL (ref 0–0.1)
BASOPHILS NFR BLD: 1 % (ref 0–1)
BUN SERPL-MCNC: 12 MG/DL (ref 6–20)
BUN/CREAT SERPL: 17 (ref 12–20)
CALCIUM SERPL-MCNC: 10 MG/DL (ref 8.5–10.1)
CHLORIDE SERPL-SCNC: 113 MMOL/L (ref 97–108)
CO2 SERPL-SCNC: 25 MMOL/L (ref 21–32)
CREAT SERPL-MCNC: 0.69 MG/DL (ref 0.55–1.02)
DIFFERENTIAL METHOD BLD: ABNORMAL
EOSINOPHIL # BLD: 0.2 K/UL (ref 0–0.4)
EOSINOPHIL NFR BLD: 2 % (ref 0–7)
ERYTHROCYTE [DISTWIDTH] IN BLOOD BY AUTOMATED COUNT: 12.4 % (ref 11.5–14.5)
GLUCOSE SERPL-MCNC: 101 MG/DL (ref 65–100)
HCT VFR BLD AUTO: 38.6 % (ref 35–47)
HGB BLD-MCNC: 13 G/DL (ref 11.5–16)
IMM GRANULOCYTES # BLD AUTO: 0 K/UL (ref 0–0.04)
IMM GRANULOCYTES NFR BLD AUTO: 0 % (ref 0–0.5)
LYMPHOCYTES # BLD: 3.3 K/UL (ref 0.8–3.5)
LYMPHOCYTES NFR BLD: 43 % (ref 12–49)
MCH RBC QN AUTO: 30.7 PG (ref 26–34)
MCHC RBC AUTO-ENTMCNC: 33.7 G/DL (ref 30–36.5)
MCV RBC AUTO: 91.3 FL (ref 80–99)
MONOCYTES # BLD: 0.8 K/UL (ref 0–1)
MONOCYTES NFR BLD: 11 % (ref 5–13)
NEUTS SEG # BLD: 3.3 K/UL (ref 1.8–8)
NEUTS SEG NFR BLD: 43 % (ref 32–75)
NRBC # BLD: 0 K/UL (ref 0–0.01)
NRBC BLD-RTO: 0 PER 100 WBC
PLATELET # BLD AUTO: 608 K/UL (ref 150–400)
PMV BLD AUTO: 9.7 FL (ref 8.9–12.9)
POTASSIUM SERPL-SCNC: 3.7 MMOL/L (ref 3.5–5.1)
RBC # BLD AUTO: 4.23 M/UL (ref 3.8–5.2)
RBC MORPH BLD: ABNORMAL
SODIUM SERPL-SCNC: 140 MMOL/L (ref 136–145)
WBC # BLD AUTO: 7.7 K/UL (ref 3.6–11)
WBC MORPH BLD: ABNORMAL

## 2023-10-14 PROCEDURE — 80048 BASIC METABOLIC PNL TOTAL CA: CPT

## 2023-10-14 PROCEDURE — 6370000000 HC RX 637 (ALT 250 FOR IP): Performed by: FAMILY MEDICINE

## 2023-10-14 PROCEDURE — 94761 N-INVAS EAR/PLS OXIMETRY MLT: CPT

## 2023-10-14 PROCEDURE — 6370000000 HC RX 637 (ALT 250 FOR IP): Performed by: INTERNAL MEDICINE

## 2023-10-14 PROCEDURE — 2580000003 HC RX 258: Performed by: FAMILY MEDICINE

## 2023-10-14 PROCEDURE — 36415 COLL VENOUS BLD VENIPUNCTURE: CPT

## 2023-10-14 PROCEDURE — 6360000002 HC RX W HCPCS: Performed by: FAMILY MEDICINE

## 2023-10-14 PROCEDURE — G0378 HOSPITAL OBSERVATION PER HR: HCPCS

## 2023-10-14 PROCEDURE — 85025 COMPLETE CBC W/AUTO DIFF WBC: CPT

## 2023-10-14 RX ORDER — VITAMIN B COMPLEX
1000 TABLET ORAL DAILY
Status: DISCONTINUED | OUTPATIENT
Start: 2023-10-14 | End: 2023-10-19 | Stop reason: HOSPADM

## 2023-10-14 RX ADMIN — SODIUM CHLORIDE, PRESERVATIVE FREE 10 ML: 5 INJECTION INTRAVENOUS at 10:06

## 2023-10-14 RX ADMIN — ACETAMINOPHEN 650 MG: 325 TABLET ORAL at 06:31

## 2023-10-14 RX ADMIN — SODIUM CHLORIDE: 9 INJECTION, SOLUTION INTRAVENOUS at 02:35

## 2023-10-14 RX ADMIN — ATENOLOL 25 MG: 50 TABLET ORAL at 10:10

## 2023-10-14 RX ADMIN — ACETAMINOPHEN 650 MG: 325 TABLET ORAL at 13:04

## 2023-10-14 RX ADMIN — ENOXAPARIN SODIUM 40 MG: 100 INJECTION SUBCUTANEOUS at 10:06

## 2023-10-14 RX ADMIN — ACETAMINOPHEN 650 MG: 325 TABLET ORAL at 20:46

## 2023-10-14 RX ADMIN — Medication 1000 UNITS: at 12:11

## 2023-10-14 ASSESSMENT — PAIN SCALES - GENERAL
PAINLEVEL_OUTOF10: 8
PAINLEVEL_OUTOF10: 2
PAINLEVEL_OUTOF10: 2
PAINLEVEL_OUTOF10: 3

## 2023-10-14 ASSESSMENT — PAIN DESCRIPTION - DESCRIPTORS
DESCRIPTORS: ACHING
DESCRIPTORS: DISCOMFORT

## 2023-10-14 ASSESSMENT — PAIN DESCRIPTION - LOCATION
LOCATION: GENERALIZED
LOCATION: GENERALIZED
LOCATION: BREAST

## 2023-10-14 ASSESSMENT — PAIN DESCRIPTION - ORIENTATION: ORIENTATION: RIGHT

## 2023-10-15 PROCEDURE — 6360000002 HC RX W HCPCS: Performed by: FAMILY MEDICINE

## 2023-10-15 PROCEDURE — 2580000003 HC RX 258: Performed by: FAMILY MEDICINE

## 2023-10-15 PROCEDURE — 6370000000 HC RX 637 (ALT 250 FOR IP): Performed by: FAMILY MEDICINE

## 2023-10-15 PROCEDURE — G0378 HOSPITAL OBSERVATION PER HR: HCPCS

## 2023-10-15 PROCEDURE — 6370000000 HC RX 637 (ALT 250 FOR IP): Performed by: INTERNAL MEDICINE

## 2023-10-15 RX ADMIN — ACETAMINOPHEN 650 MG: 325 TABLET ORAL at 18:19

## 2023-10-15 RX ADMIN — SODIUM CHLORIDE, PRESERVATIVE FREE 10 ML: 5 INJECTION INTRAVENOUS at 08:29

## 2023-10-15 RX ADMIN — ENOXAPARIN SODIUM 40 MG: 100 INJECTION SUBCUTANEOUS at 08:29

## 2023-10-15 RX ADMIN — Medication 1000 UNITS: at 08:29

## 2023-10-15 RX ADMIN — ATENOLOL 25 MG: 50 TABLET ORAL at 08:29

## 2023-10-15 RX ADMIN — ACETAMINOPHEN 650 MG: 325 TABLET ORAL at 06:07

## 2023-10-15 RX ADMIN — ACETAMINOPHEN 650 MG: 325 TABLET ORAL at 11:55

## 2023-10-15 ASSESSMENT — PAIN SCALES - GENERAL
PAINLEVEL_OUTOF10: 3
PAINLEVEL_OUTOF10: 0
PAINLEVEL_OUTOF10: 3

## 2023-10-15 ASSESSMENT — PAIN DESCRIPTION - LOCATION
LOCATION: GENERALIZED
LOCATION: GENERALIZED

## 2023-10-15 ASSESSMENT — PAIN DESCRIPTION - DESCRIPTORS: DESCRIPTORS: ACHING

## 2023-10-16 PROCEDURE — 97116 GAIT TRAINING THERAPY: CPT

## 2023-10-16 PROCEDURE — 94761 N-INVAS EAR/PLS OXIMETRY MLT: CPT

## 2023-10-16 PROCEDURE — 97110 THERAPEUTIC EXERCISES: CPT

## 2023-10-16 PROCEDURE — 6370000000 HC RX 637 (ALT 250 FOR IP): Performed by: INTERNAL MEDICINE

## 2023-10-16 PROCEDURE — 6370000000 HC RX 637 (ALT 250 FOR IP): Performed by: FAMILY MEDICINE

## 2023-10-16 PROCEDURE — G0378 HOSPITAL OBSERVATION PER HR: HCPCS

## 2023-10-16 PROCEDURE — 97530 THERAPEUTIC ACTIVITIES: CPT

## 2023-10-16 PROCEDURE — 2580000003 HC RX 258: Performed by: FAMILY MEDICINE

## 2023-10-16 PROCEDURE — 6360000002 HC RX W HCPCS: Performed by: FAMILY MEDICINE

## 2023-10-16 RX ADMIN — ATENOLOL 25 MG: 50 TABLET ORAL at 09:44

## 2023-10-16 RX ADMIN — SODIUM CHLORIDE, PRESERVATIVE FREE 10 ML: 5 INJECTION INTRAVENOUS at 21:05

## 2023-10-16 RX ADMIN — SODIUM CHLORIDE, PRESERVATIVE FREE 10 ML: 5 INJECTION INTRAVENOUS at 06:07

## 2023-10-16 RX ADMIN — ENOXAPARIN SODIUM 40 MG: 100 INJECTION SUBCUTANEOUS at 09:44

## 2023-10-16 RX ADMIN — ACETAMINOPHEN 650 MG: 325 TABLET ORAL at 06:06

## 2023-10-16 RX ADMIN — POLYETHYLENE GLYCOL 3350 17 G: 17 POWDER, FOR SOLUTION ORAL at 16:05

## 2023-10-16 RX ADMIN — Medication 1000 UNITS: at 09:44

## 2023-10-17 PROCEDURE — 97116 GAIT TRAINING THERAPY: CPT

## 2023-10-17 PROCEDURE — 2580000003 HC RX 258: Performed by: FAMILY MEDICINE

## 2023-10-17 PROCEDURE — 6370000000 HC RX 637 (ALT 250 FOR IP): Performed by: INTERNAL MEDICINE

## 2023-10-17 PROCEDURE — G0378 HOSPITAL OBSERVATION PER HR: HCPCS

## 2023-10-17 PROCEDURE — 6370000000 HC RX 637 (ALT 250 FOR IP): Performed by: FAMILY MEDICINE

## 2023-10-17 PROCEDURE — 94761 N-INVAS EAR/PLS OXIMETRY MLT: CPT

## 2023-10-17 PROCEDURE — 97530 THERAPEUTIC ACTIVITIES: CPT

## 2023-10-17 RX ADMIN — ACETAMINOPHEN 650 MG: 325 TABLET ORAL at 20:30

## 2023-10-17 RX ADMIN — Medication 1000 UNITS: at 09:36

## 2023-10-17 RX ADMIN — ACETAMINOPHEN 650 MG: 325 TABLET ORAL at 09:47

## 2023-10-17 RX ADMIN — SODIUM CHLORIDE, PRESERVATIVE FREE 10 ML: 5 INJECTION INTRAVENOUS at 09:40

## 2023-10-17 RX ADMIN — SODIUM CHLORIDE, PRESERVATIVE FREE 10 ML: 5 INJECTION INTRAVENOUS at 20:22

## 2023-10-17 RX ADMIN — ATENOLOL 25 MG: 50 TABLET ORAL at 09:36

## 2023-10-17 ASSESSMENT — PAIN DESCRIPTION - LOCATION
LOCATION: GENERALIZED
LOCATION: CHEST

## 2023-10-17 ASSESSMENT — PAIN SCALES - GENERAL
PAINLEVEL_OUTOF10: 0
PAINLEVEL_OUTOF10: 5
PAINLEVEL_OUTOF10: 3

## 2023-10-17 ASSESSMENT — PAIN DESCRIPTION - ORIENTATION: ORIENTATION: RIGHT

## 2023-10-17 ASSESSMENT — PAIN DESCRIPTION - DESCRIPTORS
DESCRIPTORS: SHARP
DESCRIPTORS: ACHING;SORE

## 2023-10-17 NOTE — DISCHARGE INSTRUCTIONS
Patient Discharge Instructions    Phoebe Smith / [id number] : 1935    Admitted 10/13/2023 Discharged: 10/19/2023     Primary Diagnoses  @Rprob@    Take Home Medications     It is important that you take the medication exactly as they are prescribed. Keep your medication in the bottles provided by the pharmacist and keep a list of the medication names, dosages, and times to be taken in your wallet. Do not take other medications without consulting your doctor. What to do at Home    Recommended diet: regular diet    Recommended activity: activity as tolerated    If you experience worse symptoms, please follow up with neurology. Follow-up with your PCP in a few weeks    [unfilled]     Information obtained by :  I understand that if any problems occur once I am at home I am to contact my physician. I understand and acknowledge receipt of the instructions indicated above.                                                                                                                                            Physician's or R.N.'s Signature                                                                  Date/Time                                                                                                                                              Patient or Representative Signature                                                          Date/Time

## 2023-10-18 PROCEDURE — G0378 HOSPITAL OBSERVATION PER HR: HCPCS

## 2023-10-18 PROCEDURE — 97535 SELF CARE MNGMENT TRAINING: CPT

## 2023-10-18 PROCEDURE — 2580000003 HC RX 258: Performed by: FAMILY MEDICINE

## 2023-10-18 PROCEDURE — 97116 GAIT TRAINING THERAPY: CPT

## 2023-10-18 PROCEDURE — 6370000000 HC RX 637 (ALT 250 FOR IP): Performed by: INTERNAL MEDICINE

## 2023-10-18 PROCEDURE — 97110 THERAPEUTIC EXERCISES: CPT

## 2023-10-18 PROCEDURE — 97530 THERAPEUTIC ACTIVITIES: CPT

## 2023-10-18 PROCEDURE — 6360000002 HC RX W HCPCS: Performed by: FAMILY MEDICINE

## 2023-10-18 PROCEDURE — 94761 N-INVAS EAR/PLS OXIMETRY MLT: CPT

## 2023-10-18 PROCEDURE — 6370000000 HC RX 637 (ALT 250 FOR IP): Performed by: FAMILY MEDICINE

## 2023-10-18 RX ORDER — FAMOTIDINE 20 MG/1
20 TABLET, FILM COATED ORAL 2 TIMES DAILY
Status: DISCONTINUED | OUTPATIENT
Start: 2023-10-18 | End: 2023-10-19 | Stop reason: HOSPADM

## 2023-10-18 RX ADMIN — ENOXAPARIN SODIUM 40 MG: 100 INJECTION SUBCUTANEOUS at 09:03

## 2023-10-18 RX ADMIN — Medication 1000 UNITS: at 09:02

## 2023-10-18 RX ADMIN — ACETAMINOPHEN 650 MG: 325 TABLET ORAL at 13:21

## 2023-10-18 RX ADMIN — SODIUM CHLORIDE, PRESERVATIVE FREE 10 ML: 5 INJECTION INTRAVENOUS at 19:58

## 2023-10-18 RX ADMIN — ATENOLOL 25 MG: 50 TABLET ORAL at 09:02

## 2023-10-18 RX ADMIN — FAMOTIDINE 20 MG: 20 TABLET, FILM COATED ORAL at 17:51

## 2023-10-18 RX ADMIN — SODIUM CHLORIDE, PRESERVATIVE FREE 10 ML: 5 INJECTION INTRAVENOUS at 09:03

## 2023-10-18 RX ADMIN — ACETAMINOPHEN 650 MG: 325 TABLET ORAL at 06:02

## 2023-10-18 ASSESSMENT — PAIN DESCRIPTION - ORIENTATION: ORIENTATION: ANTERIOR

## 2023-10-18 ASSESSMENT — PAIN DESCRIPTION - DESCRIPTORS: DESCRIPTORS: ACHING

## 2023-10-18 ASSESSMENT — PAIN DESCRIPTION - LOCATION: LOCATION: HEAD

## 2023-10-18 ASSESSMENT — PAIN SCALES - GENERAL: PAINLEVEL_OUTOF10: 3

## 2023-10-19 VITALS
TEMPERATURE: 98.4 F | DIASTOLIC BLOOD PRESSURE: 59 MMHG | OXYGEN SATURATION: 93 % | WEIGHT: 140 LBS | HEART RATE: 64 BPM | HEIGHT: 63 IN | RESPIRATION RATE: 15 BRPM | SYSTOLIC BLOOD PRESSURE: 121 MMHG | BODY MASS INDEX: 24.8 KG/M2

## 2023-10-19 PROCEDURE — G0378 HOSPITAL OBSERVATION PER HR: HCPCS

## 2023-10-19 PROCEDURE — 6370000000 HC RX 637 (ALT 250 FOR IP): Performed by: FAMILY MEDICINE

## 2023-10-19 PROCEDURE — 2580000003 HC RX 258: Performed by: FAMILY MEDICINE

## 2023-10-19 PROCEDURE — 94761 N-INVAS EAR/PLS OXIMETRY MLT: CPT

## 2023-10-19 PROCEDURE — 6370000000 HC RX 637 (ALT 250 FOR IP): Performed by: INTERNAL MEDICINE

## 2023-10-19 RX ORDER — FAMOTIDINE 20 MG/1
10 TABLET, FILM COATED ORAL 2 TIMES DAILY
Qty: 60 TABLET | Refills: 3 | Status: SHIPPED | OUTPATIENT
Start: 2023-10-19

## 2023-10-19 RX ADMIN — ATENOLOL 25 MG: 50 TABLET ORAL at 07:49

## 2023-10-19 RX ADMIN — FAMOTIDINE 20 MG: 20 TABLET, FILM COATED ORAL at 05:19

## 2023-10-19 RX ADMIN — ACETAMINOPHEN 650 MG: 325 TABLET ORAL at 09:14

## 2023-10-19 RX ADMIN — SODIUM CHLORIDE, PRESERVATIVE FREE 10 ML: 5 INJECTION INTRAVENOUS at 07:49

## 2023-10-19 RX ADMIN — Medication 1000 UNITS: at 07:49

## 2023-10-19 ASSESSMENT — PAIN SCALES - GENERAL: PAINLEVEL_OUTOF10: 3

## 2023-10-19 ASSESSMENT — PAIN DESCRIPTION - DESCRIPTORS: DESCRIPTORS: ACHING

## 2023-10-19 ASSESSMENT — PAIN DESCRIPTION - LOCATION: LOCATION: GENERALIZED

## 2023-10-19 ASSESSMENT — PAIN DESCRIPTION - ORIENTATION: ORIENTATION: ANTERIOR;POSTERIOR

## 2023-10-19 NOTE — CARE COORDINATION
10/17/2023 3:00 PM CM has met with pt 2x to discuss back up to [facility]. [facility] [address]. Referral sent via [employer]. CM will follow. 10/17/2023 11:00AM [facility] remains pending. [facility] requesting pt work back to back with PT and OT and not together. Discussed in IDR.   MADELINE Solares
10/18/2023 2:20 PM [facility] has accepted pt on 10/19. CM relayed to pt and pt's son, Chiara Mata over the phone. Both agreeable to discharge to [facility]. Discussed transport. Pt's son agreeable to covering wheelchair transport costs. 10/18/2023 9:22 AM [facility] referral remains pending. [facility] requesting PT and OT work with pt this AM but separately. CM relayed to rehab services. If pt is approved for [facility] they should have a bed available today. CM will follow. MADELINE Mathis       Care Management Progress Note      ICD-10-CM    1. Frequent falls  R29.6       2. Difficulty in walking  R26.2           RUR: 12%   Risk Level: [x]Low []Moderate []High    Transition of care plan:  Ongoing management, ready for discharge  IPR placement:  [facility]- accepted, can admit on 10/19  [facility] IPR- denied   Outpatient follow-up.   Hospital to Home wheelchair transport at discharge
10/19/2023  Nursing please call report to [address] [place] [address] [facility] at [phone]. Pt going to room 2036. Pt has been accepted by Dr. Georgiana Dykes. 10/19/2023 9:58 AM Confirmed with Eliceo Luis with admissions at [address] they can accept pt today after 3PM.  CM called and spoke with pt's son, relayed plan for discharge today, pt's son agreeable. CM also spoke with pt, pt agreeable to plan. CM scheduled Hospital to Home wheelchair transport for 3:30PM(3PM not available). Pt's son confirmed he will cover costs. Care Management Progress Note      ICD-10-CM    1. Frequent falls  R29.6       2. Difficulty in walking  R26.2           RUR: 12%   Risk Level: [x]Low []Moderate []High     Transition of care plan:  Discharge today   IPR placement:  [facility]- accepted, can admit pt today  [address] IPR- denied   Outpatient follow-up.   Hospital to Home wheelchair transport arranged for 3:30PM
CARE MANAGEMENT DAILY NOTE      RUR: 8%  LOS: 1D 3H    Pt admitted on 10/13/23 for frequent falls. Pt is followed by Hospitalist, PT, OT, and Dietician. PT/OT recommending rehab. SNF not an option as Pt is in OBS status. Referral sent to [facility]. CM coordinated with Sujatha Tatum from [address] [place]. Sujatha Tatum reports that she needs updated PT/OT notes. Sujatha Tatum will continue to follow for appropriateness. CM notified Sujatha Tatum that updated PT notes were in chart. Waiting on updated OT notes. CM will continue to follow.     _____________________________________  Sejal Garcia, [address] Management   Available via [facility]  10/16/2023   3:12 PM
Arrangements Family Members   Current Services Prior To Admission Private Duty Homecare   Potential Assistance Needed Other (Comment)  (IPR)   DME Ordered? No   Potential Assistance Purchasing Medications No  ([facility])   Type of [address] None   Patient expects to be discharged to: Acute rehab   Services At/After Discharge   Transition of Care Consult (CM Consult) [address] Discharge Inpatient rehab   Mode of Transport at Discharge   (TBD)   Condition of Participation: Discharge Planning   The Plan for Transition of Care is related to the following treatment goals: IPR   The Patient and/or Patient Representative was provided with a Choice of Provider? Patient   The Patient and/Or Patient Representative agree with the Discharge Plan? Yes   Freedom of Choice list was provided with basic dialogue that supports the patient's individualized plan of care/goals, treatment preferences, and shares the quality data associated with the providers? Yes  ([facility])         Insurer:    Active Insurance as of 10/13/2023       Primary Coverage       Payor Plan Insurance Group Employer/Plan Group    MEDICARE MEDICARE PART A AND B        Payor Address Payor Phone Number Payor Fax Number Effective Dates    [facility] [id number] [phone]  12/1/2001 - None Entered    [facility] [id number]         Subscriber Name Subscriber Birth Date Member ID       DENISE ARCE 1935 [id number]               Secondary Coverage       Payor Plan Insurance Group Employer/Plan [id number] [facility]        Payor Plan Address [facility] Phone Number Payor Plan Fax Number Effective Dates    [id number] [phone]  1/1/2023 - None Priscila Cameron [address] [place] [address]  Name Subscriber Birth Date Member ID       DENISE ARCE 1935 [id number]                     PCP: Misty Mcmanus MD   Address: [address] [place] [address] 250 /

## 2023-10-19 NOTE — PLAN OF CARE
Pain medication given per prn order
Problem: Discharge Planning  Goal: Discharge to home or other facility with appropriate resources  Outcome: Progressing     Problem: Pain  Goal: Verbalizes/displays adequate comfort level or baseline comfort level  Outcome: Progressing     Problem: ABCDS Injury Assessment  Goal: Absence of physical injury  Outcome: Progressing     Problem: Safety - Adult  Goal: Free from fall injury  Outcome: Progressing     Problem: Nutrition Deficit:  Goal: Optimize nutritional status  Outcome: Progressing
Problem: Discharge Planning  Goal: Discharge to home or other facility with appropriate resources  Outcome: Progressing     Problem: Pain  Goal: Verbalizes/displays adequate comfort level or baseline comfort level  Outcome: Progressing     Problem: ABCDS Injury Assessment  Goal: Absence of physical injury  Outcome: Progressing     Problem: Safety - Adult  Goal: Free from fall injury  Outcome: Progressing     Problem: Nutrition Deficit:  Goal: Optimize nutritional status  Outcome: Progressing
Problem: Occupational Therapy - Adult  Goal: By Discharge: Performs self-care activities at highest level of function for planned discharge setting. See evaluation for individualized goals. Description: FUNCTIONAL STATUS PRIOR TO ADMISSION: Patient was modified independent using a rollator for functional mobility. Patient has a caregiver coming multiple days per week for a few hours in the middle of the day. Patient reports recent falls within the past week but prior to that was not falling. HOME SUPPORT PRIOR TO ADMISSION: Patient lived in a private section of her child's house. Occupational Therapy Goals:  Initiated 10/13/2023  1. Patient will perform lower body dressing with Supervision within 7 day(s). 2.  Patient will perform grooming with Supervision within 7 day(s). 3.  Patient will perform toilet transfers with Supervision  within 7 day(s). 4.  Patient will perform all aspects of toileting with Supervision within 7 day(s). 5.  Patient will participate in upper extremity therapeutic exercise/activities with Supervision for 10 minutes within 7 day(s). 6.  Patient will utilize energy conservation techniques during functional activities with verbal cues within 7 day(s).     10/16/2023 1529 by SONIA Gomez  Outcome: Progressing     Problem: Discharge Planning  Goal: Discharge to home or other facility with appropriate resources  10/17/2023 0426 by River Earl RN  Outcome: Progressing  10/16/2023 1711 by Corinne Barba, LPN  Outcome: Progressing     Problem: Pain  Goal: Verbalizes/displays adequate comfort level or baseline comfort level  10/17/2023 0426 by River Earl RN  Outcome: Progressing  10/16/2023 1711 by Corinne Barba, LPN  Outcome: Progressing     Problem: ABCDS Injury Assessment  Goal: Absence of physical injury  10/17/2023 0426 by River Earl RN  Outcome: Progressing  10/16/2023 1711 by Corinne Barba, LPN  Outcome: Progressing     Problem: Safety -
Problem: Occupational Therapy - Adult  Goal: By Discharge: Performs self-care activities at highest level of function for planned discharge setting. See evaluation for individualized goals. Description: FUNCTIONAL STATUS PRIOR TO ADMISSION: Patient was modified independent using a rollator for functional mobility. Patient has a caregiver coming multiple days per week for a few hours in the middle of the day. Patient reports recent falls within the past week but prior to that was not falling. HOME SUPPORT PRIOR TO ADMISSION: Patient lived in a private section of her child's house. Occupational Therapy Goals:  Initiated 10/13/2023  1. Patient will perform lower body dressing with Supervision within 7 day(s). 2.  Patient will perform grooming with Supervision within 7 day(s). 3.  Patient will perform toilet transfers with Supervision  within 7 day(s). 4.  Patient will perform all aspects of toileting with Supervision within 7 day(s). 5.  Patient will participate in upper extremity therapeutic exercise/activities with Supervision for 10 minutes within 7 day(s). 6.  Patient will utilize energy conservation techniques during functional activities with verbal cues within 7 day(s). Outcome: Progressing   OCCUPATIONAL THERAPY EVALUATION    Patient: Marita Chan (68 y.o. female)  Date: 10/13/2023  Primary Diagnosis: Frequent falls [R29.6]         Precautions: Fall Risk                  ASSESSMENT :  The patient is limited by decreased functional mobility, independence in ADLs, ROM, strength, activity tolerance, balance following admission for frequent falls. Patient received supine in bed, agreeable to activity. Min A x 2 for bed mobility. Sit to stand with min assist x 2. Patient with posterior lean in standing. She is able to take side steps to [facility] with continued assist x 2. Patient fatigues easily and returned to bed with min x 2. Patient left in bed at end of session in [facility].   Recommend
Problem: Occupational Therapy - Adult  Goal: By Discharge: Performs self-care activities at highest level of function for planned discharge setting. See evaluation for individualized goals. Description: FUNCTIONAL STATUS PRIOR TO ADMISSION: Patient was modified independent using a rollator for functional mobility. Patient has a caregiver coming multiple days per week for a few hours in the middle of the day. Patient reports recent falls within the past week but prior to that was not falling. HOME SUPPORT PRIOR TO ADMISSION: Patient lived in a private section of her child's house. Occupational Therapy Goals:  Initiated 10/13/2023  1. Patient will perform lower body dressing with Supervision within 7 day(s). 2.  Patient will perform grooming with Supervision within 7 day(s). 3.  Patient will perform toilet transfers with Supervision  within 7 day(s). 4.  Patient will perform all aspects of toileting with Supervision within 7 day(s). 5.  Patient will participate in upper extremity therapeutic exercise/activities with Supervision for 10 minutes within 7 day(s). 6.  Patient will utilize energy conservation techniques during functional activities with verbal cues within 7 day(s). Outcome: Progressing   OCCUPATIONAL THERAPY TREATMENT  Patient: Felix Bush (89 y.o. female)  Date: 10/16/2023  Primary Diagnosis: Frequent falls [R29.6]  Difficulty in walking [R26.2]  Fall at home, sequela [W19. Carmie Clonts, Y92.009]       Precautions: Fall Risk                Chart, occupational therapy assessment, plan of care, and goals were reviewed. ASSESSMENT  Patient continues to benefit from skilled OT services and is progressing towards goals. Pt sat edge of bed and engaged with uE there ex, as well as sit to stand 5 x min assist to contact guard. She needs cues for safety in standing. PLAN :  Patient continues to benefit from skilled intervention to address the above impairments.   Continue
Problem: Physical Therapy - Adult  Goal: By Discharge: Performs mobility at highest level of function for planned discharge setting. See evaluation for individualized goals. Description: FUNCTIONAL STATUS PRIOR TO ADMISSION: Patient was modified independent using a rollator for functional mobility. Patient has a caregiver coming multiple days per week for a few hours in the middle of the day. Patient reports recent falls within the past week but prior to that was not falling. HOME SUPPORT PRIOR TO ADMISSION: Patient lived in a private section of her child's house. Physical Therapy Goals  Initiated 10/13/2023  1. Patient will move from supine to sit and sit to supine, scoot up and down, and roll side to side in bed with modified independence within 7 day(s). 2.  Patient will perform sit to stand with modified independence within 7 day(s). 3.  Patient will transfer from bed to chair and chair to bed with supervision/set-up using the least restrictive device within 7 day(s). 4.  Patient will ambulate with supervision/set-up for 100 feet with the least restrictive device within 7 day(s). 5.  Patient will ascend/descend 5 stairs with 1 handrail(s) with contact guard assist within 7 day(s).    10/17/2023 1128 by Zaina Menchaca PTA  Outcome: Progressing     Problem: Discharge Planning  Goal: Discharge to home or other facility with appropriate resources  10/17/2023 1207 by Vinh Norris RN  Outcome: Progressing  10/17/2023 0426 by Sandra Torres RN  Outcome: Progressing     Problem: Pain  Goal: Verbalizes/displays adequate comfort level or baseline comfort level  10/17/2023 1207 by Vinh Norris RN  Outcome: Progressing  10/17/2023 0426 by Sandra Torres RN  Outcome: Progressing     Problem: ABCDS Injury Assessment  Goal: Absence of physical injury  10/17/2023 1207 by Vinh Norris RN  Outcome: Progressing  10/17/2023 0426 by Sandra Torres RN  Outcome: Progressing     Problem: Safety -
Problem: Physical Therapy - Adult  Goal: By Discharge: Performs mobility at highest level of function for planned discharge setting. See evaluation for individualized goals. Description: FUNCTIONAL STATUS PRIOR TO ADMISSION: Patient was modified independent using a rollator for functional mobility. Patient has a caregiver coming multiple days per week for a few hours in the middle of the day. Patient reports recent falls within the past week but prior to that was not falling. HOME SUPPORT PRIOR TO ADMISSION: Patient lived in a private section of her child's house. Physical Therapy Goals  Initiated 10/13/2023  1. Patient will move from supine to sit and sit to supine, scoot up and down, and roll side to side in bed with modified independence within 7 day(s). 2.  Patient will perform sit to stand with modified independence within 7 day(s). 3.  Patient will transfer from bed to chair and chair to bed with supervision/set-up using the least restrictive device within 7 day(s). 4.  Patient will ambulate with supervision/set-up for 100 feet with the least restrictive device within 7 day(s). 5.  Patient will ascend/descend 5 stairs with 1 handrail(s) with contact guard assist within 7 day(s). 10/13/2023 1253 by Romy Reyes PT  Outcome: Progressing     Problem: Occupational Therapy - Adult  Goal: By Discharge: Performs self-care activities at highest level of function for planned discharge setting. See evaluation for individualized goals. Description: FUNCTIONAL STATUS PRIOR TO ADMISSION: Patient was modified independent using a rollator for functional mobility. Patient has a caregiver coming multiple days per week for a few hours in the middle of the day. Patient reports recent falls within the past week but prior to that was not falling. HOME SUPPORT PRIOR TO ADMISSION: Patient lived in a private section of her child's house. Occupational Therapy Goals:  Initiated 10/13/2023  1.   Patient
Problem: Physical Therapy - Adult  Goal: By Discharge: Performs mobility at highest level of function for planned discharge setting. See evaluation for individualized goals. Description: FUNCTIONAL STATUS PRIOR TO ADMISSION: Patient was modified independent using a rollator for functional mobility. Patient has a caregiver coming multiple days per week for a few hours in the middle of the day. Patient reports recent falls within the past week but prior to that was not falling. HOME SUPPORT PRIOR TO ADMISSION: Patient lived in a private section of her child's house. Physical Therapy Goals  Initiated 10/13/2023  1. Patient will move from supine to sit and sit to supine, scoot up and down, and roll side to side in bed with modified independence within 7 day(s). 2.  Patient will perform sit to stand with modified independence within 7 day(s). 3.  Patient will transfer from bed to chair and chair to bed with supervision/set-up using the least restrictive device within 7 day(s). 4.  Patient will ambulate with supervision/set-up for 100 feet with the least restrictive device within 7 day(s). 5.  Patient will ascend/descend 5 stairs with 1 handrail(s) with contact guard assist within 7 day(s). 10/16/2023 1048 by Jethro Coe PTA  Outcome: Progressing     Problem: Occupational Therapy - Adult  Goal: By Discharge: Performs self-care activities at highest level of function for planned discharge setting. See evaluation for individualized goals. Description: FUNCTIONAL STATUS PRIOR TO ADMISSION: Patient was modified independent using a rollator for functional mobility. Patient has a caregiver coming multiple days per week for a few hours in the middle of the day. Patient reports recent falls within the past week but prior to that was not falling. HOME SUPPORT PRIOR TO ADMISSION: Patient lived in a private section of her child's house. Occupational Therapy Goals:  Initiated 10/13/2023  1.
Problem: Physical Therapy - Adult  Goal: By Discharge: Performs mobility at highest level of function for planned discharge setting. See evaluation for individualized goals. Description: FUNCTIONAL STATUS PRIOR TO ADMISSION: Patient was modified independent using a rollator for functional mobility. Patient has a caregiver coming multiple days per week for a few hours in the middle of the day. Patient reports recent falls within the past week but prior to that was not falling. HOME SUPPORT PRIOR TO ADMISSION: Patient lived in a private section of her child's house. Physical Therapy Goals  Initiated 10/13/2023  1. Patient will move from supine to sit and sit to supine, scoot up and down, and roll side to side in bed with modified independence within 7 day(s). 2.  Patient will perform sit to stand with modified independence within 7 day(s). 3.  Patient will transfer from bed to chair and chair to bed with supervision/set-up using the least restrictive device within 7 day(s). 4.  Patient will ambulate with supervision/set-up for 100 feet with the least restrictive device within 7 day(s). 5.  Patient will ascend/descend 5 stairs with 1 handrail(s) with contact guard assist within 7 day(s). Outcome: Progressing     PHYSICAL THERAPY EVALUATION    Patient: Romy Puckett (73 y.o. female)  Date: 10/13/2023  Primary Diagnosis: Frequent falls [R29.6]       Precautions: Fall Risk                    ASSESSMENT :   DEFICITS/IMPAIRMENTS:   The patient is limited by decreased functional mobility, independence in ADLs, strength, activity tolerance, safety awareness, coordination, balance s/p admission for frequent falls. Patient is hard of hearing and oriented x 3. Patient reports she has fallen several times in the past week. Patient was received on 2L O2 and was placed on room air for session with stable SpO2.  Patient ultimately required Min A x 2 for bed mobility, sit to stand transfers, and sidestepping
Problem: Physical Therapy - Adult  Goal: By Discharge: Performs mobility at highest level of function for planned discharge setting. See evaluation for individualized goals. Description: FUNCTIONAL STATUS PRIOR TO ADMISSION: Patient was modified independent using a rollator for functional mobility. Patient has a caregiver coming multiple days per week for a few hours in the middle of the day. Patient reports recent falls within the past week but prior to that was not falling. HOME SUPPORT PRIOR TO ADMISSION: Patient lived in a private section of her child's house. Physical Therapy Goals  Initiated 10/13/2023  1. Patient will move from supine to sit and sit to supine, scoot up and down, and roll side to side in bed with modified independence within 7 day(s). 2.  Patient will perform sit to stand with modified independence within 7 day(s). 3.  Patient will transfer from bed to chair and chair to bed with supervision/set-up using the least restrictive device within 7 day(s). 4.  Patient will ambulate with supervision/set-up for 100 feet with the least restrictive device within 7 day(s). 5.  Patient will ascend/descend 5 stairs with 1 handrail(s) with contact guard assist within 7 day(s). Outcome: Progressing   PHYSICAL THERAPY TREATMENT    Patient: Addis Redd (21 y.o. female)  Date: 10/17/2023  Diagnosis: Frequent falls [R29.6]  Difficulty in walking [R26.2]  Fall at home, sequela [W19. XXXS, Y92.009] Frequent falls      Precautions: Fall Risk                    ASSESSMENT:  Patient continues to benefit from skilled PT services and is progressing towards goals. Pt received up in chair. Tolerated increased activity with use of personal 3 wheel rollator for steadying with CGA. Reports BLE fatigue, no buckling. Denies feeling SOB tho report \"slightly lightheaded\" /80 HR 66. PLAN:  Patient continues to benefit from skilled intervention to address the above impairments.   Continue
Problem: Physical Therapy - Adult  Goal: By Discharge: Performs mobility at highest level of function for planned discharge setting. See evaluation for individualized goals. Description: FUNCTIONAL STATUS PRIOR TO ADMISSION: Patient was modified independent using a rollator for functional mobility. Patient has a caregiver coming multiple days per week for a few hours in the middle of the day. Patient reports recent falls within the past week but prior to that was not falling. HOME SUPPORT PRIOR TO ADMISSION: Patient lived in a private section of her child's house. Physical Therapy Goals  Initiated 10/13/2023  1. Patient will move from supine to sit and sit to supine, scoot up and down, and roll side to side in bed with modified independence within 7 day(s). 2.  Patient will perform sit to stand with modified independence within 7 day(s). 3.  Patient will transfer from bed to chair and chair to bed with supervision/set-up using the least restrictive device within 7 day(s). 4.  Patient will ambulate with supervision/set-up for 100 feet with the least restrictive device within 7 day(s). 5.  Patient will ascend/descend 5 stairs with 1 handrail(s) with contact guard assist within 7 day(s). Outcome: Progressing   PHYSICAL THERAPY TREATMENT    Patient: Basia Alexander (59 y.o. female)  Date: 10/18/2023  Diagnosis: Frequent falls [R29.6]  Difficulty in walking [R26.2]  Fall at home, sequela [W19. XXXS, Y92.009] Frequent falls      Precautions: Fall Risk                    ASSESSMENT:  Patient continues to benefit from skilled PT services and is progressing towards goals. Pt received up in chair after working with OT. Reviewed HEP for BLE in all avail planes.  Pt requires up to [address] for safety with verbal cues for safe technique BLE weakness, no buckling, and decreased safety awareness with functional mobility tasks          PLAN:  Patient continues to benefit from skilled intervention to address the
Problem: Physical Therapy - Adult  Goal: By Discharge: Performs mobility at highest level of function for planned discharge setting. See evaluation for individualized goals. Description: FUNCTIONAL STATUS PRIOR TO ADMISSION: Patient was modified independent using a rollator for functional mobility. Patient has a caregiver coming multiple days per week for a few hours in the middle of the day. Patient reports recent falls within the past week but prior to that was not falling. HOME SUPPORT PRIOR TO ADMISSION: Patient lived in a private section of her child's house. Physical Therapy Goals  Initiated 10/13/2023  1. Patient will move from supine to sit and sit to supine, scoot up and down, and roll side to side in bed with modified independence within 7 day(s). 2.  Patient will perform sit to stand with modified independence within 7 day(s). 3.  Patient will transfer from bed to chair and chair to bed with supervision/set-up using the least restrictive device within 7 day(s). 4.  Patient will ambulate with supervision/set-up for 100 feet with the least restrictive device within 7 day(s). 5.  Patient will ascend/descend 5 stairs with 1 handrail(s) with contact guard assist within 7 day(s). Outcome: Progressing   PHYSICAL THERAPY TREATMENT    Patient: Marita Chan (62 y.o. female)  Date: 10/16/2023  Diagnosis: Frequent falls [R29.6]  Difficulty in walking [R26.2]  Fall at home, sequela [W19. XXXS, Y92.009] Frequent falls      Precautions: Fall Risk                    ASSESSMENT:  Patient continues to benefit from skilled PT services and is slowly progressing towards goals. Pt limited by impaired balance and decrease activity tolerance. Verbal cues for safety with use of 3-wheel rollator. Fatigues quickly with standing activity, with increased posterior lean, requesting for ADL's at sink Tolerated additional 20 feet gait training before returning to chair.  Pt will need assist with allgait and
Using call light appropriately.
for dynamic balance challenges, CGA for static standing; no LOB    Therapeutic Exercises:  Completed BUE strengthening HEP, seated EOB, challenging functional strength/endurance required for increased ADL independence, with pt completing 1 sets of 5 reps of biceps flexion, triceps extension, chest press and latissimus dorsi pulls, against yellow/red theraband resistance, with initial Max verbal and Min tactile cues for technique (e.g. triceps extension above heart level with slow return, elbow adducted to body during biceps flexion and driving movement through elbows rather than hands during latissimus gee pulls), as well as, cues for proper pacing and breathing, progressing to Min verbal cues. Handout provided; good understanding verbalized. Pain Rating:  C/o R sided rib pain, did not quantify; RN aware and following  Pain Intervention(s):   nursing notified and rest      Activity Tolerance:   Fair  and requires rest breaks  Please refer to the flowsheet for vital signs taken during this treatment.     After treatment:   Patient left in no apparent distress sitting up in chair, Call bell within reach, and Bed/ chair alarm activated    COMMUNICATION/EDUCATION:   The patient's plan of care was discussed with: physical therapy assistant, registered nurse, and     Patient Education  Education Given To: Patient  Education Provided: Home Exercise Program  Education Provided Comments: BUE strengthenging HEP against yellow theraband  Education Method: Demonstration;Verbal  Barriers to Learning: None  Education Outcome: Verbalized understanding;Demonstrated understanding;Continued education needed    Thank you for this referral.  Everett Colunga OT  Minutes: 31

## 2023-10-30 DIAGNOSIS — B02.29 POST HERPETIC NEURALGIA: Primary | ICD-10-CM

## 2023-10-30 RX ORDER — DULOXETIN HYDROCHLORIDE 20 MG/1
20 CAPSULE, DELAYED RELEASE ORAL 2 TIMES DAILY
Qty: 180 CAPSULE | Refills: 1 | Status: SHIPPED | OUTPATIENT
Start: 2023-10-30

## 2023-11-10 ENCOUNTER — OFFICE VISIT (OUTPATIENT)
Age: 88
End: 2023-11-10
Payer: MEDICARE

## 2023-11-10 VITALS
WEIGHT: 127.6 LBS | SYSTOLIC BLOOD PRESSURE: 130 MMHG | RESPIRATION RATE: 16 BRPM | OXYGEN SATURATION: 95 % | TEMPERATURE: 97.9 F | HEIGHT: 63 IN | DIASTOLIC BLOOD PRESSURE: 80 MMHG | HEART RATE: 95 BPM | BODY MASS INDEX: 22.61 KG/M2

## 2023-11-10 DIAGNOSIS — R35.0 URINARY FREQUENCY: ICD-10-CM

## 2023-11-10 DIAGNOSIS — R56.9 CONVULSIONS, UNSPECIFIED CONVULSION TYPE (HCC): ICD-10-CM

## 2023-11-10 DIAGNOSIS — R29.6 FREQUENT FALLS: ICD-10-CM

## 2023-11-10 DIAGNOSIS — Z00.00 MEDICARE ANNUAL WELLNESS VISIT, SUBSEQUENT: Primary | ICD-10-CM

## 2023-11-10 DIAGNOSIS — Z86.19 HISTORY OF SHINGLES: ICD-10-CM

## 2023-11-10 DIAGNOSIS — I10 ESSENTIAL (PRIMARY) HYPERTENSION: ICD-10-CM

## 2023-11-10 DIAGNOSIS — G91.2 (IDIOPATHIC) NORMAL PRESSURE HYDROCEPHALUS (HCC): ICD-10-CM

## 2023-11-10 PROCEDURE — 99214 OFFICE O/P EST MOD 30 MIN: CPT | Performed by: INTERNAL MEDICINE

## 2023-11-10 SDOH — ECONOMIC STABILITY: HOUSING INSECURITY
IN THE LAST 12 MONTHS, WAS THERE A TIME WHEN YOU DID NOT HAVE A STEADY PLACE TO SLEEP OR SLEPT IN A SHELTER (INCLUDING NOW)?: NO

## 2023-11-10 SDOH — ECONOMIC STABILITY: FOOD INSECURITY: WITHIN THE PAST 12 MONTHS, THE FOOD YOU BOUGHT JUST DIDN'T LAST AND YOU DIDN'T HAVE MONEY TO GET MORE.: NEVER TRUE

## 2023-11-10 SDOH — ECONOMIC STABILITY: INCOME INSECURITY: HOW HARD IS IT FOR YOU TO PAY FOR THE VERY BASICS LIKE FOOD, HOUSING, MEDICAL CARE, AND HEATING?: NOT HARD AT ALL

## 2023-11-10 SDOH — ECONOMIC STABILITY: FOOD INSECURITY: WITHIN THE PAST 12 MONTHS, YOU WORRIED THAT YOUR FOOD WOULD RUN OUT BEFORE YOU GOT MONEY TO BUY MORE.: NEVER TRUE

## 2023-11-10 ASSESSMENT — PATIENT HEALTH QUESTIONNAIRE - PHQ9
2. FEELING DOWN, DEPRESSED OR HOPELESS: 2
SUM OF ALL RESPONSES TO PHQ QUESTIONS 1-9: 2
SUM OF ALL RESPONSES TO PHQ9 QUESTIONS 1 & 2: 2
1. LITTLE INTEREST OR PLEASURE IN DOING THINGS: 0

## 2023-11-10 ASSESSMENT — LIFESTYLE VARIABLES
HOW MANY STANDARD DRINKS CONTAINING ALCOHOL DO YOU HAVE ON A TYPICAL DAY: 1 OR 2
HOW OFTEN DO YOU HAVE A DRINK CONTAINING ALCOHOL: MONTHLY OR LESS

## 2023-11-10 NOTE — PROGRESS NOTES
Prasanna Landry (:  1935) is a 81 y.o. female,Established patient, here for evaluation of the following chief complaint(s):  Medicare AWV (Pt is nonfasting)         ASSESSMENT/PLAN:  1. Medicare annual wellness visit, subsequent  2. Frequent falls-she is going to independent living where she will have some assistance and is going to be for now taking her own meds unless that changes. Paperwork needed to be filled out  3. Essential (primary) hypertension-at goal on medicine continue with atenolol  4. Convulsions, unspecified convulsion type (HCC)-she is stable with her Valium  5. (Idiopathic) normal pressure hydrocephalus (HCC)-followed by neurosurgery this is stable  6. History of shingles-continue with Tylenol as needed CT of the chest negative  7. Urinary frequency-we are going to refer her to Dr. Muna Samuel to get this evaluated      No follow-ups on file. ? Doing ? PT  Atenolol  Valium  Followed by NSG stable  Tylenol prn      Subjective   SUBJECTIVE/OBJECTIVE:  HPI    Her bones do hurt a lot and hard to get up with standing and ankles are sensitive and can hurt and legs can have scabs   Frequent falls / Ambulatory dysfunction - POA, Recurrent, chronic, stable. PT/OT evaluation recommends IPR. Can go now. Patient is moving into an assisted living and needs paperwork filled out it is on [address] near [place]-  Hypercalcemia - Chronic stable. Normal PTH      Anxiety and depression - Contributes to weakness. Resume duloxetine. She has some ? Demetnia-discussed cognitive issues with son and mom and she seems much more back at baseline has some slight cognitive changes but able to give her own meds and asking appropriate questions     Postherpetic neuralgia - Follows with neurology.  Continue gabapentin, lyrica; had a recent CT of the chest that was normal with stable pulmonary nodules she keeps thinking the pain is from a lung tumor and not the nerve pain from the shingles    NPH: had

## 2023-12-08 RX ORDER — ATENOLOL 25 MG/1
25 TABLET ORAL DAILY
Qty: 90 TABLET | Refills: 1 | Status: SHIPPED | OUTPATIENT
Start: 2023-12-08

## 2024-02-07 DIAGNOSIS — B02.29 POST HERPETIC NEURALGIA: ICD-10-CM

## 2024-02-07 RX ORDER — DULOXETIN HYDROCHLORIDE 20 MG/1
20 CAPSULE, DELAYED RELEASE ORAL 2 TIMES DAILY
Qty: 180 CAPSULE | Refills: 1 | Status: SHIPPED | OUTPATIENT
Start: 2024-02-07

## 2024-02-07 RX ORDER — ATENOLOL 25 MG/1
25 TABLET ORAL DAILY
Qty: 90 TABLET | Refills: 1 | Status: SHIPPED | OUTPATIENT
Start: 2024-02-07

## 2024-02-07 RX ORDER — FAMOTIDINE 20 MG/1
10 TABLET, FILM COATED ORAL 2 TIMES DAILY
Qty: 90 TABLET | Refills: 1 | Status: SHIPPED | OUTPATIENT
Start: 2024-02-07

## 2024-03-22 ENCOUNTER — TELEPHONE (OUTPATIENT)
Age: 89
End: 2024-03-22

## 2024-03-22 DIAGNOSIS — B02.29 POST HERPETIC NEURALGIA: ICD-10-CM

## 2024-03-22 DIAGNOSIS — R51.9 NONINTRACTABLE HEADACHE, UNSPECIFIED CHRONICITY PATTERN, UNSPECIFIED HEADACHE TYPE: ICD-10-CM

## 2024-03-22 RX ORDER — DIAZEPAM 5 MG/1
5 TABLET ORAL EVERY 8 HOURS PRN
Qty: 90 TABLET | Refills: 2 | Status: SHIPPED | OUTPATIENT
Start: 2024-03-22 | End: 2024-04-21

## 2024-03-25 DIAGNOSIS — R51.9 NONINTRACTABLE HEADACHE, UNSPECIFIED CHRONICITY PATTERN, UNSPECIFIED HEADACHE TYPE: ICD-10-CM

## 2024-03-25 DIAGNOSIS — B02.29 POST HERPETIC NEURALGIA: ICD-10-CM

## 2024-03-25 RX ORDER — DIAZEPAM 5 MG/1
5 TABLET ORAL EVERY 8 HOURS PRN
Qty: 90 TABLET | Refills: 0 | Status: SHIPPED | OUTPATIENT
Start: 2024-03-25 | End: 2024-04-24

## 2024-03-28 ENCOUNTER — TELEPHONE (OUTPATIENT)
Age: 89
End: 2024-03-28

## 2024-03-30 PROBLEM — J84.10 LUNG GRANULOMA (HCC): Status: RESOLVED | Noted: 2023-01-18 | Resolved: 2024-03-30

## 2024-03-30 NOTE — PROGRESS NOTES
Brenda Maharaj (:  1935) is a 88 y.o. female,Established patient, here for evaluation of the following chief complaint(s):  Hypertension and Other (Possible kidney infection)         ASSESSMENT/PLAN:  1. UTI symptoms- only sent in abx in case it is a UTI will wait for cx  -     AMB POC URINALYSIS DIP STICK AUTO W/O MICRO  -     cephALEXin (KEFLEX) 500 MG capsule; Take 1 capsule by mouth 2 times daily, Disp-14 capsule, R-0Normal  2. Frequent falls-she is a lot stronger and independent/assisted living at [facility] and her mobility and getting up is a lot better she is walking more  3. Essential (primary) hypertension-at goal on current dose of atenolol  4. Convulsions, unspecified convulsion type (HCC)-continue with Valium in stable pattern  5. (Idiopathic) normal pressure hydrocephalus (HCC)-last CT showed normal ventricles  6. History of shingles-system for a year continue with over-the-counter Tylenol and Salonpas  7. Urinary frequency-she saw urology continue to monitor  8. Post herpetic neuralgia-as above continue with Tylenol as needed topical Salonpas  9. Exudative age-related macular degeneration, unspecified laterality, unspecified stage (HCC)-sees ophthalmology  10. Major depressive disorder, recurrent, moderate (HCC)-he is off medicine and doing well  11. Benign neoplasm of meninges, unspecified (HCC)-CT was stable    ?doing  Atenolol  Valium  Last CT showed normal ventriucles  Tylenol prn  ? Saw urology  ?pain- tylenol  Sees eye   Not on medicine   Last CT stable  No follow-ups on file.         Subjective   SUBJECTIVE/OBJECTIVE:  HPI       Pt in assisted living     Hypercalcemia - Chronic stable. Normal PTH   CT Result (most recent):  CT HEAD WO CONTRAST 10/13/2023    Narrative  EXAM: CT HEAD WO CONTRAST    INDICATION: fall    COMPARISON: 2023.    CONTRAST: None.    TECHNIQUE: Unenhanced CT of the head was performed using 5 mm images. Brain and  bone windows were generated. Coronal

## 2024-04-01 ENCOUNTER — OFFICE VISIT (OUTPATIENT)
Age: 89
End: 2024-04-01
Payer: MEDICARE

## 2024-04-01 VITALS
OXYGEN SATURATION: 95 % | RESPIRATION RATE: 16 BRPM | BODY MASS INDEX: 23.25 KG/M2 | TEMPERATURE: 97.5 F | WEIGHT: 131.2 LBS | DIASTOLIC BLOOD PRESSURE: 84 MMHG | HEART RATE: 87 BPM | HEIGHT: 63 IN | SYSTOLIC BLOOD PRESSURE: 158 MMHG

## 2024-04-01 DIAGNOSIS — I10 ESSENTIAL (PRIMARY) HYPERTENSION: ICD-10-CM

## 2024-04-01 DIAGNOSIS — R56.9 CONVULSIONS, UNSPECIFIED CONVULSION TYPE (HCC): ICD-10-CM

## 2024-04-01 DIAGNOSIS — R29.6 FREQUENT FALLS: ICD-10-CM

## 2024-04-01 DIAGNOSIS — F33.1 MAJOR DEPRESSIVE DISORDER, RECURRENT, MODERATE (HCC): ICD-10-CM

## 2024-04-01 DIAGNOSIS — R39.9 UTI SYMPTOMS: Primary | ICD-10-CM

## 2024-04-01 DIAGNOSIS — B02.29 POST HERPETIC NEURALGIA: ICD-10-CM

## 2024-04-01 DIAGNOSIS — G91.2 (IDIOPATHIC) NORMAL PRESSURE HYDROCEPHALUS (HCC): ICD-10-CM

## 2024-04-01 DIAGNOSIS — H35.3290 EXUDATIVE AGE-RELATED MACULAR DEGENERATION, UNSPECIFIED LATERALITY, UNSPECIFIED STAGE (HCC): ICD-10-CM

## 2024-04-01 DIAGNOSIS — R39.9 UTI SYMPTOMS: ICD-10-CM

## 2024-04-01 DIAGNOSIS — R35.0 URINARY FREQUENCY: ICD-10-CM

## 2024-04-01 DIAGNOSIS — D32.9 BENIGN NEOPLASM OF MENINGES, UNSPECIFIED (HCC): ICD-10-CM

## 2024-04-01 DIAGNOSIS — Z86.19 HISTORY OF SHINGLES: ICD-10-CM

## 2024-04-01 PROBLEM — S06.5XAA BILATERAL SUBDURAL HEMATOMAS (HCC): Status: RESOLVED | Noted: 2022-06-19 | Resolved: 2024-04-01

## 2024-04-01 LAB
BILIRUBIN, URINE, POC: NEGATIVE
BLOOD URINE, POC: NEGATIVE
GLUCOSE URINE, POC: NEGATIVE
KETONES, URINE, POC: NEGATIVE
LEUKOCYTE ESTERASE, URINE, POC: NORMAL
NITRITE, URINE, POC: NEGATIVE
PH, URINE, POC: 7 (ref 4.6–8)
PROTEIN,URINE, POC: NEGATIVE
SPECIFIC GRAVITY, URINE, POC: 1.02 (ref 1–1.03)
URINALYSIS CLARITY, POC: CLEAR
URINALYSIS COLOR, POC: YELLOW
UROBILINOGEN, POC: NORMAL

## 2024-04-01 PROCEDURE — G8427 DOCREV CUR MEDS BY ELIG CLIN: HCPCS | Performed by: INTERNAL MEDICINE

## 2024-04-01 PROCEDURE — 99214 OFFICE O/P EST MOD 30 MIN: CPT | Performed by: INTERNAL MEDICINE

## 2024-04-01 PROCEDURE — 1123F ACP DISCUSS/DSCN MKR DOCD: CPT | Performed by: INTERNAL MEDICINE

## 2024-04-01 PROCEDURE — 1090F PRES/ABSN URINE INCON ASSESS: CPT | Performed by: INTERNAL MEDICINE

## 2024-04-01 PROCEDURE — 1036F TOBACCO NON-USER: CPT | Performed by: INTERNAL MEDICINE

## 2024-04-01 PROCEDURE — 81003 URINALYSIS AUTO W/O SCOPE: CPT | Performed by: INTERNAL MEDICINE

## 2024-04-01 PROCEDURE — G8420 CALC BMI NORM PARAMETERS: HCPCS | Performed by: INTERNAL MEDICINE

## 2024-04-01 PROCEDURE — PBSHW AMB POC URINALYSIS DIP STICK AUTO W/O MICRO: Performed by: INTERNAL MEDICINE

## 2024-04-01 RX ORDER — CEPHALEXIN 500 MG/1
500 CAPSULE ORAL 2 TIMES DAILY
Qty: 14 CAPSULE | Refills: 0 | Status: SHIPPED | OUTPATIENT
Start: 2024-04-01

## 2024-04-01 ASSESSMENT — PATIENT HEALTH QUESTIONNAIRE - PHQ9
10. IF YOU CHECKED OFF ANY PROBLEMS, HOW DIFFICULT HAVE THESE PROBLEMS MADE IT FOR YOU TO DO YOUR WORK, TAKE CARE OF THINGS AT HOME, OR GET ALONG WITH OTHER PEOPLE: NOT DIFFICULT AT ALL
SUM OF ALL RESPONSES TO PHQ QUESTIONS 1-9: 1
3. TROUBLE FALLING OR STAYING ASLEEP: NOT AT ALL
1. LITTLE INTEREST OR PLEASURE IN DOING THINGS: NOT AT ALL
7. TROUBLE CONCENTRATING ON THINGS, SUCH AS READING THE NEWSPAPER OR WATCHING TELEVISION: NOT AT ALL
4. FEELING TIRED OR HAVING LITTLE ENERGY: NOT AT ALL
6. FEELING BAD ABOUT YOURSELF - OR THAT YOU ARE A FAILURE OR HAVE LET YOURSELF OR YOUR FAMILY DOWN: NOT AT ALL
SUM OF ALL RESPONSES TO PHQ9 QUESTIONS 1 & 2: 1
SUM OF ALL RESPONSES TO PHQ QUESTIONS 1-9: 1
2. FEELING DOWN, DEPRESSED OR HOPELESS: SEVERAL DAYS
5. POOR APPETITE OR OVEREATING: NOT AT ALL
9. THOUGHTS THAT YOU WOULD BE BETTER OFF DEAD, OR OF HURTING YOURSELF: NOT AT ALL
8. MOVING OR SPEAKING SO SLOWLY THAT OTHER PEOPLE COULD HAVE NOTICED. OR THE OPPOSITE, BEING SO FIGETY OR RESTLESS THAT YOU HAVE BEEN MOVING AROUND A LOT MORE THAN USUAL: NOT AT ALL

## 2024-04-04 LAB
BACTERIA SPEC CULT: ABNORMAL
CC UR VC: ABNORMAL
SERVICE CMNT-IMP: ABNORMAL

## 2024-04-09 NOTE — PROGRESS NOTES
Patient contacted regarding COVID-19  risk. Discussed COVID-19 related testing which was pending at this time. Test results were pending. Patient informed of results, if available? yes. Outreach made within 2 business days of discharge: Yes    Care Transition Nurse/ Ambulatory Care Manager/ LPN Care Coordinator contacted the patient by telephone to perform post discharge assessment. Verified name and  with patient as identifiers. Provided introduction to self, and explanation of the CTN/ACM/LPN role, and reason for call due to risk factors for infection and/or exposure to COVID-19. Symptoms reviewed with patient who verbalized the following symptoms: cough, shortness of breath, chills or shaking, no new symptoms, no worsening symptoms and \"head congestion\". Due to no new or worsening symptoms encounter was not routed to provider for escalation. Discussed follow-up appointments. If no appointment was previously scheduled, appointment scheduling offered: patient is contact with PCP office for follow up  [facility] follow up appointment(s):   Future Appointments   Date Time Provider Tania Forde   2020 11:00 AM Tony Hernandez, MELANY NEUROWTC BS [facility]     Non-BS follow up appointment(s): none      Advance Care Planning:   Does patient have an Advance Directive: will discuss at later outreach, patient has valid living will on file    Patient has following risk factors of: possible lung lesion. CTN/ACM/LPN reviewed discharge instructions, medical action plan and red flags such as increased shortness of breath, increasing fever and signs of decompensation with patient who verbalized understanding. Discussed exposure protocols and quarantine with CDC Guidelines What to do if you are sick with coronavirus disease .  Patient was given an opportunity for questions and concerns.  The patient agrees to contact the [facility] exposure line [phone], local [facility] department R Arturo Becerril ([phone]) and PCP office for questions related to their healthcare. CTN/ACM provided contact information for future needs. Reviewed and educated patient on any new and changed medications related to discharge diagnosis. Patient/family/caregiver given information for [employer] and agrees to enroll no    Plan for follow-up call in 5-7 days based on severity of symptoms and risk factors. Ambulatory Care Management Note    Date/Time:  10/15/2020 2:02 PM    This patient was received as a referral from Daily Assignment   Ambulatory Care Manager outreached to patient today to offer care management services. Introduction to self and role of care manager provided. Patient accepted care management services at this time. Follow up call scheduled at this time. Patient has Ambulatory Care Manager's contact number for for any questions or concerns. 3

## 2024-07-09 DIAGNOSIS — R51.9 NONINTRACTABLE HEADACHE, UNSPECIFIED CHRONICITY PATTERN, UNSPECIFIED HEADACHE TYPE: ICD-10-CM

## 2024-07-09 DIAGNOSIS — B02.29 POST HERPETIC NEURALGIA: Primary | ICD-10-CM

## 2024-07-09 RX ORDER — DIAZEPAM 5 MG/1
5 TABLET ORAL EVERY 8 HOURS PRN
Qty: 90 TABLET | Refills: 2 | Status: SHIPPED | OUTPATIENT
Start: 2024-07-09 | End: 2024-08-08

## 2024-08-27 ENCOUNTER — OFFICE VISIT (OUTPATIENT)
Age: 89
End: 2024-08-27
Payer: MEDICARE

## 2024-08-27 VITALS
OXYGEN SATURATION: 96 % | DIASTOLIC BLOOD PRESSURE: 87 MMHG | HEART RATE: 85 BPM | RESPIRATION RATE: 16 BRPM | SYSTOLIC BLOOD PRESSURE: 184 MMHG

## 2024-08-27 DIAGNOSIS — G91.2 NPH (NORMAL PRESSURE HYDROCEPHALUS) (HCC): ICD-10-CM

## 2024-08-27 DIAGNOSIS — N20.0 KIDNEY STONES: ICD-10-CM

## 2024-08-27 DIAGNOSIS — R25.1 TREMOR: ICD-10-CM

## 2024-08-27 DIAGNOSIS — N20.0 KIDNEY STONES: Primary | ICD-10-CM

## 2024-08-27 PROCEDURE — 1123F ACP DISCUSS/DSCN MKR DOCD: CPT | Performed by: NURSE PRACTITIONER

## 2024-08-27 PROCEDURE — G8427 DOCREV CUR MEDS BY ELIG CLIN: HCPCS | Performed by: NURSE PRACTITIONER

## 2024-08-27 PROCEDURE — 1036F TOBACCO NON-USER: CPT | Performed by: NURSE PRACTITIONER

## 2024-08-27 PROCEDURE — G8420 CALC BMI NORM PARAMETERS: HCPCS | Performed by: NURSE PRACTITIONER

## 2024-08-27 PROCEDURE — 99215 OFFICE O/P EST HI 40 MIN: CPT | Performed by: NURSE PRACTITIONER

## 2024-08-27 PROCEDURE — 1090F PRES/ABSN URINE INCON ASSESS: CPT | Performed by: NURSE PRACTITIONER

## 2024-08-27 RX ORDER — DULOXETIN HYDROCHLORIDE 20 MG/1
20 CAPSULE, DELAYED RELEASE ORAL DAILY
COMMUNITY
End: 2024-08-27 | Stop reason: ALTCHOICE

## 2024-08-27 RX ORDER — DIAZEPAM 5 MG
TABLET ORAL
Qty: 90 TABLET | Refills: 5 | Status: SHIPPED | OUTPATIENT
Start: 2024-08-27 | End: 2024-09-26

## 2024-08-27 RX ORDER — DIAZEPAM 2 MG
5 TABLET ORAL
COMMUNITY

## 2024-08-28 NOTE — PROGRESS NOTES
neural foramina are widely patent.    C4/5:  Disc osteophyte complex with ligamentum flavum hypertrophy causing mild  to moderate central stenosis. Moderate to severe right and mild to moderate left  neural foraminal stenosis    C5/6:  Disc osteophyte complex with ligamentum flavum hypertrophy causing mild  central stenosis. Mild right and mild to moderate left neural foraminal stenosis    C6/7:  Small disc osteophyte complex without any central spinal canal stenosis.  Mild to moderate left and no right neural foraminal stenosis    C7/T1:   The spinal canal and neural foramina are widely patent.    Impression  IMPRESSION:    Mild to moderate C4-5 and mild C5-6 central stenosis with multilevel neural  foraminal narrowing as described above      CT Result (most recent):  CT HEAD WO CONTRAST 10/13/2023    Narrative  EXAM: CT HEAD WO CONTRAST    INDICATION: fall    COMPARISON: 6/18/2023.    CONTRAST: None.    TECHNIQUE: Unenhanced CT of the head was performed using 5 mm images. Brain and  bone windows were generated. Coronal and sagittal reformats. CT dose reduction  was achieved through use of a standardized protocol tailored for this  examination and automatic exposure control for dose modulation.    FINDINGS:  13 mm hyperdense lesion in the atrium of the left lateral ventricle and change.  Right frontal approach ventricular shunt catheter unchanged. Ventricles are  unchanged.. There is no significant white matter disease. There is no  intracranial hemorrhage, extra-axial collection, or mass effect. The basilar  cisterns are open. No CT evidence of acute infarct.    The bone windows demonstrate no abnormalities. The visualized portions of the  paranasal sinuses and mastoid air cells are clear.    Impression  No acute intracranial abnormality. Stable ventricular size. Stable hyperdense  lesion in the atrium left ventricle.      EEG Result:      Carotid Doppler:        Recent Labs:  Lab Results   Component Value Date     using voice recognition software. Despite editing, there may be syntax errors.

## 2024-09-15 RX ORDER — DULOXETIN HYDROCHLORIDE 20 MG/1
CAPSULE, DELAYED RELEASE ORAL DAILY
Qty: 30 CAPSULE | Refills: 3 | Status: SHIPPED | OUTPATIENT
Start: 2024-09-15

## 2024-09-18 DIAGNOSIS — R30.0 DYSURIA: Primary | ICD-10-CM

## 2024-09-18 RX ORDER — CEPHALEXIN 500 MG/1
500 CAPSULE ORAL 2 TIMES DAILY
Qty: 14 CAPSULE | Refills: 0 | Status: SHIPPED | OUTPATIENT
Start: 2024-09-18

## 2024-09-19 DIAGNOSIS — R30.0 DYSURIA: ICD-10-CM

## 2024-09-19 LAB
AMORPH CRY URNS QL MICRO: ABNORMAL
APPEARANCE UR: ABNORMAL
BACTERIA URNS QL MICRO: ABNORMAL /HPF
BILIRUB UR QL: NEGATIVE
COLOR UR: ABNORMAL
EPITH CASTS URNS QL MICRO: ABNORMAL /LPF
GLUCOSE UR STRIP.AUTO-MCNC: NEGATIVE MG/DL
HGB UR QL STRIP: NEGATIVE
KETONES UR QL STRIP.AUTO: NEGATIVE MG/DL
LEUKOCYTE ESTERASE UR QL STRIP.AUTO: ABNORMAL
NITRITE UR QL STRIP.AUTO: NEGATIVE
PH UR STRIP: >8.5 [PH] (ref 5–8)
PROT UR STRIP-MCNC: NEGATIVE MG/DL
RBC #/AREA URNS HPF: ABNORMAL /HPF (ref 0–5)
SP GR UR REFRACTOMETRY: 1.01 (ref 1–1.03)
TRI-PHOS CRY URNS QL MICRO: ABNORMAL
UROBILINOGEN UR QL STRIP.AUTO: 0.2 EU/DL (ref 0.2–1)
WBC URNS QL MICRO: ABNORMAL /HPF (ref 0–4)

## 2024-09-21 LAB
BACTERIA SPEC CULT: ABNORMAL
CC UR VC: ABNORMAL
SERVICE CMNT-IMP: ABNORMAL

## 2024-10-26 ENCOUNTER — APPOINTMENT (OUTPATIENT)
Facility: HOSPITAL | Age: 89
End: 2024-10-26
Payer: MEDICARE

## 2024-10-26 ENCOUNTER — HOSPITAL ENCOUNTER (EMERGENCY)
Facility: HOSPITAL | Age: 89
Discharge: HOME | End: 2024-10-26
Attending: EMERGENCY MEDICINE
Payer: MEDICARE

## 2024-10-26 VITALS
BODY MASS INDEX: 24.63 KG/M2 | SYSTOLIC BLOOD PRESSURE: 129 MMHG | OXYGEN SATURATION: 97 % | DIASTOLIC BLOOD PRESSURE: 88 MMHG | TEMPERATURE: 98.2 F | HEIGHT: 63 IN | WEIGHT: 139 LBS | HEART RATE: 99 BPM | RESPIRATION RATE: 16 BRPM

## 2024-10-26 DIAGNOSIS — K59.00 CONSTIPATION, UNSPECIFIED CONSTIPATION TYPE: Primary | ICD-10-CM

## 2024-10-26 LAB
ALBUMIN SERPL-MCNC: 3.4 G/DL (ref 3.5–5)
ALBUMIN/GLOB SERPL: 0.9 (ref 1.1–2.2)
ALP SERPL-CCNC: 89 U/L (ref 45–117)
ALT SERPL-CCNC: 12 U/L (ref 12–78)
ANION GAP SERPL CALC-SCNC: 4 MMOL/L (ref 2–12)
AST SERPL-CCNC: 8 U/L (ref 15–37)
BASOPHILS # BLD: 0 K/UL (ref 0–0.1)
BASOPHILS NFR BLD: 0 % (ref 0–1)
BILIRUB SERPL-MCNC: 0.4 MG/DL (ref 0.2–1)
BUN SERPL-MCNC: 12 MG/DL (ref 6–20)
BUN/CREAT SERPL: 16 (ref 12–20)
CALCIUM SERPL-MCNC: 10.5 MG/DL (ref 8.5–10.1)
CHLORIDE SERPL-SCNC: 109 MMOL/L (ref 97–108)
CO2 SERPL-SCNC: 25 MMOL/L (ref 21–32)
CREAT SERPL-MCNC: 0.74 MG/DL (ref 0.55–1.02)
DIFFERENTIAL METHOD BLD: ABNORMAL
EOSINOPHIL # BLD: 0 K/UL (ref 0–0.4)
EOSINOPHIL NFR BLD: 0 % (ref 0–7)
ERYTHROCYTE [DISTWIDTH] IN BLOOD BY AUTOMATED COUNT: 12.5 % (ref 11.5–14.5)
GLOBULIN SER CALC-MCNC: 3.9 G/DL (ref 2–4)
GLUCOSE SERPL-MCNC: 112 MG/DL (ref 65–100)
HCT VFR BLD AUTO: 37.6 % (ref 35–47)
HGB BLD-MCNC: 12.5 G/DL (ref 11.5–16)
IMM GRANULOCYTES # BLD AUTO: 0.1 K/UL (ref 0–0.04)
IMM GRANULOCYTES NFR BLD AUTO: 1 % (ref 0–0.5)
INR PPP: 1 (ref 0.9–1.1)
LYMPHOCYTES # BLD: 1.2 K/UL (ref 0.8–3.5)
LYMPHOCYTES NFR BLD: 11 % (ref 12–49)
MCH RBC QN AUTO: 29.4 PG (ref 26–34)
MCHC RBC AUTO-ENTMCNC: 33.2 G/DL (ref 30–36.5)
MCV RBC AUTO: 88.5 FL (ref 80–99)
MONOCYTES # BLD: 1 K/UL (ref 0–1)
MONOCYTES NFR BLD: 9 % (ref 5–13)
NEUTS SEG # BLD: 8.8 K/UL (ref 1.8–8)
NEUTS SEG NFR BLD: 79 % (ref 32–75)
NRBC # BLD: 0 K/UL (ref 0–0.01)
NRBC BLD-RTO: 0 PER 100 WBC
PLATELET # BLD AUTO: 874 K/UL (ref 150–400)
PMV BLD AUTO: 9.8 FL (ref 8.9–12.9)
POTASSIUM SERPL-SCNC: 3.8 MMOL/L (ref 3.5–5.1)
PROT SERPL-MCNC: 7.3 G/DL (ref 6.4–8.2)
PROTHROMBIN TIME: 10.3 SEC (ref 9–11.1)
RBC # BLD AUTO: 4.25 M/UL (ref 3.8–5.2)
RBC MORPH BLD: ABNORMAL
SODIUM SERPL-SCNC: 138 MMOL/L (ref 136–145)
WBC # BLD AUTO: 11.1 K/UL (ref 3.6–11)

## 2024-10-26 PROCEDURE — 96374 THER/PROPH/DIAG INJ IV PUSH: CPT

## 2024-10-26 PROCEDURE — 6360000004 HC RX CONTRAST MEDICATION: Performed by: EMERGENCY MEDICINE

## 2024-10-26 PROCEDURE — 85025 COMPLETE CBC W/AUTO DIFF WBC: CPT

## 2024-10-26 PROCEDURE — 36415 COLL VENOUS BLD VENIPUNCTURE: CPT

## 2024-10-26 PROCEDURE — 6360000002 HC RX W HCPCS: Performed by: EMERGENCY MEDICINE

## 2024-10-26 PROCEDURE — 80053 COMPREHEN METABOLIC PANEL: CPT

## 2024-10-26 PROCEDURE — 99285 EMERGENCY DEPT VISIT HI MDM: CPT

## 2024-10-26 PROCEDURE — 74177 CT ABD & PELVIS W/CONTRAST: CPT

## 2024-10-26 PROCEDURE — 85610 PROTHROMBIN TIME: CPT

## 2024-10-26 PROCEDURE — 94761 N-INVAS EAR/PLS OXIMETRY MLT: CPT

## 2024-10-26 RX ORDER — IOPAMIDOL 755 MG/ML
100 INJECTION, SOLUTION INTRAVASCULAR
Status: COMPLETED | OUTPATIENT
Start: 2024-10-26 | End: 2024-10-26

## 2024-10-26 RX ORDER — MORPHINE SULFATE 4 MG/ML
4 INJECTION, SOLUTION INTRAMUSCULAR; INTRAVENOUS
Status: DISCONTINUED | OUTPATIENT
Start: 2024-10-26 | End: 2024-10-26 | Stop reason: HOSPADM

## 2024-10-26 RX ORDER — KETOROLAC TROMETHAMINE 15 MG/ML
15 INJECTION, SOLUTION INTRAMUSCULAR; INTRAVENOUS ONCE
Status: COMPLETED | OUTPATIENT
Start: 2024-10-26 | End: 2024-10-26

## 2024-10-26 RX ORDER — ONDANSETRON 2 MG/ML
4 INJECTION INTRAMUSCULAR; INTRAVENOUS ONCE
Status: DISCONTINUED | OUTPATIENT
Start: 2024-10-26 | End: 2024-10-26 | Stop reason: HOSPADM

## 2024-10-26 RX ADMIN — IOPAMIDOL 100 ML: 755 INJECTION, SOLUTION INTRAVENOUS at 14:26

## 2024-10-26 RX ADMIN — KETOROLAC TROMETHAMINE 15 MG: 15 INJECTION, SOLUTION INTRAMUSCULAR; INTRAVENOUS at 15:56

## 2024-10-26 ASSESSMENT — PAIN DESCRIPTION - LOCATION: LOCATION: RECTUM

## 2024-10-26 ASSESSMENT — PAIN SCALES - GENERAL
PAINLEVEL_OUTOF10: 5
PAINLEVEL_OUTOF10: 5

## 2024-10-26 ASSESSMENT — PAIN - FUNCTIONAL ASSESSMENT: PAIN_FUNCTIONAL_ASSESSMENT: 0-10
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
## 2024-10-26 NOTE — ED TRIAGE NOTES
Patient states she has not had a abowel movement for 3 days and noticed bleeding from the rectum this morning.  Patient states pain in rectum and across abdomen.    Dr. Patel assessing in triage.

## 2024-10-26 NOTE — ED NOTES
500 mL of soap suds administered rectally. Pt tolerated and was able to have soft watery BM but still needs to pass more.

## 2024-11-15 ENCOUNTER — CARE COORDINATION (OUTPATIENT)
Dept: CARE COORDINATION | Age: 89
End: 2024-11-15

## 2024-11-15 RX ORDER — DULOXETIN HYDROCHLORIDE 20 MG/1
20 CAPSULE, DELAYED RELEASE ORAL DAILY
Qty: 90 CAPSULE | Refills: 1 | Status: SHIPPED | OUTPATIENT
Start: 2024-11-15

## 2024-12-10 ENCOUNTER — APPOINTMENT (OUTPATIENT)
Facility: HOSPITAL | Age: 89
End: 2024-12-10
Payer: MEDICARE

## 2024-12-10 ENCOUNTER — APPOINTMENT (OUTPATIENT)
Dept: VASCULAR SURGERY | Facility: HOSPITAL | Age: 89
End: 2024-12-10
Attending: EMERGENCY MEDICINE
Payer: MEDICARE

## 2024-12-10 ENCOUNTER — HOSPITAL ENCOUNTER (EMERGENCY)
Facility: HOSPITAL | Age: 89
Discharge: HOME | End: 2024-12-10
Attending: EMERGENCY MEDICINE
Payer: MEDICARE

## 2024-12-10 VITALS
OXYGEN SATURATION: 96 % | HEART RATE: 100 BPM | TEMPERATURE: 98.4 F | SYSTOLIC BLOOD PRESSURE: 122 MMHG | RESPIRATION RATE: 16 BRPM | DIASTOLIC BLOOD PRESSURE: 65 MMHG

## 2024-12-10 DIAGNOSIS — M54.6 ACUTE BILATERAL THORACIC BACK PAIN: ICD-10-CM
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
## 2024-12-10 DIAGNOSIS — I82.4Y2 ACUTE DEEP VEIN THROMBOSIS (DVT) OF PROXIMAL VEIN OF LEFT LOWER EXTREMITY (HCC): ICD-10-CM

## 2024-12-10 PROCEDURE — 99284 EMERGENCY DEPT VISIT MOD MDM: CPT

## 2024-12-10 PROCEDURE — 93971 EXTREMITY STUDY: CPT

## 2024-12-10 PROCEDURE — 72125 CT NECK SPINE W/O DYE: CPT

## 2024-12-10 PROCEDURE — 71250 CT THORAX DX C-: CPT

## 2024-12-10 PROCEDURE — 6370000000 HC RX 637 (ALT 250 FOR IP): Performed by: EMERGENCY MEDICINE

## 2024-12-10 PROCEDURE — 70450 CT HEAD/BRAIN W/O DYE: CPT

## 2024-12-10 RX ORDER — ACETAMINOPHEN 325 MG/1
650 TABLET ORAL
Status: COMPLETED | OUTPATIENT
Start: 2024-12-10 | End: 2024-12-10

## 2024-12-10 RX ADMIN — ACETAMINOPHEN 650 MG: 325 TABLET ORAL at 15:57

## 2024-12-10 RX ADMIN — APIXABAN 5 MG: 5 TABLET, FILM COATED ORAL at 17:00

## 2024-12-10 ASSESSMENT — PAIN DESCRIPTION - LOCATION
LOCATION: BACK
LOCATION: BACK

## 2024-12-10 ASSESSMENT — PAIN SCALES - GENERAL
PAINLEVEL_OUTOF10: 5
PAINLEVEL_OUTOF10: 6

## 2024-12-10 NOTE — ED TRIAGE NOTES
Pt arrives to ED via EMS from assisted living facility after witnessed GLF while transferring from chair to wheel chair, and slipped out onto floor.  Pt did hit head, no LOC, no blood thinners.  Pt denies head pain, but reports back pain to mid back.  Pt has no other complaints at this time.

## 2024-12-10 NOTE — ED PROVIDER NOTES
Patient signed out to me at 3 PM waiting for a ride back to her nursing home.  Son arrived and pointed out that her left leg was swollen.  I do not think this was appreciated earlier.  Lower extremity duplex study was obtained which did show pretty extensive DVT in the left lower extremity.  Vital signs are normal.  No hypoxia or tachycardia.  No chest pain or shortness of breath.  I think she is appropriate for outpatient treatment.  She was given a dose of Eliquis in the emergency department and discharged home with a prescription for 1 month's worth of medication.  I discussed this with the son who was in agreement.  We reviewed the risk versus benefits of anticoagulation and he was in agreement.  Recommended following up with primary care doctor to discuss continuation of medication if needed.     Isaias Yen MD  12/10/24 0594    
    SOCIAL HISTORY       Social History     Socioeconomic History    Marital status:    Tobacco Use    Smoking status: Former     Current packs/day: 0.00     Types: Cigarettes     Quit date: 1966     Years since quittin.9    Smokeless tobacco: Never    Tobacco comments:     Quit smoking: stopped in her 20s   Vaping Use    Vaping status: Never Used   Substance and Sexual Activity    Alcohol use: Not Currently     Alcohol/week: 7.0 standard drinks of alcohol    Drug use: No    Sexual activity: Not Currently     Social Determinants of Health     Financial Resource Strain: Low Risk  (11/10/2023)    Overall Financial Resource Strain (CARDIA)     Difficulty of Paying Living Expenses: Not hard at all   Transportation Needs: Unknown (11/10/2023)    PRAPARE - Transportation     Lack of Transportation (Non-Medical): No   Physical Activity: Inactive (11/10/2023)    Exercise Vital Sign     Days of Exercise per Week: 0 days     Minutes of Exercise per Session: 0 min   Housing Stability: Unknown (11/10/2023)    Housing Stability Vital Sign     Unstable Housing in the Last Year: No           PHYSICAL EXAM    (up to 7 for level 4, 8 or more for level 5)     ED Triage Vitals [12/10/24 1228]   BP Systolic BP Percentile Diastolic BP Percentile Temp Temp Source Pulse Respirations SpO2   126/76 -- -- 97.9 °F (36.6 °C) Oral (!) 110 19 98 %      Height Weight         -- --             There is no height or weight on file to calculate BMI.    Physical Exam  Vitals and nursing note reviewed.   Constitutional:       Appearance: Normal appearance.   Cardiovascular:      Rate and Rhythm: Normal rate and regular rhythm.   Pulmonary:      Effort: Pulmonary effort is normal.      Breath sounds: Normal breath sounds.   Chest:      Chest wall: Tenderness (right rib) present.   Musculoskeletal:         General: No deformity.   Neurological:      Mental Status: She is alert.         DIAGNOSTIC RESULTS     EKG: All EKG's are

## 2025-06-24 NOTE — TELEPHONE ENCOUNTER
Adeline Live sent to [facility]         Pt is requesting a Rx for Hydrochlorothiazide and she has no medication. Pts number is [phone] and [facility] number is [phone]. BPH (benign prostatic hyperplasia)

## 2025-07-06 ENCOUNTER — APPOINTMENT (OUTPATIENT)
Facility: HOSPITAL | Age: 89
DRG: 177 | End: 2025-07-06
Payer: MEDICARE

## 2025-07-06 ENCOUNTER — HOSPITAL ENCOUNTER (INPATIENT)
Facility: HOSPITAL | Age: 89
LOS: 4 days | Discharge: NURSING, REHAB OR LONG-TERM CARE | DRG: 177 | End: 2025-07-10
Attending: EMERGENCY MEDICINE | Admitting: FAMILY MEDICINE
Payer: MEDICARE

## 2025-07-06 DIAGNOSIS — R09.02 HYPOXIA: Primary | ICD-10-CM

## 2025-07-06 DIAGNOSIS — I50.9 CONGESTIVE HEART FAILURE, UNSPECIFIED HF CHRONICITY, UNSPECIFIED HEART FAILURE TYPE (HCC): ICD-10-CM

## 2025-07-06 DIAGNOSIS — J18.9 COMMUNITY ACQUIRED PNEUMONIA, UNSPECIFIED LATERALITY: ICD-10-CM

## 2025-07-06 DIAGNOSIS — J96.01 ACUTE RESPIRATORY FAILURE WITH HYPOXIA (HCC): ICD-10-CM

## 2025-07-06 PROBLEM — J96.91 HYPOXIC RESPIRATORY FAILURE (HCC): Status: ACTIVE | Noted: 2025-07-06

## 2025-07-06 LAB
ALBUMIN SERPL-MCNC: 3 G/DL (ref 3.5–5)
ALBUMIN/GLOB SERPL: 0.8 (ref 1.1–2.2)
ALP SERPL-CCNC: 57 U/L (ref 45–117)
ALT SERPL-CCNC: 9 U/L (ref 12–78)
ANION GAP SERPL CALC-SCNC: 7 MMOL/L (ref 2–12)
AST SERPL-CCNC: 16 U/L (ref 15–37)
BASOPHILS # BLD: 0 K/UL (ref 0–0.1)
BASOPHILS NFR BLD: 0 % (ref 0–1)
BILIRUB SERPL-MCNC: 0.4 MG/DL (ref 0.2–1)
BUN SERPL-MCNC: 20 MG/DL (ref 6–20)
BUN/CREAT SERPL: 26 (ref 12–20)
CALCIUM SERPL-MCNC: 10.4 MG/DL (ref 8.5–10.1)
CHLORIDE SERPL-SCNC: 106 MMOL/L (ref 97–108)
CO2 SERPL-SCNC: 26 MMOL/L (ref 21–32)
COMMENT:: NORMAL
CREAT SERPL-MCNC: 0.78 MG/DL (ref 0.55–1.02)
DIFFERENTIAL METHOD BLD: ABNORMAL
EKG ATRIAL RATE: 73 BPM
EKG DIAGNOSIS: NORMAL
EKG P AXIS: 17 DEGREES
EKG P-R INTERVAL: 206 MS
EKG Q-T INTERVAL: 392 MS
EKG QRS DURATION: 72 MS
EKG QTC CALCULATION (BAZETT): 431 MS
EKG R AXIS: -44 DEGREES
EKG T AXIS: 11 DEGREES
EKG VENTRICULAR RATE: 73 BPM
EOSINOPHIL # BLD: 0 K/UL (ref 0–0.4)
EOSINOPHIL NFR BLD: 0 % (ref 0–7)
ERYTHROCYTE [DISTWIDTH] IN BLOOD BY AUTOMATED COUNT: 13.7 % (ref 11.5–14.5)
FLUAV RNA SPEC QL NAA+PROBE: NOT DETECTED
FLUBV RNA SPEC QL NAA+PROBE: NOT DETECTED
GLOBULIN SER CALC-MCNC: 3.7 G/DL (ref 2–4)
GLUCOSE SERPL-MCNC: 73 MG/DL (ref 65–100)
HCT VFR BLD AUTO: 40.2 % (ref 35–47)
HGB BLD-MCNC: 13.2 G/DL (ref 11.5–16)
IMM GRANULOCYTES # BLD AUTO: 0 K/UL
IMM GRANULOCYTES NFR BLD AUTO: 0 %
LYMPHOCYTES # BLD: 1.96 K/UL (ref 0.8–3.5)
LYMPHOCYTES NFR BLD: 35 % (ref 12–49)
MCH RBC QN AUTO: 29.4 PG (ref 26–34)
MCHC RBC AUTO-ENTMCNC: 32.8 G/DL (ref 30–36.5)
MCV RBC AUTO: 89.5 FL (ref 80–99)
MONOCYTES # BLD: 0.34 K/UL (ref 0–1)
MONOCYTES NFR BLD: 6 % (ref 5–13)
NEUTS SEG # BLD: 3.3 K/UL (ref 1.8–8)
NEUTS SEG NFR BLD: 59 % (ref 32–75)
NRBC # BLD: 0 K/UL (ref 0–0.01)
NRBC BLD-RTO: 0 PER 100 WBC
NT PRO BNP: 319 PG/ML
PLATELET # BLD AUTO: 531 K/UL (ref 150–400)
PMV BLD AUTO: 10.4 FL (ref 8.9–12.9)
POTASSIUM SERPL-SCNC: 3.8 MMOL/L (ref 3.5–5.1)
PROT SERPL-MCNC: 6.7 G/DL (ref 6.4–8.2)
RBC # BLD AUTO: 4.49 M/UL (ref 3.8–5.2)
RBC MORPH BLD: ABNORMAL
SARS-COV-2 RNA RESP QL NAA+PROBE: NOT DETECTED
SODIUM SERPL-SCNC: 139 MMOL/L (ref 136–145)
SOURCE: NORMAL
SPECIMEN HOLD: NORMAL
TROPONIN I SERPL HS-MCNC: 7 NG/L (ref 0–51)
TROPONIN I SERPL HS-MCNC: 7 NG/L (ref 0–51)
WBC # BLD AUTO: 5.6 K/UL (ref 3.6–11)

## 2025-07-06 PROCEDURE — 2500000003 HC RX 250 WO HCPCS: Performed by: FAMILY MEDICINE

## 2025-07-06 PROCEDURE — 1100000000 HC RM PRIVATE

## 2025-07-06 PROCEDURE — 71045 X-RAY EXAM CHEST 1 VIEW: CPT

## 2025-07-06 PROCEDURE — 87636 SARSCOV2 & INF A&B AMP PRB: CPT

## 2025-07-06 PROCEDURE — 6360000004 HC RX CONTRAST MEDICATION: Performed by: EMERGENCY MEDICINE

## 2025-07-06 PROCEDURE — 80053 COMPREHEN METABOLIC PANEL: CPT

## 2025-07-06 PROCEDURE — 85025 COMPLETE CBC W/AUTO DIFF WBC: CPT

## 2025-07-06 PROCEDURE — 99285 EMERGENCY DEPT VISIT HI MDM: CPT

## 2025-07-06 PROCEDURE — 84484 ASSAY OF TROPONIN QUANT: CPT

## 2025-07-06 PROCEDURE — 6370000000 HC RX 637 (ALT 250 FOR IP): Performed by: FAMILY MEDICINE

## 2025-07-06 PROCEDURE — 2580000003 HC RX 258: Performed by: FAMILY MEDICINE

## 2025-07-06 PROCEDURE — 71275 CT ANGIOGRAPHY CHEST: CPT

## 2025-07-06 PROCEDURE — 93005 ELECTROCARDIOGRAM TRACING: CPT | Performed by: EMERGENCY MEDICINE

## 2025-07-06 PROCEDURE — 6360000002 HC RX W HCPCS: Performed by: FAMILY MEDICINE

## 2025-07-06 PROCEDURE — 0202U NFCT DS 22 TRGT SARS-COV-2: CPT

## 2025-07-06 PROCEDURE — 83880 ASSAY OF NATRIURETIC PEPTIDE: CPT

## 2025-07-06 PROCEDURE — 36415 COLL VENOUS BLD VENIPUNCTURE: CPT

## 2025-07-06 PROCEDURE — 93010 ELECTROCARDIOGRAM REPORT: CPT | Performed by: SPECIALIST

## 2025-07-06 RX ORDER — ACETAMINOPHEN 650 MG/1
650 SUPPOSITORY RECTAL EVERY 6 HOURS PRN
Status: DISCONTINUED | OUTPATIENT
Start: 2025-07-06 | End: 2025-07-10 | Stop reason: HOSPADM

## 2025-07-06 RX ORDER — ONDANSETRON 2 MG/ML
4 INJECTION INTRAMUSCULAR; INTRAVENOUS EVERY 6 HOURS PRN
Status: DISCONTINUED | OUTPATIENT
Start: 2025-07-06 | End: 2025-07-10 | Stop reason: HOSPADM

## 2025-07-06 RX ORDER — METRONIDAZOLE 500 MG/100ML
500 INJECTION, SOLUTION INTRAVENOUS EVERY 8 HOURS
Status: DISCONTINUED | OUTPATIENT
Start: 2025-07-06 | End: 2025-07-06

## 2025-07-06 RX ORDER — POTASSIUM CHLORIDE 750 MG/1
40 TABLET, EXTENDED RELEASE ORAL PRN
Status: DISCONTINUED | OUTPATIENT
Start: 2025-07-06 | End: 2025-07-10 | Stop reason: HOSPADM

## 2025-07-06 RX ORDER — POTASSIUM CHLORIDE 7.45 MG/ML
10 INJECTION INTRAVENOUS PRN
Status: DISCONTINUED | OUTPATIENT
Start: 2025-07-06 | End: 2025-07-10 | Stop reason: HOSPADM

## 2025-07-06 RX ORDER — METRONIDAZOLE 500 MG/100ML
500 INJECTION, SOLUTION INTRAVENOUS EVERY 8 HOURS
Status: DISCONTINUED | OUTPATIENT
Start: 2025-07-06 | End: 2025-07-08

## 2025-07-06 RX ORDER — IOPAMIDOL 755 MG/ML
100 INJECTION, SOLUTION INTRAVASCULAR
Status: COMPLETED | OUTPATIENT
Start: 2025-07-06 | End: 2025-07-06

## 2025-07-06 RX ORDER — SODIUM CHLORIDE 0.9 % (FLUSH) 0.9 %
5-40 SYRINGE (ML) INJECTION EVERY 12 HOURS SCHEDULED
Status: DISCONTINUED | OUTPATIENT
Start: 2025-07-06 | End: 2025-07-10 | Stop reason: HOSPADM

## 2025-07-06 RX ORDER — SODIUM CHLORIDE 9 MG/ML
INJECTION, SOLUTION INTRAVENOUS PRN
Status: DISCONTINUED | OUTPATIENT
Start: 2025-07-06 | End: 2025-07-10 | Stop reason: HOSPADM

## 2025-07-06 RX ORDER — ONDANSETRON 4 MG/1
4 TABLET, ORALLY DISINTEGRATING ORAL EVERY 8 HOURS PRN
Status: DISCONTINUED | OUTPATIENT
Start: 2025-07-06 | End: 2025-07-10 | Stop reason: HOSPADM

## 2025-07-06 RX ORDER — MAGNESIUM SULFATE IN WATER 40 MG/ML
2000 INJECTION, SOLUTION INTRAVENOUS PRN
Status: DISCONTINUED | OUTPATIENT
Start: 2025-07-06 | End: 2025-07-10 | Stop reason: HOSPADM

## 2025-07-06 RX ORDER — SODIUM CHLORIDE 0.9 % (FLUSH) 0.9 %
5-40 SYRINGE (ML) INJECTION PRN
Status: DISCONTINUED | OUTPATIENT
Start: 2025-07-06 | End: 2025-07-10 | Stop reason: HOSPADM

## 2025-07-06 RX ORDER — ACETAMINOPHEN 325 MG/1
650 TABLET ORAL EVERY 6 HOURS PRN
Status: DISCONTINUED | OUTPATIENT
Start: 2025-07-06 | End: 2025-07-10 | Stop reason: HOSPADM

## 2025-07-06 RX ORDER — ATENOLOL 25 MG/1
25 TABLET ORAL DAILY
Status: DISCONTINUED | OUTPATIENT
Start: 2025-07-07 | End: 2025-07-10 | Stop reason: HOSPADM

## 2025-07-06 RX ORDER — POLYETHYLENE GLYCOL 3350 17 G/17G
17 POWDER, FOR SOLUTION ORAL DAILY PRN
Status: DISCONTINUED | OUTPATIENT
Start: 2025-07-06 | End: 2025-07-10 | Stop reason: HOSPADM

## 2025-07-06 RX ORDER — 0.9 % SODIUM CHLORIDE 0.9 %
500 INTRAVENOUS SOLUTION INTRAVENOUS ONCE
Status: DISCONTINUED | OUTPATIENT
Start: 2025-07-06 | End: 2025-07-06

## 2025-07-06 RX ADMIN — DOXYCYCLINE 100 MG: 100 INJECTION, POWDER, LYOPHILIZED, FOR SOLUTION INTRAVENOUS at 18:49

## 2025-07-06 RX ADMIN — IOPAMIDOL 50 ML: 755 INJECTION, SOLUTION INTRAVENOUS at 17:22

## 2025-07-06 RX ADMIN — WATER 1000 MG: 1 INJECTION INTRAMUSCULAR; INTRAVENOUS; SUBCUTANEOUS at 21:10

## 2025-07-06 RX ADMIN — METRONIDAZOLE 500 MG: 500 INJECTION, SOLUTION INTRAVENOUS at 21:11

## 2025-07-06 RX ADMIN — APIXABAN 5 MG: 5 TABLET, FILM COATED ORAL at 21:07

## 2025-07-06 NOTE — H&P
Hospitalist Admission Note    NAME:  Brenda Maharaj   :  1935   MRN:  [id number]     Date/Time:  2025 6:34 PM    Patient PCP: Janie Dodd MD  ________________________________________________________________________    Given the patient's current clinical presentation, I have a high level of concern for decompensation if discharged from the emergency department.  Complex decision making was performed, which includes reviewing the patient's available past medical records, laboratory results, and x-ray films.       My assessment of this patient's clinical condition and my plan of care is as follows.    Assessment / Plan:    Brenda Stoner is a 89 y.o.  female with PMHx brain tumor that is nonoperable, seizures, kidney stones, sleep apnea who presents for shortness of breath found to have hypoxic respiratory failure from PNA.    # Hypoxic respiratory failure/aspiration PNA: Acute, present on admission.  Likely PNA based on CTA results.  Possibly aspiration.   - Respiratory viral panel  - Sputum culture  - Legionella  - MRSA nares  - azithromycin, rocephin, and flagyl (to cover for aspiration)   - Wean O2  - Repeat troponin.  --speech consult--NPO until cleared by speech    Possible sarcoid: consider pulm consult.     Hypertension: Chronic.  Continue atenolol    Weakness: Chronic.  Will consult PT/OT.    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  Continue Eliquis.    Tremor: Chronic.  Monitor.    Thrombocytosis: Chronic.  Is lower than platelet count in .  Monitor.     Hypercalcemia: Chronic.  Is lower than calcium in .  Does correct to 11.2.  Has a history of parathyroid issues.           I have personally reviewed the radiographs, laboratory data in Epic and decisions and statements above are based partially on this personal interpretation.    Code Status: Partial patient is DNR not DNI.  DVT Prophylaxis: Eliquis  GI Prophylaxis: not indicated       Subjective:   CHIEF COMPLAINT: \"Shortness of

## 2025-07-06 NOTE — ED PROVIDER NOTES
[facility] EMERGENCY DEPARTMENT  EMERGENCY DEPARTMENT ENCOUNTER      Pt Name: Brenda Maharaj  MRN: [id number]  Birthdate 1935  Date of evaluation: 7/6/2025  Provider: Thanh Johnson MD    CHIEF COMPLAINT       Chief Complaint   Patient presents with    Cough    Hypoxic         HISTORY OF PRESENT ILLNESS   (Location/Symptom, Timing/Onset, Context/Setting, Quality, Duration, Modifying Factors, Severity)  Note limiting factors.   Brenda Maharaj is a 89 y.o. female who presents to the emergency department      The history is provided by the patient. The history is limited by the condition of the patient. No  was used.   Cough  Cough characteristics:  Non-productive  Sputum characteristics:  Nondescript  Severity:  Moderate  Onset quality:  Sudden  Timing:  Constant  Progression:  Unchanged  Chronicity:  New  Ineffective treatments:  None tried  Associated symptoms: shortness of breath    Associated symptoms: no chest pain, no chills, no fever and no headaches        Nursing Notes were reviewed.    REVIEW OF SYSTEMS    (2-9 systems for level 4, 10 or more for level 5)     Review of Systems   Constitutional:  Negative for activity change, chills and fever.   HENT:  Negative for nosebleeds.    Eyes:  Negative for visual disturbance.   Respiratory:  Positive for cough and shortness of breath.    Cardiovascular:  Negative for chest pain and palpitations.   Gastrointestinal:  Negative for abdominal pain, constipation, diarrhea, nausea and vomiting.   Genitourinary:  Negative for difficulty urinating, dysuria, hematuria and urgency.   Musculoskeletal:  Negative for back pain, neck pain and neck stiffness.   Skin:  Negative for color change.   Allergic/Immunologic: Negative for immunocompromised state.   Neurological:  Negative for dizziness, seizures, syncope, weakness, light-headedness, numbness and headaches.   Psychiatric/Behavioral:  Negative for behavioral problems,

## 2025-07-06 NOTE — ED NOTES
TRANSFER - OUT REPORT:    Verbal report given to Nurse on Brenda Maharaj  being transferred to [facility] for routine progression of patient care       Report consisted of patient's Situation, Background, Assessment and   Recommendations(SBAR).     Information from the following report(s) ED Encounter Summary, ED SBAR, Intake/Output, MAR, and Recent Results was reviewed with the receiving nurse.    [place] Fall Assessment:                           Lines:   Peripheral IV 07/06/25 Right Antecubital (Active)        Opportunity for questions and clarification was provided.      Patient transported with:  Tech

## 2025-07-06 NOTE — ED TRIAGE NOTES
Pt arrives via EMS from assisted living facility. CC  non productive cough and fever since 7/3. Facility tested for covid and pneumonia came back negative. Also experiencing CP     With EMS pt O2 was 84% RA NC 4L went up to 94%, A&O x3 is baseline

## 2025-07-07 LAB
ALBUMIN SERPL-MCNC: 2.8 G/DL (ref 3.5–5)
ALBUMIN/GLOB SERPL: 0.8 (ref 1.1–2.2)
ALP SERPL-CCNC: 52 U/L (ref 45–117)
ALT SERPL-CCNC: 10 U/L (ref 12–78)
ANION GAP SERPL CALC-SCNC: 10 MMOL/L (ref 2–12)
AST SERPL-CCNC: 11 U/L (ref 15–37)
B PERT DNA SPEC QL NAA+PROBE: NOT DETECTED
BASOPHILS # BLD: 0 K/UL (ref 0–0.1)
BASOPHILS NFR BLD: 0 % (ref 0–1)
BILIRUB SERPL-MCNC: 0.3 MG/DL (ref 0.2–1)
BORDETELLA PARAPERTUSSIS BY PCR: NOT DETECTED
BUN SERPL-MCNC: 17 MG/DL (ref 6–20)
BUN/CREAT SERPL: 26 (ref 12–20)
C PNEUM DNA SPEC QL NAA+PROBE: NOT DETECTED
CALCIUM SERPL-MCNC: 10.4 MG/DL (ref 8.5–10.1)
CHLORIDE SERPL-SCNC: 107 MMOL/L (ref 97–108)
CO2 SERPL-SCNC: 24 MMOL/L (ref 21–32)
CREAT SERPL-MCNC: 0.66 MG/DL (ref 0.55–1.02)
DIFFERENTIAL METHOD BLD: ABNORMAL
EOSINOPHIL # BLD: 0 K/UL (ref 0–0.4)
EOSINOPHIL NFR BLD: 0 % (ref 0–7)
ERYTHROCYTE [DISTWIDTH] IN BLOOD BY AUTOMATED COUNT: 13.5 % (ref 11.5–14.5)
FLUAV SUBTYP SPEC NAA+PROBE: NOT DETECTED
FLUBV RNA SPEC QL NAA+PROBE: NOT DETECTED
GLOBULIN SER CALC-MCNC: 3.5 G/DL (ref 2–4)
GLUCOSE SERPL-MCNC: 70 MG/DL (ref 65–100)
HADV DNA SPEC QL NAA+PROBE: NOT DETECTED
HCOV 229E RNA SPEC QL NAA+PROBE: NOT DETECTED
HCOV HKU1 RNA SPEC QL NAA+PROBE: NOT DETECTED
HCOV NL63 RNA SPEC QL NAA+PROBE: NOT DETECTED
HCOV OC43 RNA SPEC QL NAA+PROBE: NOT DETECTED
HCT VFR BLD AUTO: 37.6 % (ref 35–47)
HGB BLD-MCNC: 12.1 G/DL (ref 11.5–16)
HMPV RNA SPEC QL NAA+PROBE: DETECTED
HPIV1 RNA SPEC QL NAA+PROBE: NOT DETECTED
HPIV2 RNA SPEC QL NAA+PROBE: NOT DETECTED
HPIV3 RNA SPEC QL NAA+PROBE: NOT DETECTED
HPIV4 RNA SPEC QL NAA+PROBE: NOT DETECTED
IMM GRANULOCYTES # BLD AUTO: 0 K/UL
IMM GRANULOCYTES NFR BLD AUTO: 0 %
LYMPHOCYTES # BLD: 2.02 K/UL (ref 0.8–3.5)
LYMPHOCYTES NFR BLD: 36 % (ref 12–49)
M PNEUMO DNA SPEC QL NAA+PROBE: NOT DETECTED
MCH RBC QN AUTO: 29.3 PG (ref 26–34)
MCHC RBC AUTO-ENTMCNC: 32.2 G/DL (ref 30–36.5)
MCV RBC AUTO: 91 FL (ref 80–99)
MONOCYTES # BLD: 0.22 K/UL (ref 0–1)
MONOCYTES NFR BLD: 4 % (ref 5–13)
NEUTS SEG # BLD: 3.36 K/UL (ref 1.8–8)
NEUTS SEG NFR BLD: 60 % (ref 32–75)
NRBC # BLD: 0 K/UL (ref 0–0.01)
NRBC BLD-RTO: 0 PER 100 WBC
PLATELET # BLD AUTO: 520 K/UL (ref 150–400)
PMV BLD AUTO: 10.1 FL (ref 8.9–12.9)
POTASSIUM SERPL-SCNC: 3.3 MMOL/L (ref 3.5–5.1)
PROT SERPL-MCNC: 6.3 G/DL (ref 6.4–8.2)
RBC # BLD AUTO: 4.13 M/UL (ref 3.8–5.2)
RBC MORPH BLD: ABNORMAL
RSV RNA SPEC QL NAA+PROBE: NOT DETECTED
RV+EV RNA SPEC QL NAA+PROBE: NOT DETECTED
SARS-COV-2 RNA RESP QL NAA+PROBE: NOT DETECTED
SODIUM SERPL-SCNC: 141 MMOL/L (ref 136–145)
WBC # BLD AUTO: 5.6 K/UL (ref 3.6–11)

## 2025-07-07 PROCEDURE — 6370000000 HC RX 637 (ALT 250 FOR IP): Performed by: FAMILY MEDICINE

## 2025-07-07 PROCEDURE — 85025 COMPLETE CBC W/AUTO DIFF WBC: CPT

## 2025-07-07 PROCEDURE — 2700000000 HC OXYGEN THERAPY PER DAY

## 2025-07-07 PROCEDURE — 2500000003 HC RX 250 WO HCPCS: Performed by: FAMILY MEDICINE

## 2025-07-07 PROCEDURE — 2580000003 HC RX 258: Performed by: FAMILY MEDICINE

## 2025-07-07 PROCEDURE — 97165 OT EVAL LOW COMPLEX 30 MIN: CPT

## 2025-07-07 PROCEDURE — 36415 COLL VENOUS BLD VENIPUNCTURE: CPT

## 2025-07-07 PROCEDURE — 94761 N-INVAS EAR/PLS OXIMETRY MLT: CPT

## 2025-07-07 PROCEDURE — 97530 THERAPEUTIC ACTIVITIES: CPT

## 2025-07-07 PROCEDURE — 92610 EVALUATE SWALLOWING FUNCTION: CPT

## 2025-07-07 PROCEDURE — 6360000002 HC RX W HCPCS: Performed by: FAMILY MEDICINE

## 2025-07-07 PROCEDURE — 97116 GAIT TRAINING THERAPY: CPT

## 2025-07-07 PROCEDURE — 1100000000 HC RM PRIVATE

## 2025-07-07 PROCEDURE — 80053 COMPREHEN METABOLIC PANEL: CPT

## 2025-07-07 PROCEDURE — 97162 PT EVAL MOD COMPLEX 30 MIN: CPT

## 2025-07-07 RX ADMIN — ATENOLOL 25 MG: 25 TABLET ORAL at 08:53

## 2025-07-07 RX ADMIN — WATER 1000 MG: 1 INJECTION INTRAMUSCULAR; INTRAVENOUS; SUBCUTANEOUS at 18:33

## 2025-07-07 RX ADMIN — METRONIDAZOLE 500 MG: 500 INJECTION, SOLUTION INTRAVENOUS at 12:14

## 2025-07-07 RX ADMIN — AZITHROMYCIN MONOHYDRATE 500 MG: 500 INJECTION, POWDER, LYOPHILIZED, FOR SOLUTION INTRAVENOUS at 23:31

## 2025-07-07 RX ADMIN — APIXABAN 5 MG: 5 TABLET, FILM COATED ORAL at 21:41

## 2025-07-07 RX ADMIN — METRONIDAZOLE 500 MG: 500 INJECTION, SOLUTION INTRAVENOUS at 04:30

## 2025-07-07 RX ADMIN — POTASSIUM BICARBONATE 40 MEQ: 782 TABLET, EFFERVESCENT ORAL at 05:43

## 2025-07-07 RX ADMIN — SODIUM CHLORIDE, PRESERVATIVE FREE 10 ML: 5 INJECTION INTRAVENOUS at 08:54

## 2025-07-07 RX ADMIN — METRONIDAZOLE 500 MG: 500 INJECTION, SOLUTION INTRAVENOUS at 21:45

## 2025-07-07 RX ADMIN — ONDANSETRON 4 MG: 2 INJECTION, SOLUTION INTRAMUSCULAR; INTRAVENOUS at 08:49

## 2025-07-07 RX ADMIN — SODIUM CHLORIDE, PRESERVATIVE FREE 10 ML: 5 INJECTION INTRAVENOUS at 21:00

## 2025-07-07 RX ADMIN — APIXABAN 5 MG: 5 TABLET, FILM COATED ORAL at 08:53

## 2025-07-07 NOTE — CARE COORDINATION
Care Management Progress Note    Reason for Admission:   Hypoxia [R09.02]  Community acquired pneumonia, unspecified laterality [J18.9]  Hypoxic respiratory failure (HCC) [J96.91]         Patient Admission Status: Inpatient  RUR:  13%  Hospitalization in the last 30 days (Readmission):  No        CM reviewed EMR and pt was discussed in IDRs.    CM will continue to follow pt and will be available to assist with any discharge needs.  Lilliam

## 2025-07-08 ENCOUNTER — APPOINTMENT (OUTPATIENT)
Facility: HOSPITAL | Age: 89
DRG: 177 | End: 2025-07-08
Attending: FAMILY MEDICINE
Payer: MEDICARE

## 2025-07-08 LAB
ALBUMIN SERPL-MCNC: 2.9 G/DL (ref 3.5–5)
ALBUMIN/GLOB SERPL: 0.9 (ref 1.1–2.2)
ALP SERPL-CCNC: 52 U/L (ref 45–117)
ALT SERPL-CCNC: 6 U/L (ref 12–78)
ANION GAP SERPL CALC-SCNC: 5 MMOL/L (ref 2–12)
AST SERPL-CCNC: 10 U/L (ref 15–37)
BASOPHILS # BLD: 0 K/UL (ref 0–0.1)
BASOPHILS NFR BLD: 0 % (ref 0–1)
BILIRUB SERPL-MCNC: 0.4 MG/DL (ref 0.2–1)
BUN SERPL-MCNC: 13 MG/DL (ref 6–20)
BUN/CREAT SERPL: 20 (ref 12–20)
CALCIUM SERPL-MCNC: 10.2 MG/DL (ref 8.5–10.1)
CHLORIDE SERPL-SCNC: 106 MMOL/L (ref 97–108)
CO2 SERPL-SCNC: 27 MMOL/L (ref 21–32)
CREAT SERPL-MCNC: 0.64 MG/DL (ref 0.55–1.02)
DIFFERENTIAL METHOD BLD: ABNORMAL
ECHO AO ASC DIAM: 2.9 CM
ECHO AO ASCENDING AORTA INDEX: 1.99 CM/M2
ECHO AV AREA PEAK VELOCITY: 2.8 CM2
ECHO AV AREA VTI: 3.2 CM2
ECHO AV AREA/BSA PEAK VELOCITY: 1.9 CM2/M2
ECHO AV AREA/BSA VTI: 2.2 CM2/M2
ECHO AV MEAN GRADIENT: 1 MMHG
ECHO AV MEAN VELOCITY: 0.5 M/S
ECHO AV PEAK GRADIENT: 2 MMHG
ECHO AV PEAK VELOCITY: 0.7 M/S
ECHO AV VELOCITY RATIO: 1
ECHO AV VTI: 13.4 CM
ECHO BSA: 1.45 M2
ECHO EST RA PRESSURE: 3 MMHG
ECHO LA DIAMETER INDEX: 1.37 CM/M2
ECHO LA DIAMETER: 2 CM
ECHO LV E' LATERAL VELOCITY: 5.89 CM/S
ECHO LV E' SEPTAL VELOCITY: 4.18 CM/S
ECHO LV EDV A4C: 40 ML
ECHO LV EDV INDEX A4C: 27 ML/M2
ECHO LV EF PHYSICIAN: 55 %
ECHO LV EJECTION FRACTION A4C: 55 %
ECHO LV ESV A4C: 18 ML
ECHO LV ESV INDEX A4C: 12 ML/M2
ECHO LV FRACTIONAL SHORTENING: 35 % (ref 28–44)
ECHO LV INTERNAL DIMENSION DIASTOLE INDEX: 2.12 CM/M2
ECHO LV INTERNAL DIMENSION DIASTOLIC: 3.1 CM (ref 3.9–5.3)
ECHO LV INTERNAL DIMENSION SYSTOLIC INDEX: 1.37 CM/M2
ECHO LV INTERNAL DIMENSION SYSTOLIC: 2 CM
ECHO LV IVSD: 0.7 CM (ref 0.6–0.9)
ECHO LV MASS 2D: 51.6 G (ref 67–162)
ECHO LV MASS INDEX 2D: 35.4 G/M2 (ref 43–95)
ECHO LV POSTERIOR WALL DIASTOLIC: 0.7 CM (ref 0.6–0.9)
ECHO LV RELATIVE WALL THICKNESS RATIO: 0.45
ECHO LVOT AREA: 2.8 CM2
ECHO LVOT AV VTI INDEX: 1.17
ECHO LVOT DIAM: 1.9 CM
ECHO LVOT MEAN GRADIENT: 1 MMHG
ECHO LVOT PEAK GRADIENT: 2 MMHG
ECHO LVOT PEAK VELOCITY: 0.7 M/S
ECHO LVOT STROKE VOLUME INDEX: 30.5 ML/M2
ECHO LVOT SV: 44.5 ML
ECHO LVOT VTI: 15.7 CM
ECHO MV A VELOCITY: 0.71 M/S
ECHO MV E DECELERATION TIME (DT): 311.4 MS
ECHO MV E VELOCITY: 0.45 M/S
ECHO MV E/A RATIO: 0.63
ECHO MV E/E' LATERAL: 7.64
ECHO MV E/E' RATIO (AVERAGED): 9.2
ECHO MV E/E' SEPTAL: 10.77
ECHO RVOT MEAN GRADIENT: 0 MMHG
ECHO RVOT PEAK GRADIENT: 1 MMHG
ECHO RVOT PEAK VELOCITY: 0.5 M/S
ECHO RVOT VTI: 11.6 CM
EOSINOPHIL # BLD: 0.06 K/UL (ref 0–0.4)
EOSINOPHIL NFR BLD: 1 % (ref 0–7)
ERYTHROCYTE [DISTWIDTH] IN BLOOD BY AUTOMATED COUNT: 13.2 % (ref 11.5–14.5)
GLOBULIN SER CALC-MCNC: 3.1 G/DL (ref 2–4)
GLUCOSE SERPL-MCNC: 105 MG/DL (ref 65–100)
HCT VFR BLD AUTO: 38.8 % (ref 35–47)
HGB BLD-MCNC: 12.7 G/DL (ref 11.5–16)
IMM GRANULOCYTES # BLD AUTO: 0 K/UL (ref 0–0.04)
IMM GRANULOCYTES NFR BLD AUTO: 0 % (ref 0–0.5)
LYMPHOCYTES # BLD: 1.98 K/UL (ref 0.8–3.5)
LYMPHOCYTES NFR BLD: 36 % (ref 12–49)
MCH RBC QN AUTO: 29.3 PG (ref 26–34)
MCHC RBC AUTO-ENTMCNC: 32.7 G/DL (ref 30–36.5)
MCV RBC AUTO: 89.6 FL (ref 80–99)
METAMYELOCYTES NFR BLD MANUAL: 1 %
MONOCYTES # BLD: 0.5 K/UL (ref 0–1)
MONOCYTES NFR BLD: 9 % (ref 5–13)
NEUTS SEG # BLD: 2.92 K/UL (ref 1.8–8)
NEUTS SEG NFR BLD: 53 % (ref 32–75)
NRBC # BLD: 0 K/UL (ref 0–0.01)
NRBC BLD-RTO: 0 PER 100 WBC
PLATELET # BLD AUTO: 592 K/UL (ref 150–400)
PMV BLD AUTO: 9.9 FL (ref 8.9–12.9)
POTASSIUM SERPL-SCNC: 4.2 MMOL/L (ref 3.5–5.1)
PROT SERPL-MCNC: 6 G/DL (ref 6.4–8.2)
RBC # BLD AUTO: 4.33 M/UL (ref 3.8–5.2)
RBC MORPH BLD: ABNORMAL
SODIUM SERPL-SCNC: 138 MMOL/L (ref 136–145)
WBC # BLD AUTO: 5.5 K/UL (ref 3.6–11)

## 2025-07-08 PROCEDURE — 1100000000 HC RM PRIVATE

## 2025-07-08 PROCEDURE — 94761 N-INVAS EAR/PLS OXIMETRY MLT: CPT

## 2025-07-08 PROCEDURE — 92526 ORAL FUNCTION THERAPY: CPT

## 2025-07-08 PROCEDURE — 6370000000 HC RX 637 (ALT 250 FOR IP): Performed by: FAMILY MEDICINE

## 2025-07-08 PROCEDURE — 80053 COMPREHEN METABOLIC PANEL: CPT

## 2025-07-08 PROCEDURE — 85025 COMPLETE CBC W/AUTO DIFF WBC: CPT

## 2025-07-08 PROCEDURE — 6360000002 HC RX W HCPCS: Performed by: FAMILY MEDICINE

## 2025-07-08 PROCEDURE — 2500000003 HC RX 250 WO HCPCS: Performed by: FAMILY MEDICINE

## 2025-07-08 PROCEDURE — 36415 COLL VENOUS BLD VENIPUNCTURE: CPT

## 2025-07-08 PROCEDURE — 93306 TTE W/DOPPLER COMPLETE: CPT

## 2025-07-08 PROCEDURE — 93306 TTE W/DOPPLER COMPLETE: CPT | Performed by: SPECIALIST

## 2025-07-08 RX ORDER — AZITHROMYCIN 250 MG/1
500 TABLET, FILM COATED ORAL EVERY 24 HOURS
Status: COMPLETED | OUTPATIENT
Start: 2025-07-08 | End: 2025-07-09

## 2025-07-08 RX ORDER — METRONIDAZOLE 250 MG/1
500 TABLET ORAL 3 TIMES DAILY
Status: DISCONTINUED | OUTPATIENT
Start: 2025-07-08 | End: 2025-07-09

## 2025-07-08 RX ADMIN — METRONIDAZOLE 500 MG: 250 TABLET ORAL at 13:30

## 2025-07-08 RX ADMIN — AZITHROMYCIN DIHYDRATE 500 MG: 250 TABLET ORAL at 21:47

## 2025-07-08 RX ADMIN — WATER 1000 MG: 1 INJECTION INTRAMUSCULAR; INTRAVENOUS; SUBCUTANEOUS at 19:18

## 2025-07-08 RX ADMIN — METRONIDAZOLE 500 MG: 250 TABLET ORAL at 21:47

## 2025-07-08 RX ADMIN — ACETAMINOPHEN 650 MG: 325 TABLET ORAL at 06:44

## 2025-07-08 RX ADMIN — ATENOLOL 25 MG: 25 TABLET ORAL at 08:48

## 2025-07-08 RX ADMIN — APIXABAN 5 MG: 5 TABLET, FILM COATED ORAL at 21:46

## 2025-07-08 RX ADMIN — SODIUM CHLORIDE, PRESERVATIVE FREE 10 ML: 5 INJECTION INTRAVENOUS at 08:49

## 2025-07-08 RX ADMIN — SODIUM CHLORIDE, PRESERVATIVE FREE 10 ML: 5 INJECTION INTRAVENOUS at 21:47

## 2025-07-08 RX ADMIN — METRONIDAZOLE 500 MG: 500 INJECTION, SOLUTION INTRAVENOUS at 05:51

## 2025-07-08 RX ADMIN — APIXABAN 5 MG: 5 TABLET, FILM COATED ORAL at 08:48

## 2025-07-08 NOTE — CARE COORDINATION
Care Management Progress Note    Reason for Admission:   Hypoxia [R09.02]  Community acquired pneumonia, unspecified laterality [J18.9]  Hypoxic respiratory failure (HCC) [J96.91]         Patient Admission Status: Inpatient  RUR:  13%  Hospitalization in the last 30 days (Readmission):  No        CM reviewed EMR for clinical updates.  Reportedly, pt is a resident at [facility]  Pt is in a w/c and requires the highest level of care for ADLs.  SonDavid ([phone]) is the primary family contact.    Transition Plan of Care:  PT/OT/ST evals complete; pt ambulated 3 feet on 7/7 and SNF was recommended.  CM discussed with pt's son and he is in agreement with SNF  [facility] choices are: [facility], [facility], and [facility]  Pt has Medicare so no auth is needed  Pt's son completed a Medicaid application in May in anticipation of pt needing LTC  Outpatient follow up  Mode of transport to be determined    CM will continue to follow pt for SNF  Lilliam

## 2025-07-09 LAB
ALBUMIN SERPL-MCNC: 3 G/DL (ref 3.5–5)
ALBUMIN/GLOB SERPL: 1 (ref 1.1–2.2)
ALP SERPL-CCNC: 50 U/L (ref 45–117)
ALT SERPL-CCNC: 8 U/L (ref 12–78)
ANION GAP SERPL CALC-SCNC: 10 MMOL/L (ref 2–12)
AST SERPL-CCNC: 10 U/L (ref 15–37)
BASOPHILS # BLD: 0.02 K/UL (ref 0–0.1)
BASOPHILS NFR BLD: 0.3 % (ref 0–1)
BILIRUB SERPL-MCNC: 0.5 MG/DL (ref 0.2–1)
BUN SERPL-MCNC: 11 MG/DL (ref 6–20)
BUN/CREAT SERPL: 18 (ref 12–20)
CALCIUM SERPL-MCNC: 10.6 MG/DL (ref 8.5–10.1)
CHLORIDE SERPL-SCNC: 104 MMOL/L (ref 97–108)
CO2 SERPL-SCNC: 23 MMOL/L (ref 21–32)
CREAT SERPL-MCNC: 0.6 MG/DL (ref 0.55–1.02)
DIFFERENTIAL METHOD BLD: ABNORMAL
EOSINOPHIL # BLD: 0.04 K/UL (ref 0–0.4)
EOSINOPHIL NFR BLD: 0.6 % (ref 0–7)
ERYTHROCYTE [DISTWIDTH] IN BLOOD BY AUTOMATED COUNT: 13.1 % (ref 11.5–14.5)
GLOBULIN SER CALC-MCNC: 3 G/DL (ref 2–4)
GLUCOSE SERPL-MCNC: 76 MG/DL (ref 65–100)
HCT VFR BLD AUTO: 39.2 % (ref 35–47)
HGB BLD-MCNC: 12.8 G/DL (ref 11.5–16)
IMM GRANULOCYTES # BLD AUTO: 0.02 K/UL (ref 0–0.04)
IMM GRANULOCYTES NFR BLD AUTO: 0.3 % (ref 0–0.5)
LYMPHOCYTES # BLD: 2.13 K/UL (ref 0.8–3.5)
LYMPHOCYTES NFR BLD: 32.3 % (ref 12–49)
MCH RBC QN AUTO: 29.2 PG (ref 26–34)
MCHC RBC AUTO-ENTMCNC: 32.7 G/DL (ref 30–36.5)
MCV RBC AUTO: 89.3 FL (ref 80–99)
MONOCYTES # BLD: 0.57 K/UL (ref 0–1)
MONOCYTES NFR BLD: 8.6 % (ref 5–13)
NEUTS SEG # BLD: 3.82 K/UL (ref 1.8–8)
NEUTS SEG NFR BLD: 57.9 % (ref 32–75)
NRBC # BLD: 0 K/UL (ref 0–0.01)
NRBC BLD-RTO: 0 PER 100 WBC
PLATELET # BLD AUTO: 648 K/UL (ref 150–400)
PLATELET COMMENT: ABNORMAL
PMV BLD AUTO: 9.8 FL (ref 8.9–12.9)
POTASSIUM SERPL-SCNC: 4.1 MMOL/L (ref 3.5–5.1)
PROT SERPL-MCNC: 6 G/DL (ref 6.4–8.2)
RBC # BLD AUTO: 4.39 M/UL (ref 3.8–5.2)
RBC MORPH BLD: ABNORMAL
SODIUM SERPL-SCNC: 137 MMOL/L (ref 136–145)
WBC # BLD AUTO: 6.6 K/UL (ref 3.6–11)

## 2025-07-09 PROCEDURE — 94761 N-INVAS EAR/PLS OXIMETRY MLT: CPT

## 2025-07-09 PROCEDURE — 2580000003 HC RX 258: Performed by: FAMILY MEDICINE

## 2025-07-09 PROCEDURE — 6370000000 HC RX 637 (ALT 250 FOR IP): Performed by: FAMILY MEDICINE

## 2025-07-09 PROCEDURE — 85025 COMPLETE CBC W/AUTO DIFF WBC: CPT

## 2025-07-09 PROCEDURE — 1100000000 HC RM PRIVATE

## 2025-07-09 PROCEDURE — 6360000002 HC RX W HCPCS: Performed by: FAMILY MEDICINE

## 2025-07-09 PROCEDURE — 80053 COMPREHEN METABOLIC PANEL: CPT

## 2025-07-09 PROCEDURE — 2500000003 HC RX 250 WO HCPCS: Performed by: FAMILY MEDICINE

## 2025-07-09 RX ORDER — AMLODIPINE BESYLATE 5 MG/1
5 TABLET ORAL DAILY
Status: DISCONTINUED | OUTPATIENT
Start: 2025-07-09 | End: 2025-07-10 | Stop reason: HOSPADM

## 2025-07-09 RX ORDER — CLINDAMYCIN PHOSPHATE 150 MG/ML
300 INJECTION, SOLUTION INTRAVENOUS EVERY 8 HOURS
Status: DISCONTINUED | OUTPATIENT
Start: 2025-07-09 | End: 2025-07-09

## 2025-07-09 RX ADMIN — AMLODIPINE BESYLATE 5 MG: 5 TABLET ORAL at 08:36

## 2025-07-09 RX ADMIN — PIPERACILLIN AND TAZOBACTAM 4500 MG: 4; .5 INJECTION, POWDER, FOR SOLUTION INTRAVENOUS at 11:08

## 2025-07-09 RX ADMIN — PIPERACILLIN AND TAZOBACTAM 3375 MG: 3; .375 INJECTION, POWDER, LYOPHILIZED, FOR SOLUTION INTRAVENOUS at 15:57

## 2025-07-09 RX ADMIN — ATENOLOL 25 MG: 25 TABLET ORAL at 08:36

## 2025-07-09 RX ADMIN — APIXABAN 5 MG: 5 TABLET, FILM COATED ORAL at 08:36

## 2025-07-09 RX ADMIN — SODIUM CHLORIDE: 0.9 INJECTION, SOLUTION INTRAVENOUS at 11:07

## 2025-07-09 RX ADMIN — METRONIDAZOLE 500 MG: 250 TABLET ORAL at 08:36

## 2025-07-09 RX ADMIN — AZITHROMYCIN DIHYDRATE 500 MG: 250 TABLET ORAL at 22:35

## 2025-07-09 RX ADMIN — APIXABAN 5 MG: 5 TABLET, FILM COATED ORAL at 22:35

## 2025-07-09 RX ADMIN — SODIUM CHLORIDE, PRESERVATIVE FREE 10 ML: 5 INJECTION INTRAVENOUS at 08:36

## 2025-07-09 NOTE — CARE COORDINATION
Care Management Progress Note    Reason for Admission:   Hypoxia [R09.02]  Community acquired pneumonia, unspecified laterality [J18.9]  Hypoxic respiratory failure (HCC) [J96.91]         Patient Admission Status: Inpatient  RUR:  13%  Hospitalization in the last 30 days (Readmission):  No         Reportedly, pt is a resident at [facility] ([facility]  Pt is in a w/c and requires the highest level of care for ADLs.  SonDavid ([phone]) is the primary family contact.     Transition Plan of Care:  SNF - accepted by [facility]  Pt has Medicare so no auth is needed  Pt's son completed a Medicaid application in May in anticipation of pt needing LTC  Outpatient follow up  Mode of transport to be determined     CM will continue to follow pt for SNF  Lilliam

## 2025-07-10 ENCOUNTER — CARE COORDINATION (OUTPATIENT)
Dept: CARE COORDINATION | Age: 89
End: 2025-07-10

## 2025-07-10 VITALS
WEIGHT: 104 LBS | OXYGEN SATURATION: 92 % | SYSTOLIC BLOOD PRESSURE: 131 MMHG | HEIGHT: 63 IN | BODY MASS INDEX: 18.43 KG/M2 | HEART RATE: 81 BPM | RESPIRATION RATE: 17 BRPM | DIASTOLIC BLOOD PRESSURE: 84 MMHG | TEMPERATURE: 98.4 F

## 2025-07-10 LAB
ALBUMIN SERPL-MCNC: 2.8 G/DL (ref 3.5–5)
ALBUMIN/GLOB SERPL: 0.8 (ref 1.1–2.2)
ALP SERPL-CCNC: 48 U/L (ref 45–117)
ALT SERPL-CCNC: 6 U/L (ref 12–78)
ANION GAP SERPL CALC-SCNC: 8 MMOL/L (ref 2–12)
AST SERPL-CCNC: 12 U/L (ref 15–37)
BASOPHILS # BLD: 0.03 K/UL (ref 0–0.1)
BASOPHILS NFR BLD: 0.4 % (ref 0–1)
BILIRUB SERPL-MCNC: 0.5 MG/DL (ref 0.2–1)
BUN SERPL-MCNC: 13 MG/DL (ref 6–20)
BUN/CREAT SERPL: 19 (ref 12–20)
CALCIUM SERPL-MCNC: 10.3 MG/DL (ref 8.5–10.1)
CHLORIDE SERPL-SCNC: 105 MMOL/L (ref 97–108)
CO2 SERPL-SCNC: 23 MMOL/L (ref 21–32)
CREAT SERPL-MCNC: 0.69 MG/DL (ref 0.55–1.02)
DIFFERENTIAL METHOD BLD: ABNORMAL
EOSINOPHIL # BLD: 0.05 K/UL (ref 0–0.4)
EOSINOPHIL NFR BLD: 0.6 % (ref 0–7)
ERYTHROCYTE [DISTWIDTH] IN BLOOD BY AUTOMATED COUNT: 13.2 % (ref 11.5–14.5)
GLOBULIN SER CALC-MCNC: 3.3 G/DL (ref 2–4)
GLUCOSE SERPL-MCNC: 84 MG/DL (ref 65–100)
HCT VFR BLD AUTO: 38.7 % (ref 35–47)
HGB BLD-MCNC: 12.9 G/DL (ref 11.5–16)
IMM GRANULOCYTES # BLD AUTO: 0.06 K/UL (ref 0–0.04)
IMM GRANULOCYTES NFR BLD AUTO: 0.8 % (ref 0–0.5)
LYMPHOCYTES # BLD: 2.31 K/UL (ref 0.8–3.5)
LYMPHOCYTES NFR BLD: 29.2 % (ref 12–49)
MCH RBC QN AUTO: 29.9 PG (ref 26–34)
MCHC RBC AUTO-ENTMCNC: 33.3 G/DL (ref 30–36.5)
MCV RBC AUTO: 89.6 FL (ref 80–99)
MONOCYTES # BLD: 0.69 K/UL (ref 0–1)
MONOCYTES NFR BLD: 8.7 % (ref 5–13)
NEUTS SEG # BLD: 4.77 K/UL (ref 1.8–8)
NEUTS SEG NFR BLD: 60.3 % (ref 32–75)
NRBC # BLD: 0 K/UL (ref 0–0.01)
NRBC BLD-RTO: 0 PER 100 WBC
PLATELET # BLD AUTO: 666 K/UL (ref 150–400)
PMV BLD AUTO: 9.8 FL (ref 8.9–12.9)
POTASSIUM SERPL-SCNC: 3.4 MMOL/L (ref 3.5–5.1)
PROT SERPL-MCNC: 6.1 G/DL (ref 6.4–8.2)
RBC # BLD AUTO: 4.32 M/UL (ref 3.8–5.2)
SODIUM SERPL-SCNC: 136 MMOL/L (ref 136–145)
WBC # BLD AUTO: 7.9 K/UL (ref 3.6–11)

## 2025-07-10 PROCEDURE — 2500000003 HC RX 250 WO HCPCS: Performed by: FAMILY MEDICINE

## 2025-07-10 PROCEDURE — 94761 N-INVAS EAR/PLS OXIMETRY MLT: CPT

## 2025-07-10 PROCEDURE — 2580000003 HC RX 258: Performed by: FAMILY MEDICINE

## 2025-07-10 PROCEDURE — 6370000000 HC RX 637 (ALT 250 FOR IP): Performed by: FAMILY MEDICINE

## 2025-07-10 PROCEDURE — 80053 COMPREHEN METABOLIC PANEL: CPT

## 2025-07-10 PROCEDURE — 97530 THERAPEUTIC ACTIVITIES: CPT

## 2025-07-10 PROCEDURE — 85025 COMPLETE CBC W/AUTO DIFF WBC: CPT

## 2025-07-10 PROCEDURE — 6360000002 HC RX W HCPCS: Performed by: FAMILY MEDICINE

## 2025-07-10 RX ORDER — AMLODIPINE BESYLATE 5 MG/1
5 TABLET ORAL DAILY
Qty: 30 TABLET | Refills: 3 | Status: SHIPPED
Start: 2025-07-11

## 2025-07-10 RX ADMIN — SODIUM CHLORIDE, PRESERVATIVE FREE 10 ML: 5 INJECTION INTRAVENOUS at 09:39

## 2025-07-10 RX ADMIN — AMLODIPINE BESYLATE 5 MG: 5 TABLET ORAL at 09:39

## 2025-07-10 RX ADMIN — APIXABAN 5 MG: 5 TABLET, FILM COATED ORAL at 09:39

## 2025-07-10 RX ADMIN — ATENOLOL 25 MG: 25 TABLET ORAL at 09:39

## 2025-07-10 RX ADMIN — PIPERACILLIN AND TAZOBACTAM 3375 MG: 3; .375 INJECTION, POWDER, LYOPHILIZED, FOR SOLUTION INTRAVENOUS at 09:41

## 2025-07-10 RX ADMIN — PIPERACILLIN AND TAZOBACTAM 3375 MG: 3; .375 INJECTION, POWDER, LYOPHILIZED, FOR SOLUTION INTRAVENOUS at 00:58

## 2025-07-10 RX ADMIN — AMOXICILLIN AND CLAVULANATE POTASSIUM 1 TABLET: 875; 125 TABLET, FILM COATED ORAL at 11:38

## 2025-07-10 NOTE — DISCHARGE SUMMARY
Discharge Summary       PATIENT ID: Brenda Maharaj  MRN: [id number]   YOB: 1935    DATE OF ADMISSION: 7/6/2025  1:19 PM    DATE OF DISCHARGE: 7.10.2025   PRIMARY CARE PROVIDER: Janie Dodd MD     ATTENDING PHYSICIAN: joanna vo  DISCHARGING PROVIDER: Joanna Vo MD    To contact this individual call [phone] and ask the  to page.  If unavailable ask to be transferred the Adult Hospitalist Department.    CONSULTATIONS: IP CONSULT TO FAMILY MEDICINE    PROCEDURES/SURGERIES: * No surgery found *     ADMITTING DIAGNOSES & HOSPITAL COURSE:   Brenda Stoner is a 89 y.o.  female with PMHx brain tumor that is nonoperable, seizures, kidney stones, sleep apnea who presents for shortness of breath.  Patient reports for the past 5 days she has had a difficult time breathing.  She has had a cough.  She has felt feverish.  Her breathing is worse with walking or laying flat.  She does report that it is getting better but the people at the assisted living where she stays forced to come to the hospital.  She has reports that she does get chest pressure whenever she walks but this has been occurring for the past 6 months.  No chest pressure currently.        ED course: Patient was hypoxic on room air and so placed on 2 L nasal cannula.  Chest x-ray was normal.  Patient was given 1 dose of doxycycline.        DISCHARGE DIAGNOSES / PLAN:           Brenda Stoner is a 89 y.o.  female with PMHx brain tumor that is nonoperable, seizures, kidney stones, sleep apnea who presents for shortness of breath found to have hypoxic respiratory failure from PNA.     # Hypoxic respiratory failure/aspiration PNA: Acute, present on admission.  Likely PNA based on CTA results.  Possibly aspiration, IMPROVING , HIGH RISK FOR RECURRENT ASPIRATION AND PNEUMONIA, HAS VERY WEAK COUGH.   - Respiratory viral panel, POSITIVE FOR metapneumovirus  - Sputum culture  - Legionella  - MRSA nares  - azithromycin, rocephin, and flagyl

## 2025-07-10 NOTE — CARE COORDINATION
Transition of Care Plan to SNF/Rehab    Pt has been accepted by [facility] and a skilled bed is available today.    RN, please call report to [phone].  Pt will go to room 300 (changed to room 315P)  [facility] transport has been arranged for 3 p.m.  Son is aware and agreeable to plan.    Communication to Patient/Family:   Met with patient and family and they are agreeable to the transition plan. The Plan for Transition of Care is related to the following treatment goals: Hypoxia [R09.02]  Community acquired pneumonia, unspecified laterality [J18.9]  Hypoxic respiratory failure (HCC) [J96.91]      The Patient and/or patient representative was provided with a choice of provider and agrees  with the discharge plan.      Yes [x] No []    A Freedom of choice list was provided with basic dialogue that supports the patient's individualized plan of care/goals and shares the quality data associated with the providers.       Yes [x] No []    SNF/Rehab Transition:  Patient has been accepted to [facility] SNF/Re[facility] and meets criteria for admission.     SNF reports auth has been received? [x]    Patient will be transported by [facility] and expected to leave at 3 p.m.   [x] Packet on chart (if needed)  [] PCS completed (if applicable)     Communication to SNF/Rehab:  Bedside RN, Mami, has been notified to update the transition plan to the facility and call report (phone number).  Discharge information has been updated on the AVS. And communicated to facility via [employer], or CC link.     Discharge instructions to be fax'd to facility via [x] [employer] [] CCLink      Nursing Please include all hard scripts for controlled substances, med rec and dc summary, and AVS in packet.       Nursing, please discuss the following applicable information with the facility's nursing during report:     Brendan with (X) only those applicable:  Medication:  []Medications are available at the facility  []IV Antibiotics    []Controlled
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
## 2025-07-10 NOTE — PROGRESS NOTES
Hospitalist Progress Note  Shakeel Vo MD  Answering service: [phone]        Date of Service:  2025  NAME:  Brenda Maharaj  :  1935  MRN:  [id number]      Admission Summary:      Brenda Stoner is a 89 y.o.  female with PMHx brain tumor that is nonoperable, seizures, kidney stones, sleep apnea who presents for shortness of breath found to have hypoxic respiratory failure from PNA.     Interval history / Subjective:   STILL HAS COUGH, PATIENT HAS WEAK COUGH   FEELING BETTER SON AT BEDSIDE UPDATED      Assessment & Plan:           Brenda Stoner is a 89 y.o.  female with PMHx brain tumor that is nonoperable, seizures, kidney stones, sleep apnea who presents for shortness of breath found to have hypoxic respiratory failure from PNA.     # Hypoxic respiratory failure/aspiration PNA: Acute, present on admission.  Likely PNA based on CTA results.  Possibly aspiration, IMPROVING , HIGH RISK FOR RECURRENT ASPIRATION AND PNEUMONIA, HAS VERY WEAK COUGH.   - Respiratory viral panel, POSITIVE FOR metapneumovirus  - Sputum culture  - Legionella  - MRSA nares  - azithromycin, rocephin, and flagyl (to cover for aspiration)   - Wean O2  - Repeat troponin.  --speech consult--advised soft and bite-size     Possible sarcoid: consider pulm consult.      Hypertension: Chronic.  Continue atenolol     Weakness: Chronic.  Will consult PT/OT.     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  Continue Eliquis.     Tremor: Chronic.  Monitor.     Thrombocytosis: Chronic.  Is lower than platelet count in .  Monitor.      Hypercalcemia: Chronic.  Is lower than calcium in .  Does correct to 11.2.  Has a history of parathyroid issues.               I have personally reviewed the radiographs, laboratory data in Epic and decisions and statements above are based partially on this personal interpretation.     Code Status: Partial patient is DNR not 
                                                                                                Hospitalist Progress Note  Shakeel Vo MD  Answering service: [phone]        Date of Service:  2025  NAME:  Brenda Maharaj  :  1935  MRN:  [id number]      Admission Summary:      Brenda Stoner is a 89 y.o.  female with PMHx brain tumor that is nonoperable, seizures, kidney stones, sleep apnea who presents for shortness of breath found to have hypoxic respiratory failure from PNA.     Interval history / Subjective:   STILL HAS COUGH AND SHORTNESS OF BREATH  REPORTS FEELING BETTER  SON UPDATED      Assessment & Plan:           Brenda Stoner is a 89 y.o.  female with PMHx brain tumor that is nonoperable, seizures, kidney stones, sleep apnea who presents for shortness of breath found to have hypoxic respiratory failure from PNA.     # Hypoxic respiratory failure/aspiration PNA: Acute, present on admission.  Likely PNA based on CTA results.  Possibly aspiration.   - Respiratory viral panel, POSITIVE FOR metapneumovirus  - Sputum culture  - Legionella  - MRSA nares  - azithromycin, rocephin, and flagyl (to cover for aspiration)   - Wean O2  - Repeat troponin.  --speech consult--advised soft and bite-size     Possible sarcoid: consider pulm consult.      Hypertension: Chronic.  Continue atenolol     Weakness: Chronic.  Will consult PT/OT.     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  Continue Eliquis.     Tremor: Chronic.  Monitor.     Thrombocytosis: Chronic.  Is lower than platelet count in .  Monitor.      Hypercalcemia: Chronic.  Is lower than calcium in .  Does correct to 11.2.  Has a history of parathyroid issues.               I have personally reviewed the radiographs, laboratory data in Epic and decisions and statements above are based partially on this personal interpretation.     Code Status: Partial patient is DNR not DNI.  DVT Prophylaxis: Eliquis  GI Prophylaxis: not indicated                Review of 
                                                                                                Hospitalist Progress Note  Shakeel Vo MD  Answering service: [phone]        Date of Service:  7/10/2025  NAME:  Brenda Maharaj  :  1935  MRN:  [id number]      Admission Summary:      Brenda Stoner is a 89 y.o.  female with PMHx brain tumor that is nonoperable, seizures, kidney stones, sleep apnea who presents for shortness of breath found to have hypoxic respiratory failure from PNA.     Interval history / Subjective:   REPORT FEELING TERRIBLE WANTS SON TO COME AT BEDSIDE  DECLINED METRONIDAZOLE WANTED ABX TO CHANGED DISCUSSED WITH PHARMACY      Assessment & Plan:           Brenda Stoner is a 89 y.o.  female with PMHx brain tumor that is nonoperable, seizures, kidney stones, sleep apnea who presents for shortness of breath found to have hypoxic respiratory failure from PNA.     # Hypoxic respiratory failure/aspiration PNA: Acute, present on admission.  Likely PNA based on CTA results.  Possibly aspiration, IMPROVING , HIGH RISK FOR RECURRENT ASPIRATION AND PNEUMONIA, HAS VERY WEAK COUGH.   - Respiratory viral panel, POSITIVE FOR metapneumovirus  - Sputum culture  - Legionella  - MRSA nares  - azithromycin, rocephin, and flagyl (to cover for aspiration) ; DCED DISCUSSED WITH PHARMACY ADD ZOSYN   - Wean O2  - Repeat troponin.  --speech consult--advised soft and bite-size     Possible sarcoid: consider pulm consult.      Hypertension: Chronic.  Continue atenolol     Weakness: Chronic.  Will consult PT/OT.     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  Continue Eliquis.     Tremor: Chronic.  Monitor.     Thrombocytosis: Chronic.  Is lower than platelet count in .  Monitor.      Hypercalcemia: Chronic.  Is lower than calcium in .  Does correct to 11.2.  Has a history of parathyroid issues.               I have personally reviewed the radiographs, laboratory data in Epic and decisions and statements above are based 
FUNCTIONAL STATUS PRIOR TO ADMISSION: Patient lived alone in an assisted living facility. Son reports patient received assistance for ADLs and IADLs with primary use of wheelchair.   Receives Help From: Other (Comment) (care home), Prior Level of Assist for ADLs: Needs assistance, , , , , , Prior Level of Assist for Homemaking: Needs assistance, , Prior Level of Assist for Transfers: Needs assistance, Active : No     HOME SUPPORT: Patient lived alone with staff to provide assistance.     OCCUPATIONAL THERAPY TREATMENT  Patient: Brenda Maharaj (89 y.o. female)  Date: 7/10/2025  Primary Diagnosis: Hypoxia [R09.02]  Community acquired pneumonia, unspecified laterality [J18.9]  Hypoxic respiratory failure (HCC) [J96.91]       Precautions: Fall Risk                Chart, occupational therapy assessment, plan of care, and goals were reviewed.    ASSESSMENT  Patient continues to benefit from skilled OT services and is not progressing towards goals. Ms. Aguillon was received in bed requiring assistance with hygiene + linen change.  She remains most limited by confusion, lethargy, weakness, poor activity tolerance, and limited motivation.  She required min to mod A for rolling multiple times R and L in bed.  She needs max verbal and manual cuing + physical assist for carryover for improved, safe technique.  Patient requires total A to scoot higher in bed to be repositioned and attempted bed in chair position with minimal tolerance.  Encouraged HOB elevation.  Patient anticipates discharge to rehab today.           PLAN :  Patient continues to benefit from skilled intervention to address the above impairments.  Continue treatment per established plan of care to address goals.    Recommend with staff:   Recommend patient be OOB to chair as frequently as tolerated; Goal of 3x/day for all meals for 60 minutes at a time.   For toileting needs, recommend BSC with staff assist.  Encourage patient involvement in personal 
Patient arrived to the floor 1800 - attempted but unable to complete admission questions due to patient being confused.  
Pharmacy Dosing Services: 07/08/25     The pharmacist has determined that this patient meets P & T approved criteria for conversion from IV to oral therapy for the following medication: IV Azithromycin & Metronidazole       The pharmacist has written the following order for the patient: Azithromycin PO 500mg q24h & Metronidazole 500mg PO TID  The pharmacist will continue to monitor the patient's status and advise the physician if conversion back to IV therapy is recommended.    Signed Liyah Rudd RPH Contact information:  [phone]  
Report called to Chioma MOSLEY at [facility].  
07/07/25  0428 07/08/25  0938 07/09/25  0622   ALT 10* 6* 8*   GLOB 3.5 3.1 3.0     No results for input(s): \"INR\", \"APTT\" in the last 72 hours.    Invalid input(s): \"PTP\"   No results for input(s): \"TIBC\" in the last 72 hours.    Invalid input(s): \"FE\", \"PSAT\", \"FERR\"   No results found for: \"RBCF\"   No results for input(s): \"PH\", \"PCO2\", \"PO2\" in the last 72 hours.  No results for input(s): \"CPK\" in the last 72 hours.    Invalid input(s): \"CPKMB\", \"CKNDX\", \"TROIQ\"  Lab Results   Component Value Date/Time    CHOL 177 02/03/2022 03:17 PM    HDL 50 02/03/2022 03:17 PM    .8 02/03/2022 03:17 PM     No results found for: \"GLUCPOC\"  [unfilled]      Medications Reviewed:     Current Facility-Administered Medications   Medication Dose Route Frequency    amLODIPine (NORVASC) tablet 5 mg  5 mg Oral Daily    piperacillin-tazobactam (ZOSYN) 3,375 mg in sodium chloride 0.9 % 50 mL IVPB (addEASE)  3,375 mg IntraVENous Q8H    azithromycin (ZITHROMAX) tablet 500 mg  500 mg Oral Q24H    apixaban (ELIQUIS) tablet 5 mg  5 mg Oral BID    atenolol (TENORMIN) tablet 25 mg  25 mg Oral Daily    sodium chloride flush 0.9 % injection 5-40 mL  5-40 mL IntraVENous 2 times per day    sodium chloride flush 0.9 % injection 5-40 mL  5-40 mL IntraVENous PRN    0.9 % sodium chloride infusion   IntraVENous PRN    potassium chloride (KLOR-CON) extended release tablet 40 mEq  40 mEq Oral PRN    Or    potassium bicarb-citric acid (EFFER-K) effervescent tablet 40 mEq  40 mEq Oral PRN    Or    potassium chloride 10 mEq/100 mL IVPB (Peripheral Line)  10 mEq IntraVENous PRN    magnesium sulfate 2000 mg in 50 mL IVPB premix  2,000 mg IntraVENous PRN    ondansetron (ZOFRAN-ODT) disintegrating tablet 4 mg  4 mg Oral Q8H PRN    Or    ondansetron (ZOFRAN) injection 4 mg  4 mg IntraVENous Q6H PRN    polyethylene glycol (GLYCOLAX) packet 17 g  17 g Oral Daily PRN    acetaminophen (TYLENOL) tablet 650 mg  650 mg Oral Q6H PRN    Or    acetaminophen (TYLENOL)

## 2025-07-11 ENCOUNTER — OFFICE VISIT (OUTPATIENT)
Facility: CLINIC | Age: 89
End: 2025-07-11

## 2025-07-11 DIAGNOSIS — F41.9 ANXIETY AND DEPRESSION: ICD-10-CM

## 2025-07-11 DIAGNOSIS — R93.89 ABNORMAL FINDINGS ON IMAGING TEST: ICD-10-CM

## 2025-07-11 DIAGNOSIS — G91.2 NPH (NORMAL PRESSURE HYDROCEPHALUS) (HCC): ICD-10-CM

## 2025-07-11 DIAGNOSIS — G47.30 SLEEP APNEA, UNSPECIFIED TYPE: ICD-10-CM

## 2025-07-11 DIAGNOSIS — D75.839 THROMBOCYTOSIS: ICD-10-CM

## 2025-07-11 DIAGNOSIS — F32.A ANXIETY AND DEPRESSION: ICD-10-CM

## 2025-07-11 DIAGNOSIS — E83.52 HYPERCALCEMIA: ICD-10-CM

## 2025-07-11 DIAGNOSIS — Z87.19 HISTORY OF ESOPHAGEAL STRICTURE: ICD-10-CM

## 2025-07-11 DIAGNOSIS — I10 ESSENTIAL HYPERTENSION: ICD-10-CM

## 2025-07-11 DIAGNOSIS — J96.91 RESPIRATORY FAILURE WITH HYPOXIA, UNSPECIFIED CHRONICITY (HCC): Primary | ICD-10-CM

## 2025-07-11 DIAGNOSIS — Z86.718 HISTORY OF DEEP VENOUS THROMBOSIS (DVT) OF DISTAL VEIN OF LEFT LOWER EXTREMITY: ICD-10-CM

## 2025-07-11 DIAGNOSIS — R07.89 CHEST PRESSURE: ICD-10-CM

## 2025-07-11 DIAGNOSIS — D32.9 BENIGN NEOPLASM OF MENINGES, UNSPECIFIED (HCC): ICD-10-CM

## 2025-07-11 DIAGNOSIS — R13.10 DYSPHAGIA, UNSPECIFIED TYPE: ICD-10-CM

## 2025-07-11 DIAGNOSIS — R25.1 TREMOR: ICD-10-CM

## 2025-07-11 DIAGNOSIS — Z86.73 HISTORY OF TIAS: ICD-10-CM

## 2025-07-11 DIAGNOSIS — Z87.898 HISTORY OF SEIZURE: ICD-10-CM

## 2025-07-11 DIAGNOSIS — N20.0 KIDNEY STONES: ICD-10-CM

## 2025-07-11 DIAGNOSIS — D49.6 BRAIN TUMOR (HCC): ICD-10-CM

## 2025-07-11 DIAGNOSIS — H35.30 MACULAR DEGENERATION, UNSPECIFIED LATERALITY, UNSPECIFIED TYPE: ICD-10-CM

## 2025-07-11 NOTE — ASSESSMENT & PLAN NOTE
Used to use CPAP, it was mentioned that after losing weight the patient has not used it any more  Currently on 2 liters of oxygen as mentioned above

## 2025-07-11 NOTE — PROGRESS NOTES
Addendum:  . BMI 18.42.    Nutritional support  . Nursing staff to check skin and wound care accordingly.        [facility] SERVICES      Skilled Nursing Facility Admission Note   Facility:  [facility]     Name: Brenda Maharaj     YOB: 1935  MRN: [id number]    Assessment & Plan  Respiratory failure with hypoxia, unspecified chronicity (HCC)  CTA on 07/06/2025   No pulmonary embolism. No thoracic aortic dissection or aneurysm.  Bilateral lung findings may suggest recurrent aspiration versus bronchitis and patchy consolidative opacity may suggest right lower lobe pneumonia.  Additional intrathoracic findings may suggest chronic granulomatous disease such as sarcoidosis. Correlation advised.   Right and left kidneys intrarenal calculi without obvious hydronephrosis.  Respiratory viral panel, POSITIVE for Human Metapneumovirus  History of esophageal stricture  Most likely secondary to both aspiration and  viral pneumonia  Aspitation precautions. High risk for aspiration  Was treated with antibiotic therapy inpatient and it was discontinued on discharge   Currently without any wheezing and good oxygen saturation on 2 liters.  See below for more information         Chest pressure  During the hosopitalization  Troponin 7  ECHO 07/08/2025   Left Ventricle Normal left ventricular systolic function. EF by visual approximation is 55%. Left ventricle size is normal. Normal wall thickness. Unable to assess wall motion. Grade I diastolic dysfunction with normal LAP.   Left Atrium Left atrium size is normal.   Right Ventricle Right ventricle size is normal. Normal systolic function.   Right Atrium Right atrium size is normal.   Aortic Valve Not well visualized. Trace regurgitation. No stenosis.   Mitral Valve Not well visualized. Valve structure is normal. No regurgitation. No stenosis noted.   Tricuspid Valve Valve structure is normal. No regurgitation. No stenosis noted.

## 2025-07-11 NOTE — ASSESSMENT & PLAN NOTE
CTA on 07/06/2025   No pulmonary embolism. No thoracic aortic dissection or aneurysm.  Bilateral lung findings may suggest recurrent aspiration versus bronchitis and patchy consolidative opacity may suggest right lower lobe pneumonia.  Additional intrathoracic findings may suggest chronic granulomatous disease such as sarcoidosis. Correlation advised.   Right and left kidneys intrarenal calculi without obvious hydronephrosis.  Respiratory viral panel, POSITIVE for Human Metapneumovirus  History of esophageal stricture  Most likely secondary to both aspiration and  viral pneumonia  Aspitation precautions. High risk for aspiration  Was treated with antibiotic therapy inpatient and it was discontinued on discharge   Currently without any wheezing and good oxygen saturation on 2 liters.  See below for more information

## 2025-07-11 NOTE — CARE COORDINATION
SECURE email notification sent to identified IDT members at   [facility] [place] [facility]

## 2025-07-11 NOTE — ASSESSMENT & PLAN NOTE
Right and left kidneys intrarenal calculi without obvious hydronephrosis.ndings on imaging study    Monitor

## 2025-07-14 ENCOUNTER — OFFICE VISIT (OUTPATIENT)
Facility: CLINIC | Age: 89
End: 2025-07-14
Payer: MEDICARE

## 2025-07-14 DIAGNOSIS — R25.1 TREMOR: ICD-10-CM

## 2025-07-14 DIAGNOSIS — F41.9 ANXIETY AND DEPRESSION: ICD-10-CM

## 2025-07-14 DIAGNOSIS — D49.6 BRAIN TUMOR (HCC): ICD-10-CM

## 2025-07-14 DIAGNOSIS — H35.30 MACULAR DEGENERATION, UNSPECIFIED LATERALITY, UNSPECIFIED TYPE: ICD-10-CM

## 2025-07-14 DIAGNOSIS — D32.9 BENIGN NEOPLASM OF MENINGES, UNSPECIFIED (HCC): ICD-10-CM

## 2025-07-14 DIAGNOSIS — N20.0 KIDNEY STONES: ICD-10-CM

## 2025-07-14 DIAGNOSIS — R93.89 ABNORMAL FINDINGS ON IMAGING TEST: ICD-10-CM

## 2025-07-14 DIAGNOSIS — F32.A ANXIETY AND DEPRESSION: ICD-10-CM

## 2025-07-14 DIAGNOSIS — Z87.898 HISTORY OF SEIZURE: ICD-10-CM

## 2025-07-14 DIAGNOSIS — E83.52 HYPERCALCEMIA: ICD-10-CM

## 2025-07-14 DIAGNOSIS — Z87.19 HISTORY OF ESOPHAGEAL STRICTURE: ICD-10-CM

## 2025-07-14 DIAGNOSIS — I10 ESSENTIAL HYPERTENSION: ICD-10-CM

## 2025-07-14 DIAGNOSIS — Z86.73 HISTORY OF TIAS: ICD-10-CM

## 2025-07-14 DIAGNOSIS — G47.30 SLEEP APNEA, UNSPECIFIED TYPE: ICD-10-CM

## 2025-07-14 DIAGNOSIS — J96.91 RESPIRATORY FAILURE WITH HYPOXIA, UNSPECIFIED CHRONICITY (HCC): Primary | ICD-10-CM

## 2025-07-14 DIAGNOSIS — Z86.718 HISTORY OF DEEP VENOUS THROMBOSIS (DVT) OF DISTAL VEIN OF LEFT LOWER EXTREMITY: ICD-10-CM

## 2025-07-14 DIAGNOSIS — G91.2 NPH (NORMAL PRESSURE HYDROCEPHALUS) (HCC): ICD-10-CM

## 2025-07-14 DIAGNOSIS — R13.10 DYSPHAGIA, UNSPECIFIED TYPE: ICD-10-CM

## 2025-07-14 DIAGNOSIS — D75.839 THROMBOCYTOSIS: ICD-10-CM

## 2025-07-14 PROCEDURE — 99309 SBSQ NF CARE MODERATE MDM 30: CPT | Performed by: HOSPITALIST

## 2025-07-14 PROCEDURE — 1123F ACP DISCUSS/DSCN MKR DOCD: CPT | Performed by: HOSPITALIST

## 2025-07-14 NOTE — PROGRESS NOTES
[facility]      Skilled Nursing Facility Follow-up Note   Facility:  [facility] [place]     Name: Brenda Maharaj     YOB: 1935  MRN: [id number]    Assessment & Plan  Respiratory failure with hypoxia, unspecified chronicity (HCC)            Brain tumor (HCC)            Benign neoplasm of meninges, unspecified (HCC)            NPH (normal pressure hydrocephalus) (HCC)            Tremor            Essential hypertension            Sleep apnea, unspecified type            Dysphagia, unspecified type            History of esophageal stricture            History of deep venous thrombosis (DVT) of distal vein of left lower extremity            Thrombocytosis            Hypercalcemia            History of TIAs            Anxiety and depression            Macular degeneration, unspecified laterality, unspecified type            Kidney stones            Abnormal findings on imaging test            History of seizure            PLAN    Respiratory failure with hypoxia  . CTA on 07/06/2025   No pulmonary embolism. No thoracic aortic dissection or aneurysm.  Bilateral lung findings may suggest recurrent aspiration versus bronchitis and patchy consolidative opacity may suggest right lower lobe pneumonia.  Additional intrathoracic findings may suggest chronic granulomatous disease such as sarcoidosis.    Respiratory viral panel, POSITIVE for Human Metapneumovirus    History of esophageal stricture    Most likely secondary to both aspiration and  viral pneumonia  . Today     Continue aspiration precautions. Discussed with speech therapist about the need of swallowing evaluation to guide feeding High risk for aspiration  Was  treated with antibiotic therapy inpatient and it was discontinued on discharge   Currently without any wheezing and good oxygen saturation on 2 liters.    Discussed with the nursing staff about suctioning and aspiration precautions while swallowing

## 2025-07-18 ENCOUNTER — OFFICE VISIT (OUTPATIENT)
Facility: CLINIC | Age: 89
End: 2025-07-18

## 2025-07-18 DIAGNOSIS — Z87.19 HISTORY OF ESOPHAGEAL STRICTURE: ICD-10-CM

## 2025-07-18 DIAGNOSIS — Z86.718 HISTORY OF DEEP VENOUS THROMBOSIS (DVT) OF DISTAL VEIN OF LEFT LOWER EXTREMITY: ICD-10-CM

## 2025-07-18 DIAGNOSIS — Z87.898 HISTORY OF SEIZURE: ICD-10-CM

## 2025-07-18 DIAGNOSIS — I10 ESSENTIAL HYPERTENSION: ICD-10-CM

## 2025-07-18 DIAGNOSIS — R13.10 DYSPHAGIA, UNSPECIFIED TYPE: ICD-10-CM

## 2025-07-18 DIAGNOSIS — F41.9 ANXIETY AND DEPRESSION: ICD-10-CM

## 2025-07-18 DIAGNOSIS — Z86.73 HISTORY OF TIAS: ICD-10-CM

## 2025-07-18 DIAGNOSIS — J96.91 RESPIRATORY FAILURE WITH HYPOXIA, UNSPECIFIED CHRONICITY (HCC): Primary | ICD-10-CM

## 2025-07-18 DIAGNOSIS — F32.A ANXIETY AND DEPRESSION: ICD-10-CM

## 2025-07-18 DIAGNOSIS — E83.52 HYPERCALCEMIA: ICD-10-CM

## 2025-07-18 DIAGNOSIS — H35.30 MACULAR DEGENERATION, UNSPECIFIED LATERALITY, UNSPECIFIED TYPE: ICD-10-CM

## 2025-07-18 DIAGNOSIS — G47.30 SLEEP APNEA, UNSPECIFIED TYPE: ICD-10-CM

## 2025-07-18 DIAGNOSIS — R25.1 TREMOR: ICD-10-CM

## 2025-07-18 DIAGNOSIS — N20.0 KIDNEY STONES: ICD-10-CM

## 2025-07-18 DIAGNOSIS — R93.89 ABNORMAL FINDINGS ON IMAGING TEST: ICD-10-CM

## 2025-07-18 DIAGNOSIS — G91.2 NPH (NORMAL PRESSURE HYDROCEPHALUS) (HCC): ICD-10-CM

## 2025-07-18 DIAGNOSIS — D75.839 THROMBOCYTOSIS: ICD-10-CM

## 2025-07-18 DIAGNOSIS — D32.9 BENIGN NEOPLASM OF MENINGES, UNSPECIFIED (HCC): ICD-10-CM

## 2025-07-18 NOTE — PROGRESS NOTES
[facility] CARE SERVICES    Skilled Nursing Facility Follow-up Note   Facility:  [facility] [place]     Name: Brenda Maharaj     YOB: 1935  MRN: [id number]    Assessment & Plan  Respiratory failure with hypoxia, unspecified chronicity (HCC)            Benign neoplasm of meninges, unspecified (HCC)            NPH (normal pressure hydrocephalus) (HCC)            Tremor            Essential hypertension            Sleep apnea, unspecified type            Dysphagia, unspecified type            History of esophageal stricture            History of deep venous thrombosis (DVT) of distal vein of left lower extremity            Thrombocytosis            Hypercalcemia            History of TIAs            Anxiety and depression            Macular degeneration, unspecified laterality, unspecified type            Kidney stones            Abnormal findings on imaging test            History of seizure            PLAN    Respiratory failure with hypoxia  Most likely secondary to both aspiration and  viral pneumonia  . Continue aspiration precautions    Continue supplemental oxygen     As the patient and her son decided not  to work on  with some intervention for the underlying dysphagia  the patient's condition would  most likely get worse. They are waiting for the hospice service to see the patient.      Benign neoplasm of meninges, unspecified (HCC)  Non-operative  Per the patient's son the he patient has no associated seizures and is not on any seizure medication  See below     NPH (normal pressure hydrocephalus) (HCC)  Seems stable     Tremor  Etiology not clear   Has been on  Valium   Not seeing any tremor so far   Monitor      Essential hypertension  Stable  Amlodipine and Atenolol     Sleep apnea, unspecified type  No CPAP after losing weight     Dysphagia, unspecified type  History of esophageal stricture  Pet the patient's son no further GI scope and/or stretching

## 2025-07-22 ENCOUNTER — OFFICE VISIT (OUTPATIENT)
Facility: CLINIC | Age: 89
End: 2025-07-22
Payer: MEDICARE

## 2025-07-22 DIAGNOSIS — I10 ESSENTIAL HYPERTENSION: ICD-10-CM

## 2025-07-22 DIAGNOSIS — J96.91 RESPIRATORY FAILURE WITH HYPOXIA, UNSPECIFIED CHRONICITY (HCC): ICD-10-CM

## 2025-07-22 DIAGNOSIS — Z51.5 HOSPICE CARE: Primary | ICD-10-CM

## 2025-07-22 DIAGNOSIS — R93.89 ABNORMAL FINDINGS ON IMAGING TEST: ICD-10-CM

## 2025-07-22 DIAGNOSIS — G91.2 NPH (NORMAL PRESSURE HYDROCEPHALUS) (HCC): ICD-10-CM

## 2025-07-22 DIAGNOSIS — R13.10 DYSPHAGIA, UNSPECIFIED TYPE: ICD-10-CM

## 2025-07-22 DIAGNOSIS — F32.A ANXIETY AND DEPRESSION: ICD-10-CM

## 2025-07-22 DIAGNOSIS — H35.30 MACULAR DEGENERATION, UNSPECIFIED LATERALITY, UNSPECIFIED TYPE: ICD-10-CM

## 2025-07-22 DIAGNOSIS — Z87.19 HISTORY OF ESOPHAGEAL STRICTURE: ICD-10-CM

## 2025-07-22 DIAGNOSIS — R25.1 TREMOR: ICD-10-CM

## 2025-07-22 DIAGNOSIS — Z86.718 HISTORY OF DEEP VENOUS THROMBOSIS (DVT) OF DISTAL VEIN OF LEFT LOWER EXTREMITY: ICD-10-CM

## 2025-07-22 DIAGNOSIS — N20.0 KIDNEY STONES: ICD-10-CM

## 2025-07-22 DIAGNOSIS — F41.9 ANXIETY AND DEPRESSION: ICD-10-CM

## 2025-07-22 DIAGNOSIS — G47.30 SLEEP APNEA, UNSPECIFIED TYPE: ICD-10-CM

## 2025-07-22 DIAGNOSIS — Z86.73 HISTORY OF TIAS: ICD-10-CM

## 2025-07-22 DIAGNOSIS — E83.52 HYPERCALCEMIA: ICD-10-CM

## 2025-07-22 DIAGNOSIS — R63.8 DECREASED ORAL INTAKE: ICD-10-CM

## 2025-07-22 DIAGNOSIS — Z87.898 HISTORY OF SEIZURE: ICD-10-CM

## 2025-07-22 DIAGNOSIS — D75.839 THROMBOCYTOSIS: ICD-10-CM

## 2025-07-22 DIAGNOSIS — D32.9 BENIGN NEOPLASM OF MENINGES, UNSPECIFIED (HCC): ICD-10-CM

## 2025-07-22 PROCEDURE — 99309 SBSQ NF CARE MODERATE MDM 30: CPT | Performed by: HOSPITALIST

## 2025-07-22 PROCEDURE — 1123F ACP DISCUSS/DSCN MKR DOCD: CPT | Performed by: HOSPITALIST

## 2025-07-22 NOTE — PROGRESS NOTES
[facility] SERVICES    Skilled Nursing Facility Follow-up Note   Facility:  [facility] [place]     Name: Brenda Maharaj     YOB: 1935  MRN: [id number]    Assessment & Plan  Hospice care            Respiratory failure with hypoxia, unspecified chronicity (HCC)            Benign neoplasm of meninges, unspecified (HCC)            NPH (normal pressure hydrocephalus) (HCC)            Tremor            Essential hypertension            Sleep apnea, unspecified type            Dysphagia, unspecified type            History of esophageal stricture            History of deep venous thrombosis (DVT) of distal vein of left lower extremity            Thrombocytosis            Hypercalcemia            History of TIAs            Anxiety and depression            Macular degeneration, unspecified laterality, unspecified type            Kidney stones            Abnormal findings on imaging test            History of seizure            Decreased oral intake            PLAN  Hospice Care   The patient's condition has been deteriorating, the  patient has refused to eat and to take medication. She was a nurse , she has asked  for hospice care . The patient and her son have discussed about hospice care for the patient. The patient would like to have hospice care.  The  has worked on the hospice care service and its availability.   Formal hospice care consult pending     Respiratory failure with hypoxia  Most likely secondary to both aspiration and  viral pneumonia  . Continue aspiration precautions    Continue supplemental oxygen ( currently on 2 liters of oxygen)     As the patient and her son decided not  to work on  with some intervention for the underlying dysphagia  the patient's condition would  most likely get worse. They are waiting for the hospice service to see the patient.      Benign neoplasm of meninges  Non-operative  Per the patient's son the he patient has no

## 2025-07-25 ENCOUNTER — OFFICE VISIT (OUTPATIENT)
Facility: CLINIC | Age: 89
End: 2025-07-25

## 2025-07-25 DIAGNOSIS — D32.9 BENIGN NEOPLASM OF MENINGES, UNSPECIFIED (HCC): ICD-10-CM

## 2025-07-25 DIAGNOSIS — E83.52 HYPERCALCEMIA: ICD-10-CM

## 2025-07-25 DIAGNOSIS — N20.0 KIDNEY STONES: ICD-10-CM

## 2025-07-25 DIAGNOSIS — I10 ESSENTIAL HYPERTENSION: ICD-10-CM

## 2025-07-25 DIAGNOSIS — Z87.19 HISTORY OF ESOPHAGEAL STRICTURE: ICD-10-CM

## 2025-07-25 DIAGNOSIS — D75.839 THROMBOCYTOSIS: ICD-10-CM

## 2025-07-25 DIAGNOSIS — R93.89 ABNORMAL FINDINGS ON IMAGING TEST: ICD-10-CM

## 2025-07-25 DIAGNOSIS — H35.30 MACULAR DEGENERATION, UNSPECIFIED LATERALITY, UNSPECIFIED TYPE: ICD-10-CM

## 2025-07-25 DIAGNOSIS — R13.10 DYSPHAGIA, UNSPECIFIED TYPE: ICD-10-CM

## 2025-07-25 DIAGNOSIS — R63.8 DECREASED ORAL INTAKE: ICD-10-CM

## 2025-07-25 DIAGNOSIS — Z86.73 HISTORY OF TIAS: ICD-10-CM

## 2025-07-25 DIAGNOSIS — F41.9 ANXIETY AND DEPRESSION: ICD-10-CM

## 2025-07-25 DIAGNOSIS — J96.91 RESPIRATORY FAILURE WITH HYPOXIA, UNSPECIFIED CHRONICITY (HCC): ICD-10-CM

## 2025-07-25 DIAGNOSIS — Z51.5 HOSPICE CARE: Primary | ICD-10-CM

## 2025-07-25 DIAGNOSIS — G47.30 SLEEP APNEA, UNSPECIFIED TYPE: ICD-10-CM

## 2025-07-25 DIAGNOSIS — Z87.898 HISTORY OF SEIZURE: ICD-10-CM

## 2025-07-25 DIAGNOSIS — F32.A ANXIETY AND DEPRESSION: ICD-10-CM

## 2025-07-25 DIAGNOSIS — G91.2 NPH (NORMAL PRESSURE HYDROCEPHALUS) (HCC): ICD-10-CM

## 2025-07-25 DIAGNOSIS — R25.1 TREMOR: ICD-10-CM

## 2025-07-25 DIAGNOSIS — Z86.718 HISTORY OF DEEP VENOUS THROMBOSIS (DVT) OF DISTAL VEIN OF LEFT LOWER EXTREMITY: ICD-10-CM

## 2025-07-25 NOTE — PROGRESS NOTES
Place of Service:  [facility]                                                                      Discharge Summary       Admit Dx:    Respiratory failure with hypoxia, unspecified chronicity (HCC)   Chest pressure  Brain tumor (HCC), non-operative     Disposition: Hospice care/service     Presentation:  See below    Discharge time : More than 30 minutes     Brief SNF Course:    Brenda Maharaj is a 89 y.o. female  admitted to  the SNF following a admission to the hospital from 07/06/2025 to 07/10/2025 .      She has past medical history of  brain tumor that is nonoperable, seizure, kidney stones, sleep apnea, HTN, and others .     She presented  for shortness of breath. She reported past 5 days of  difficult time breathing and coughing  .  She felt feverish.  Her breathing was worse with walking or laying flat.  The patient was from an  assisted living facility .  In addition, she had chest pressure.   She was hypoxic on room air and so placed on 2 L nasal cannula.  Chest x-ray was normal.  She  was given 1 dose of doxycycline. She was admitted for further medical management for hypoxic respiratory failure/aspiration pneumonia .Respiratory viral panel positive for Human Metapneumovirus.   . Possible sarcoidosis suggestive on CTA which showed no PE.   The patient was initially treated with antibiotic therapy but on discharge the antibiotics were discontinued.     The patient  also had some chest pressure , but troponin was within normal limit, and  ECHO not suggesting acute ischemia.     On 07/10/2025   The patient was evaluated for admission to [facility].  Prednisone started      On 07/18/2025   The patient was evaluated in follow up   Per the the nursing staff the patient did not have  good oral intake and sometimes she did spit out medications.      On 07/22/2025  The patient was evaluated in follow-up today.    The nursing staff reported that patient has  not eaten much at all and  refused taking

## 2025-08-29 ENCOUNTER — OFFICE VISIT (OUTPATIENT)
Facility: CLINIC | Age: 89
End: 2025-08-29

## 2025-08-29 DIAGNOSIS — D32.9 BENIGN NEOPLASM OF MENINGES, UNSPECIFIED (HCC): ICD-10-CM

## 2025-08-29 DIAGNOSIS — R63.8 DECREASED ORAL INTAKE: ICD-10-CM

## 2025-08-29 DIAGNOSIS — R25.1 TREMOR: ICD-10-CM

## 2025-08-29 DIAGNOSIS — Z87.898 HISTORY OF SEIZURE: ICD-10-CM

## 2025-08-29 DIAGNOSIS — Z87.19 HISTORY OF ESOPHAGEAL STRICTURE: ICD-10-CM

## 2025-08-29 DIAGNOSIS — J96.91 RESPIRATORY FAILURE WITH HYPOXIA, UNSPECIFIED CHRONICITY (HCC): ICD-10-CM

## 2025-08-29 DIAGNOSIS — I10 ESSENTIAL HYPERTENSION: ICD-10-CM

## 2025-08-29 DIAGNOSIS — F32.A ANXIETY AND DEPRESSION: ICD-10-CM

## 2025-08-29 DIAGNOSIS — G47.30 SLEEP APNEA, UNSPECIFIED TYPE: ICD-10-CM

## 2025-08-29 DIAGNOSIS — H35.30 MACULAR DEGENERATION, UNSPECIFIED LATERALITY, UNSPECIFIED TYPE: ICD-10-CM

## 2025-08-29 DIAGNOSIS — R07.89 CHEST PRESSURE: ICD-10-CM

## 2025-08-29 DIAGNOSIS — Z51.5 HOSPICE CARE: Primary | ICD-10-CM

## 2025-08-29 DIAGNOSIS — Z86.73 HISTORY OF TIAS: ICD-10-CM

## 2025-08-29 DIAGNOSIS — E83.52 HYPERCALCEMIA: ICD-10-CM

## 2025-08-29 DIAGNOSIS — N20.0 KIDNEY STONES: ICD-10-CM

## 2025-08-29 DIAGNOSIS — R13.10 DYSPHAGIA, UNSPECIFIED TYPE: ICD-10-CM

## 2025-08-29 DIAGNOSIS — Z86.718 HISTORY OF DEEP VENOUS THROMBOSIS (DVT) OF DISTAL VEIN OF LEFT LOWER EXTREMITY: ICD-10-CM

## 2025-08-29 DIAGNOSIS — F41.9 ANXIETY AND DEPRESSION: ICD-10-CM

## 2025-08-29 DIAGNOSIS — G91.2 NPH (NORMAL PRESSURE HYDROCEPHALUS) (HCC): ICD-10-CM

## 2025-08-29 DIAGNOSIS — D75.839 THROMBOCYTOSIS: ICD-10-CM

## (undated) DEVICE — ELECTRODE,RADIOTRANSLUCENT,FOAM,3PK: Brand: MEDLINE

## (undated) DEVICE — CATH IV AUTOGRD BC PNK 20GA 25 -- INSYTE

## (undated) DEVICE — INTENDED FOR TISSUE SEPARATION, AND OTHER PROCEDURES THAT REQUIRE A SHARP SURGICAL BLADE TO PUNCTURE OR CUT.: Brand: BARD-PARKER ® CARBON RIB-BACK BLADES

## (undated) DEVICE — PREP SKN PREVAIL 40ML APPL --

## (undated) DEVICE — INTENDED USED TO PROTECT, TAG AND HELP LOCATED SUTURES DURING SURGERY: Brand: STERION®SUTURE AID BOOTIES

## (undated) DEVICE — DRAPE,REIN 53X77,STERILE: Brand: MEDLINE

## (undated) DEVICE — 3M™ IOBAN™ 2 ANTIMICROBIAL INCISE DRAPE 6648EZ: Brand: IOBAN™ 2

## (undated) DEVICE — SOLIDIFIER MEDC 1200ML -- CONVERT TO 356117

## (undated) DEVICE — SUTURE VCRL SZ 4-0 L27IN ABSRB UD L19MM PS-2 3/8 CIR PRIM J426H

## (undated) DEVICE — SUTURE PERMAHAND SZ 2-0 L30IN NONABSORBABLE BLK SILK W/O A305H

## (undated) DEVICE — HANDLE LT SNAP ON ULT DURABLE LENS FOR TRUMPF ALC DISPOSABLE

## (undated) DEVICE — Z CONVERTED USE 2271365 TRAY SKIN W3XL3IN S STL STPL REMV GZ PD DISP MEDI PK

## (undated) DEVICE — TOWEL SURG W17XL27IN STD BLU COT NONFENESTRATED PREWASHED

## (undated) DEVICE — SURGICAL PROCEDURE PACK BASIN MAJ SET CUST NO CAUT

## (undated) DEVICE — ASTOUND STANDARD SURGICAL GOWN, XXL: Brand: CONVERTORS

## (undated) DEVICE — MEDI-VAC NON-CONDUCTIVE SUCTION TUBING: Brand: CARDINAL HEALTH

## (undated) DEVICE — STERILE POLYISOPRENE POWDER-FREE SURGICAL GLOVES WITH EMOLLIENT COATING: Brand: PROTEXIS

## (undated) DEVICE — BITEBLOCK ENDOSCP 60FR MAXI WHT POLYETH STURDY W/ VELC WVN

## (undated) DEVICE — SUTURE VCRL SZ 3-0 L27IN ABSRB UD L26MM SH 1/2 CIR J416H

## (undated) DEVICE — CANNULA CUSH AD W/ 14FT TBG

## (undated) DEVICE — 1200 GUARD II KIT W/5MM TUBE W/O VAC TUBE: Brand: GUARDIAN

## (undated) DEVICE — NDL FLTR TIP 5 MIC 18GX1.5IN --

## (undated) DEVICE — CODMAN® SURGICAL PATTIES 1/2" X 1/2" (1.27CM X 1.27CM): Brand: CODMAN®

## (undated) DEVICE — BAG BELONG PT PERS CLEAR HANDL

## (undated) DEVICE — DRAPE,SPINE,UNIVERSAL,ST,7/CS: Brand: MEDLINE

## (undated) DEVICE — Device

## (undated) DEVICE — 3M™ TEGADERM™ TRANSPARENT FILM DRESSING FRAME STYLE, 1626W, 4 IN X 4-3/4 IN (10 CM X 12 CM), 50/CT 4CT/CASE: Brand: 3M™ TEGADERM™

## (undated) DEVICE — CATH IV AUTOGRD BC BLU 22GA 25 -- INSYTE

## (undated) DEVICE — PASSER 48409 60CM DISP. CATHETER

## (undated) DEVICE — ADULT SPO2 SENSOR: Brand: NELLCOR

## (undated) DEVICE — STRAP POS KNEE BODY VELC

## (undated) DEVICE — BLADE ASSEMB CLP HAIR COARSE --

## (undated) DEVICE — 3M™ IOBAN™ 2 ANTIMICROBIAL INCISE DRAPE 6651EZ: Brand: IOBAN™ 2

## (undated) DEVICE — BASIN EMSIS 16OZ GRAPHITE PLAS KID SHP MOLD GRAD FOR ORAL

## (undated) DEVICE — ROCKER SWITCH PENCIL BLADE ELECTRODE, HOLSTER: Brand: EDGE

## (undated) DEVICE — 1016 S-DRAPE IRRIG POUCH 10/BOX: Brand: STERI-DRAPE™

## (undated) DEVICE — SET GRAV CK VLV NEEDLESS ST 3 GANGED 4WAY STPCOCK HI FLO 10

## (undated) DEVICE — ESOPHAGEAL/COLONIC/BILIARY WIREGUIDED BALLOON DILATATION CATHETER: Brand: CRE™ PRO

## (undated) DEVICE — SYR 10ML LUER LOK 1/5ML GRAD --

## (undated) DEVICE — SET ADMIN 16ML TBNG L100IN 2 Y INJ SITE IV PIGGY BK DISP

## (undated) DEVICE — NDL PRT INJ NSAF BLNT 18GX1.5 --

## (undated) DEVICE — 1010 S-DRAPE TOWEL DRAPE 10/BX: Brand: STERI-DRAPE™

## (undated) DEVICE — KIT COLON W/ 1.1OZ LUB AND 2 END

## (undated) DEVICE — ADPT IV LR STUB 160/190ML 18G --

## (undated) DEVICE — SURGIFOAM SPNG SZ 12-7

## (undated) DEVICE — DRAPE,EENT,SPLIT,STERILE: Brand: MEDLINE

## (undated) DEVICE — NEEDLE HYPO 22GA L1.5IN BLK S STL HUB POLYPR SHLD REG BVL

## (undated) DEVICE — SLIM BODY SKIN STAPLER: Brand: APPOSE ULC

## (undated) DEVICE — SOLUTION IV 250ML 0.9% SOD CHL CLR INJ FLX BG CONT PRT CLSR

## (undated) DEVICE — CUFF BLD PRSS AD SZ 11 FOR 25-34CM 1 TB TRIPURPOSE CONN

## (undated) DEVICE — CANN NASAL O2 CAPNOGRAPHY AD -- FILTERLINE

## (undated) DEVICE — BONE WAX WHITE: Brand: BONE WAX WHITE

## (undated) DEVICE — SUTURE VCRL SZ 2-0 L27IN ABSRB UD L26MM SH 1/2 CIR J417H

## (undated) DEVICE — 3M™ CUROS™ DISINFECTING CAP FOR NEEDLELESS CONNECTORS 270/CARTON 20 CARTONS/CASE CFF1-270: Brand: CUROS™

## (undated) DEVICE — BIPOLAR IRRIGATOR INTEGRATED TUBING AND BIPOLAR CORD SET, DISPOSABLE

## (undated) DEVICE — STRAP,POSITIONING,KNEE/BODY,FOAM,4X60": Brand: MEDLINE

## (undated) DEVICE — KENDALL RADIOLUCENT FOAM MONITORING ELECTRODE -RECTANGULAR SHAPE: Brand: KENDALL

## (undated) DEVICE — SYR 5ML 1/5 GRAD LL NSAF LF --

## (undated) DEVICE — ESOPHAGEAL BALLOON DILATATION CATHETER: Brand: CRE FIXED WIRE

## (undated) DEVICE — CUFF RMFG BP INF SZ 11 DISP -- LAWSON OEM ITEM 238915

## (undated) DEVICE — TOOL 9AC90 LEGEND 9CM 9MM AC: Brand: MIDAS REX

## (undated) DEVICE — PASSER 48407 CATHETER 38CM DISP.: Brand: CATHTER PASSER, DISPOSABLE

## (undated) DEVICE — REM POLYHESIVE ADULT PATIENT RETURN ELECTRODE: Brand: VALLEYLAB

## (undated) DEVICE — PACK,EENT,TURBAN DRAPE,PK II: Brand: MEDLINE

## (undated) DEVICE — SUTURE VCRL SZ 2-0 L18IN ABSRB UD L26MM CP-2 1/2 CIR REV J762D

## (undated) DEVICE — SUTURE NRLN SZ 4-0 L18IN NONABSORBABLE BLK L13MM TF 1/2 CIR C584D

## (undated) DEVICE — NEEDLE HYPO 25GA L1.5IN BVL ORIENTED ECLIPSE

## (undated) DEVICE — SUTURE VCRL SZ 3-0 L27IN ABSRB UD L17MM RB-1 1/2 CIR J215H

## (undated) DEVICE — INFECTION CONTROL KIT SYS

## (undated) DEVICE — PACKING 8004000 NEURAY 200PK 13X13MM: Brand: NEURAY ®

## (undated) DEVICE — SYR 3ML LL TIP 1/10ML GRAD --

## (undated) DEVICE — SPONGE GZ W4XL4IN COT 12 PLY TYP VII WVN C FLD DSGN